# Patient Record
Sex: FEMALE | Race: WHITE | Employment: UNEMPLOYED | URBAN - METROPOLITAN AREA
[De-identification: names, ages, dates, MRNs, and addresses within clinical notes are randomized per-mention and may not be internally consistent; named-entity substitution may affect disease eponyms.]

---

## 2017-02-02 ENCOUNTER — OFFICE VISIT (OUTPATIENT)
Dept: FAMILY MEDICINE CLINIC | Facility: CLINIC | Age: 47
End: 2017-02-02

## 2017-02-02 VITALS
HEIGHT: 65 IN | BODY MASS INDEX: 36.49 KG/M2 | DIASTOLIC BLOOD PRESSURE: 98 MMHG | WEIGHT: 219 LBS | HEART RATE: 78 BPM | SYSTOLIC BLOOD PRESSURE: 142 MMHG

## 2017-02-02 DIAGNOSIS — F41.9 ANXIETY: ICD-10-CM

## 2017-02-02 DIAGNOSIS — G47.9 SLEEP DISTURBANCE: ICD-10-CM

## 2017-02-02 DIAGNOSIS — M15.9 PRIMARY OSTEOARTHRITIS INVOLVING MULTIPLE JOINTS: ICD-10-CM

## 2017-02-02 DIAGNOSIS — L30.9 DERMATITIS: ICD-10-CM

## 2017-02-02 DIAGNOSIS — F43.0 STRESS REACTION: Primary | ICD-10-CM

## 2017-02-02 DIAGNOSIS — J45.20 MILD INTERMITTENT ASTHMA WITHOUT COMPLICATION: ICD-10-CM

## 2017-02-02 DIAGNOSIS — F34.1 DYSTHYMIA: ICD-10-CM

## 2017-02-02 DIAGNOSIS — R53.83 OTHER FATIGUE: ICD-10-CM

## 2017-02-02 DIAGNOSIS — I10 ESSENTIAL HYPERTENSION: ICD-10-CM

## 2017-02-02 PROCEDURE — 99203 OFFICE O/P NEW LOW 30 MIN: CPT | Performed by: NURSE PRACTITIONER

## 2017-02-02 RX ORDER — TRIAMCINOLONE ACETONIDE 5 MG/G
OINTMENT TOPICAL 2 TIMES DAILY PRN
Qty: 1 TUBE | Refills: 1 | Status: SHIPPED | OUTPATIENT
Start: 2017-02-02 | End: 2017-09-26 | Stop reason: ALTCHOICE

## 2017-02-02 RX ORDER — ALBUTEROL SULFATE 90 UG/1
2 AEROSOL, METERED RESPIRATORY (INHALATION) EVERY 6 HOURS PRN
Qty: 1 INHALER | Refills: 1 | Status: SHIPPED | OUTPATIENT
Start: 2017-02-02 | End: 2017-09-13 | Stop reason: SDUPTHER

## 2017-02-02 RX ORDER — METOPROLOL TARTRATE 50 MG/1
50 TABLET, FILM COATED ORAL 2 TIMES DAILY
Qty: 60 TABLET | Refills: 1 | Status: SHIPPED | OUTPATIENT
Start: 2017-02-02 | End: 2017-04-24 | Stop reason: SDUPTHER

## 2017-02-02 RX ORDER — TRIAMCINOLONE ACETONIDE 5 MG/G
OINTMENT TOPICAL 2 TIMES DAILY PRN
COMMUNITY
End: 2017-02-02 | Stop reason: SDUPTHER

## 2017-02-02 RX ORDER — OXYCODONE HYDROCHLORIDE AND ACETAMINOPHEN 5; 325 MG/1; MG/1
1 TABLET ORAL EVERY 4 HOURS PRN
COMMUNITY
End: 2019-10-24

## 2017-02-02 RX ORDER — ALPRAZOLAM 1 MG/1
1 TABLET ORAL DAILY PRN
Qty: 30 TABLET | Refills: 0 | Status: SHIPPED | OUTPATIENT
Start: 2017-02-02 | End: 2017-03-02 | Stop reason: SDUPTHER

## 2017-02-02 RX ORDER — LORATADINE 10 MG/1
10 TABLET ORAL DAILY
COMMUNITY
End: 2017-04-24 | Stop reason: SDUPTHER

## 2017-02-02 RX ORDER — IBUPROFEN 800 MG/1
800 TABLET ORAL EVERY 8 HOURS PRN
Qty: 90 TABLET | Refills: 0 | Status: SHIPPED | OUTPATIENT
Start: 2017-02-02 | End: 2017-03-02 | Stop reason: SDUPTHER

## 2017-02-02 ASSESSMENT — ENCOUNTER SYMPTOMS
SHORTNESS OF BREATH: 0
COUGH: 0

## 2017-03-02 DIAGNOSIS — M15.9 PRIMARY OSTEOARTHRITIS INVOLVING MULTIPLE JOINTS: ICD-10-CM

## 2017-03-02 DIAGNOSIS — F43.0 STRESS REACTION: ICD-10-CM

## 2017-03-02 DIAGNOSIS — F41.9 ANXIETY: ICD-10-CM

## 2017-03-02 RX ORDER — IBUPROFEN 800 MG/1
800 TABLET ORAL EVERY 8 HOURS PRN
Qty: 90 TABLET | Refills: 3 | Status: SHIPPED | OUTPATIENT
Start: 2017-03-02 | End: 2017-07-13 | Stop reason: SDUPTHER

## 2017-03-02 RX ORDER — ALPRAZOLAM 1 MG/1
1 TABLET ORAL DAILY PRN
Qty: 30 TABLET | Refills: 0 | Status: SHIPPED | OUTPATIENT
Start: 2017-03-02 | End: 2017-04-04 | Stop reason: SDUPTHER

## 2017-03-30 ENCOUNTER — HOSPITAL ENCOUNTER (OUTPATIENT)
Age: 47
Setting detail: SPECIMEN
Discharge: HOME | End: 2017-03-30
Payer: MEDICARE

## 2017-03-30 ENCOUNTER — OFFICE VISIT (OUTPATIENT)
Dept: FAMILY MEDICINE CLINIC | Age: 47
End: 2017-03-30
Payer: MEDICARE

## 2017-03-30 VITALS
TEMPERATURE: 97.1 F | DIASTOLIC BLOOD PRESSURE: 88 MMHG | HEART RATE: 64 BPM | BODY MASS INDEX: 38.65 KG/M2 | WEIGHT: 232 LBS | SYSTOLIC BLOOD PRESSURE: 124 MMHG | HEIGHT: 65 IN

## 2017-03-30 DIAGNOSIS — Z11.4 SCREENING FOR HIV WITHOUT PRESENCE OF RISK FACTORS: ICD-10-CM

## 2017-03-30 DIAGNOSIS — J01.00 ACUTE NON-RECURRENT MAXILLARY SINUSITIS: Primary | ICD-10-CM

## 2017-03-30 DIAGNOSIS — H61.21 IMPACTED CERUMEN OF RIGHT EAR: ICD-10-CM

## 2017-03-30 LAB — HIV AG/AB: NONREACTIVE

## 2017-03-30 PROCEDURE — 69210 REMOVE IMPACTED EAR WAX UNI: CPT | Performed by: NURSE PRACTITIONER

## 2017-03-30 PROCEDURE — 99213 OFFICE O/P EST LOW 20 MIN: CPT | Performed by: NURSE PRACTITIONER

## 2017-03-30 PROCEDURE — 36415 COLL VENOUS BLD VENIPUNCTURE: CPT | Performed by: NURSE PRACTITIONER

## 2017-03-30 RX ORDER — SULFAMETHOXAZOLE AND TRIMETHOPRIM 800; 160 MG/1; MG/1
1 TABLET ORAL 2 TIMES DAILY
Qty: 20 TABLET | Refills: 0 | Status: SHIPPED | OUTPATIENT
Start: 2017-03-30 | End: 2017-04-09

## 2017-03-30 RX ORDER — GABAPENTIN 300 MG/1
300 CAPSULE ORAL 3 TIMES DAILY
COMMUNITY
End: 2017-09-26 | Stop reason: DRUGHIGH

## 2017-03-30 ASSESSMENT — ENCOUNTER SYMPTOMS
SINUS PRESSURE: 1
SORE THROAT: 0
SHORTNESS OF BREATH: 0
SWOLLEN GLANDS: 0

## 2017-04-03 DIAGNOSIS — F43.0 STRESS REACTION: ICD-10-CM

## 2017-04-03 DIAGNOSIS — F41.9 ANXIETY: ICD-10-CM

## 2017-04-03 RX ORDER — ALPRAZOLAM 1 MG/1
TABLET, ORALLY DISINTEGRATING ORAL
Qty: 30 TABLET | Refills: 0 | OUTPATIENT
Start: 2017-04-03

## 2017-04-04 RX ORDER — ALPRAZOLAM 1 MG/1
1 TABLET ORAL DAILY PRN
Qty: 30 TABLET | Refills: 0 | Status: SHIPPED | OUTPATIENT
Start: 2017-04-04 | End: 2017-05-02 | Stop reason: SDUPTHER

## 2017-04-24 ENCOUNTER — OFFICE VISIT (OUTPATIENT)
Dept: FAMILY MEDICINE CLINIC | Age: 47
End: 2017-04-24
Payer: MEDICARE

## 2017-04-24 VITALS
DIASTOLIC BLOOD PRESSURE: 98 MMHG | HEIGHT: 65 IN | SYSTOLIC BLOOD PRESSURE: 132 MMHG | HEART RATE: 60 BPM | TEMPERATURE: 97.4 F | BODY MASS INDEX: 38.49 KG/M2 | WEIGHT: 231 LBS

## 2017-04-24 DIAGNOSIS — L30.9 DERMATITIS: ICD-10-CM

## 2017-04-24 DIAGNOSIS — M25.551 CHRONIC RIGHT HIP PAIN: Primary | ICD-10-CM

## 2017-04-24 DIAGNOSIS — I10 ESSENTIAL HYPERTENSION: ICD-10-CM

## 2017-04-24 DIAGNOSIS — M15.9 PRIMARY OSTEOARTHRITIS INVOLVING MULTIPLE JOINTS: ICD-10-CM

## 2017-04-24 DIAGNOSIS — G89.29 CHRONIC RIGHT HIP PAIN: Primary | ICD-10-CM

## 2017-04-24 DIAGNOSIS — J30.2 SEASONAL ALLERGIC RHINITIS, UNSPECIFIED ALLERGIC RHINITIS TRIGGER: ICD-10-CM

## 2017-04-24 PROCEDURE — 99214 OFFICE O/P EST MOD 30 MIN: CPT | Performed by: NURSE PRACTITIONER

## 2017-04-24 RX ORDER — LORATADINE 10 MG/1
10 TABLET ORAL DAILY
Qty: 30 TABLET | Refills: 3 | Status: SHIPPED | OUTPATIENT
Start: 2017-04-24 | End: 2018-07-24 | Stop reason: SDUPTHER

## 2017-04-24 RX ORDER — METOPROLOL TARTRATE 50 MG/1
50 TABLET, FILM COATED ORAL 2 TIMES DAILY
Qty: 60 TABLET | Refills: 3 | Status: SHIPPED | OUTPATIENT
Start: 2017-04-24 | End: 2017-09-13 | Stop reason: SDUPTHER

## 2017-04-24 RX ORDER — DIPHENHYDRAMINE HCL 50 MG
50 CAPSULE ORAL EVERY 6 HOURS PRN
Qty: 120 CAPSULE | Refills: 0 | Status: SHIPPED | OUTPATIENT
Start: 2017-04-24 | End: 2017-05-24

## 2017-04-24 ASSESSMENT — ENCOUNTER SYMPTOMS
SHORTNESS OF BREATH: 0
COUGH: 0
RHINORRHEA: 1
EYE REDNESS: 1
EYE ITCHING: 1

## 2017-04-26 RX ORDER — FLUTICASONE PROPIONATE 50 MCG
1 SPRAY, SUSPENSION (ML) NASAL DAILY
Qty: 1 BOTTLE | Refills: 1 | Status: SHIPPED | OUTPATIENT
Start: 2017-04-26 | End: 2018-07-24 | Stop reason: SDUPTHER

## 2017-05-02 DIAGNOSIS — F41.9 ANXIETY: ICD-10-CM

## 2017-05-02 DIAGNOSIS — F43.0 STRESS REACTION: ICD-10-CM

## 2017-05-02 RX ORDER — ALPRAZOLAM 1 MG/1
1 TABLET ORAL DAILY PRN
Qty: 30 TABLET | Refills: 0 | Status: SHIPPED | OUTPATIENT
Start: 2017-05-02 | End: 2017-06-05 | Stop reason: SDUPTHER

## 2017-05-16 ENCOUNTER — OFFICE VISIT (OUTPATIENT)
Dept: FAMILY MEDICINE CLINIC | Age: 47
End: 2017-05-16
Payer: MEDICARE

## 2017-05-16 VITALS
DIASTOLIC BLOOD PRESSURE: 98 MMHG | WEIGHT: 226.5 LBS | HEIGHT: 65 IN | HEART RATE: 68 BPM | SYSTOLIC BLOOD PRESSURE: 148 MMHG | BODY MASS INDEX: 37.74 KG/M2

## 2017-05-16 DIAGNOSIS — M79.672 LEFT FOOT PAIN: Primary | ICD-10-CM
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
## 2017-05-16 DIAGNOSIS — Z12.31 ENCOUNTER FOR SCREENING MAMMOGRAM FOR BREAST CANCER: ICD-10-CM

## 2017-05-16 PROCEDURE — 99213 OFFICE O/P EST LOW 20 MIN: CPT | Performed by: NURSE PRACTITIONER

## 2017-05-16 RX ORDER — LIDOCAINE HYDROCHLORIDE 40 MG/ML
SOLUTION TOPICAL
COMMUNITY
Start: 2017-05-01 | End: 2017-09-26 | Stop reason: ALTCHOICE

## 2017-05-16 ASSESSMENT — ENCOUNTER SYMPTOMS: RESPIRATORY NEGATIVE: 1

## 2017-05-16 ASSESSMENT — PATIENT HEALTH QUESTIONNAIRE - PHQ9
2. FEELING DOWN, DEPRESSED OR HOPELESS: 0
SUM OF ALL RESPONSES TO PHQ QUESTIONS 1-9: 0
SUM OF ALL RESPONSES TO PHQ9 QUESTIONS 1 & 2: 0
1. LITTLE INTEREST OR PLEASURE IN DOING THINGS: 0

## 2017-06-05 DIAGNOSIS — F41.9 ANXIETY: ICD-10-CM

## 2017-06-05 DIAGNOSIS — F43.0 STRESS REACTION: ICD-10-CM

## 2017-06-05 RX ORDER — ALPRAZOLAM 1 MG/1
1 TABLET ORAL DAILY PRN
Qty: 30 TABLET | Refills: 0 | Status: SHIPPED | OUTPATIENT
Start: 2017-06-05 | End: 2017-07-03 | Stop reason: SDUPTHER

## 2017-07-03 DIAGNOSIS — F43.0 STRESS REACTION: ICD-10-CM

## 2017-07-03 DIAGNOSIS — F41.9 ANXIETY: ICD-10-CM

## 2017-07-03 RX ORDER — ALPRAZOLAM 1 MG/1
TABLET ORAL
Qty: 30 TABLET | Refills: 0 | Status: SHIPPED | OUTPATIENT
Start: 2017-07-03 | End: 2017-08-07 | Stop reason: SDUPTHER

## 2017-07-13 DIAGNOSIS — M15.9 PRIMARY OSTEOARTHRITIS INVOLVING MULTIPLE JOINTS: ICD-10-CM

## 2017-07-13 RX ORDER — IBUPROFEN 800 MG/1
800 TABLET ORAL EVERY 8 HOURS PRN
Qty: 90 TABLET | Refills: 1 | Status: SHIPPED | OUTPATIENT
Start: 2017-07-13 | End: 2017-09-13 | Stop reason: SDUPTHER

## 2017-08-07 DIAGNOSIS — F43.0 STRESS REACTION: ICD-10-CM

## 2017-08-07 DIAGNOSIS — F41.9 ANXIETY: ICD-10-CM

## 2017-08-08 RX ORDER — ALPRAZOLAM 1 MG/1
1 TABLET ORAL NIGHTLY PRN
Qty: 30 TABLET | Refills: 0 | Status: SHIPPED | OUTPATIENT
Start: 2017-08-08 | End: 2017-09-06 | Stop reason: SDUPTHER

## 2017-08-22 ENCOUNTER — OFFICE VISIT (OUTPATIENT)
Dept: FAMILY MEDICINE CLINIC | Age: 47
End: 2017-08-22
Payer: MEDICARE

## 2017-08-22 VITALS
HEIGHT: 65 IN | WEIGHT: 224 LBS | HEART RATE: 60 BPM | BODY MASS INDEX: 37.32 KG/M2 | SYSTOLIC BLOOD PRESSURE: 140 MMHG | DIASTOLIC BLOOD PRESSURE: 90 MMHG

## 2017-08-22 DIAGNOSIS — I10 ESSENTIAL HYPERTENSION: ICD-10-CM

## 2017-08-22 DIAGNOSIS — M25.562 ACUTE PAIN OF LEFT KNEE: Primary | ICD-10-CM

## 2017-08-22 DIAGNOSIS — M15.9 PRIMARY OSTEOARTHRITIS INVOLVING MULTIPLE JOINTS: ICD-10-CM

## 2017-08-22 DIAGNOSIS — M79.7 FIBROMYALGIA: ICD-10-CM

## 2017-08-22 PROCEDURE — 99214 OFFICE O/P EST MOD 30 MIN: CPT | Performed by: NURSE PRACTITIONER

## 2017-08-22 RX ORDER — DULOXETIN HYDROCHLORIDE 30 MG/1
30 CAPSULE, DELAYED RELEASE ORAL DAILY
COMMUNITY
End: 2019-08-19

## 2017-08-22 RX ORDER — AMLODIPINE BESYLATE 10 MG/1
10 TABLET ORAL DAILY
Qty: 30 TABLET | Refills: 3 | Status: SHIPPED | OUTPATIENT
Start: 2017-08-22 | End: 2018-01-23 | Stop reason: SDUPTHER

## 2017-08-22 ASSESSMENT — ENCOUNTER SYMPTOMS: BLURRED VISION: 0

## 2017-08-23 ENCOUNTER — TELEPHONE (OUTPATIENT)
Dept: FAMILY MEDICINE CLINIC | Age: 47
End: 2017-08-23

## 2017-08-23 DIAGNOSIS — M79.672 LEFT FOOT PAIN: ICD-10-CM

## 2017-08-23 DIAGNOSIS — M25.562 ACUTE PAIN OF LEFT KNEE: ICD-10-CM

## 2017-08-23 DIAGNOSIS — M25.562 CHRONIC PAIN OF LEFT KNEE: ICD-10-CM

## 2017-08-23 DIAGNOSIS — M79.7 FIBROMYALGIA: ICD-10-CM

## 2017-08-23 DIAGNOSIS — M17.12 PRIMARY OSTEOARTHRITIS OF LEFT KNEE: Primary | ICD-10-CM

## 2017-08-23 DIAGNOSIS — M77.32 HEEL SPUR, LEFT: ICD-10-CM
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
## 2017-08-23 DIAGNOSIS — M15.9 PRIMARY OSTEOARTHRITIS INVOLVING MULTIPLE JOINTS: ICD-10-CM

## 2017-08-23 DIAGNOSIS — G89.29 CHRONIC PAIN OF LEFT KNEE: ICD-10-CM

## 2017-09-06 DIAGNOSIS — F43.0 STRESS REACTION: ICD-10-CM

## 2017-09-06 DIAGNOSIS — F41.9 ANXIETY: ICD-10-CM

## 2017-09-06 RX ORDER — ALPRAZOLAM 1 MG/1
1 TABLET ORAL NIGHTLY PRN
Qty: 30 TABLET | Refills: 0 | Status: SHIPPED | OUTPATIENT
Start: 2017-09-06 | End: 2017-10-12 | Stop reason: SDUPTHER

## 2017-09-13 DIAGNOSIS — I10 ESSENTIAL HYPERTENSION: ICD-10-CM

## 2017-09-13 DIAGNOSIS — M15.9 PRIMARY OSTEOARTHRITIS INVOLVING MULTIPLE JOINTS: ICD-10-CM

## 2017-09-13 DIAGNOSIS — J45.20 MILD INTERMITTENT ASTHMA WITHOUT COMPLICATION: ICD-10-CM

## 2017-09-13 RX ORDER — METOPROLOL TARTRATE 50 MG/1
50 TABLET, FILM COATED ORAL 2 TIMES DAILY
Qty: 60 TABLET | Refills: 2 | Status: SHIPPED | OUTPATIENT
Start: 2017-09-13 | End: 2017-12-18 | Stop reason: SDUPTHER

## 2017-09-13 RX ORDER — ALBUTEROL SULFATE 90 UG/1
2 AEROSOL, METERED RESPIRATORY (INHALATION) EVERY 6 HOURS PRN
Qty: 1 INHALER | Refills: 3 | Status: SHIPPED | OUTPATIENT
Start: 2017-09-13 | End: 2018-03-19 | Stop reason: SDUPTHER

## 2017-09-13 RX ORDER — IBUPROFEN 800 MG/1
800 TABLET ORAL EVERY 8 HOURS PRN
Qty: 90 TABLET | Refills: 1 | Status: SHIPPED | OUTPATIENT
Start: 2017-09-13 | End: 2017-12-11 | Stop reason: SDUPTHER

## 2017-09-26 ENCOUNTER — OFFICE VISIT (OUTPATIENT)
Dept: FAMILY MEDICINE CLINIC | Age: 47
End: 2017-09-26
Payer: MEDICARE

## 2017-09-26 VITALS
HEART RATE: 60 BPM | BODY MASS INDEX: 36.28 KG/M2 | WEIGHT: 218 LBS | SYSTOLIC BLOOD PRESSURE: 138 MMHG | DIASTOLIC BLOOD PRESSURE: 88 MMHG

## 2017-09-26 DIAGNOSIS — M79.672 LEFT FOOT PAIN: ICD-10-CM

## 2017-09-26 DIAGNOSIS — M25.562 LEFT KNEE PAIN, UNSPECIFIED CHRONICITY: ICD-10-CM

## 2017-09-26 DIAGNOSIS — F43.0 STRESS REACTION: Primary | ICD-10-CM

## 2017-09-26 DIAGNOSIS — F41.9 ANXIETY: ICD-10-CM

## 2017-09-26 LAB
BASOPHILS ABSOLUTE: NORMAL /ΜL
BASOPHILS RELATIVE PERCENT: NORMAL %
EOSINOPHILS ABSOLUTE: NORMAL /ΜL
EOSINOPHILS RELATIVE PERCENT: NORMAL %
HCT VFR BLD CALC: NORMAL % (ref 36–46)
HEMOGLOBIN: NORMAL G/DL (ref 12–16)
LYMPHOCYTES ABSOLUTE: NORMAL /ΜL
LYMPHOCYTES RELATIVE PERCENT: NORMAL %
MCH RBC QN AUTO: NORMAL PG
MCHC RBC AUTO-ENTMCNC: NORMAL G/DL
MCV RBC AUTO: NORMAL FL
MONOCYTES ABSOLUTE: NORMAL /ΜL
MONOCYTES RELATIVE PERCENT: NORMAL %
NEUTROPHILS ABSOLUTE: NORMAL /ΜL
NEUTROPHILS RELATIVE PERCENT: NORMAL %
PDW BLD-RTO: NORMAL %
PLATELET # BLD: NORMAL K/ΜL
PMV BLD AUTO: NORMAL FL
RBC # BLD: NORMAL 10^6/ΜL
VITAMIN D 25-HYDROXY: NORMAL
VITAMIN D2, 25 HYDROXY: NORMAL
VITAMIN D3,25 HYDROXY: NORMAL
WBC # BLD: NORMAL 10^3/ML

## 2017-09-26 PROCEDURE — 99213 OFFICE O/P EST LOW 20 MIN: CPT | Performed by: NURSE PRACTITIONER

## 2017-09-26 PROCEDURE — 36415 COLL VENOUS BLD VENIPUNCTURE: CPT | Performed by: NURSE PRACTITIONER

## 2017-09-26 RX ORDER — GABAPENTIN 600 MG/1
TABLET ORAL 3 TIMES DAILY
Refills: 0 | COMMUNITY
Start: 2017-09-21 | End: 2018-07-24 | Stop reason: ALTCHOICE

## 2017-09-28 ENCOUNTER — TELEPHONE (OUTPATIENT)
Dept: FAMILY MEDICINE CLINIC | Age: 47
End: 2017-09-28

## 2017-09-28 DIAGNOSIS — R53.83 FATIGUE, UNSPECIFIED TYPE: ICD-10-CM

## 2017-09-28 DIAGNOSIS — F34.1 DYSTHYMIA: ICD-10-CM

## 2017-09-28 DIAGNOSIS — G47.9 SLEEP DISTURBANCE: ICD-10-CM

## 2017-10-12 DIAGNOSIS — F43.0 STRESS REACTION: ICD-10-CM

## 2017-10-12 DIAGNOSIS — F41.9 ANXIETY: ICD-10-CM

## 2017-10-12 RX ORDER — ALPRAZOLAM 1 MG/1
1 TABLET ORAL NIGHTLY PRN
Qty: 30 TABLET | Refills: 0 | Status: SHIPPED | OUTPATIENT
Start: 2017-10-12 | End: 2017-11-27 | Stop reason: SDUPTHER

## 2017-10-12 NOTE — TELEPHONE ENCOUNTER
Fax request, last appt 9/26/17    Health Maintenance   Topic Date Due    Cervical cancer screen  02/07/1989    Flu vaccine (1) 02/02/2018 (Originally 9/1/2017)    DTaP/Tdap/Td vaccine (1 - Tdap) 03/30/2018 (Originally 2/7/1987)    Pneumococcal med risk (1 of 1 - PPSV23) 03/30/2018 (Originally 2/7/1987)    Diabetes screen  08/24/2018    Lipid screen  04/18/2021    HIV screen  Completed             (applicable per patient's age: Cancer Screenings, Depression Screening, Fall Risk Screening, Immunizations)    No results found for: LABA1C, LABMICR, LDLCHOLESTEROL, LDLCALC, AST, ALT, BUN   (goal A1C is < 7)   (goal LDL is <100) need 30-50% reduction from baseline     BP Readings from Last 3 Encounters:   09/26/17 138/88   08/22/17 (!) 140/90   05/16/17 (!) 148/98    (goal /80)      All Future Testing planned in [facility]:  Lab Frequency Next Occurrence   XR HIP RIGHT STANDARD Once 07/26/2017   ASIF DIGITAL SCREEN BILATERAL Once 08/17/2017       Next Visit Date:  No future appointments.          Patient Active Problem List:     Stress reaction     Essential hypertension     Dysthymia     Anxiety     Mild intermittent asthma without complication     Dermatitis     Osteoarthritis of multiple joints     Fibromyalgia

## 2017-11-27 DIAGNOSIS — F41.9 ANXIETY: ICD-10-CM

## 2017-11-27 DIAGNOSIS — F43.0 STRESS REACTION: ICD-10-CM

## 2017-11-27 NOTE — TELEPHONE ENCOUNTER
Pharmacy request refill last in office 09/26/2017.   Health Maintenance   Topic Date Due    Cervical cancer screen  02/07/1989    Flu vaccine (1) 02/02/2018 (Originally 9/1/2017)    DTaP/Tdap/Td vaccine (1 - Tdap) 03/30/2018 (Originally 2/7/1987)    Pneumococcal med risk (1 of 1 - PPSV23) 03/30/2018 (Originally 2/7/1987)    Diabetes screen  08/24/2018    Lipid screen  04/18/2021    HIV screen  Completed             (applicable per patient's age: Cancer Screenings, Depression Screening, Fall Risk Screening, Immunizations)    No results found for: LABA1C, LABMICR, LDLCHOLESTEROL, LDLCALC, AST, ALT, BUN   (goal A1C is < 7)   (goal LDL is <100) need 30-50% reduction from baseline     BP Readings from Last 3 Encounters:   09/26/17 138/88   08/22/17 (!) 140/90   05/16/17 (!) 148/98    (goal /80)      All Future Testing planned in [facility]:  Lab Frequency Next Occurrence   XR HIP RIGHT STANDARD Once 07/26/2017   ASIF DIGITAL SCREEN BILATERAL Once 08/17/2017       Next Visit Date:  Future Appointments  Date Time Provider Phoebe Boogie   11/28/2017 10:30 AM Bruno Bernstein, PT [facility] None            Patient Active Problem List:     Stress reaction     Essential hypertension     Dysthymia     Anxiety     Mild intermittent asthma without complication     Dermatitis     Osteoarthritis of multiple joints     Fibromyalgia

## 2017-11-28 RX ORDER — ALPRAZOLAM 1 MG/1
1 TABLET ORAL NIGHTLY PRN
Qty: 30 TABLET | Refills: 0 | Status: SHIPPED | OUTPATIENT
Start: 2017-11-28 | End: 2017-12-26 | Stop reason: SDUPTHER

## 2017-11-30 ENCOUNTER — HOSPITAL ENCOUNTER (OUTPATIENT)
Dept: PHYSICAL THERAPY | Facility: CLINIC | Age: 47
Setting detail: THERAPIES SERIES
Discharge: HOME | End: 2017-11-30
Payer: MEDICARE

## 2017-11-30 PROCEDURE — 97110 THERAPEUTIC EXERCISES: CPT

## 2017-11-30 PROCEDURE — 97161 PT EVAL LOW COMPLEX 20 MIN: CPT

## 2017-11-30 PROCEDURE — 97113 AQUATIC THERAPY/EXERCISES: CPT

## 2017-11-30 NOTE — FLOWSHEET NOTE
[] [facility]       Physical Therapy [facility]       [address]        [address]       Phone: [phone]       Fax: [phone] [] [facility]       Promotion at [address] [facility]       Phone: [phone]       Fax: [phone] [x] Tyrese. [facility]  [address] [place] [address]   Phone: [phone]   Fax:  [phone]     Physical Therapy Daily  Aquatic Treatment Note    Date:  2017  Patient Name:  Lorraine Benton    :  1970  MRN: [id number]  Physician: Dr. Jv Villar: Roberto Gold (27 v)  Medical Diagnosis: Fibromyalgia                 Rehab Codes: M51.26  Onset Date:                    Next 's appt.: na      Visit# / total visits:  (2x wk)  Cancels/No Shows:     Subjective:    Pain:  [x] Yes  [] No Location: bilat shoulders, elbows, lower back, knees, feet   Pain Rating: (0-10 scale) 4-5/10  Pain altered Tx:  [x] No  [] Yes  Action:  Comments: (prefers Marlow Babinski or [address] [place] [address]) Pt reports is always in pn with any action or WB and averages around 4-5/10 pn daily.      Objective:     KEY  B = Belt G = Gloves N = Noodle   C = Cuffs K = Kickboard P = Paddles   CC = Cervical Collar L = Laps T = Theratube   DB = Dumbells M = Minutes W = Weights     Exercises/Activities  Warm-up/Amb     Dynamic Exercises       Forward 3L    March       Sideways 3L    Squat       Backwards 3L    Retro HS curls            Retro SLR       Stretches     Braiding       Gastroc/Soleus     Heel to Toe amb       Hamstring     Toe amb       Hip flexor     Heel amb       Piriformis            SKTC            Pec Stretch            Post Deltoid     Static Exercises UE            Shoulder flex/ext 12      Static Exercises LE     Shoulder abd/add 12      Heel/toe raises 12    Shoulder H.  abd/add 12      Marches 12    Shoulder IR/ER       Mini-squats 12    Rowing 12      4-way hip  12    Arm Circles       Hamstring curls 12    UT shrugs/rolls       Hip Circles/Fig 8     Scap squeezes       Ankle ROM 12    Diagonals 1/2       Lunges      Elbow flex/ext            Pron/Sup       Functional Exercise     Wrist AROM       Step            Wall Push-ups     Deep H20/       SLS     Bike 2'      Breast Stroke on Noodle     Hip abd/add       Noodle Twist     Hip flex/ext       Noodle Push down     Hip IR/ER       Kickboard push/pull     Knee flex/ext            Push/pull on [employer] 5'      Other:    Specific Instructions for next treatment:    Treatment Charges: Mins Units   []  Modalities     []  Ther Exercise     []  Manual Therapy     []  Ther Activities     [x]  Aquatics 35 2   []  Other       Assessment: [x] Progressing toward goals. Initiated full aquatic therapy this date with ed on benefits of aquatics and levels of gravity elimination. Demonstration with vc for each ex was provided for proper technique and mm activation. Pt had good marcos and understanding of ex with min L foot discomfort with dorsiflexion in mini squats but was mracos. Ed pt on DOMS with plans on progressing next tx if pt marcos. [] No change. [] Other:    STG: (to be met in 10 treatments)  1. ? Pain: Pt to decrease pain levels to 3/10 with ADLs  2. ? ROM: Increase flexibility throughout to ease ADL progression  3. ? Strength: Improve strength to 5/5 MMT throughout limitations  4. ? Function: Allow patient to perform stairs at home, community without pain   5. Independent with Home Exercise Programs  6. Demonstrate Knowledge of fall prevention     LTG: (to be met in 20 treatments)  1. Improve score of functional assessment tool LEFS from 50% impairment to less than 20% impairment   2. Decrease pain to 0-1/10 wth ADLs to ease functional motions    Pt.  Education:  [x] Yes  [] No  [] Reviewed Prior HEP/Ed  Method of Education: [x] Verbal  [] Demo  [] Written  Comprehension of Education:  [x] [employer]

## 2017-11-30 NOTE — CONSULTS
Phosphate 40-80 mAmin  [x] Aquatic Therapy   [] Other:    []  Medication allergies reviewed for use of    Dexamethasone Sodium Phosphate 4mg/ml     with iontophoresis treatments. Pt is not allergic. Frequency:  2 x/week for 20 visits      Todays Treatment:  Treatment Location  Left      Right                          Position   Bilat UE/LEs []          []  [] Supine    [] Prone   [] Side lying  [] Sitting                 Exercises:  Exercise Reps/ Time Weight/ Level Comments   Pool                                                                                                       Other:    Specific Instructions for next treatment: Advance in aquatic program     Treatment Charges: Mins Units   [x] Evaluation       [x]  Low       []  Moderate       []  High 30 1   []  Modalities     [x]  Ther Exercise 10 1   []  Manual Therapy     []  Ther Activities     []  Aquatics     []  Vasocompression     []  Other       TOTAL TREATMENT TIME: 40    Time in: 1154      Time out: 4202    Electronically signed by: Gigi Barrow PT    Physician Signature:________________________________Date:__________________  By signing above or cosigning this note, I have reviewed this plan of care and certify a need for medically necessary rehabilitation services.      *PLEASE SIGN ABOVE AND FAX BACK ALL PAGES*

## 2017-12-11 DIAGNOSIS — M15.9 PRIMARY OSTEOARTHRITIS INVOLVING MULTIPLE JOINTS: ICD-10-CM

## 2017-12-11 RX ORDER — IBUPROFEN 800 MG/1
800 TABLET ORAL EVERY 8 HOURS PRN
Qty: 90 TABLET | Refills: 1 | Status: SHIPPED | OUTPATIENT
Start: 2017-12-11 | End: 2018-07-24 | Stop reason: ALTCHOICE

## 2017-12-11 NOTE — TELEPHONE ENCOUNTER
Fax from pharmacy  Health Maintenance   Topic Date Due    Cervical cancer screen  02/07/1989    Flu vaccine (1) 02/02/2018 (Originally 9/1/2017)    DTaP/Tdap/Td vaccine (1 - Tdap) 03/30/2018 (Originally 2/7/1987)    Pneumococcal med risk (1 of 1 - PPSV23) 03/30/2018 (Originally 2/7/1987)    Diabetes screen  08/24/2018    Lipid screen  04/18/2021    HIV screen  Completed             (applicable per patient's age: Cancer Screenings, Depression Screening, Fall Risk Screening, Immunizations)    No results found for: LABA1C, LABMICR, LDLCHOLESTEROL, LDLCALC, AST, ALT, BUN   (goal A1C is < 7)   (goal LDL is <100) need 30-50% reduction from baseline     BP Readings from Last 3 Encounters:   09/26/17 138/88   08/22/17 (!) 140/90   05/16/17 (!) 148/98    (goal /80)      All Future Testing planned in [facility]:  Lab Frequency Next Occurrence   XR HIP RIGHT STANDARD Once 07/26/2017   ASIF DIGITAL SCREEN BILATERAL Once 08/17/2017       Next Visit Date:  No future appointments.          Patient Active Problem List:     Stress reaction     Essential hypertension     Dysthymia     Anxiety     Mild intermittent asthma without complication     Dermatitis     Osteoarthritis of multiple joints     Fibromyalgia

## 2017-12-13 ENCOUNTER — HOSPITAL ENCOUNTER (OUTPATIENT)
Dept: PHYSICAL THERAPY | Facility: CLINIC | Age: 47
Setting detail: THERAPIES SERIES
Discharge: HOME | End: 2017-12-13
Payer: MEDICARE

## 2017-12-13 NOTE — FLOWSHEET NOTE
[] Charla Medrano        Outpatient Physical                Therapy       [address]       Phone: [phone]       Fax: [phone] [] [facility] at [address] [place] [address]       Phone: [phone]       Fax: [phone] [x] [facility]     [address] [place] [address]      Phone: [phone]      Fax:  [phone]     Physical Therapy Cancel/No Show note    Date: 2017  Patient: Gavino David  : 1970  MRN: [id number]    Cancels/No Shows to date:     For today's appointment patient:  [x]  Cancelled  []  Rescheduled appointment  []  No-show     Reason given by patient:  []  Patient ill  []  Conflicting appointment  []  No transportation    []  Conflict with work  []  No reason given  [x]  Weather related  []  Other:      Comments:      [x]  Next appointment was confirmed    Electronically signed by: Tommy Galeano

## 2017-12-14 ENCOUNTER — OFFICE VISIT (OUTPATIENT)
Dept: FAMILY MEDICINE CLINIC | Age: 47
End: 2017-12-14
Payer: MEDICARE

## 2017-12-14 ENCOUNTER — HOSPITAL ENCOUNTER (OUTPATIENT)
Dept: PHYSICAL THERAPY | Facility: CLINIC | Age: 47
Setting detail: THERAPIES SERIES
Discharge: HOME | End: 2017-12-14
Payer: MEDICARE

## 2017-12-14 VITALS — DIASTOLIC BLOOD PRESSURE: 112 MMHG | HEART RATE: 76 BPM | SYSTOLIC BLOOD PRESSURE: 164 MMHG
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
## 2017-12-14 DIAGNOSIS — J34.89 NASAL TENDERNESS: ICD-10-CM

## 2017-12-14 DIAGNOSIS — S83.92XA SPRAIN OF LEFT KNEE, UNSPECIFIED LIGAMENT, INITIAL ENCOUNTER: ICD-10-CM

## 2017-12-14 DIAGNOSIS — R51.9 LEFT-SIDED FACE PAIN: ICD-10-CM

## 2017-12-14 DIAGNOSIS — S05.12XA TRAUMATIC HEMATOMA OF LEFT ORBIT, INITIAL ENCOUNTER: ICD-10-CM

## 2017-12-14 PROCEDURE — G8484 FLU IMMUNIZE NO ADMIN: HCPCS | Performed by: NURSE PRACTITIONER

## 2017-12-14 PROCEDURE — 99214 OFFICE O/P EST MOD 30 MIN: CPT | Performed by: NURSE PRACTITIONER

## 2017-12-14 PROCEDURE — G8417 CALC BMI ABV UP PARAM F/U: HCPCS | Performed by: NURSE PRACTITIONER

## 2017-12-14 PROCEDURE — G8427 DOCREV CUR MEDS BY ELIG CLIN: HCPCS | Performed by: NURSE PRACTITIONER

## 2017-12-14 PROCEDURE — 1036F TOBACCO NON-USER: CPT | Performed by: NURSE PRACTITIONER

## 2017-12-14 RX ORDER — PAROXETINE HYDROCHLORIDE 20 MG/1
TABLET, FILM COATED ORAL
Refills: 0 | COMMUNITY
Start: 2017-11-25 | End: 2018-05-02 | Stop reason: ALTCHOICE

## 2017-12-14 ASSESSMENT — ENCOUNTER SYMPTOMS
EYE PAIN: 1
BLURRED VISION: 0
PHOTOPHOBIA: 0

## 2017-12-14 NOTE — PROGRESS NOTES
knee) and myalgias (\"sore\"). Neurological: Negative for dizziness, tingling, sensory change, loss of consciousness, numbness and headaches. PAST MEDICAL HISTORY    Past Medical History:   Diagnosis Date    Anxiety     Arthritis     Asthma     Cataplexy and narcolepsy     Chronic pain     Depression     Fibromyalgia     Hypertension     Osteoarthritis        FAMILY HISTORY    Family History   Problem Relation Age of Onset    Heart Disease Mother     Diabetes Mother     Stroke Mother     Dementia Father        SOCIAL HISTORY    Social History     Social History    Marital status:      Spouse name: N/A    Number of children: N/A    Years of education: N/A     Social History Main Topics    Smoking status: Never Smoker    Smokeless tobacco: Never Used    Alcohol use No    Drug use: No    Sexual activity: Not on file     Other Topics Concern    Not on file     Social History Narrative    No narrative on file       SURGICAL HISTORY    Past Surgical History:   Procedure Laterality Date    CHOLECYSTECTOMY         CURRENT MEDICATIONS    Current Outpatient Prescriptions   Medication Sig Dispense Refill    PARoxetine (PAXIL) 20 MG tablet take 1 tablet by mouth once daily  0    ibuprofen (ADVIL;MOTRIN) 800 MG tablet Take 1 tablet by mouth every 8 hours as needed for Pain 90 tablet 1    ALPRAZolam (XANAX) 1 MG tablet Take 1 tablet by mouth nightly as needed for Anxiety .  30 tablet 0    gabapentin (NEURONTIN) 600 MG tablet 3 times daily  0    metoprolol tartrate (LOPRESSOR) 50 MG tablet Take 1 tablet by mouth 2 times daily 60 tablet 2    albuterol sulfate  (90 Base) MCG/ACT inhaler Inhale 2 puffs into the lungs every 6 hours as needed for Wheezing 1 Inhaler 3    DULoxetine (CYMBALTA) 30 MG extended release capsule Take 30 mg by mouth daily      amLODIPine (NORVASC) 10 MG tablet Take 1 tablet by mouth daily 30 tablet 3    fluticasone (FLONASE) 50 MCG/ACT nasal spray 1 spray by

## 2017-12-14 NOTE — FLOWSHEET NOTE
[] Marlin Del Castillo        Outpatient Physical                Therapy       [address]       Phone: [phone]       Fax: [phone] [] [facility] at [address] [facility]       Phone: [phone]       Fax: [phone] [x] Phijessica.  [address] [place] [address]      Phone: [phone]      Fax:  [phone]     Physical Therapy Cancel/No Show note    Date: 2017  Patient: Sean Ford  : 1970  MRN: [id number]    Cancels/No Shows to date:   For today's appointment patient:  [x]  Cancelled  []  Rescheduled appointment  []  No-show     Reason given by patient:  []  Patient ill  []  Conflicting appointment  []  No transportation    []  Conflict with work  []  No reason given  []  Weather related  [x]  Other:    Pt injured knee and requires xray   Comments:      []  Next appointment was confirmed    Electronically signed by: Raymon Carrero PTA

## 2017-12-18 DIAGNOSIS — I10 ESSENTIAL HYPERTENSION: ICD-10-CM

## 2017-12-18 RX ORDER — METOPROLOL TARTRATE 50 MG/1
50 TABLET, FILM COATED ORAL 2 TIMES DAILY
Qty: 60 TABLET | Refills: 5 | Status: SHIPPED | OUTPATIENT
Start: 2017-12-18 | End: 2018-09-11 | Stop reason: SDUPTHER

## 2017-12-18 NOTE — TELEPHONE ENCOUNTER
Fax from pharmacy   Health Maintenance   Topic Date Due    Cervical cancer screen  02/07/1989    Flu vaccine (1) 02/02/2018 (Originally 9/1/2017)    DTaP/Tdap/Td vaccine (1 - Tdap) 03/30/2018 (Originally 2/7/1987)    Pneumococcal med risk (1 of 1 - PPSV23) 03/30/2018 (Originally 2/7/1987)    Diabetes screen  08/24/2018    Lipid screen  04/18/2021    HIV screen  Completed             (applicable per patient's age: Cancer Screenings, Depression Screening, Fall Risk Screening, Immunizations)    No results found for: LABA1C, LABMICR, LDLCHOLESTEROL, LDLCALC, AST, ALT, BUN   (goal A1C is < 7)   (goal LDL is <100) need 30-50% reduction from baseline     BP Readings from Last 3 Encounters:   12/14/17 (!) 164/112   09/26/17 138/88   08/22/17 (!) 140/90    (goal /80)      All Future Testing planned in [facility]:  Lab Frequency Next Occurrence   XR HIP RIGHT STANDARD Once 07/26/2017   ASIF DIGITAL SCREEN BILATERAL Once 08/17/2017   CT FACIAL BONES WO CONTRAST Once 12/14/2017   XR KNEE LEFT (3 VIEWS) Once 12/14/2017       Next Visit Date:  No future appointments.          Patient Active Problem List:     Stress reaction     Essential hypertension     Dysthymia     Anxiety     Mild intermittent asthma without complication     Dermatitis     Osteoarthritis of multiple joints     Fibromyalgia

## 2017-12-26 DIAGNOSIS — F41.9 ANXIETY: ICD-10-CM

## 2017-12-26 DIAGNOSIS — F43.0 STRESS REACTION: ICD-10-CM

## 2017-12-26 RX ORDER — ALPRAZOLAM 1 MG/1
TABLET ORAL
Qty: 30 TABLET | Refills: 0 | Status: SHIPPED | OUTPATIENT
Start: 2017-12-26 | End: 2018-01-23 | Stop reason: SDUPTHER

## 2018-01-23 DIAGNOSIS — F43.0 STRESS REACTION: ICD-10-CM

## 2018-01-23 DIAGNOSIS — F41.9 ANXIETY: ICD-10-CM

## 2018-01-23 RX ORDER — ALPRAZOLAM 1 MG/1
TABLET ORAL
Qty: 30 TABLET | Refills: 0 | Status: SHIPPED | OUTPATIENT
Start: 2018-01-23 | End: 2018-02-19 | Stop reason: SDUPTHER

## 2018-01-23 RX ORDER — AMLODIPINE BESYLATE 10 MG/1
10 TABLET ORAL DAILY
Qty: 30 TABLET | Refills: 3 | Status: SHIPPED | OUTPATIENT
Start: 2018-01-23 | End: 2018-05-19 | Stop reason: SDUPTHER

## 2018-02-13 ENCOUNTER — OFFICE VISIT (OUTPATIENT)
Dept: INFECTIOUS DISEASES | Age: 48
End: 2018-02-13
Payer: MEDICARE

## 2018-02-13 VITALS
RESPIRATION RATE: 16 BRPM | HEIGHT: 65 IN | SYSTOLIC BLOOD PRESSURE: 193 MMHG | HEART RATE: 105 BPM | BODY MASS INDEX: 40.75 KG/M2 | OXYGEN SATURATION: 96 % | WEIGHT: 244.6 LBS | TEMPERATURE: 97.8 F | DIASTOLIC BLOOD PRESSURE: 92 MMHG
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
## 2018-02-13 DIAGNOSIS — L03.116 CELLULITIS OF LEFT LOWER EXTREMITY: ICD-10-CM

## 2018-02-13 PROCEDURE — G8427 DOCREV CUR MEDS BY ELIG CLIN: HCPCS | Performed by: INTERNAL MEDICINE

## 2018-02-13 PROCEDURE — 3017F COLORECTAL CA SCREEN DOC REV: CPT | Performed by: INTERNAL MEDICINE

## 2018-02-13 PROCEDURE — G8417 CALC BMI ABV UP PARAM F/U: HCPCS | Performed by: INTERNAL MEDICINE

## 2018-02-13 PROCEDURE — 99213 OFFICE O/P EST LOW 20 MIN: CPT | Performed by: INTERNAL MEDICINE

## 2018-02-13 PROCEDURE — 1036F TOBACCO NON-USER: CPT | Performed by: INTERNAL MEDICINE

## 2018-02-13 PROCEDURE — G8484 FLU IMMUNIZE NO ADMIN: HCPCS | Performed by: INTERNAL MEDICINE

## 2018-02-13 RX ORDER — TRAZODONE HYDROCHLORIDE 100 MG/1
1 TABLET ORAL DAILY
Refills: 0 | COMMUNITY
Start: 2017-12-26 | End: 2019-02-26 | Stop reason: ALTCHOICE

## 2018-02-13 ASSESSMENT — ENCOUNTER SYMPTOMS
SORE THROAT: 0
TROUBLE SWALLOWING: 0
DIARRHEA: 0
FACIAL SWELLING: 0
RHINORRHEA: 0
SHORTNESS OF BREATH: 0
COUGH: 0
ABDOMINAL PAIN: 0
VOICE CHANGE: 0
ABDOMINAL DISTENTION: 0
EYE DISCHARGE: 0
BACK PAIN: 0
NAUSEA: 0

## 2018-03-16 DIAGNOSIS — F41.9 ANXIETY: ICD-10-CM

## 2018-03-16 DIAGNOSIS — F43.0 STRESS REACTION: ICD-10-CM

## 2018-03-16 RX ORDER — TRIAMCINOLONE ACETONIDE 5 MG/G
OINTMENT TOPICAL
Qty: 15 G | Refills: 0 | Status: SHIPPED | OUTPATIENT
Start: 2018-03-16 | End: 2018-05-19 | Stop reason: SDUPTHER

## 2018-03-16 RX ORDER — ALPRAZOLAM 1 MG/1
TABLET ORAL
Qty: 30 TABLET | Refills: 0 | Status: SHIPPED | OUTPATIENT
Start: 2018-03-16 | End: 2018-04-17 | Stop reason: SDUPTHER

## 2018-03-19 DIAGNOSIS — J45.20 MILD INTERMITTENT ASTHMA WITHOUT COMPLICATION: ICD-10-CM

## 2018-03-19 RX ORDER — ALBUTEROL SULFATE 90 UG/1
2 AEROSOL, METERED RESPIRATORY (INHALATION) EVERY 6 HOURS PRN
Qty: 1 INHALER | Refills: 2 | Status: SHIPPED | OUTPATIENT
Start: 2018-03-19 | End: 2018-07-20 | Stop reason: SDUPTHER

## 2018-04-05 ENCOUNTER — OFFICE VISIT (OUTPATIENT)
Dept: FAMILY MEDICINE CLINIC | Age: 48
End: 2018-04-05
Payer: MEDICARE

## 2018-04-05 VITALS
DIASTOLIC BLOOD PRESSURE: 84 MMHG | WEIGHT: 254 LBS | HEART RATE: 96 BPM | BODY MASS INDEX: 42.27 KG/M2 | SYSTOLIC BLOOD PRESSURE: 138 MMHG

## 2018-04-05 DIAGNOSIS — M79.7 FIBROMYALGIA: ICD-10-CM

## 2018-04-05 DIAGNOSIS — M15.9 PRIMARY OSTEOARTHRITIS INVOLVING MULTIPLE JOINTS: Primary | ICD-10-CM

## 2018-04-05 DIAGNOSIS — M51.36 DDD (DEGENERATIVE DISC DISEASE), LUMBAR: ICD-10-CM

## 2018-04-05 PROCEDURE — G8427 DOCREV CUR MEDS BY ELIG CLIN: HCPCS | Performed by: NURSE PRACTITIONER

## 2018-04-05 PROCEDURE — G8417 CALC BMI ABV UP PARAM F/U: HCPCS | Performed by: NURSE PRACTITIONER

## 2018-04-05 PROCEDURE — 99213 OFFICE O/P EST LOW 20 MIN: CPT | Performed by: NURSE PRACTITIONER

## 2018-04-05 PROCEDURE — 3017F COLORECTAL CA SCREEN DOC REV: CPT | Performed by: NURSE PRACTITIONER

## 2018-04-05 PROCEDURE — 1036F TOBACCO NON-USER: CPT | Performed by: NURSE PRACTITIONER

## 2018-04-05 ASSESSMENT — ENCOUNTER SYMPTOMS
BACK PAIN: 1
BOWEL INCONTINENCE: 0

## 2018-04-10 ENCOUNTER — HOSPITAL ENCOUNTER (OUTPATIENT)
Dept: PHYSICAL THERAPY | Facility: CLINIC | Age: 48
Setting detail: THERAPIES SERIES
Discharge: HOME | End: 2018-04-10
Payer: MEDICARE

## 2018-04-17 DIAGNOSIS — F43.0 STRESS REACTION: ICD-10-CM

## 2018-04-17 DIAGNOSIS — F41.9 ANXIETY: ICD-10-CM

## 2018-04-18 RX ORDER — ALPRAZOLAM 1 MG/1
TABLET ORAL
Qty: 30 TABLET | Refills: 0 | Status: SHIPPED | OUTPATIENT
Start: 2018-04-18 | End: 2018-05-02 | Stop reason: HOSPADM

## 2018-04-24 DIAGNOSIS — M25.552 LEFT HIP PAIN: Primary | ICD-10-CM

## 2018-04-24 DIAGNOSIS — M25.562 LEFT KNEE PAIN, UNSPECIFIED CHRONICITY: ICD-10-CM

## 2018-04-25 ENCOUNTER — OFFICE VISIT (OUTPATIENT)
Dept: ORTHOPEDIC SURGERY | Age: 48
End: 2018-04-25
Payer: MEDICARE

## 2018-04-25 VITALS — HEIGHT: 65 IN | BODY MASS INDEX: 43.62 KG/M2 | WEIGHT: 261.8 LBS

## 2018-04-25 DIAGNOSIS — M17.12 PRIMARY OSTEOARTHRITIS OF LEFT KNEE: Primary | ICD-10-CM

## 2018-04-25 PROBLEM — L03.116 LEFT LEG CELLULITIS: Status: ACTIVE | Noted: 2018-01-10

## 2018-04-25 PROCEDURE — 3017F COLORECTAL CA SCREEN DOC REV: CPT | Performed by: STUDENT IN AN ORGANIZED HEALTH CARE EDUCATION/TRAINING PROGRAM

## 2018-04-25 PROCEDURE — 99203 OFFICE O/P NEW LOW 30 MIN: CPT | Performed by: STUDENT IN AN ORGANIZED HEALTH CARE EDUCATION/TRAINING PROGRAM

## 2018-04-25 PROCEDURE — 1036F TOBACCO NON-USER: CPT | Performed by: STUDENT IN AN ORGANIZED HEALTH CARE EDUCATION/TRAINING PROGRAM

## 2018-04-25 PROCEDURE — G8427 DOCREV CUR MEDS BY ELIG CLIN: HCPCS | Performed by: STUDENT IN AN ORGANIZED HEALTH CARE EDUCATION/TRAINING PROGRAM

## 2018-04-25 PROCEDURE — G8417 CALC BMI ABV UP PARAM F/U: HCPCS | Performed by: STUDENT IN AN ORGANIZED HEALTH CARE EDUCATION/TRAINING PROGRAM

## 2018-04-25 RX ORDER — MELOXICAM 15 MG/1
15 TABLET ORAL DAILY
Qty: 30 TABLET | Refills: 1 | Status: SHIPPED | OUTPATIENT
Start: 2018-04-25 | End: 2018-05-02 | Stop reason: ALTCHOICE

## 2018-04-25 ASSESSMENT — ENCOUNTER SYMPTOMS
NAUSEA: 0
WHEEZING: 0
ABDOMINAL DISTENTION: 0
ABDOMINAL PAIN: 0
SHORTNESS OF BREATH: 0
VOMITING: 0
COLOR CHANGE: 0
EYE PAIN: 0
COUGH: 0

## 2018-05-01 ENCOUNTER — HOSPITAL ENCOUNTER (OUTPATIENT)
Dept: PHYSICAL THERAPY | Age: 48
Setting detail: THERAPIES SERIES
Discharge: HOME | End: 2018-05-01
Payer: MEDICARE

## 2018-05-02 ENCOUNTER — OFFICE VISIT (OUTPATIENT)
Dept: FAMILY MEDICINE CLINIC | Age: 48
End: 2018-05-02
Payer: MEDICARE

## 2018-05-02 VITALS
DIASTOLIC BLOOD PRESSURE: 84 MMHG | HEART RATE: 100 BPM | SYSTOLIC BLOOD PRESSURE: 122 MMHG | WEIGHT: 255 LBS | BODY MASS INDEX: 42.43 KG/M2

## 2018-05-02 DIAGNOSIS — I10 ESSENTIAL HYPERTENSION: ICD-10-CM

## 2018-05-02 DIAGNOSIS — R63.5 WEIGHT GAIN: ICD-10-CM

## 2018-05-02 DIAGNOSIS — Z13.220 ENCOUNTER FOR SCREENING FOR LIPID DISORDER: ICD-10-CM

## 2018-05-02 PROCEDURE — 3017F COLORECTAL CA SCREEN DOC REV: CPT | Performed by: NURSE PRACTITIONER

## 2018-05-02 PROCEDURE — 99213 OFFICE O/P EST LOW 20 MIN: CPT | Performed by: NURSE PRACTITIONER

## 2018-05-02 PROCEDURE — 1036F TOBACCO NON-USER: CPT | Performed by: NURSE PRACTITIONER

## 2018-05-02 PROCEDURE — G8427 DOCREV CUR MEDS BY ELIG CLIN: HCPCS | Performed by: NURSE PRACTITIONER

## 2018-05-02 PROCEDURE — G8417 CALC BMI ABV UP PARAM F/U: HCPCS | Performed by: NURSE PRACTITIONER

## 2018-05-02 RX ORDER — BUDESONIDE AND FORMOTEROL FUMARATE DIHYDRATE 160; 4.5 UG/1; UG/1
2 AEROSOL RESPIRATORY (INHALATION) 2 TIMES DAILY
COMMUNITY
End: 2018-07-24 | Stop reason: SDUPTHER

## 2018-05-02 RX ORDER — VENLAFAXINE HYDROCHLORIDE 75 MG/1
CAPSULE, EXTENDED RELEASE ORAL
Refills: 0 | COMMUNITY
Start: 2018-04-30 | End: 2018-11-01 | Stop reason: SDUPTHER

## 2018-05-02 RX ORDER — HYDROXYZINE PAMOATE 25 MG/1
CAPSULE ORAL
Refills: 0 | COMMUNITY
Start: 2018-04-30 | End: 2018-07-24 | Stop reason: ALTCHOICE

## 2018-05-21 RX ORDER — TRIAMCINOLONE ACETONIDE 5 MG/G
OINTMENT TOPICAL
Qty: 15 G | Refills: 1 | Status: SHIPPED | OUTPATIENT
Start: 2018-05-21 | End: 2018-07-20 | Stop reason: SDUPTHER

## 2018-05-21 RX ORDER — AMLODIPINE BESYLATE 10 MG/1
10 TABLET ORAL DAILY
Qty: 30 TABLET | Refills: 5 | Status: SHIPPED | OUTPATIENT
Start: 2018-05-21 | End: 2018-11-16 | Stop reason: SDUPTHER

## 2018-07-20 RX ORDER — TRIAMCINOLONE ACETONIDE OINTMENT USP, 0.05% 0.5 MG/G
OINTMENT TOPICAL 2 TIMES DAILY
Qty: 15 G | Refills: 1 | Status: SHIPPED | OUTPATIENT
Start: 2018-07-20 | End: 2018-08-03

## 2018-07-20 RX ORDER — ALBUTEROL SULFATE 90 UG/1
2 AEROSOL, METERED RESPIRATORY (INHALATION) EVERY 6 HOURS PRN
Qty: 1 INHALER | Refills: 3 | Status: SHIPPED | OUTPATIENT
Start: 2018-07-20 | End: 2018-10-29 | Stop reason: SDUPTHER

## 2018-07-20 NOTE — TELEPHONE ENCOUNTER
Last in office 05/02/2018,  Health Maintenance   Topic Date Due    Pneumococcal med risk (1 of 1 - PPSV23) 02/07/1987    Cervical cancer screen  02/07/1989    Breast cancer screen  02/07/2018    Shingles Vaccine (1 of 2 - 2 Dose Series) 02/07/2018    Colon cancer screen colonoscopy  02/07/2018    Diabetes screen  08/24/2018    Flu vaccine (1) 09/01/2018    Potassium monitoring  01/12/2019    Creatinine monitoring  01/12/2019    Lipid screen  04/18/2021    DTaP/Tdap/Td vaccine (2 - Td) 01/05/2028    HIV screen  Completed             (applicable per patient's age: Cancer Screenings, Depression Screening, Fall Risk Screening, Immunizations)    No results found for: LABA1C, LABMICR, LDLCHOLESTEROL, LDLCALC, AST, ALT, BUN   (goal A1C is < 7)   (goal LDL is <100) need 30-50% reduction from baseline     BP Readings from Last 3 Encounters:   05/02/18 122/84   04/05/18 138/84   02/13/18 (!) 193/92    (goal /80)      All Future Testing planned in [facility]:  Lab Frequency Next Occurrence   XR KNEE LEFT (3 VIEWS) Once 04/28/2018   T4, Free Once 06/10/2018   TSH without Reflex Once 06/10/2018   Comprehensive Metabolic Panel Once 20/04/7795   Lipid Panel Once 06/10/2018       Next Visit Date:  No future appointments.          Patient Active Problem List:     Stress reaction     Essential hypertension     Dysthymia     Anxiety     Mild intermittent asthma without complication     Dermatitis     Osteoarthritis of multiple joints     Fibromyalgia     Left leg cellulitis     Primary osteoarthritis of left knee

## 2018-07-24 ENCOUNTER — OFFICE VISIT (OUTPATIENT)
Dept: FAMILY MEDICINE CLINIC | Age: 48
End: 2018-07-24
Payer: MEDICARE

## 2018-07-24 VITALS
DIASTOLIC BLOOD PRESSURE: 98 MMHG | HEART RATE: 96 BPM | WEIGHT: 247 LBS | SYSTOLIC BLOOD PRESSURE: 132 MMHG | BODY MASS INDEX: 41.1 KG/M2

## 2018-07-24 DIAGNOSIS — R42 VERTIGO: Primary | ICD-10-CM

## 2018-07-24 DIAGNOSIS — F41.9 ANXIETY: ICD-10-CM

## 2018-07-24 DIAGNOSIS — L30.9 DERMATITIS: ICD-10-CM

## 2018-07-24 DIAGNOSIS — I10 ESSENTIAL HYPERTENSION: ICD-10-CM

## 2018-07-24 DIAGNOSIS — J30.89 CHRONIC NON-SEASONAL ALLERGIC RHINITIS, UNSPECIFIED TRIGGER: ICD-10-CM

## 2018-07-24 DIAGNOSIS — J45.20 MILD INTERMITTENT ASTHMA WITHOUT COMPLICATION: ICD-10-CM

## 2018-07-24 DIAGNOSIS — F43.0 STRESS REACTION: ICD-10-CM

## 2018-07-24 PROCEDURE — 3017F COLORECTAL CA SCREEN DOC REV: CPT | Performed by: FAMILY MEDICINE

## 2018-07-24 PROCEDURE — G8427 DOCREV CUR MEDS BY ELIG CLIN: HCPCS | Performed by: FAMILY MEDICINE

## 2018-07-24 PROCEDURE — 1036F TOBACCO NON-USER: CPT | Performed by: FAMILY MEDICINE

## 2018-07-24 PROCEDURE — 99214 OFFICE O/P EST MOD 30 MIN: CPT | Performed by: FAMILY MEDICINE

## 2018-07-24 PROCEDURE — G8417 CALC BMI ABV UP PARAM F/U: HCPCS | Performed by: FAMILY MEDICINE

## 2018-07-24 RX ORDER — LORATADINE 10 MG/1
10 TABLET ORAL DAILY
Qty: 30 TABLET | Refills: 3 | Status: SHIPPED | OUTPATIENT
Start: 2018-07-24 | End: 2019-08-19 | Stop reason: SDUPTHER

## 2018-07-24 RX ORDER — BUDESONIDE AND FORMOTEROL FUMARATE DIHYDRATE 160; 4.5 UG/1; UG/1
2 AEROSOL RESPIRATORY (INHALATION) 2 TIMES DAILY
Qty: 1 INHALER | Refills: 0 | Status: SHIPPED | OUTPATIENT
Start: 2018-07-24 | End: 2018-09-13 | Stop reason: SDUPTHER

## 2018-07-24 RX ORDER — FLUTICASONE PROPIONATE 50 MCG
1 SPRAY, SUSPENSION (ML) NASAL DAILY
Qty: 1 BOTTLE | Refills: 3 | Status: SHIPPED | OUTPATIENT
Start: 2018-07-24 | End: 2018-10-23 | Stop reason: SDUPTHER

## 2018-07-24 RX ORDER — MECLIZINE HYDROCHLORIDE 25 MG/1
25 TABLET ORAL 3 TIMES DAILY PRN
Qty: 30 TABLET | Refills: 0 | Status: SHIPPED | OUTPATIENT
Start: 2018-07-24 | End: 2018-08-03

## 2018-07-24 RX ORDER — ALPRAZOLAM 1 MG/1
TABLET ORAL
Qty: 30 TABLET | Refills: 0 | Status: SHIPPED | OUTPATIENT
Start: 2018-07-24 | End: 2018-08-27 | Stop reason: SDUPTHER

## 2018-07-24 RX ORDER — TRIAMCINOLONE ACETONIDE 5 MG/G
OINTMENT TOPICAL
Qty: 30 G | Refills: 2 | Status: SHIPPED | OUTPATIENT
Start: 2018-07-24 | End: 2019-08-19 | Stop reason: SDUPTHER

## 2018-07-24 ASSESSMENT — ENCOUNTER SYMPTOMS
BACK PAIN: 1
ABDOMINAL PAIN: 0
COUGH: 0
EYES NEGATIVE: 1
SHORTNESS OF BREATH: 0

## 2018-07-24 ASSESSMENT — PATIENT HEALTH QUESTIONNAIRE - PHQ9
SUM OF ALL RESPONSES TO PHQ9 QUESTIONS 1 & 2: 0
2. FEELING DOWN, DEPRESSED OR HOPELESS: 0
1. LITTLE INTEREST OR PLEASURE IN DOING THINGS: 0
SUM OF ALL RESPONSES TO PHQ QUESTIONS 1-9: 0

## 2018-07-24 NOTE — PROGRESS NOTES
[facility] PHYSICIANS  DORIAN HICKEY [facility]  [address] [facility] [address] [address]  Dept: [phone]    7/24/2018    CHIEF COMPLAINT    Chief Complaint   Patient presents with    Dizziness       HPI    Michael Murphy is a 46 y.o. female who presents   Chief Complaint   Patient presents with    Dizziness   . Dizziness   This is a recurrent problem. The current episode started in the past 7 days. The problem occurs daily. The problem has been waxing and waning. Associated symptoms include congestion, headaches and vertigo. Pertinent negatives include no abdominal pain, chest pain, coughing or fever. The symptoms are aggravated by standing. Treatments tried: using a cane for stability. The treatment provided mild relief. REVIEW OF SYSTEMS    Review of Systems   Constitutional: Positive for malaise/fatigue. Negative for fever and weight loss. HENT: Positive for congestion. Eyes: Negative. Respiratory: Negative for cough and shortness of breath. Cardiovascular: Negative for chest pain, palpitations and leg swelling. Gastrointestinal: Negative for abdominal pain. Genitourinary: Negative for frequency and urgency. Musculoskeletal: Positive for back pain and joint pain. Skin: Negative. Neurological: Positive for dizziness, vertigo and headaches. Negative for sensory change. Endo/Heme/Allergies: Negative. Psychiatric/Behavioral: Negative for depression. The patient is nervous/anxious. The patient does not have insomnia. Anxiety not controlled with vistaril. Seeing psychiatrist next month.         PAST MEDICAL HISTORY    Past Medical History:   Diagnosis Date    Anxiety     Arthritis     Asthma     Cataplexy and narcolepsy     Chronic back pain     Chronic pain     Depression     Fibromyalgia     Hypertension     Obesity     Osteoarthritis        FAMILY HISTORY    Family History   Problem Relation Age of Onset    Heart Disease Mother     Diabetes Mother     Stroke Mother     Dementia Father        SOCIAL HISTORY    Social History     Social History    Marital status:      Spouse name: N/A    Number of children: N/A    Years of education: N/A     Social History Main Topics    Smoking status: Never Smoker    Smokeless tobacco: Never Used    Alcohol use No    Drug use: No    Sexual activity: Not Asked     Other Topics Concern    None     Social History Narrative    None       SURGICAL HISTORY    Past Surgical History:   Procedure Laterality Date    CHOLECYSTECTOMY         CURRENT MEDICATIONS    Current Outpatient Prescriptions   Medication Sig Dispense Refill    budesonide-formoterol (SYMBICORT) 160-4.5 MCG/ACT AERO Inhale 2 puffs into the lungs 2 times daily 1 Inhaler 0    fluticasone (FLONASE) 50 MCG/ACT nasal spray 1 spray by Nasal route daily 1 Bottle 3    ALPRAZolam (XANAX) 1 MG tablet take 1 tablet by mouth every evening if needed for anxiety.  30 tablet 0    loratadine (CLARITIN) 10 MG tablet Take 1 tablet by mouth daily 30 tablet 3    triamcinolone (ARISTOCORT) 0.5 % ointment apply to affected area twice a day 30 g 2    meclizine (ANTIVERT) 25 MG tablet Take 1 tablet by mouth 3 times daily as needed for Dizziness 30 tablet 0    albuterol sulfate HFA (VENTOLIN HFA) 108 (90 Base) MCG/ACT inhaler Inhale 2 puffs into the lungs every 6 hours as needed for Wheezing 1 Inhaler 3    triamcinolone (KENALOG) 0.05 % OINT ointment Apply topically 2 times daily for 14 days 15 g 1    amLODIPine (NORVASC) 10 MG tablet take 1 tablet by mouth daily 30 tablet 5    venlafaxine (EFFEXOR XR) 75 MG extended release capsule take 1 capsule by mouth every morning  0    Handicap Placard MISC by Does not apply route Expires 6 months after issue 1 each 0    metoprolol tartrate (LOPRESSOR) 50 MG tablet Take 1 tablet by mouth 2 times daily 60 tablet 5    DULoxetine (CYMBALTA) 30 MG extended release capsule Take 30 mg by mouth daily      oxyCODONE-acetaminophen (PERCOCET) 5-325 MG per tablet Take 1 tablet by mouth every 4 hours as needed for Pain .  traZODone (DESYREL) 100 MG tablet Take 1 tablet by mouth daily  0    sodium oxybate (XYREM) 500 MG/ML SOLN solution Take 4.5 g by mouth nightly        No current facility-administered medications for this visit. ALLERGIES    Allergies   Allergen Reactions    Amoxicillin     Augmentin [Amoxicillin-Pot Clavulanate]     Buspirone     Lisinopril     Paxil [Paroxetine Hcl]     Seasonal     Zoloft [Sertraline Hcl]        PHYSICAL EXAM   Physical Exam   Constitutional: She is oriented to person, place, and time. She appears well-developed and well-nourished. obese   HENT:   Head: Normocephalic. Ears:    Nose: Mucosal edema and rhinorrhea present. Right sinus exhibits no maxillary sinus tenderness and no frontal sinus tenderness. Left sinus exhibits no maxillary sinus tenderness and no frontal sinus tenderness. Mouth/Throat: Oropharynx is clear and moist.       Eyes: Pupils are equal, round, and reactive to light. Neck: Normal range of motion. Neck supple. No thyromegaly present. Cardiovascular: Normal rate, regular rhythm and normal heart sounds. No murmur heard. Pulmonary/Chest: Effort normal and breath sounds normal. She has no wheezes. She has no rales. Abdominal: Soft. There is no tenderness. Musculoskeletal: Normal range of motion. She exhibits tenderness (low back). She exhibits no edema or deformity. Lymphadenopathy:     She has no cervical adenopathy. Neurological: She is alert and oriented to person, place, and time. Skin: Skin is warm and dry. Psychiatric: She has a normal mood and affect. Her behavior is normal.   tearful   Vitals reviewed. Assessment/PLan  1. Vertigo  Stay hydrated. Pt ed provided. Call if not improving.   - meclizine (ANTIVERT) 25 MG tablet;  Take 1 tablet by mouth 3 times daily as needed for Dizziness  Dispense: 30 tablet; Refill: 0    2. Mild intermittent asthma without complication  Chronic, cont inhaler prn.   - budesonide-formoterol (SYMBICORT) 160-4.5 MCG/ACT AERO; Inhale 2 puffs into the lungs 2 times daily  Dispense: 1 Inhaler; Refill: 0    3. Essential hypertension  Chronic, stable, continue current medication and/or plan  Not controlled today, pt very anxious. 4. Chronic non-seasonal allergic rhinitis, unspecified trigger  Chronic, stable, continue current medication and/or plan. Get back on flonase and claritin. - fluticasone (FLONASE) 50 MCG/ACT nasal spray; 1 spray by Nasal route daily  Dispense: 1 Bottle; Refill: 3  - loratadine (CLARITIN) 10 MG tablet; Take 1 tablet by mouth daily  Dispense: 30 tablet; Refill: 3    5. Anxiety  Get back on xanax, keep seeing counselor.   - ALPRAZolam (XANAX) 1 MG tablet; take 1 tablet by mouth every evening if needed for anxiety. Dispense: 30 tablet; Refill: 0    6. Stress reaction  Cont xanax  - ALPRAZolam (XANAX) 1 MG tablet; take 1 tablet by mouth every evening if needed for anxiety. Dispense: 30 tablet; Refill: 0    7. Class 3 obesity due to excess calories without serious comorbidity with body mass index (BMI) of 40.0 to 44.9 in adult [facility])  Stay active. 8. Dermatitis  Cont topical.   - triamcinolone (ARISTOCORT) 0.5 % ointment; apply to affected area twice a day  Dispense: 30 g; Refill: 2      Laisha Stafford received counseling on the following healthy behaviors: nutrition, exercise and medication adherence  Reviewed prior labs and health maintenance. Continue current medications, diet and exercise. Discussed use, benefit, and side effects of prescribed medications. Barriers to medication compliance addressed. Patient given educational materials - see patient instructions. All patient questions answered. Patient voiced understanding. Return in about 2 months (around 9/24/2018) for htn.         Electronically signed by Adrianna Randle MD on 7/24/18 at

## 2018-08-23 ENCOUNTER — TELEPHONE (OUTPATIENT)
Dept: FAMILY MEDICINE CLINIC | Age: 48
End: 2018-08-23

## 2018-08-23 DIAGNOSIS — M79.7 FIBROMYALGIA: Primary | ICD-10-CM

## 2018-08-23 NOTE — TELEPHONE ENCOUNTER
Patient called stating she has a upcoming appt with KIAH Altamirano but is requesting to be reffered to a Rheumatologist , Please Cong-- patient was offered a appt to discuss request with provider but patient declined.

## 2018-08-24 NOTE — TELEPHONE ENCOUNTER
Please call patient that Rheumatology will wants office notes from us so I will need to see her before being able to refer. Thank you.

## 2018-08-29 NOTE — TELEPHONE ENCOUNTER
Tried calling patient and there was no answer and voice mail was full, but she does have a OA and fibromyalgia dx for her 4/5/18 office visit

## 2018-09-11 DIAGNOSIS — I10 ESSENTIAL HYPERTENSION: ICD-10-CM

## 2018-09-11 RX ORDER — METOPROLOL TARTRATE 50 MG/1
50 TABLET, FILM COATED ORAL 2 TIMES DAILY
Qty: 60 TABLET | Refills: 1 | Status: SHIPPED | OUTPATIENT
Start: 2018-09-11 | End: 2018-11-02 | Stop reason: SDUPTHER

## 2018-09-13 ENCOUNTER — OFFICE VISIT (OUTPATIENT)
Dept: FAMILY MEDICINE CLINIC | Age: 48
End: 2018-09-13
Payer: MEDICARE

## 2018-09-13 VITALS
WEIGHT: 246 LBS | HEART RATE: 100 BPM | SYSTOLIC BLOOD PRESSURE: 138 MMHG | DIASTOLIC BLOOD PRESSURE: 88 MMHG | BODY MASS INDEX: 40.94 KG/M2

## 2018-09-13 DIAGNOSIS — E66.01 CLASS 3 SEVERE OBESITY DUE TO EXCESS CALORIES WITHOUT SERIOUS COMORBIDITY WITH BODY MASS INDEX (BMI) OF 40.0 TO 44.9 IN ADULT (HCC): ICD-10-CM

## 2018-09-13 DIAGNOSIS — Z12.11 SCREENING FOR COLON CANCER: ICD-10-CM

## 2018-09-13 DIAGNOSIS — I10 ESSENTIAL HYPERTENSION: ICD-10-CM

## 2018-09-13 DIAGNOSIS — M15.9 PRIMARY OSTEOARTHRITIS INVOLVING MULTIPLE JOINTS: Primary | ICD-10-CM

## 2018-09-13 DIAGNOSIS — Z12.31 SCREENING MAMMOGRAM, ENCOUNTER FOR: ICD-10-CM

## 2018-09-13 DIAGNOSIS — R63.5 WEIGHT GAIN: ICD-10-CM

## 2018-09-13 DIAGNOSIS — J45.20 MILD INTERMITTENT ASTHMA WITHOUT COMPLICATION: ICD-10-CM

## 2018-09-13 PROCEDURE — 99214 OFFICE O/P EST MOD 30 MIN: CPT | Performed by: NURSE PRACTITIONER

## 2018-09-13 RX ORDER — BUDESONIDE AND FORMOTEROL FUMARATE DIHYDRATE 160; 4.5 UG/1; UG/1
2 AEROSOL RESPIRATORY (INHALATION) 2 TIMES DAILY
Qty: 1 INHALER | Refills: 3 | Status: SHIPPED | OUTPATIENT
Start: 2018-09-13 | End: 2019-08-19 | Stop reason: SDUPTHER

## 2018-09-13 RX ORDER — MELOXICAM 15 MG/1
15 TABLET ORAL DAILY
Qty: 30 TABLET | Refills: 3 | Status: SHIPPED | OUTPATIENT
Start: 2018-09-13 | End: 2019-03-02 | Stop reason: SDUPTHER

## 2018-09-13 ASSESSMENT — ENCOUNTER SYMPTOMS
EYES NEGATIVE: 1
NAUSEA: 0
DIARRHEA: 0
ABDOMINAL PAIN: 0
SHORTNESS OF BREATH: 0
COUGH: 0
BACK PAIN: 1
GASTROINTESTINAL NEGATIVE: 1
RESPIRATORY NEGATIVE: 1
BLURRED VISION: 0
VOMITING: 0

## 2018-09-13 NOTE — PROGRESS NOTES
pain and palpitations. Gastrointestinal: Negative. Negative for abdominal pain, diarrhea, nausea and vomiting. Genitourinary: Negative. Negative for dysuria. Musculoskeletal: Positive for back pain (Chronic, lumbar spine ) and joint pain (Mulitple ). Chronic pain see HPI    Skin: Negative. Neurological: Negative. Negative for dizziness and headaches. Endo/Heme/Allergies: Negative. Psychiatric/Behavioral: The patient is nervous/anxious (Chronic stable. ).       PAST MEDICAL HISTORY    Past Medical History:   Diagnosis Date    Anxiety     Arthritis     Asthma     Cataplexy and narcolepsy     Chronic back pain     Chronic pain     Depression     Fibromyalgia     Hypertension     Obesity     Osteoarthritis        FAMILY HISTORY    Family History   Problem Relation Age of Onset    Heart Disease Mother     Diabetes Mother     Stroke Mother     Dementia Father        SOCIAL HISTORY    Social History     Social History    Marital status:      Spouse name: N/A    Number of children: N/A    Years of education: N/A     Social History Main Topics    Smoking status: Never Smoker    Smokeless tobacco: Never Used    Alcohol use No    Drug use: No    Sexual activity: Not Asked     Other Topics Concern    None     Social History Narrative    None     SURGICAL HISTORY    Past Surgical History:   Procedure Laterality Date    CHOLECYSTECTOMY         CURRENT MEDICATIONS    Current Outpatient Prescriptions   Medication Sig Dispense Refill    meloxicam (MOBIC) 15 MG tablet Take 1 tablet by mouth daily 30 tablet 3    budesonide-formoterol (SYMBICORT) 160-4.5 MCG/ACT AERO Inhale 2 puffs into the lungs 2 times daily 1 Inhaler 3    Handicap Placard MISC by Does not apply route Expires in 6 months 1 each 0    metoprolol tartrate (LOPRESSOR) 50 MG tablet Take 1 tablet by mouth 2 times daily 60 tablet 1    ALPRAZolam (XANAX) 1 MG tablet take 1 tablet by mouth every evening if needed for anxiety 30 tablet 0    fluticasone (FLONASE) 50 MCG/ACT nasal spray 1 spray by Nasal route daily 1 Bottle 3    loratadine (CLARITIN) 10 MG tablet Take 1 tablet by mouth daily 30 tablet 3    triamcinolone (ARISTOCORT) 0.5 % ointment apply to affected area twice a day 30 g 2    albuterol sulfate HFA (VENTOLIN HFA) 108 (90 Base) MCG/ACT inhaler Inhale 2 puffs into the lungs every 6 hours as needed for Wheezing 1 Inhaler 3    amLODIPine (NORVASC) 10 MG tablet take 1 tablet by mouth daily 30 tablet 5    venlafaxine (EFFEXOR XR) 75 MG extended release capsule take 1 capsule by mouth every morning  0    Handicap Placard MISC by Does not apply route Expires 6 months after issue 1 each 0    DULoxetine (CYMBALTA) 30 MG extended release capsule Take 30 mg by mouth daily      oxyCODONE-acetaminophen (PERCOCET) 5-325 MG per tablet Take 1 tablet by mouth every 4 hours as needed for Pain .  sodium oxybate (XYREM) 500 MG/ML SOLN solution Take 4.5 g by mouth nightly       traZODone (DESYREL) 100 MG tablet Take 1 tablet by mouth daily  0     No current facility-administered medications for this visit. ALLERGIES    Allergies   Allergen Reactions    Amoxicillin     Augmentin [Amoxicillin-Pot Clavulanate]     Buspirone     Lisinopril     Paxil [Paroxetine Hcl]     Seasonal     Zoloft [Sertraline Hcl]        PHYSICAL EXAM   Physical Exam   Constitutional: She is oriented to person, place, and time. Vital signs are normal. She appears well-developed and well-nourished. No distress. Obese   HENT:   Head: Normocephalic. Neck: Neck supple. No thyromegaly present. Cardiovascular: Normal rate, regular rhythm, normal heart sounds and intact distal pulses. No murmur heard. Pulmonary/Chest: Effort normal and breath sounds normal. No respiratory distress. She has no wheezes. Abdominal: Soft. Bowel sounds are normal. She exhibits no distension. There is no tenderness.    Musculoskeletal:        Lumbar back: She exhibits tenderness. Lymphadenopathy:     She has no cervical adenopathy. Neurological: She is alert and oriented to person, place, and time. Skin: Skin is warm and dry. No rash noted. She is not diaphoretic. No erythema. Psychiatric: She has a normal mood and affect. Her behavior is normal. Her mood appears not anxious. She does not exhibit a depressed mood. Nursing note and vitals reviewed. Assessment   Diagnosis Orders   1. Primary osteoarthritis involving multiple joints  Handicap Placard MISC    Cane adjustable single point   2. Class 3 severe obesity due to excess calories without serious comorbidity with body mass index (BMI) of 40.0 to 44.9 in [facility])  Lipid Panel   3. Mild intermittent asthma without complication  budesonide-formoterol (SYMBICORT) 160-4.5 MCG/ACT AERO   4. Screening mammogram, encounter for  ASIF DIGITAL SCREEN W CAD BILATERAL   5. Essential hypertension  Lipid Panel    Comprehensive Metabolic Panel    TSH With Reflex Ft4    CBC With Auto Differential   6. Screening for colon cancer  POCT Fecal Immunochemical Test (FIT)     plan    Orders Placed This Encounter   Procedures    ASIF DIGITAL SCREEN W CAD BILATERAL     Standing Status:   Future     Standing Expiration Date:   11/13/2019     Order Specific Question:   Reason for exam:     Answer:   screening mammogram    Lipid Panel     Standing Status:   Future     Standing Expiration Date:   9/13/2019     Order Specific Question:   Is Patient Fasting?/# of Hours     Answer:   10-12 hours. Can have black coffee and water.     Comprehensive Metabolic Panel     Standing Status:   Future     Standing Expiration Date:   9/13/2019    TSH With Reflex Ft4     Standing Status:   Future     Standing Expiration Date:   9/13/2019    CBC With Auto Differential     Standing Status:   Future     Standing Expiration Date:   9/13/2019    POCT Fecal Immunochemical Test (FIT)     Standing Status:   Future     Standing Expiration Date:   9/13/2019       1. Primary osteoarthritis involving multiple joints    - Handicap Placard MISC; by Does not apply route Expires in 6 months  Dispense: 1 each; Refill: 0  - Cane adjustable single point  -Chronic. Stable. Try Mobic again to see if it provides better relief of pain. 2. Class 3 severe obesity due to excess calories without serious comorbidity with body mass index (BMI) of 40.0 to 44.9 in adult [facility])    - Lipid Panel; Future  -Continue increasing activity as able. -Continue watching portion sizes and decrease juice intake  -Use My Fitness Pal to count calories and become more aware of calorie intake. 3. Mild intermittent asthma without complication    -Chronic. Stable. Continue current medicaitons. - budesonide-formoterol (SYMBICORT) 160-4.5 MCG/ACT AERO; Inhale 2 puffs into the lungs 2 times daily  Dispense: 1 Inhaler; Refill: 3    4. Screening mammogram, encounter for    - ASIF DIGITAL SCREEN W CAD BILATERAL; Future  -Office to call with results. 5. Essential hypertension    - Lipid Panel; Future  - Comprehensive Metabolic Panel; Future  - TSH With Reflex Ft4; Future  - CBC With Auto Differential; Future  -Chronic. Initially high in office. Recheck better. Advised patient to take her medications daily    6. Screening for colon cancer    - POCT Fecal Immunochemical Test (FIT); Future    Rigoberto Elizabethey Patrickmirza received counseling on the following healthy behaviors: nutrition, exercise and medication adherence  Reviewed prior labs and health maintenance  Continue current medications, diet and exercise. Discussed use, benefit, and side effects of prescribed medications. Barriers to medication compliance addressed. Patient given educational materials - see patient instructions  Was a self-tracking handout given in paper form or via [employer]? No    Return in about 3 months (around 12/13/2018) for BP.       Electronically signed by RUBEN Valdes CNP on 9/13/18 at 9:14 AM

## 2018-09-17 ENCOUNTER — TELEPHONE (OUTPATIENT)
Dept: FAMILY MEDICINE CLINIC | Age: 48
End: 2018-09-17

## 2018-09-17 LAB
ALBUMIN SERPL-MCNC: 4.6 G/DL
ALP BLD-CCNC: 89 U/L
ALT SERPL-CCNC: 105 U/L
ANION GAP SERPL CALCULATED.3IONS-SCNC: NORMAL MMOL/L
AST SERPL-CCNC: 56 U/L
BASOPHILS ABSOLUTE: 0.1 /ΜL
BASOPHILS RELATIVE PERCENT: 1 %
BILIRUB SERPL-MCNC: 0.3 MG/DL (ref 0.1–1.4)
BUN BLDV-MCNC: 14 MG/DL
CALCIUM SERPL-MCNC: 9.4 MG/DL
CHLORIDE BLD-SCNC: 104 MMOL/L
CHOLESTEROL, TOTAL: 304 MG/DL
CHOLESTEROL/HDL RATIO: 5.6
CO2: 23 MMOL/L
CREAT SERPL-MCNC: 0.9 MG/DL
EOSINOPHILS ABSOLUTE: 0.38 /ΜL
EOSINOPHILS RELATIVE PERCENT: 4 %
GFR CALCULATED: 66.3
GLUCOSE BLD-MCNC: 165 MG/DL
HCT VFR BLD CALC: 43.4 % (ref 36–46)
HDLC SERPL-MCNC: 54 MG/DL (ref 35–70)
HEMOGLOBIN: 14.1 G/DL (ref 12–16)
LDL CHOLESTEROL CALCULATED: 199 MG/DL (ref 0–160)
LYMPHOCYTES ABSOLUTE: 2.95 /ΜL
LYMPHOCYTES RELATIVE PERCENT: 31 %
MCH RBC QN AUTO: 28.1 PG
MCHC RBC AUTO-ENTMCNC: 32.5 G/DL
MCV RBC AUTO: 86.4 FL
MONOCYTES ABSOLUTE: 0.38 /ΜL
MONOCYTES RELATIVE PERCENT: 4 %
NEUTROPHILS ABSOLUTE: 5.7 /ΜL
NEUTROPHILS RELATIVE PERCENT: NORMAL %
PDW BLD-RTO: NORMAL %
PLATELET # BLD: 401 K/ΜL
PMV BLD AUTO: NORMAL FL
POTASSIUM SERPL-SCNC: 4.5 MMOL/L
RBC # BLD: 5.02 10^6/ΜL
SODIUM BLD-SCNC: 138 MMOL/L
TOTAL PROTEIN: 7.1
TRIGL SERPL-MCNC: 254 MG/DL
TSH SERPL DL<=0.05 MIU/L-ACNC: 3.65 UIU/ML
VLDLC SERPL CALC-MCNC: 51 MG/DL
WBC # BLD: 9.5 10^3/ML

## 2018-09-17 NOTE — TELEPHONE ENCOUNTER
Please call patient and ask if she was fasting for her labs. Her blood sugar was elevated at 165 and her cholesterol is also high. If she was fasting I would like to see her for an apt to discuss further. Please also let her know her liver enzymes are high so she needs to ask pain management to remove the acetaminophen from her pain medication and just prescribe oxycodone.

## 2018-09-27 ENCOUNTER — TELEPHONE (OUTPATIENT)
Dept: FAMILY MEDICINE CLINIC | Age: 48
End: 2018-09-27

## 2018-09-27 ENCOUNTER — OFFICE VISIT (OUTPATIENT)
Dept: FAMILY MEDICINE CLINIC | Age: 48
End: 2018-09-27
Payer: MEDICARE

## 2018-09-27 DIAGNOSIS — E78.2 MIXED HYPERLIPIDEMIA: ICD-10-CM

## 2018-09-27 DIAGNOSIS — E11.8 TYPE 2 DIABETES MELLITUS WITH COMPLICATION, WITHOUT LONG-TERM CURRENT USE OF INSULIN (HCC): Primary | ICD-10-CM

## 2018-09-27 DIAGNOSIS — R74.8 ELEVATED LIVER ENZYMES: ICD-10-CM

## 2018-09-27 LAB — HBA1C MFR BLD: 6.9 %

## 2018-09-27 PROCEDURE — 83036 HEMOGLOBIN GLYCOSYLATED A1C: CPT | Performed by: NURSE PRACTITIONER

## 2018-09-27 PROCEDURE — 99213 OFFICE O/P EST LOW 20 MIN: CPT | Performed by: NURSE PRACTITIONER

## 2018-09-27 RX ORDER — BLOOD-GLUCOSE METER
1 KIT MISCELLANEOUS DAILY
Qty: 1 KIT | Refills: 0 | Status: SHIPPED | OUTPATIENT
Start: 2018-09-27 | End: 2021-04-19 | Stop reason: SDUPTHER

## 2018-09-27 RX ORDER — GLUCOSAMINE HCL/CHONDROITIN SU 500-400 MG
CAPSULE ORAL
Qty: 100 STRIP | Refills: 3 | Status: SHIPPED | OUTPATIENT
Start: 2018-09-27 | End: 2019-11-20 | Stop reason: SDUPTHER

## 2018-09-27 RX ORDER — LANCETS 30 GAUGE
EACH MISCELLANEOUS
Qty: 100 EACH | Refills: 3 | Status: SHIPPED | OUTPATIENT
Start: 2018-09-27 | End: 2019-10-16 | Stop reason: SDUPTHER

## 2018-09-27 NOTE — PROGRESS NOTES
[facility]YSIC[facility]ANS  [facility]  [address]   [place]  [id number]  Dept: [phone]    9/27/2018    CHIEF COMPLAINT    No chief complaint on file. HPI    Anthony Delaney is a 46 y.o. female who presents No chief complaint on file. Mitch Earing Here for HgbA1c and to discuss new diagnosis of diabetes. REVIEW OF SYSTEMS    Review of Systems   Constitutional: Negative. Negative for chills and fever. Eyes: Negative. Negative for blurred vision. Respiratory: Negative. Negative for cough and shortness of breath. Cardiovascular: Negative. Negative for chest pain and palpitations. Gastrointestinal: Negative for abdominal pain. Genitourinary: Negative. Negative for dysuria, frequency and urgency. Skin: Negative. Neurological: Negative for dizziness and headaches. Endo/Heme/Allergies: Negative. Negative for polydipsia.        PAST MEDICAL HISTORY    Past Medical History:   Diagnosis Date    Anxiety     Arthritis     Asthma     Cataplexy and narcolepsy     Chronic back pain     Chronic pain     Depression     Fibromyalgia     Hypertension     Obesity     Osteoarthritis        FAMILY HISTORY    Family History   Problem Relation Age of Onset    Heart Disease Mother     Diabetes Mother     Stroke Mother     Dementia Father        SOCIAL HISTORY    Social History     Social History    Marital status:      Spouse name: N/A    Number of children: N/A    Years of education: N/A     Social History Main Topics    Smoking status: Never Smoker    Smokeless tobacco: Never Used    Alcohol use No    Drug use: No    Sexual activity: Not on file     Other Topics Concern    Not on file     Social History Narrative    No narrative on file       SURGICAL HISTORY    Past Surgical History:   Procedure Laterality Date    CHOLECYSTECTOMY         CURRENT MEDICATIONS    Current Outpatient Prescriptions   Medication Sig Dispense

## 2018-09-28 ENCOUNTER — PATIENT MESSAGE (OUTPATIENT)
Dept: FAMILY MEDICINE CLINIC | Age: 48
End: 2018-09-28

## 2018-09-28 ENCOUNTER — TELEPHONE (OUTPATIENT)
Dept: FAMILY MEDICINE CLINIC | Age: 48
End: 2018-09-28

## 2018-09-28 NOTE — TELEPHONE ENCOUNTER
From: Mikayla Prajapati  To: RUBEN Merritt CNP  Sent: 9/27/2018 6:11 PM EDT  Subject: RE:Liver Panel    Ok thank you so much. Hope it's something simple  ----- Message -----  From: RUBEN Merritt CNP  Sent: 9/27/2018 2:34 PM EDT  To: Oletta Gaucher  Subject: Liver Panel  Tatyana Hopkins,    I spoke to Dr. Freya Lee regarding your liver panel recheck. She said it would be ok to wait 2 months still even though you're still taking the Tylenol. Please let me know if you need anything else.     Take Care,    Maranda Lou

## 2018-09-30 PROBLEM — E11.8 TYPE 2 DIABETES MELLITUS WITH COMPLICATION, WITHOUT LONG-TERM CURRENT USE OF INSULIN (HCC): Status: ACTIVE | Noted: 2018-09-30

## 2018-09-30 ASSESSMENT — ENCOUNTER SYMPTOMS
ABDOMINAL PAIN: 0
SHORTNESS OF BREATH: 0
RESPIRATORY NEGATIVE: 1
COUGH: 0
EYES NEGATIVE: 1
BLURRED VISION: 0

## 2018-10-11 DIAGNOSIS — F41.9 ANXIETY: ICD-10-CM

## 2018-10-11 DIAGNOSIS — F43.0 STRESS REACTION: ICD-10-CM

## 2018-10-11 RX ORDER — ALPRAZOLAM 1 MG/1
TABLET ORAL
Qty: 30 TABLET | Refills: 0 | Status: SHIPPED | OUTPATIENT
Start: 2018-10-11 | End: 2018-11-12 | Stop reason: SDUPTHER

## 2018-10-11 NOTE — TELEPHONE ENCOUNTER
osteoarthritis of left knee     Chronic back pain     Obesity     Chronic non-seasonal allergic rhinitis     Type 2 diabetes mellitus with complication, without long-term current use of insulin ([facility] [address]

## 2018-10-23 ENCOUNTER — TELEPHONE (OUTPATIENT)
Dept: FAMILY MEDICINE CLINIC | Age: 48
End: 2018-10-23

## 2018-10-23 ENCOUNTER — OFFICE VISIT (OUTPATIENT)
Dept: FAMILY MEDICINE CLINIC | Age: 48
End: 2018-10-23
Payer: MEDICARE

## 2018-10-23 VITALS
DIASTOLIC BLOOD PRESSURE: 104 MMHG | HEIGHT: 65 IN | HEART RATE: 72 BPM | SYSTOLIC BLOOD PRESSURE: 154 MMHG | BODY MASS INDEX: 41.32 KG/M2 | WEIGHT: 248 LBS

## 2018-10-23 DIAGNOSIS — F41.9 ANXIETY: ICD-10-CM

## 2018-10-23 DIAGNOSIS — I10 ESSENTIAL HYPERTENSION: ICD-10-CM

## 2018-10-23 DIAGNOSIS — E11.9 TYPE 2 DIABETES MELLITUS WITHOUT COMPLICATION, WITHOUT LONG-TERM CURRENT USE OF INSULIN (HCC): Primary | ICD-10-CM

## 2018-10-23 DIAGNOSIS — J30.89 SEASONAL ALLERGIC RHINITIS DUE TO OTHER ALLERGIC TRIGGER: ICD-10-CM

## 2018-10-23 DIAGNOSIS — E78.2 MIXED HYPERLIPIDEMIA: ICD-10-CM

## 2018-10-23 DIAGNOSIS — H65.01 RIGHT ACUTE SEROUS OTITIS MEDIA, RECURRENCE NOT SPECIFIED: ICD-10-CM

## 2018-10-23 DIAGNOSIS — R74.8 ELEVATED LIVER ENZYMES: ICD-10-CM

## 2018-10-23 PROCEDURE — 99214 OFFICE O/P EST MOD 30 MIN: CPT | Performed by: NURSE PRACTITIONER

## 2018-10-23 RX ORDER — FLUTICASONE PROPIONATE 50 MCG
1 SPRAY, SUSPENSION (ML) NASAL DAILY
Qty: 1 BOTTLE | Refills: 3 | Status: SHIPPED | OUTPATIENT
Start: 2018-10-23 | End: 2019-08-19 | Stop reason: SDUPTHER

## 2018-10-23 ASSESSMENT — ENCOUNTER SYMPTOMS
GASTROINTESTINAL NEGATIVE: 1
DIARRHEA: 0
EYES NEGATIVE: 1
RHINORRHEA: 0
COUGH: 0
BACK PAIN: 0
ALLERGIC/IMMUNOLOGIC NEGATIVE: 1
SORE THROAT: 0
RESPIRATORY NEGATIVE: 1
BLURRED VISION: 0
ABDOMINAL PAIN: 0
CONSTIPATION: 0
SHORTNESS OF BREATH: 0
WHEEZING: 0
NAUSEA: 0
VOMITING: 0

## 2018-10-23 NOTE — PROGRESS NOTES
materials - see patient instructions  Was a self-tracking handout given in paper form or via [employer]? No    Return in about 2 months (around 12/23/2018) for diabetes, HgbA1C.     Electronically signed by RUBEN Ch CNP on 10/23/18 at 7:48 AM

## 2018-11-01 RX ORDER — VENLAFAXINE HYDROCHLORIDE 75 MG/1
CAPSULE, EXTENDED RELEASE ORAL
Qty: 30 CAPSULE | Refills: 3 | Status: SHIPPED | OUTPATIENT
Start: 2018-11-01 | End: 2019-03-06 | Stop reason: SDUPTHER

## 2018-11-12 DIAGNOSIS — F41.9 ANXIETY: ICD-10-CM

## 2018-11-12 DIAGNOSIS — F43.0 STRESS REACTION: ICD-10-CM

## 2018-11-12 RX ORDER — ALPRAZOLAM 1 MG/1
TABLET ORAL
Qty: 30 TABLET | Refills: 0 | Status: SHIPPED | OUTPATIENT
Start: 2018-11-12 | End: 2018-12-14 | Stop reason: SDUPTHER

## 2018-11-16 DIAGNOSIS — I10 ESSENTIAL HYPERTENSION: Primary | ICD-10-CM

## 2018-11-16 RX ORDER — AMLODIPINE BESYLATE 10 MG/1
10 TABLET ORAL DAILY
Qty: 30 TABLET | Refills: 2 | Status: SHIPPED | OUTPATIENT
Start: 2018-11-16 | End: 2019-02-18 | Stop reason: SDUPTHER

## 2018-12-07 DIAGNOSIS — S83.92XA SPRAIN OF LEFT KNEE, UNSPECIFIED LIGAMENT, INITIAL ENCOUNTER: ICD-10-CM
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
## 2018-12-14 DIAGNOSIS — F43.0 STRESS REACTION: ICD-10-CM

## 2018-12-14 DIAGNOSIS — F41.9 ANXIETY: ICD-10-CM

## 2018-12-14 RX ORDER — ALPRAZOLAM 1 MG/1
TABLET ORAL
Qty: 30 TABLET | Refills: 0 | Status: SHIPPED | OUTPATIENT
Start: 2018-12-14 | End: 2019-01-14 | Stop reason: SDUPTHER

## 2018-12-28 ENCOUNTER — OFFICE VISIT (OUTPATIENT)
Dept: FAMILY MEDICINE CLINIC | Age: 48
End: 2018-12-28
Payer: MEDICARE

## 2018-12-28 VITALS
WEIGHT: 249 LBS | SYSTOLIC BLOOD PRESSURE: 152 MMHG | HEART RATE: 84 BPM | DIASTOLIC BLOOD PRESSURE: 98 MMHG | BODY MASS INDEX: 41.44 KG/M2

## 2018-12-28 DIAGNOSIS — R74.8 ELEVATED LIVER ENZYMES: ICD-10-CM

## 2018-12-28 DIAGNOSIS — R80.9 MICROALBUMINURIA: ICD-10-CM

## 2018-12-28 DIAGNOSIS — E11.9 TYPE 2 DIABETES MELLITUS WITHOUT COMPLICATION, WITHOUT LONG-TERM CURRENT USE OF INSULIN (HCC): Primary | ICD-10-CM

## 2018-12-28 DIAGNOSIS — I10 ESSENTIAL HYPERTENSION: ICD-10-CM

## 2018-12-28 LAB
ALBUMIN SERPL-MCNC: 4.8 G/DL
ALP BLD-CCNC: 96 U/L
ALT SERPL-CCNC: 72 U/L
ANION GAP SERPL CALCULATED.3IONS-SCNC: 10 MMOL/L
AST SERPL-CCNC: 52 U/L
BILIRUB SERPL-MCNC: 0.5 MG/DL (ref 0.1–1.4)
BUN BLDV-MCNC: 10 MG/DL
CALCIUM SERPL-MCNC: 9.7 MG/DL
CHLORIDE BLD-SCNC: 100 MMOL/L
CO2: 25 MMOL/L
CREAT SERPL-MCNC: 0.71 MG/DL
CREATININE URINE POCT: NORMAL
GFR CALCULATED: NORMAL
GLUCOSE BLD-MCNC: 209 MG/DL
HBA1C MFR BLD: 8.5 %
MICROALBUMIN/CREAT 24H UR: NORMAL MG/G{CREAT}
MICROALBUMIN/CREAT UR-RTO: NORMAL
POTASSIUM SERPL-SCNC: 5.1 MMOL/L
SODIUM BLD-SCNC: 135 MMOL/L
TOTAL PROTEIN: 7.4

## 2018-12-28 PROCEDURE — 1036F TOBACCO NON-USER: CPT | Performed by: NURSE PRACTITIONER

## 2018-12-28 PROCEDURE — G8417 CALC BMI ABV UP PARAM F/U: HCPCS | Performed by: NURSE PRACTITIONER

## 2018-12-28 PROCEDURE — G8484 FLU IMMUNIZE NO ADMIN: HCPCS | Performed by: NURSE PRACTITIONER

## 2018-12-28 PROCEDURE — 36415 COLL VENOUS BLD VENIPUNCTURE: CPT | Performed by: NURSE PRACTITIONER

## 2018-12-28 PROCEDURE — 99213 OFFICE O/P EST LOW 20 MIN: CPT | Performed by: NURSE PRACTITIONER

## 2018-12-28 PROCEDURE — 2022F DILAT RTA XM EVC RTNOPTHY: CPT | Performed by: NURSE PRACTITIONER

## 2018-12-28 PROCEDURE — 3017F COLORECTAL CA SCREEN DOC REV: CPT | Performed by: NURSE PRACTITIONER

## 2018-12-28 PROCEDURE — 82044 UR ALBUMIN SEMIQUANTITATIVE: CPT | Performed by: NURSE PRACTITIONER

## 2018-12-28 PROCEDURE — 3045F PR MOST RECENT HEMOGLOBIN A1C LEVEL 7.0-9.0%: CPT | Performed by: NURSE PRACTITIONER

## 2018-12-28 PROCEDURE — 83036 HEMOGLOBIN GLYCOSYLATED A1C: CPT | Performed by: NURSE PRACTITIONER

## 2018-12-28 PROCEDURE — G8427 DOCREV CUR MEDS BY ELIG CLIN: HCPCS | Performed by: NURSE PRACTITIONER

## 2018-12-28 RX ORDER — HYDROCHLOROTHIAZIDE 12.5 MG/1
12.5 TABLET ORAL DAILY
Qty: 30 TABLET | Refills: 1 | Status: SHIPPED | OUTPATIENT
Start: 2018-12-28 | End: 2019-02-18 | Stop reason: SDUPTHER

## 2018-12-28 RX ORDER — METFORMIN HYDROCHLORIDE 500 MG/1
500 TABLET, EXTENDED RELEASE ORAL
Qty: 30 TABLET | Refills: 5 | Status: CANCELLED | OUTPATIENT
Start: 2018-12-28

## 2018-12-28 RX ORDER — METFORMIN HYDROCHLORIDE 500 MG/1
1000 TABLET, EXTENDED RELEASE ORAL
Qty: 90 TABLET | Refills: 1 | Status: SHIPPED | OUTPATIENT
Start: 2018-12-28 | End: 2019-03-20 | Stop reason: SDUPTHER

## 2018-12-28 ASSESSMENT — ENCOUNTER SYMPTOMS
VOMITING: 0
SHORTNESS OF BREATH: 0
BACK PAIN: 0
BLURRED VISION: 0
ABDOMINAL PAIN: 0
GASTROINTESTINAL NEGATIVE: 1
COUGH: 0
CONSTIPATION: 0
NAUSEA: 0
DIARRHEA: 0
RESPIRATORY NEGATIVE: 1

## 2019-01-09 DIAGNOSIS — R74.8 ELEVATED LIVER ENZYMES: ICD-10-CM

## 2019-01-09 DIAGNOSIS — R80.9 MICROALBUMINURIA: ICD-10-CM

## 2019-01-14 DIAGNOSIS — F43.0 STRESS REACTION: ICD-10-CM

## 2019-01-14 DIAGNOSIS — F41.9 ANXIETY: ICD-10-CM

## 2019-01-14 RX ORDER — ALPRAZOLAM 1 MG/1
TABLET ORAL
Qty: 30 TABLET | Refills: 0 | Status: SHIPPED | OUTPATIENT
Start: 2019-01-14 | End: 2019-02-12 | Stop reason: SDUPTHER

## 2019-02-12 DIAGNOSIS — F41.9 ANXIETY: ICD-10-CM

## 2019-02-12 DIAGNOSIS — F43.0 STRESS REACTION: ICD-10-CM

## 2019-02-12 RX ORDER — ALPRAZOLAM 1 MG/1
TABLET ORAL
Qty: 30 TABLET | Refills: 0 | Status: SHIPPED | OUTPATIENT
Start: 2019-02-12 | End: 2019-03-12 | Stop reason: SDUPTHER

## 2019-02-18 DIAGNOSIS — I10 ESSENTIAL HYPERTENSION: ICD-10-CM

## 2019-02-18 RX ORDER — HYDROCHLOROTHIAZIDE 12.5 MG/1
TABLET ORAL
Qty: 30 TABLET | Refills: 0 | Status: SHIPPED | OUTPATIENT
Start: 2019-02-18 | End: 2019-04-09 | Stop reason: SDUPTHER

## 2019-02-18 RX ORDER — AMLODIPINE BESYLATE 10 MG/1
TABLET ORAL
Qty: 30 TABLET | Refills: 0 | Status: SHIPPED | OUTPATIENT
Start: 2019-02-18 | End: 2019-05-22 | Stop reason: SDUPTHER

## 2019-02-26 ENCOUNTER — OFFICE VISIT (OUTPATIENT)
Dept: FAMILY MEDICINE CLINIC | Age: 49
End: 2019-02-26
Payer: MEDICARE

## 2019-02-26 VITALS
BODY MASS INDEX: 41.82 KG/M2 | HEART RATE: 80 BPM | WEIGHT: 251 LBS | SYSTOLIC BLOOD PRESSURE: 128 MMHG | HEIGHT: 65 IN | DIASTOLIC BLOOD PRESSURE: 72 MMHG

## 2019-02-26 DIAGNOSIS — Z12.4 PAP SMEAR FOR CERVICAL CANCER SCREENING: ICD-10-CM

## 2019-02-26 DIAGNOSIS — I10 ESSENTIAL HYPERTENSION: Primary | ICD-10-CM

## 2019-02-26 DIAGNOSIS — Z12.11 COLON CANCER SCREENING: ICD-10-CM

## 2019-02-26 DIAGNOSIS — Z12.31 BREAST CANCER SCREENING BY MAMMOGRAM: ICD-10-CM

## 2019-02-26 DIAGNOSIS — E11.9 TYPE 2 DIABETES MELLITUS WITHOUT COMPLICATION, WITHOUT LONG-TERM CURRENT USE OF INSULIN (HCC): ICD-10-CM

## 2019-02-26 LAB
BUN BLDV-MCNC: 12 MG/DL
CALCIUM SERPL-MCNC: 9.9 MG/DL
CHLORIDE BLD-SCNC: 94 MMOL/L
CO2: 29 MMOL/L
CREAT SERPL-MCNC: 0.73 MG/DL
GFR CALCULATED: NORMAL
GLUCOSE BLD-MCNC: 302 MG/DL
POTASSIUM SERPL-SCNC: 4.9 MMOL/L
SODIUM BLD-SCNC: 135 MMOL/L

## 2019-02-26 PROCEDURE — G8427 DOCREV CUR MEDS BY ELIG CLIN: HCPCS | Performed by: NURSE PRACTITIONER

## 2019-02-26 PROCEDURE — 3017F COLORECTAL CA SCREEN DOC REV: CPT | Performed by: NURSE PRACTITIONER

## 2019-02-26 PROCEDURE — 99214 OFFICE O/P EST MOD 30 MIN: CPT | Performed by: NURSE PRACTITIONER

## 2019-02-26 PROCEDURE — 2022F DILAT RTA XM EVC RTNOPTHY: CPT | Performed by: NURSE PRACTITIONER

## 2019-02-26 PROCEDURE — 3046F HEMOGLOBIN A1C LEVEL >9.0%: CPT | Performed by: NURSE PRACTITIONER

## 2019-02-26 PROCEDURE — G8417 CALC BMI ABV UP PARAM F/U: HCPCS | Performed by: NURSE PRACTITIONER

## 2019-02-26 PROCEDURE — 1036F TOBACCO NON-USER: CPT | Performed by: NURSE PRACTITIONER

## 2019-02-26 PROCEDURE — 36415 COLL VENOUS BLD VENIPUNCTURE: CPT | Performed by: NURSE PRACTITIONER

## 2019-02-26 PROCEDURE — G8484 FLU IMMUNIZE NO ADMIN: HCPCS | Performed by: NURSE PRACTITIONER

## 2019-02-26 RX ORDER — GABAPENTIN 400 MG/1
400 CAPSULE ORAL 3 TIMES DAILY
COMMUNITY
End: 2019-10-24

## 2019-02-26 ASSESSMENT — ENCOUNTER SYMPTOMS
EYES NEGATIVE: 1
VOMITING: 0
CONSTIPATION: 0
NAUSEA: 0
COUGH: 0
GASTROINTESTINAL NEGATIVE: 1
RESPIRATORY NEGATIVE: 1
ABDOMINAL PAIN: 0
DIARRHEA: 0
BACK PAIN: 0
SHORTNESS OF BREATH: 0

## 2019-03-01 DIAGNOSIS — I10 ESSENTIAL HYPERTENSION: ICD-10-CM

## 2019-03-02 RX ORDER — MELOXICAM 15 MG/1
TABLET ORAL
Qty: 30 TABLET | Refills: 3 | Status: SHIPPED | OUTPATIENT
Start: 2019-03-02 | End: 2019-08-19

## 2019-03-12 DIAGNOSIS — F43.0 STRESS REACTION: ICD-10-CM

## 2019-03-12 DIAGNOSIS — F41.9 ANXIETY: ICD-10-CM

## 2019-03-12 RX ORDER — ALPRAZOLAM 1 MG/1
TABLET ORAL
Qty: 30 TABLET | Refills: 0 | Status: SHIPPED | OUTPATIENT
Start: 2019-03-12 | End: 2019-04-09 | Stop reason: SDUPTHER

## 2019-03-20 ENCOUNTER — OFFICE VISIT (OUTPATIENT)
Dept: FAMILY MEDICINE CLINIC | Age: 49
End: 2019-03-20
Payer: MEDICARE

## 2019-03-20 VITALS
SYSTOLIC BLOOD PRESSURE: 146 MMHG | WEIGHT: 251 LBS | BODY MASS INDEX: 41.77 KG/M2 | DIASTOLIC BLOOD PRESSURE: 104 MMHG | HEART RATE: 104 BPM

## 2019-03-20 DIAGNOSIS — M51.36 DDD (DEGENERATIVE DISC DISEASE), LUMBAR: ICD-10-CM

## 2019-03-20 DIAGNOSIS — M79.7 FIBROMYALGIA: ICD-10-CM

## 2019-03-20 DIAGNOSIS — M15.9 PRIMARY OSTEOARTHRITIS INVOLVING MULTIPLE JOINTS: Primary | ICD-10-CM

## 2019-03-20 DIAGNOSIS — J45.20 MILD INTERMITTENT ASTHMA WITHOUT COMPLICATION: ICD-10-CM

## 2019-03-20 PROCEDURE — G8417 CALC BMI ABV UP PARAM F/U: HCPCS | Performed by: NURSE PRACTITIONER

## 2019-03-20 PROCEDURE — G8427 DOCREV CUR MEDS BY ELIG CLIN: HCPCS | Performed by: NURSE PRACTITIONER

## 2019-03-20 PROCEDURE — 1036F TOBACCO NON-USER: CPT | Performed by: NURSE PRACTITIONER

## 2019-03-20 PROCEDURE — 3017F COLORECTAL CA SCREEN DOC REV: CPT | Performed by: NURSE PRACTITIONER

## 2019-03-20 PROCEDURE — G8484 FLU IMMUNIZE NO ADMIN: HCPCS | Performed by: NURSE PRACTITIONER

## 2019-03-20 PROCEDURE — 99215 OFFICE O/P EST HI 40 MIN: CPT | Performed by: NURSE PRACTITIONER

## 2019-03-20 ASSESSMENT — ENCOUNTER SYMPTOMS
GASTROINTESTINAL NEGATIVE: 1
CONSTIPATION: 0
DIARRHEA: 0
ABDOMINAL PAIN: 0
RESPIRATORY NEGATIVE: 1
SHORTNESS OF BREATH: 0
VOMITING: 0
EYES NEGATIVE: 1
COUGH: 0
NAUSEA: 0

## 2019-05-09 DIAGNOSIS — F43.0 STRESS REACTION: ICD-10-CM

## 2019-05-09 DIAGNOSIS — F41.9 ANXIETY: ICD-10-CM

## 2019-05-09 RX ORDER — ALPRAZOLAM 1 MG/1
TABLET ORAL
Qty: 30 TABLET | Refills: 0 | Status: SHIPPED | OUTPATIENT
Start: 2019-05-09 | End: 2019-06-10 | Stop reason: SDUPTHER

## 2019-05-09 NOTE — TELEPHONE ENCOUNTER
Pharm requested refill     Health Maintenance   Topic Date Due    Pneumococcal 0-64 years Vaccine (1 of 1 - PPSV23) 02/07/1974    Diabetic retinal exam  02/07/1978    Hepatitis B Vaccine (1 of 3 - Risk 3-dose series) 02/07/1987    Cervical cancer screen  02/07/1989    Breast cancer screen  02/07/2018    Shingles Vaccine (1 of 2) 02/07/2018    Colon cancer screen colonoscopy  02/07/2018    Flu vaccine (Season Ended) 09/01/2019    Lipid screen  09/17/2019    A1C test (Diabetic or Prediabetic)  12/28/2019    Diabetic microalbuminuria test  12/28/2019    Diabetic foot exam  02/26/2020    Potassium monitoring  02/26/2020    Creatinine monitoring  02/26/2020    DTaP/Tdap/Td vaccine (2 - Td) 01/05/2028    HIV screen  Completed             (applicable per patient's age: Cancer Screenings, Depression Screening, Fall Risk Screening, Immunizations)    Hemoglobin A1C (%)   Date Value   12/28/2018 8.5   09/27/2018 6.9     LDL Calculated (mg/dL)   Date Value   09/17/2018 199 (A)     AST (U/L)   Date Value   12/28/2018 52     ALT (U/L)   Date Value   12/28/2018 72     BUN (mg/dL)   Date Value   02/26/2019 12      (goal A1C is < 7)   (goal LDL is <100) need 30-50% reduction from baseline     BP Readings from Last 3 Encounters:   03/20/19 (!) 146/104   02/26/19 128/72   12/28/18 (!) 152/98    (goal /80)      All Future Testing planned in [facility]:  Lab Frequency Next Occurrence   Hepatic Function Panel Once 01/21/2019   ASIF DIGITAL SCREEN W CAD BILATERAL Once 04/27/2019   POCT Fecal Immunochemical Test (FIT) Once 05/29/2019       Next Visit Date:  No future appointments.          Patient Active Problem List:     Stress reaction     Essential hypertension     Dysthymia     Anxiety     Mild intermittent asthma without complication     Dermatitis     Osteoarthritis of multiple joints     Fibromyalgia     Left leg cellulitis     Primary osteoarthritis of left knee     Chronic back pain     Obesity     Chronic

## 2019-06-10 DIAGNOSIS — F41.9 ANXIETY: ICD-10-CM

## 2019-06-10 DIAGNOSIS — F43.0 STRESS REACTION: ICD-10-CM

## 2019-06-10 RX ORDER — ALPRAZOLAM 1 MG/1
TABLET ORAL
Qty: 30 TABLET | Refills: 0 | Status: SHIPPED | OUTPATIENT
Start: 2019-06-10 | End: 2019-08-06 | Stop reason: SDUPTHER

## 2019-06-10 NOTE — TELEPHONE ENCOUNTER
Pharmacy request    Health Maintenance   Topic Date Due    Pneumococcal 0-64 years Vaccine (1 of 1 - PPSV23) 02/07/1974    Diabetic retinal exam  02/07/1978    Hepatitis B Vaccine (1 of 3 - Risk 3-dose series) 02/07/1987    Cervical cancer screen  02/07/1989    Breast cancer screen  02/07/2018    Shingles Vaccine (1 of 2) 02/07/2018    Colon cancer screen colonoscopy  02/07/2018    Flu vaccine (Season Ended) 09/01/2019    Lipid screen  09/17/2019    A1C test (Diabetic or Prediabetic)  12/28/2019    Diabetic microalbuminuria test  12/28/2019    Diabetic foot exam  02/26/2020    Potassium monitoring  02/26/2020    Creatinine monitoring  02/26/2020    DTaP/Tdap/Td vaccine (2 - Td) 01/05/2028    HIV screen  Completed             (applicable per patient's age: Cancer Screenings, Depression Screening, Fall Risk Screening, Immunizations)    Hemoglobin A1C (%)   Date Value   12/28/2018 8.5   09/27/2018 6.9     LDL Calculated (mg/dL)   Date Value   09/17/2018 199 (A)     AST (U/L)   Date Value   12/28/2018 52     ALT (U/L)   Date Value   12/28/2018 72     BUN (mg/dL)   Date Value   02/26/2019 12      (goal A1C is < 7)   (goal LDL is <100) need 30-50% reduction from baseline     BP Readings from Last 3 Encounters:   03/20/19 (!) 146/104   02/26/19 128/72   12/28/18 (!) 152/98    (goal /80)      All Future Testing planned in [facility]:  Lab Frequency Next Occurrence   Hepatic Function Panel Once 01/21/2019   ASIF DIGITAL SCREEN W CAD BILATERAL Once 06/13/2019   POCT Fecal Immunochemical Test (FIT) Once 06/30/2019       Next Visit Date:  No future appointments.          Patient Active Problem List:     Stress reaction     Essential hypertension     Dysthymia     Anxiety     Mild intermittent asthma without complication     Dermatitis     Osteoarthritis of multiple joints     Fibromyalgia     Left leg cellulitis     Primary osteoarthritis of left knee     Chronic back pain     Obesity     Chronic non-seasonal allergic rhinitis     Type 2 diabetes mellitus without complication, without long-term current use of insulin ([facility] [address]

## 2019-06-27 DIAGNOSIS — I10 ESSENTIAL HYPERTENSION: ICD-10-CM

## 2019-06-27 RX ORDER — VENLAFAXINE HYDROCHLORIDE 75 MG/1
CAPSULE, EXTENDED RELEASE ORAL
Qty: 30 CAPSULE | Refills: 3 | Status: SHIPPED | OUTPATIENT
Start: 2019-06-27 | End: 2019-11-06 | Stop reason: SDUPTHER

## 2019-06-27 RX ORDER — METOPROLOL TARTRATE 50 MG/1
TABLET, FILM COATED ORAL
Qty: 60 TABLET | Refills: 3 | Status: SHIPPED | OUTPATIENT
Start: 2019-06-27 | End: 2019-11-13 | Stop reason: SDUPTHER

## 2019-08-06 DIAGNOSIS — F41.9 ANXIETY: ICD-10-CM

## 2019-08-06 DIAGNOSIS — F43.0 STRESS REACTION: ICD-10-CM

## 2019-08-06 RX ORDER — ALPRAZOLAM 1 MG/1
TABLET ORAL
Qty: 30 TABLET | Refills: 0 | Status: SHIPPED | OUTPATIENT
Start: 2019-08-06 | End: 2019-10-14 | Stop reason: SDUPTHER

## 2019-08-06 NOTE — TELEPHONE ENCOUNTER
allergic rhinitis     Type 2 diabetes mellitus without complication, without long-term current use of insulin ([facility] [address]

## 2019-08-16 ENCOUNTER — NURSE TRIAGE (OUTPATIENT)
Dept: OTHER | Facility: CLINIC | Age: 49
End: 2019-08-16

## 2019-08-16 NOTE — TELEPHONE ENCOUNTER
Reason for Disposition   Blood glucose > 400 mg/dl (22 mmol/l)    Protocols used: DIABETES - HIGH BLOOD SUGAR-ADULT-OH    Received call from Lili in [facility]. Patient calling with the following:  New diagnosis of diabetes in December of 2018. Patient reports that her blood sugars have been running high even though she has cut soft drinks from her diet, she is drinking sugar free gatorade, and eating healthier. Patient reports despite the change in diet and drinking that her blood sugar \"the other day\" was 511. Prior to this phone call the patient had a blood sugar of 490 and then while on the phone with this writer the patient's blood sugar was 411. The patient denies vomiting at this time; nor has she been instructed on how to check urine ketones. This writer called [address] to be connected with PCP office however no one at the PCP office was able to be reached. This writer then told the patient to proceed to the ED for evaluation and treatment. Patient will take blood glucose monitor with her so the ED can historically see what her blood sugars have been running. The patient reports she will head to the ED and states that she is not having blurred vision or head ache at this time.

## 2019-08-17 ENCOUNTER — TELEPHONE (OUTPATIENT)
Dept: FAMILY MEDICINE CLINIC | Age: 49
End: 2019-08-17

## 2019-08-17 DIAGNOSIS — E11.9 TYPE 2 DIABETES MELLITUS WITHOUT COMPLICATION, WITHOUT LONG-TERM CURRENT USE OF INSULIN (HCC): Primary | ICD-10-CM

## 2019-08-17 RX ORDER — GLIMEPIRIDE 2 MG/1
2 TABLET ORAL
Qty: 90 TABLET | Refills: 1 | Status: SHIPPED | OUTPATIENT
Start: 2019-08-17 | End: 2020-02-10

## 2019-08-17 RX ORDER — METFORMIN HYDROCHLORIDE 500 MG/1
1000 TABLET, EXTENDED RELEASE ORAL
Qty: 180 TABLET | Refills: 2
Start: 2019-08-17 | End: 2019-10-22 | Stop reason: SDUPTHER

## 2019-08-17 NOTE — TELEPHONE ENCOUNTER
Pt called with elevated bs. Told to take 2000mg metformin, amaryl 2mg called in to add. Call for appt.

## 2019-08-19 ENCOUNTER — OFFICE VISIT (OUTPATIENT)
Dept: FAMILY MEDICINE CLINIC | Age: 49
End: 2019-08-19
Payer: MEDICARE

## 2019-08-19 ENCOUNTER — TELEPHONE (OUTPATIENT)
Dept: FAMILY MEDICINE CLINIC | Age: 49
End: 2019-08-19

## 2019-08-19 VITALS
WEIGHT: 232 LBS | BODY MASS INDEX: 38.65 KG/M2 | HEIGHT: 65 IN | HEART RATE: 84 BPM | SYSTOLIC BLOOD PRESSURE: 128 MMHG | DIASTOLIC BLOOD PRESSURE: 86 MMHG

## 2019-08-19 DIAGNOSIS — L30.9 DERMATITIS: ICD-10-CM

## 2019-08-19 DIAGNOSIS — R74.8 LIVER ENZYME ELEVATION: ICD-10-CM

## 2019-08-19 DIAGNOSIS — E11.9 TYPE 2 DIABETES MELLITUS WITHOUT COMPLICATION, WITHOUT LONG-TERM CURRENT USE OF INSULIN (HCC): Primary | ICD-10-CM

## 2019-08-19 DIAGNOSIS — J30.89 SEASONAL ALLERGIC RHINITIS DUE TO OTHER ALLERGIC TRIGGER: ICD-10-CM

## 2019-08-19 DIAGNOSIS — J45.20 MILD INTERMITTENT ASTHMA WITHOUT COMPLICATION: ICD-10-CM

## 2019-08-19 DIAGNOSIS — E66.09 CLASS 2 OBESITY DUE TO EXCESS CALORIES WITHOUT SERIOUS COMORBIDITY WITH BODY MASS INDEX (BMI) OF 38.0 TO 38.9 IN ADULT: ICD-10-CM

## 2019-08-19 DIAGNOSIS — M15.9 PRIMARY OSTEOARTHRITIS INVOLVING MULTIPLE JOINTS: ICD-10-CM

## 2019-08-19 DIAGNOSIS — J30.9 ALLERGIC RHINITIS, UNSPECIFIED SEASONALITY, UNSPECIFIED TRIGGER: ICD-10-CM

## 2019-08-19 LAB — HBA1C MFR BLD: 14 %

## 2019-08-19 PROCEDURE — 2022F DILAT RTA XM EVC RTNOPTHY: CPT | Performed by: FAMILY MEDICINE

## 2019-08-19 PROCEDURE — 83036 HEMOGLOBIN GLYCOSYLATED A1C: CPT | Performed by: FAMILY MEDICINE

## 2019-08-19 PROCEDURE — G8417 CALC BMI ABV UP PARAM F/U: HCPCS | Performed by: FAMILY MEDICINE

## 2019-08-19 PROCEDURE — 99214 OFFICE O/P EST MOD 30 MIN: CPT | Performed by: FAMILY MEDICINE

## 2019-08-19 PROCEDURE — 3046F HEMOGLOBIN A1C LEVEL >9.0%: CPT | Performed by: FAMILY MEDICINE

## 2019-08-19 PROCEDURE — 3017F COLORECTAL CA SCREEN DOC REV: CPT | Performed by: FAMILY MEDICINE

## 2019-08-19 PROCEDURE — 1036F TOBACCO NON-USER: CPT | Performed by: FAMILY MEDICINE

## 2019-08-19 PROCEDURE — G8428 CUR MEDS NOT DOCUMENT: HCPCS | Performed by: FAMILY MEDICINE

## 2019-08-19 RX ORDER — ALBUTEROL SULFATE 90 UG/1
AEROSOL, METERED RESPIRATORY (INHALATION)
Qty: 18 G | Refills: 2 | Status: SHIPPED | OUTPATIENT
Start: 2019-08-19 | End: 2019-11-01 | Stop reason: SDUPTHER

## 2019-08-19 RX ORDER — LORATADINE 10 MG/1
10 TABLET ORAL DAILY
Qty: 30 TABLET | Refills: 3 | Status: SHIPPED | OUTPATIENT
Start: 2019-08-19 | End: 2020-06-03 | Stop reason: SDUPTHER

## 2019-08-19 RX ORDER — TRIAMCINOLONE ACETONIDE 5 MG/G
OINTMENT TOPICAL
Qty: 30 G | Refills: 3 | Status: SHIPPED | OUTPATIENT
Start: 2019-08-19 | End: 2020-08-04 | Stop reason: SDUPTHER

## 2019-08-19 RX ORDER — FLUTICASONE PROPIONATE 50 MCG
1 SPRAY, SUSPENSION (ML) NASAL DAILY
Qty: 1 BOTTLE | Refills: 3 | Status: SHIPPED | OUTPATIENT
Start: 2019-08-19 | End: 2020-01-09

## 2019-08-19 RX ORDER — IBUPROFEN 800 MG/1
800 TABLET ORAL EVERY 8 HOURS PRN
Qty: 90 TABLET | Refills: 1 | Status: SHIPPED | OUTPATIENT
Start: 2019-08-19 | End: 2019-10-16 | Stop reason: SDUPTHER

## 2019-08-19 RX ORDER — BUDESONIDE AND FORMOTEROL FUMARATE DIHYDRATE 160; 4.5 UG/1; UG/1
2 AEROSOL RESPIRATORY (INHALATION) 2 TIMES DAILY
Qty: 1 INHALER | Refills: 3 | Status: SHIPPED | OUTPATIENT
Start: 2019-08-19 | End: 2020-01-09

## 2019-08-19 ASSESSMENT — ENCOUNTER SYMPTOMS
COUGH: 0
SHORTNESS OF BREATH: 0
BLOOD IN STOOL: 0
ABDOMINAL PAIN: 0
EYES NEGATIVE: 1
BACK PAIN: 1

## 2019-08-19 NOTE — TELEPHONE ENCOUNTER
Silvio Allen, could you check on марина to make sure she gets to diabetic counselor. She is uncontrolled and resistant to using injections which she most likely needs. I did not mention you when she was here for appt.

## 2019-08-19 NOTE — PROGRESS NOTES
 Sexual activity: Not Currently   Lifestyle    Physical activity:     Days per week: None     Minutes per session: None    Stress: None   Relationships    Social connections:     Talks on phone: None     Gets together: None     Attends [employer] service: None     Active member of club or organization: None     Attends meetings of clubs or organizations: None     Relationship status: None    Intimate partner violence:     Fear of current or ex partner: None     Emotionally abused: None     Physically abused: None     Forced sexual activity: None   Other Topics Concern    None   Social History Narrative    None       SURGICAL HISTORY    Past Surgical History:   Procedure Laterality Date    CHOLECYSTECTOMY         CURRENT MEDICATIONS    Current Outpatient Medications   Medication Sig Dispense Refill    budesonide-formoterol (SYMBICORT) 160-4.5 MCG/ACT AERO Inhale 2 puffs into the lungs 2 times daily 1 Inhaler 3    fluticasone (FLONASE) 50 MCG/ACT nasal spray 1 spray by Nasal route daily 1 Bottle 3    albuterol sulfate HFA (VENTOLIN HFA) 108 (90 Base) MCG/ACT inhaler inhale 2 puffs by mouth every 6 hours if needed for wheezing 18 g 2    triamcinolone (ARISTOCORT) 0.5 % ointment apply to affected area twice a day 30 g 3    loratadine (CLARITIN) 10 MG tablet Take 1 tablet by mouth daily 30 tablet 3    ibuprofen (ADVIL;MOTRIN) 800 MG tablet Take 1 tablet by mouth every 8 hours as needed for Pain Take with food 90 tablet 1    Handicap Placard MISC by Does not apply route  5 years from [facility] written date 1 each 0    glimepiride (AMARYL) 2 MG tablet Take 1 tablet by mouth daily (before lunch) 90 tablet 1    metFORMIN (GLUCOPHAGE-XR) 500 MG extended release tablet Take 2 tablets by mouth 2 times daily (before meals) 180 tablet 2    venlafaxine (EFFEXOR XR) 75 MG extended release capsule take 1 capsule by mouth every morning 30 capsule 3    metoprolol tartrate (LOPRESSOR) 50 MG tablet take 1 tablet by mouth twice a day 60 tablet 3    amLODIPine (NORVASC) 10 MG tablet take 1 tablet by mouth once daily 30 tablet 5    hydrochlorothiazide (HYDRODIURIL) 12.5 MG tablet take 1 tablet by mouth once daily 30 tablet 5    gabapentin (NEURONTIN) 400 MG capsule Take 400 mg by mouth 3 times daily. .      blood glucose monitor strips Check blood sugar 1 x daily 100 strip 3    Lancets MISC Check Blood sugar 1 x daily 100 each 3    glucose monitoring kit (FREESTYLE) monitoring kit 1 kit by Does not apply route daily 1 kit 0    Handicap Placard MISC by Does not apply route Expires 6 months after issue 1 each 0    oxyCODONE-acetaminophen (PERCOCET) 5-325 MG per tablet Take 1 tablet by mouth every 4 hours as needed for Pain .  sodium oxybate (XYREM) 500 MG/ML SOLN solution Take 4.5 g by mouth nightly       ALPRAZolam (XANAX) 1 MG tablet take 1 tablet by mouth every evening if needed for anxiety (Patient not taking: Reported on 8/19/2019) 30 tablet 0     No current facility-administered medications for this visit. ALLERGIES    Allergies   Allergen Reactions    Lisinopril Anaphylaxis     Difficulty breathing     Zoloft [Sertraline Hcl] Anaphylaxis     Difficulty breathing     Seasonal     Amoxicillin Rash    Augmentin [Amoxicillin-Pot Clavulanate] Rash    Buspirone Rash    Paxil [Paroxetine Hcl] Other (See Comments)     Weight gain        PHYSICAL EXAM   Physical Exam   Constitutional: She is oriented to person, place, and time. She appears well-developed and well-nourished. obese   HENT:   Head: Normocephalic. Eyes: Pupils are equal, round, and reactive to light. Neck: Normal range of motion. Neck supple. No thyromegaly present. Cardiovascular: Normal rate, regular rhythm and normal heart sounds. No murmur heard. Pulmonary/Chest: Effort normal and breath sounds normal. She has no wheezes. She has no rales. Abdominal: Soft. There is no tenderness. There is no rebound and no guarding.

## 2019-08-21 ENCOUNTER — TELEPHONE (OUTPATIENT)
Dept: FAMILY MEDICINE CLINIC | Age: 49
End: 2019-08-21

## 2019-08-21 RX ORDER — FLUCONAZOLE 150 MG/1
150 TABLET ORAL
Qty: 2 TABLET | Refills: 1 | Status: SHIPPED | OUTPATIENT
Start: 2019-08-21 | End: 2019-10-24 | Stop reason: ALTCHOICE

## 2019-10-14 DIAGNOSIS — F43.0 STRESS REACTION: ICD-10-CM

## 2019-10-14 DIAGNOSIS — F41.9 ANXIETY: ICD-10-CM

## 2019-10-14 RX ORDER — ALPRAZOLAM 1 MG/1
TABLET ORAL
Qty: 30 TABLET | Refills: 0 | Status: SHIPPED | OUTPATIENT
Start: 2019-10-14 | End: 2019-12-10 | Stop reason: SDUPTHER

## 2019-10-16 DIAGNOSIS — M15.9 PRIMARY OSTEOARTHRITIS INVOLVING MULTIPLE JOINTS: ICD-10-CM

## 2019-10-16 DIAGNOSIS — E11.8 TYPE 2 DIABETES MELLITUS WITH COMPLICATION, WITHOUT LONG-TERM CURRENT USE OF INSULIN (HCC): ICD-10-CM

## 2019-10-16 RX ORDER — LANCETS 33 GAUGE
EACH MISCELLANEOUS
Qty: 100 EACH | Refills: 3 | Status: SHIPPED | OUTPATIENT
Start: 2019-10-16 | End: 2020-08-12 | Stop reason: SDUPTHER

## 2019-10-16 RX ORDER — IBUPROFEN 800 MG/1
TABLET ORAL
Qty: 90 TABLET | Refills: 1 | Status: SHIPPED | OUTPATIENT
Start: 2019-10-16 | End: 2020-02-12 | Stop reason: SDUPTHER

## 2019-10-24 ENCOUNTER — OFFICE VISIT (OUTPATIENT)
Dept: FAMILY MEDICINE CLINIC | Age: 49
End: 2019-10-24
Payer: MEDICARE

## 2019-10-24 ENCOUNTER — TELEPHONE (OUTPATIENT)
Dept: FAMILY MEDICINE CLINIC | Age: 49
End: 2019-10-24

## 2019-10-24 VITALS
WEIGHT: 234 LBS | DIASTOLIC BLOOD PRESSURE: 86 MMHG | BODY MASS INDEX: 38.99 KG/M2 | HEIGHT: 65 IN | SYSTOLIC BLOOD PRESSURE: 128 MMHG | HEART RATE: 100 BPM

## 2019-10-24 DIAGNOSIS — M25.519 NECK AND SHOULDER PAIN: Primary | ICD-10-CM

## 2019-10-24 DIAGNOSIS — E11.9 TYPE 2 DIABETES MELLITUS WITHOUT COMPLICATION, WITHOUT LONG-TERM CURRENT USE OF INSULIN (HCC): Primary | ICD-10-CM

## 2019-10-24 DIAGNOSIS — E11.8 TYPE 2 DIABETES MELLITUS WITH COMPLICATION, WITHOUT LONG-TERM CURRENT USE OF INSULIN (HCC): ICD-10-CM

## 2019-10-24 DIAGNOSIS — J45.20 MILD INTERMITTENT ASTHMA WITHOUT COMPLICATION: ICD-10-CM

## 2019-10-24 DIAGNOSIS — M54.2 NECK AND SHOULDER PAIN: Primary | ICD-10-CM

## 2019-10-24 DIAGNOSIS — I10 ESSENTIAL HYPERTENSION: ICD-10-CM

## 2019-10-24 DIAGNOSIS — R68.2 DRY MOUTH: ICD-10-CM

## 2019-10-24 PROCEDURE — 2022F DILAT RTA XM EVC RTNOPTHY: CPT | Performed by: NURSE PRACTITIONER

## 2019-10-24 PROCEDURE — 3017F COLORECTAL CA SCREEN DOC REV: CPT | Performed by: NURSE PRACTITIONER

## 2019-10-24 PROCEDURE — G8484 FLU IMMUNIZE NO ADMIN: HCPCS | Performed by: NURSE PRACTITIONER

## 2019-10-24 PROCEDURE — G8427 DOCREV CUR MEDS BY ELIG CLIN: HCPCS | Performed by: NURSE PRACTITIONER

## 2019-10-24 PROCEDURE — 1036F TOBACCO NON-USER: CPT | Performed by: NURSE PRACTITIONER

## 2019-10-24 PROCEDURE — 3046F HEMOGLOBIN A1C LEVEL >9.0%: CPT | Performed by: NURSE PRACTITIONER

## 2019-10-24 PROCEDURE — 99214 OFFICE O/P EST MOD 30 MIN: CPT | Performed by: NURSE PRACTITIONER

## 2019-10-24 PROCEDURE — G8417 CALC BMI ABV UP PARAM F/U: HCPCS | Performed by: NURSE PRACTITIONER

## 2019-10-24 RX ORDER — CYCLOBENZAPRINE HCL 5 MG
5 TABLET ORAL 2 TIMES DAILY PRN
Qty: 30 TABLET | Refills: 0 | Status: SHIPPED | OUTPATIENT
Start: 2019-10-24 | End: 2020-02-12 | Stop reason: SDUPTHER

## 2019-10-24 RX ORDER — OXYCODONE AND ACETAMINOPHEN 7.5; 325 MG/1; MG/1
TABLET ORAL
Status: ON HOLD | COMMUNITY
Start: 2019-10-09 | End: 2021-10-28 | Stop reason: HOSPADM

## 2019-10-24 RX ORDER — GABAPENTIN 600 MG/1
1200 TABLET ORAL 2 TIMES DAILY
COMMUNITY
Start: 2019-10-09 | End: 2022-05-09 | Stop reason: ALTCHOICE

## 2019-10-24 RX ORDER — XYLITOL/YERBA SANTA
AEROSOL, SPRAY WITH PUMP (ML) MUCOUS MEMBRANE
Qty: 240 ML | Refills: 11 | Status: SHIPPED | OUTPATIENT
Start: 2019-10-24 | End: 2021-04-19

## 2019-10-24 ASSESSMENT — ENCOUNTER SYMPTOMS
GASTROINTESTINAL NEGATIVE: 1
NAUSEA: 0
DIARRHEA: 0
SHORTNESS OF BREATH: 0
RESPIRATORY NEGATIVE: 1
VOMITING: 0
ABDOMINAL PAIN: 0
COUGH: 0
BACK PAIN: 0

## 2019-11-20 ENCOUNTER — OFFICE VISIT (OUTPATIENT)
Dept: FAMILY MEDICINE CLINIC | Age: 49
End: 2019-11-20
Payer: MEDICARE

## 2019-11-20 VITALS
DIASTOLIC BLOOD PRESSURE: 90 MMHG | WEIGHT: 229 LBS | HEART RATE: 100 BPM | BODY MASS INDEX: 38.11 KG/M2 | SYSTOLIC BLOOD PRESSURE: 150 MMHG | OXYGEN SATURATION: 99 %

## 2019-11-20 DIAGNOSIS — G47.09 OTHER INSOMNIA: ICD-10-CM

## 2019-11-20 DIAGNOSIS — J45.20 MILD INTERMITTENT ASTHMA WITHOUT COMPLICATION: ICD-10-CM

## 2019-11-20 DIAGNOSIS — M25.519 NECK AND SHOULDER PAIN: ICD-10-CM
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
## 2019-11-20 DIAGNOSIS — H04.123 DRY EYE SYNDROME OF BOTH EYES: ICD-10-CM

## 2019-11-20 DIAGNOSIS — E11.8 TYPE 2 DIABETES MELLITUS WITH COMPLICATION, WITHOUT LONG-TERM CURRENT USE OF INSULIN (HCC): ICD-10-CM

## 2019-11-20 DIAGNOSIS — J34.89 NASAL SORE: ICD-10-CM

## 2019-11-20 DIAGNOSIS — S06.0X0D CONCUSSION WITHOUT LOSS OF CONSCIOUSNESS, SUBSEQUENT ENCOUNTER: ICD-10-CM

## 2019-11-20 DIAGNOSIS — M54.2 NECK AND SHOULDER PAIN: ICD-10-CM

## 2019-11-20 DIAGNOSIS — E11.9 TYPE 2 DIABETES MELLITUS WITHOUT COMPLICATION, WITHOUT LONG-TERM CURRENT USE OF INSULIN (HCC): Primary | ICD-10-CM

## 2019-11-20 DIAGNOSIS — I10 ESSENTIAL HYPERTENSION: ICD-10-CM

## 2019-11-20 LAB
CHP ED QC CHECK: NORMAL
GLUCOSE BLD-MCNC: 364 MG/DL
HBA1C MFR BLD: 8.6 %

## 2019-11-20 PROCEDURE — 82962 GLUCOSE BLOOD TEST: CPT | Performed by: NURSE PRACTITIONER

## 2019-11-20 PROCEDURE — 1036F TOBACCO NON-USER: CPT | Performed by: NURSE PRACTITIONER

## 2019-11-20 PROCEDURE — 83036 HEMOGLOBIN GLYCOSYLATED A1C: CPT | Performed by: NURSE PRACTITIONER

## 2019-11-20 PROCEDURE — 3017F COLORECTAL CA SCREEN DOC REV: CPT | Performed by: NURSE PRACTITIONER

## 2019-11-20 PROCEDURE — 99214 OFFICE O/P EST MOD 30 MIN: CPT | Performed by: NURSE PRACTITIONER

## 2019-11-20 PROCEDURE — 2022F DILAT RTA XM EVC RTNOPTHY: CPT | Performed by: NURSE PRACTITIONER

## 2019-11-20 PROCEDURE — G8417 CALC BMI ABV UP PARAM F/U: HCPCS | Performed by: NURSE PRACTITIONER

## 2019-11-20 PROCEDURE — G8427 DOCREV CUR MEDS BY ELIG CLIN: HCPCS | Performed by: NURSE PRACTITIONER

## 2019-11-20 PROCEDURE — G8484 FLU IMMUNIZE NO ADMIN: HCPCS | Performed by: NURSE PRACTITIONER

## 2019-11-20 RX ORDER — TRAZODONE HYDROCHLORIDE 100 MG/1
100 TABLET ORAL NIGHTLY
Qty: 30 TABLET | Refills: 1 | Status: SHIPPED | OUTPATIENT
Start: 2019-11-20 | End: 2020-01-13

## 2019-11-20 RX ORDER — MINERAL OIL 100 MG/100ML
1 OIL ORAL; TOPICAL 2 TIMES DAILY
Qty: 1 DEVICE | Refills: 0 | Status: SHIPPED | OUTPATIENT
Start: 2019-11-20

## 2019-11-20 RX ORDER — SODIUM CHLORIDE/ALOE VERA
GEL (GRAM) NASAL PRN
Qty: 1 TUBE | Refills: 3 | Status: SHIPPED | OUTPATIENT
Start: 2019-11-20 | End: 2020-08-03

## 2019-11-20 ASSESSMENT — ENCOUNTER SYMPTOMS
DIARRHEA: 0
SHORTNESS OF BREATH: 0
VOMITING: 0
BACK PAIN: 0
GASTROINTESTINAL NEGATIVE: 1
NAUSEA: 0
ALLERGIC/IMMUNOLOGIC NEGATIVE: 1
EYES NEGATIVE: 1
ABDOMINAL PAIN: 0

## 2019-11-27 ENCOUNTER — CARE COORDINATION (OUTPATIENT)
Dept: CARE COORDINATION | Age: 49
End: 2019-11-27

## 2019-11-29 DIAGNOSIS — G47.09 OTHER INSOMNIA: Primary | ICD-10-CM

## 2019-11-29 DIAGNOSIS — E11.9 TYPE 2 DIABETES MELLITUS WITHOUT COMPLICATION, WITHOUT LONG-TERM CURRENT USE OF INSULIN (HCC): ICD-10-CM

## 2019-11-29 RX ORDER — METFORMIN HYDROCHLORIDE 500 MG/1
TABLET, EXTENDED RELEASE ORAL
Qty: 180 TABLET | Refills: 1 | Status: SHIPPED | OUTPATIENT
Start: 2019-11-29 | End: 2020-02-12

## 2019-12-01 RX ORDER — ZOLPIDEM TARTRATE 5 MG/1
5 TABLET ORAL NIGHTLY PRN
Qty: 30 TABLET | Refills: 0 | Status: SHIPPED | OUTPATIENT
Start: 2019-12-01 | End: 2019-12-30

## 2019-12-03 DIAGNOSIS — Z12.31 BREAST CANCER SCREENING BY MAMMOGRAM: ICD-10-CM

## 2019-12-04 ENCOUNTER — CARE COORDINATION (OUTPATIENT)
Dept: CARE COORDINATION | Age: 49
End: 2019-12-04

## 2019-12-04 SDOH — SOCIAL STABILITY: SOCIAL NETWORK: HOW OFTEN DO YOU GET TOGETHER WITH FRIENDS OR RELATIVES?: MORE THAN THREE TIMES A WEEK

## 2019-12-04 SDOH — HEALTH STABILITY: PHYSICAL HEALTH: ON AVERAGE, HOW MANY DAYS PER WEEK DO YOU ENGAGE IN MODERATE TO STRENUOUS EXERCISE (LIKE A BRISK WALK)?: 4 DAYS

## 2019-12-04 SDOH — ECONOMIC STABILITY: TRANSPORTATION INSECURITY
IN THE PAST 12 MONTHS, HAS LACK OF TRANSPORTATION KEPT YOU FROM MEETINGS, WORK, OR FROM GETTING THINGS NEEDED FOR DAILY LIVING?: NO

## 2019-12-04 SDOH — HEALTH STABILITY: MENTAL HEALTH: HOW OFTEN DO YOU HAVE A DRINK CONTAINING ALCOHOL?: NEVER

## 2019-12-04 SDOH — SOCIAL STABILITY: SOCIAL NETWORK
DO YOU BELONG TO ANY CLUBS OR ORGANIZATIONS SUCH AS CHURCH GROUPS UNIONS, FRATERNAL OR ATHLETIC GROUPS, OR SCHOOL GROUPS?: NO

## 2019-12-04 SDOH — ECONOMIC STABILITY: FOOD INSECURITY: WITHIN THE PAST 12 MONTHS, THE FOOD YOU BOUGHT JUST DIDN'T LAST AND YOU DIDN'T HAVE MONEY TO GET MORE.: NEVER TRUE

## 2019-12-04 SDOH — SOCIAL STABILITY: SOCIAL NETWORK: HOW OFTEN DO YOU ATTEND CHURCH OR RELIGIOUS SERVICES?: 1 TO 4 TIMES PER YEAR

## 2019-12-04 SDOH — HEALTH STABILITY: PHYSICAL HEALTH: ON AVERAGE, HOW MANY MINUTES DO YOU ENGAGE IN EXERCISE AT THIS LEVEL?: 40 MIN

## 2019-12-04 SDOH — HEALTH STABILITY: MENTAL HEALTH
STRESS IS WHEN SOMEONE FEELS TENSE, NERVOUS, ANXIOUS, OR CAN'T SLEEP AT NIGHT BECAUSE THEIR MIND IS TROUBLED. HOW STRESSED ARE YOU?: NOT AT ALL

## 2019-12-04 SDOH — ECONOMIC STABILITY: FOOD INSECURITY: WITHIN THE PAST 12 MONTHS, YOU WORRIED THAT YOUR FOOD WOULD RUN OUT BEFORE YOU GOT MONEY TO BUY MORE.: NEVER TRUE

## 2019-12-04 SDOH — SOCIAL STABILITY: SOCIAL NETWORK: ARE YOU MARRIED, WIDOWED, DIVORCED, SEPARATED, NEVER MARRIED, OR LIVING WITH A PARTNER?: DIVORCED

## 2019-12-04 SDOH — ECONOMIC STABILITY: INCOME INSECURITY: HOW HARD IS IT FOR YOU TO PAY FOR THE VERY BASICS LIKE FOOD, HOUSING, MEDICAL CARE, AND HEATING?: NOT HARD AT ALL

## 2019-12-04 SDOH — SOCIAL STABILITY: SOCIAL NETWORK
IN A TYPICAL WEEK, HOW MANY TIMES DO YOU TALK ON THE PHONE WITH FAMILY, FRIENDS, OR NEIGHBORS?: MORE THAN THREE TIMES A WEEK

## 2019-12-04 SDOH — SOCIAL STABILITY: SOCIAL NETWORK: HOW OFTEN DO YOU ATTENT MEETINGS OF THE CLUB OR ORGANIZATION YOU BELONG TO?: NEVER

## 2019-12-04 SDOH — ECONOMIC STABILITY: TRANSPORTATION INSECURITY
IN THE PAST 12 MONTHS, HAS THE LACK OF TRANSPORTATION KEPT YOU FROM MEDICAL APPOINTMENTS OR FROM GETTING MEDICATIONS?: NO

## 2019-12-09 ENCOUNTER — TELEPHONE (OUTPATIENT)
Dept: FAMILY MEDICINE CLINIC | Age: 49
End: 2019-12-09

## 2019-12-09 DIAGNOSIS — R05.9 COUGH: Primary | ICD-10-CM

## 2019-12-09 RX ORDER — BENZONATATE 100 MG/1
100-200 CAPSULE ORAL 3 TIMES DAILY PRN
Qty: 60 CAPSULE | Refills: 0 | Status: CANCELLED | OUTPATIENT
Start: 2019-12-09 | End: 2019-12-16

## 2019-12-09 RX ORDER — BENZONATATE 200 MG/1
200 CAPSULE ORAL 3 TIMES DAILY PRN
Qty: 30 CAPSULE | Refills: 1 | Status: SHIPPED | OUTPATIENT
Start: 2019-12-09 | End: 2019-12-16

## 2019-12-10 DIAGNOSIS — F41.9 ANXIETY: ICD-10-CM

## 2019-12-10 DIAGNOSIS — F43.0 STRESS REACTION: ICD-10-CM

## 2019-12-10 RX ORDER — ALPRAZOLAM 1 MG/1
TABLET ORAL
Qty: 30 TABLET | Refills: 0 | Status: SHIPPED | OUTPATIENT
Start: 2019-12-10 | End: 2020-01-09

## 2019-12-12 ENCOUNTER — TELEPHONE (OUTPATIENT)
Dept: FAMILY MEDICINE CLINIC | Age: 49
End: 2019-12-12

## 2019-12-12 DIAGNOSIS — T07.XXXA FRACTURES, MULTIPLE: ICD-10-CM

## 2019-12-12 DIAGNOSIS — Z78.0 POST-MENOPAUSAL: Primary | ICD-10-CM

## 2019-12-13 ENCOUNTER — CARE COORDINATION (OUTPATIENT)
Dept: CARE COORDINATION | Age: 49
End: 2019-12-13

## 2019-12-13 RX ORDER — CAL/D3/MAG11/ZINC/COP/MANG/BOR 600 MG-800
1 TABLET ORAL DAILY
Qty: 30 TABLET | Refills: 5 | Status: SHIPPED | OUTPATIENT
Start: 2019-12-13 | End: 2020-02-12 | Stop reason: SDUPTHER

## 2019-12-18 ENCOUNTER — CARE COORDINATION (OUTPATIENT)
Dept: CARE COORDINATION | Age: 49
End: 2019-12-18

## 2019-12-20 ENCOUNTER — CARE COORDINATION (OUTPATIENT)
Dept: CARE COORDINATION | Age: 49
End: 2019-12-20

## 2019-12-27 ENCOUNTER — CARE COORDINATION (OUTPATIENT)
Dept: CARE COORDINATION | Age: 49
End: 2019-12-27

## 2019-12-30 DIAGNOSIS — G47.09 OTHER INSOMNIA: ICD-10-CM

## 2019-12-30 RX ORDER — ZOLPIDEM TARTRATE 5 MG/1
5 TABLET ORAL NIGHTLY PRN
Qty: 30 TABLET | Refills: 2 | Status: SHIPPED | OUTPATIENT
Start: 2019-12-30 | End: 2020-04-07 | Stop reason: SDUPTHER

## 2020-01-09 RX ORDER — ALPRAZOLAM 1 MG/1
TABLET ORAL
Qty: 30 TABLET | Refills: 0 | Status: SHIPPED | OUTPATIENT
Start: 2020-01-09 | End: 2020-06-04

## 2020-01-09 NOTE — TELEPHONE ENCOUNTER
Health Maintenance   Topic Date Due    Pneumococcal 0-64 years Vaccine (1 of 1 - PPSV23) 02/07/1974    Cervical cancer screen  02/07/1989    Shingles Vaccine (1 of 2) 02/07/2018    Colon cancer screen colonoscopy  02/07/2018    Flu vaccine (1) 09/01/2019    Lipid screen  09/17/2019    Diabetic microalbuminuria test  12/28/2019    Hepatitis B vaccine (1 of 3 - Risk 3-dose series) 09/18/2035 (Originally 2/7/1987)    Diabetic foot exam  02/26/2020    Potassium monitoring  02/26/2020    Creatinine monitoring  02/26/2020    Diabetic retinal exam  11/16/2020    A1C test (Diabetic or Prediabetic)  11/20/2020    Breast cancer screen  11/15/2021    DTaP/Tdap/Td vaccine (2 - Td) 01/05/2028    HIV screen  Completed             (applicable per patient's age: Cancer Screenings, Depression Screening, Fall Risk Screening, Immunizations)    Hemoglobin A1C (%)   Date Value   11/20/2019 8.6   08/19/2019 14.0   12/28/2018 8.5     LDL Calculated (mg/dL)   Date Value   09/17/2018 199 (A)     AST (U/L)   Date Value   12/28/2018 52     ALT (U/L)   Date Value   12/28/2018 72     BUN (mg/dL)   Date Value   02/26/2019 12      (goal A1C is < 7)   (goal LDL is <100) need 30-50% reduction from baseline     BP Readings from Last 3 Encounters:   11/20/19 (!) 150/90   10/24/19 128/86   08/19/19 128/86    (goal /80)      All Future Testing planned in [facility]:  Lab Frequency Next Occurrence   POCT Fecal Immunochemical Test (FIT) Once 06/30/2019   US LIVER Once 10/03/2019   DEXA Bone Density Axial Skeleton Once 12/27/2019       Next Visit Date:  Future Appointments   Date Time Provider Phoebe Boogie   1/16/2020  9:15 AM Trout[place] Zenaida, APRN - CNP [address] [id number]   2/21/2020 10:15 AM Tr[place] Zenaida, APRN - CNP [address] [id number]            Patient Active Problem List:     Stress reaction     Essential hypertension     Dysthymia     Anxiety     Mild intermittent asthma without complication     Dermatitis

## 2020-01-10 ENCOUNTER — CARE COORDINATION (OUTPATIENT)
Dept: CARE COORDINATION | Age: 50
End: 2020-01-10

## 2020-01-17 ENCOUNTER — TELEPHONE (OUTPATIENT)
Dept: FAMILY MEDICINE CLINIC | Age: 50
End: 2020-01-17

## 2020-01-17 ENCOUNTER — CARE COORDINATION (OUTPATIENT)
Dept: CARE COORDINATION | Age: 50
End: 2020-01-17

## 2020-01-17 NOTE — CARE COORDINATION
Ambulatory Care Coordination Note  1/17/2020  CM Risk Score: 6  Charlson 10 Year Mortality Risk Score: 23%     ACC: Latasha Magana, RN    Summary Note: spoke with pt who said her foot is doing better. She is wearing a boot for 6 weeks. She is getting around ok. She did have her Dexa scan done at [facility] will get these results into her chart. She said her sleep study is scheduled for Sunday to Monday. She did have an apt to see her pcp yesterday but missed this apt because she had to go see her brother in [place] who is being dx with Cancer. She said her fingers are bothering her in the morning they feel stiff she wants to see her pcp about this did reschedule her for end of Jan per her request. She said the other day her bs was high because she ate larisa with pasta. Her bs are coming down. She is working on eating better focusing on eating meats and veggies with some fruits. 1) fu on bs- fluctuating  2) fu on sleep   3) fu on sleep study - January   4) fu dm ed  5) dexa scan done         Care Coordination Interventions    Program Enrollment:  Complex Care  Referral from Primary Care Provider:  No  Suggested Interventions and Community Resources  Zone Management Tools:  Not Started         Goals Addressed                 This Visit's Progress     Conditions and Symptoms   On track     I will schedule office visits, as directed by my provider. I will keep my appointment or reschedule if I have to cancel. I will follow my Zone Management tool to seek urgent or emergent care. I will notify my provider of any symptoms that indicate a worsening of my condition. dm     Barriers: overwhelmed by complexity of regimen  Plan for overcoming my barriers: care coordination   Confidence: 7/10  Anticipated Goal Completion Date: 2/27/19                Prior to Admission medications    Medication Sig Start Date End Date Taking?  Authorizing Provider   hydrochlorothiazide (HYDRODIURIL) 12.5 MG tablet take 1 tablet by mouth once RUBEN Pitt CNP   Handicap Placard MISC by Does not apply route Expires 6 months after issue 4/5/18   RUBEN Hess CNP       Future Appointments   Date Time Provider Phoebe Chuyita   1/27/2020 10:45 AM RUBEN Pitt [email or web address]   2/21/2020 10:15 AM RUBEN Pitt CNP [email or web address]      and   Diabetes Assessment    Medic Alert ID:  No  Meal Planning:  Avoidance of concentrated sweets   How often do you test your blood sugar?:  Daily   Do you have barriers with adherence to non-pharmacologic self-management interventions?  (Nutrition/Exercise/Self-Monitoring):  No   Have you ever had to go to the ED for symptoms of low blood sugar?:  No

## 2020-01-31 ENCOUNTER — CARE COORDINATION (OUTPATIENT)
Dept: CARE COORDINATION | Age: 50
End: 2020-01-31

## 2020-02-07 ENCOUNTER — CARE COORDINATION (OUTPATIENT)
Dept: CARE COORDINATION | Age: 50
End: 2020-02-07

## 2020-02-07 NOTE — CARE COORDINATION
Ambulatory Care Coordination Note  2/7/2020  CM Risk Score: 6  Charlson 10 Year Mortality Risk Score: 23%     ACC: Alexa Dunlap, RN    Summary Note: spoke with pt who said she is doing well. She did have her sleep study done yet because she took a medication she was not supposed to take. She has to call them to reschedule. She is out of her boot and wearing a regular shoe. She said her bs are around 190. Advised her on diabetic diet and exercise. She said she does not want to go to DM ed because it is too hard for her to get to those apt. She said she does get information in the mail from [facility] about dm diet with recipes. 1) fu on bs- fluctuating  2) fu on sleep better   3) fu on sleep study - needs rescheduled   4) fu dm ed does not want to go           Care Coordination Interventions    Program Enrollment:  Complex Care  Referral from Primary Care Provider:  No  Suggested Interventions and Community Resources  Zone Management Tools:  Completed         Goals Addressed                 This Visit's Progress     Conditions and Symptoms   Improving     I will schedule office visits, as directed by my provider. I will keep my appointment or reschedule if I have to cancel. I will follow my Zone Management tool to seek urgent or emergent care. I will notify my provider of any symptoms that indicate a worsening of my condition. dm     Barriers: overwhelmed by complexity of regimen  Plan for overcoming my barriers: care coordination   Confidence: 7/10  Anticipated Goal Completion Date: 2/27/19                Prior to Admission medications    Medication Sig Start Date End Date Taking?  Authorizing Provider   hydrochlorothiazide (HYDRODIURIL) 12.5 MG tablet take 1 tablet by mouth once daily 1/13/20   RUBEN Botello CNP   metoprolol tartrate (LOPRESSOR) 50 MG tablet take 1 tablet by mouth twice a day 1/13/20   RUBEN Botello - CNP   traZODone (DESYREL) 100 MG tablet take 1 tablet by mouth nightly 1/13/20   RUBEN Park CNP   ALPRAZolam Robert Reuben) 1 MG tablet take 1 tablet by mouth every evening if needed for anxiety 1/9/20 2/8/20  RUBEN Park CNP   fluticasone Richad Vigo) 50 MCG/ACT nasal spray instill 1 spray into each nostril once daily 1/9/20   RUBEN Park CNP   SYMBICORT 160-4.5 MCG/ACT AERO inhale 2 puffs by mouth twice a day 1/9/20   RUBEN Park CNP   mupirocin (BACTROBAN) 2 % ointment apply topically to affected area three times a day 1/2/20   RUBEN Park CNP   zolpidem (AMBIEN) 5 MG tablet Take 1 tablet by mouth nightly as needed for Sleep for up to 90 days. 12/30/19 3/29/20  RUBEN Park CNP   CALTRATE 600+D PLUS MINERALS (CALTRATE) 600-800 MG-UNIT TABS tablet Take 1 tablet by mouth daily 12/13/19   RUBEN Park CNP   amLODIPine (NORVASC) 10 MG tablet take 1 tablet by mouth once daily 12/12/19   RUBEN Park CNP   metFORMIN (GLUCOPHAGE-XR) 500 MG extended release tablet take 2 tablets by mouth twice a day BEFORE MEALS 11/29/19   RUBEN Park CNP   blood glucose test strips (ONE TOUCH ULTRA TEST) strip TEST once daily 11/21/19   Kalpana Ross MD   polyethylene glycol-propylene glycol (SYSTANE) 0.4-0.3 % GEL ophthalmic gel Place 1 drop into both eyes nightly 11/20/19   RUBEN Park CNP   Propylene Glycol 0.6 % SOLN 1 drop 4-5 times per day 11/20/19   RUBEN Park CNP   polyethyl glycol-propyl glycol 0.4-0.3 % (SYSTANE) 0.4-0.3 % ophthalmic solution 1-2 drops 4-5 times per day 11/20/19   RUBEN Park CNP   saline nasal gel (AYR) GEL by Nasal route as needed for Congestion 11/20/19   RUBEN Park CNP   Misc. Devices MISC 1 each by Does not apply route once for 1 dose Please provide patient with one humidifier 11/20/19 11/20/19  RUBEN Park CNP   Misc.  Devices MISC 1 each by Does not apply route once for 1 dose Please provide the patient with one heating pad

## 2020-02-12 ENCOUNTER — OFFICE VISIT (OUTPATIENT)
Dept: FAMILY MEDICINE CLINIC | Age: 50
End: 2020-02-12
Payer: MEDICARE

## 2020-02-12 VITALS
HEIGHT: 65 IN | BODY MASS INDEX: 39.32 KG/M2 | SYSTOLIC BLOOD PRESSURE: 122 MMHG | DIASTOLIC BLOOD PRESSURE: 78 MMHG | WEIGHT: 236 LBS | HEART RATE: 74 BPM | OXYGEN SATURATION: 98 %

## 2020-02-12 LAB
CREATININE URINE POCT: 100
HBA1C MFR BLD: 7.5 %
MICROALBUMIN/CREAT 24H UR: 30 MG/G{CREAT}
MICROALBUMIN/CREAT UR-RTO: <30

## 2020-02-12 PROCEDURE — G8417 CALC BMI ABV UP PARAM F/U: HCPCS | Performed by: NURSE PRACTITIONER

## 2020-02-12 PROCEDURE — 3051F HG A1C>EQUAL 7.0%<8.0%: CPT | Performed by: NURSE PRACTITIONER

## 2020-02-12 PROCEDURE — G8427 DOCREV CUR MEDS BY ELIG CLIN: HCPCS | Performed by: NURSE PRACTITIONER

## 2020-02-12 PROCEDURE — 2022F DILAT RTA XM EVC RTNOPTHY: CPT | Performed by: NURSE PRACTITIONER

## 2020-02-12 PROCEDURE — 83036 HEMOGLOBIN GLYCOSYLATED A1C: CPT | Performed by: NURSE PRACTITIONER

## 2020-02-12 PROCEDURE — G8484 FLU IMMUNIZE NO ADMIN: HCPCS | Performed by: NURSE PRACTITIONER

## 2020-02-12 PROCEDURE — 99214 OFFICE O/P EST MOD 30 MIN: CPT | Performed by: NURSE PRACTITIONER

## 2020-02-12 PROCEDURE — 3017F COLORECTAL CA SCREEN DOC REV: CPT | Performed by: NURSE PRACTITIONER

## 2020-02-12 PROCEDURE — 1036F TOBACCO NON-USER: CPT | Performed by: NURSE PRACTITIONER

## 2020-02-12 PROCEDURE — 82044 UR ALBUMIN SEMIQUANTITATIVE: CPT | Performed by: NURSE PRACTITIONER

## 2020-02-12 RX ORDER — CAL/D3/MAG11/ZINC/COP/MANG/BOR 600 MG-800
1 TABLET ORAL DAILY
Qty: 30 TABLET | Refills: 5 | Status: SHIPPED | OUTPATIENT
Start: 2020-02-12 | End: 2021-04-19

## 2020-02-12 RX ORDER — CYCLOBENZAPRINE HCL 5 MG
5 TABLET ORAL 2 TIMES DAILY PRN
Qty: 30 TABLET | Refills: 1 | Status: SHIPPED | OUTPATIENT
Start: 2020-02-12 | End: 2020-02-28

## 2020-02-12 RX ORDER — IBUPROFEN 800 MG/1
TABLET ORAL
Qty: 90 TABLET | Refills: 1 | Status: SHIPPED | OUTPATIENT
Start: 2020-02-12 | End: 2020-05-26

## 2020-02-12 RX ORDER — METFORMIN HYDROCHLORIDE 500 MG/1
TABLET, EXTENDED RELEASE ORAL
Qty: 180 TABLET | Refills: 1 | Status: SHIPPED | OUTPATIENT
Start: 2020-02-12 | End: 2020-03-05

## 2020-02-12 ASSESSMENT — ENCOUNTER SYMPTOMS
TROUBLE SWALLOWING: 0
SWOLLEN GLANDS: 1
RESPIRATORY NEGATIVE: 1
SINUS PAIN: 0
SHORTNESS OF BREATH: 0
COUGH: 0
BACK PAIN: 1
BOWEL INCONTINENCE: 0
SORE THROAT: 0
CHEST TIGHTNESS: 0
CONSTIPATION: 0
EYES NEGATIVE: 1
NAUSEA: 0
GASTROINTESTINAL NEGATIVE: 1
SINUS PRESSURE: 0

## 2020-02-12 NOTE — PROGRESS NOTES
[facility]  [facility]  [address]  [facility] [address]  Dept: [phone]    2/12/2020    CHIEF COMPLAINT    Chief Complaint   Patient presents with    Diabetes     A1C, Micro     Back Pain     HPI    Ravi Beaver is a 44 y.o. female who presents   Chief Complaint   Patient presents with    Diabetes     A1C, Micro     Back Pain     Patient is tearful, brother passed away on Friday right and has been the support for the rest of her family. Also discussed that her father was diagnosed with Alzheimer's about 2 years ago and is declining. Reports that she is not sleeping related to the stress. We discussed that she discontinued her narcolepsy medication, Zyrem, and effexor as she is trying to complete a sleep study that she has previously cancelled. Discussed concerns with swelling on her throat. She saw the dentist yesterday and was told that she most likely does not have a tooth infection but was prescribed an antibiotic, clindamycin, that she has not yet filled. Also discussed back pain that started after straining getting her dog into the bathtub. She continues to use her prescribed percocet for pain. Discussed trying heat or ice to the affected area. Right ankle pain s/p fracture- patient reports she took her fracture shoe off early due to reporting her pain was resolved. Back Pain   This is a new problem. The current episode started in the past 7 days. The problem occurs intermittently. The pain is present in the lumbar spine. The quality of the pain is described as aching. The pain does not radiate. The pain is moderate. The symptoms are aggravated by bending. Associated symptoms include headaches. Pertinent negatives include no bladder incontinence, bowel incontinence, chest pain, fever, numbness, paresis or paresthesias. Risk factors include obesity, sedentary lifestyle, history of osteoporosis and menopause.  She has tried analgesics for the symptoms. The treatment provided mild relief. Other   This is a new (Swelling on anterior neck, suspect parotid gland involvement) problem. The current episode started yesterday. The problem has been gradually worsening. Associated symptoms include fatigue, headaches, joint swelling, myalgias, neck pain and swollen glands. Pertinent negatives include no anorexia, chest pain, chills, congestion, coughing, diaphoresis, fever, nausea, numbness, rash or sore throat. The symptoms are aggravated by drinking and swallowing. She has tried nothing (Patient seen at dental office yesterday and was precribed clindamycin ) for the symptoms. The treatment provided no relief. Vitals:    02/12/20 0952   BP: 122/78   Pulse: 74   SpO2: 98%   Weight: 236 lb (107 kg)   Height: 5' 5\" (1.651 m)     REVIEW OF SYSTEMS    Review of Systems   Constitutional: Positive for fatigue. Negative for chills, diaphoresis and fever. HENT: Negative for congestion, ear pain, postnasal drip, sinus pressure, sinus pain, sore throat and trouble swallowing. Globus sensation related to left sided swelling in mouth    Eyes: Negative. Respiratory: Negative. Negative for cough, chest tightness and shortness of breath. Cardiovascular: Negative. Negative for chest pain and palpitations. Gastrointestinal: Negative. Negative for anorexia, bowel incontinence, constipation and nausea. Genitourinary: Negative. Negative for bladder incontinence. Musculoskeletal: Positive for back pain, joint swelling, myalgias, neck pain and neck stiffness. Hx of fibromyalgia and osteoarthritis    Skin: Negative for rash and wound. Neurological: Positive for headaches. Negative for dizziness, light-headedness, numbness and paresthesias. Hx of migraines    Psychiatric/Behavioral: Positive for dysphoric mood. The patient is nervous/anxious.         PAST MEDICAL HISTORY    Past Medical History:   Diagnosis Date    Anxiety     Arthritis     Asthma     Cataplexy and narcolepsy     Chronic back pain     Chronic pain     Depression     Fibromyalgia     Hypertension     Liver enzyme elevation 8/19/2019    Obesity     Osteoarthritis        FAMILY HISTORY    Family History   Problem Relation Age of Onset    Heart Disease Mother     Diabetes Mother     Stroke Mother     Dementia Father     Alzheimer's Disease Father        SOCIAL HISTORY    Social History     Socioeconomic History    Marital status:      Spouse name: None    Number of children: None    Years of education: None    Highest education level: None   Occupational History    None   Social Needs    Financial resource strain: Not hard at all   [place] insecurity:     Worry: Never true     Inability: Never true    Transportation needs:     Medical: No     Non-medical: No   Tobacco Use    Smoking status: Never Smoker    Smokeless tobacco: Never Used   Substance and Sexual Activity    Alcohol use: No     Frequency: Never    Drug use: No    Sexual activity: Yes     Partners: Male   Lifestyle    Physical activity:     Days per week: 4 days     Minutes per session: 40 min    Stress: Not at all   Relationships    Social connections:     Talks on phone: More than three times a week     Gets together: More than three times a week     Attends [employer] service: 1 to 4 times per year     Active member of club or organization: No     Attends meetings of clubs or organizations: Never     Relationship status:     Intimate partner violence:     Fear of current or ex partner: None     Emotionally abused: None     Physically abused: None     Forced sexual activity: None   Other Topics Concern    None   Social History Narrative    None       SURGICAL HISTORY    Past Surgical History:   Procedure Laterality Date    CHOLECYSTECTOMY         CURRENT MEDICATIONS    Current Outpatient Medications   Medication Sig Dispense Refill    ibuprofen Normocephalic. Right Ear: Hearing and external ear normal.      Left Ear: Hearing and external ear normal.      Nose: Nose normal. No mucosal edema. Mouth/Throat:      Lips: Pink. Mouth: Mucous membranes are moist.      Dentition: No dental tenderness or dental abscesses. Tongue: Lesions present. Pharynx: Oropharynx is clear. Uvula midline. Tonsils: No tonsillar exudate. Eyes:      General: Lids are normal.         Right eye: No discharge. Left eye: No discharge. Conjunctiva/sclera:      Right eye: Right conjunctiva is injected. Left eye: Left conjunctiva is injected. Pupils: Pupils are equal, round, and reactive to light. Comments: Redness noted   Neck:      Musculoskeletal: Normal range of motion and neck supple. Muscular tenderness present. Thyroid: No thyromegaly or thyroid tenderness. Cardiovascular:      Rate and Rhythm: Normal rate and regular rhythm. Pulses: Normal pulses. Heart sounds: Normal heart sounds, S1 normal and S2 normal. No murmur. Pulmonary:      Effort: Pulmonary effort is normal. No respiratory distress. Breath sounds: Normal breath sounds. No wheezing. Abdominal:      General: There is no distension. Palpations: Abdomen is soft. Tenderness: There is no abdominal tenderness. Musculoskeletal:      Lumbar back: She exhibits tenderness and pain. She exhibits no swelling, no edema and no deformity. Lymphadenopathy:      Cervical: No cervical adenopathy. Skin:     General: Skin is warm and dry. Findings: No erythema or rash. Neurological:      Mental Status: She is alert and oriented to person, place, and time. Psychiatric:         Attention and Perception: Attention normal.         Mood and Affect: Affect is tearful. Behavior: Behavior normal. Behavior is cooperative. Thought Content:  Thought content normal.         Cognition and Memory: Cognition and memory normal. Judgment: Judgment normal.         Assessment/PLan  1. Type 2 diabetes mellitus without complication, without long-term current use of insulin ([facility])    - POCT glycosylated hemoglobin (Hb A1C)  - POCT microalbumin  Results for orders placed or performed in visit on 02/12/20   POCT glycosylated hemoglobin (Hb A1C)   Result Value Ref Range    Hemoglobin A1C 7.5 %   POCT microalbumin   Result Value Ref Range    Microalb, Ur 30     Creatinine Ur POCT 100     Microalbumin Creatinine Ratio <30      -Discussed healthy meals and importance of maintaining blood glucose control.   -Chronic, stable, continue current medications. 2. Primary osteoarthritis involving multiple joints  -Discussed alternating heat and ice to affected areas. - ibuprofen (ADVIL;MOTRIN) 800 MG tablet; take 1 tablet by mouth every 8 hours with food if needed for pain  Dispense: 90 tablet; Refill: 1    3. Post-menopausal  - Discussed normal DEXASCAN results and history of fractures, patient wishes to continue taking supplements. - Caltrate 600+D Plus Minerals (CALTRATE) 600-800 MG-UNIT TABS tablet; Take 1 tablet by mouth daily  Dispense: 30 tablet; Refill: 5    4. Fractures, multiple  - Discussed normal DEXASCAN results and history of fractures, patient wishes to continue taking supplements. - Caltrate 600+D Plus Minerals (CALTRATE) 600-800 MG-UNIT TABS tablet; Take 1 tablet by mouth daily  Dispense: 30 tablet; Refill: 5    5. Neck and shoulder pain  - Discussed alternating heat and ice to affected areas.  -Use ibuprofen as needed for pain. - cyclobenzaprine (FLEXERIL) 5 MG tablet; Take 1 tablet by mouth 2 times daily as needed for Muscle spasms  Dispense: 30 tablet; Refill: 1      Brandi Turcios received counseling on the following healthy behaviors: nutrition, exercise and medication adherence  Reviewed prior labs and health maintenance  Continue current medications, diet and exercise.   Discussed use, benefit, and side effects of prescribed medications. Barriers to medication compliance addressed. Patient given educational materials - see patient instructions  Was a self-tracking handout given in paper form or via [employer]? Yes    Requested Prescriptions     Signed Prescriptions Disp Refills    ibuprofen (ADVIL;MOTRIN) 800 MG tablet 90 tablet 1     Sig: take 1 tablet by mouth every 8 hours with food if needed for pain    Caltrate 600+D Plus Minerals (CALTRATE) 600-800 MG-UNIT TABS tablet 30 tablet 5     Sig: Take 1 tablet by mouth daily    cyclobenzaprine (FLEXERIL) 5 MG tablet 30 tablet 1     Sig: Take 1 tablet by mouth 2 times daily as needed for Muscle spasms       All patient questions answered. Patient voiced understanding. Quality Measures    Body mass index is 39.27 kg/m². Elevated. Weight control planned discussed. Healthy diet and regular exercise. BP: 122/78 Blood pressure is high. Treatment plan consists of Weight Reduction, DASH Eating Plan, Increased Physical Activity. Lab Results   Component Value Date    LDLCALC 199 (A) 09/17/2018    (goal LDL reduction with dx if diabetes is 50% LDL reduction)      PHQ Scores 7/24/2018 5/16/2017   PHQ2 Score 0 0   PHQ9 Score 0 0     Interpretation of Total Score Depression Severity: 1-4 = Minimal depression, 5-9 = Mild depression, 10-14 = Moderate depression, 15-19 = Moderately severe depression, 20-27 = Severe depression     Return in about 6 weeks (around 3/25/2020) for depression, Anxiety. Related to brother's death and discontinuation of Effexor.      Electronically signed by RUBEN Mcmullen CNP on 2/12/20 at 12:34 PM

## 2020-02-24 ENCOUNTER — CARE COORDINATION (OUTPATIENT)
Dept: CARE COORDINATION | Age: 50
End: 2020-02-24

## 2020-02-24 NOTE — CARE COORDINATION
Ambulatory Care Coordination Note  2/24/2020  CM Risk Score: 6  Charlson 10 Year Mortality Risk Score: 23%     ACC: Jethro Burton, RN    Summary Note: spoke with pt who said she is doing good. Her bs have been great her last A1C was 7.5. she did get her sleep study rescheduled for March 5-6. She said her foot is healed and she has been taking her dog for walks. seh does need health maintenance items completed. 1) fu on bs- better A1C 7.5  2) fu on sleep better   3) fu on sleep study - march 5-6   4) fu dm ed does not want to go   5) colonoscopy, lipids, vaccines, pap            Care Coordination Interventions    Program Enrollment:  Complex Care  Referral from Primary Care Provider:  No  Suggested Interventions and Community Resources  Zone Management Tools:  Completed         Goals Addressed                 This Visit's Progress     Conditions and Symptoms   On track     I will schedule office visits, as directed by my provider. I will keep my appointment or reschedule if I have to cancel. I will follow my Zone Management tool to seek urgent or emergent care. I will notify my provider of any symptoms that indicate a worsening of my condition. dm     Barriers: overwhelmed by complexity of regimen  Plan for overcoming my barriers: care coordination   Confidence: 7/10  Anticipated Goal Completion Date: 2/27/19                Prior to Admission medications    Medication Sig Start Date End Date Taking?  Authorizing Provider   ibuprofen (ADVIL;MOTRIN) 800 MG tablet take 1 tablet by mouth every 8 hours with food if needed for pain 2/12/20   RUBEN Putnam CNP   Caltrate 600+D Plus Minerals (CALTRATE) 600-800 MG-UNIT TABS tablet Take 1 tablet by mouth daily 2/12/20   RUBEN Putnam CNP   metFORMIN (GLUCOPHAGE-XR) 500 MG extended release tablet take 2 tablets by mouth twice a day before meals 2/12/20   RUBEN Putnam CNP   glimepiride (AMARYL) 2 MG tablet take 1 tablet by mouth daily (BEFORE (Nutrition/Exercise/Self-Monitoring):  No   Have you ever had to go to the ED for symptoms of low blood sugar?:  No       No patient-reported symptoms

## 2020-03-09 ENCOUNTER — CARE COORDINATION (OUTPATIENT)
Dept: CARE COORDINATION | Age: 50
End: 2020-03-09

## 2020-03-10 ENCOUNTER — OFFICE VISIT (OUTPATIENT)
Dept: FAMILY MEDICINE CLINIC | Age: 50
End: 2020-03-10
Payer: MEDICARE

## 2020-03-10 VITALS
WEIGHT: 234 LBS | SYSTOLIC BLOOD PRESSURE: 122 MMHG | BODY MASS INDEX: 38.94 KG/M2 | DIASTOLIC BLOOD PRESSURE: 78 MMHG | HEART RATE: 72 BPM

## 2020-03-10 PROCEDURE — 1036F TOBACCO NON-USER: CPT | Performed by: NURSE PRACTITIONER

## 2020-03-10 PROCEDURE — 3017F COLORECTAL CA SCREEN DOC REV: CPT | Performed by: NURSE PRACTITIONER

## 2020-03-10 PROCEDURE — G8417 CALC BMI ABV UP PARAM F/U: HCPCS | Performed by: NURSE PRACTITIONER

## 2020-03-10 PROCEDURE — G8484 FLU IMMUNIZE NO ADMIN: HCPCS | Performed by: NURSE PRACTITIONER

## 2020-03-10 PROCEDURE — 99214 OFFICE O/P EST MOD 30 MIN: CPT | Performed by: NURSE PRACTITIONER

## 2020-03-10 PROCEDURE — G8427 DOCREV CUR MEDS BY ELIG CLIN: HCPCS | Performed by: NURSE PRACTITIONER

## 2020-03-10 RX ORDER — METHYLPREDNISOLONE 4 MG/1
TABLET ORAL
Qty: 1 KIT | Refills: 0 | Status: SHIPPED | OUTPATIENT
Start: 2020-03-10 | End: 2020-06-02 | Stop reason: ALTCHOICE

## 2020-03-10 ASSESSMENT — ENCOUNTER SYMPTOMS
SHORTNESS OF BREATH: 0
COUGH: 0
RESPIRATORY NEGATIVE: 1
EYES NEGATIVE: 1
ABDOMINAL PAIN: 0
BACK PAIN: 1

## 2020-03-10 NOTE — PATIENT INSTRUCTIONS
[facility]   Phone [phone]   *available 24/7 365 days a year    [facility] [address] Friday and Saturday 9-5 pm   Address [address] [place] [address]     Patient Education        Carpal Tunnel Syndrome: Care Instructions  Your Care Instructions    Carpal tunnel syndrome is a nerve problem. It can cause tingling, numbness, weakness, or pain in the fingers, thumb, and hand. The median nerve and several tough tissues called tendons run through a space in the wrist called the carpal tunnel. The repeated hand motions used in work and some hobbies and sports can put pressure on the nerve. Pregnancy and several conditions, including diabetes, arthritis, and an underactive thyroid, also can cause carpal tunnel syndrome. You may be able to limit an activity or do it differently to reduce your symptoms. You also can take other steps to feel better. If your symptoms are mild, 1 to 2 weeks of home treatment are likely to ease your pain. Surgery is needed only if other treatments do not work. Follow-up care is a key part of your treatment and safety. Be sure to make and go to all appointments, and call your doctor if you are having problems. It's also a good idea to know your test results and keep a list of the medicines you take. How can you care for yourself at home? · If possible, stop or reduce the activity that causes your symptoms. If you cannot stop the activity, take frequent breaks to rest and stretch or change hand positions to do a task. Try switching hands, such as when using a computer mouse. · Try to avoid bending or twisting your wrists. · Ask your doctor if you can take an over-the-counter pain medicine, such as acetaminophen (Tylenol), ibuprofen (Advil, Motrin), or naproxen (Aleve). Be safe with medicines. Read and follow all instructions on the label.   · If your doctor prescribes corticosteroid medicine to help reduce pain and swelling, take it your hands and fingers strong. · Avoid holding objects (such as a book) in one position for a long time. When possible, use your whole hand to grasp an object. Using just the thumb and index finger can put stress on the wrist.  · Do not smoke. It can make this condition worse by reducing blood flow to the median nerve. If you need help quitting, talk to your doctor about stop-smoking programs and medicines. These can increase your chances of quitting for good. When should you call for help? Watch closely for changes in your health, and be sure to contact your doctor if:    · Your pain or other problems do not get better with home care.     · You want more information about physical or occupational therapy.     · You have side effects of your corticosteroid medicine, such as:  ? Weight gain. ? Mood changes. ? Trouble sleeping. ? Bruising easily.     · You have any other problems with your medicine. Where can you learn more? Go to https://[employer]. org and sign in to your [employer] account. Enter R432 in the TinyCo box to learn more about \"Carpal Tunnel Syndrome: Care Instructions. \"     If you do not have an account, please click on the \"Sign Up Now\" link. Current as of: June 26, 2019  Content Version: 12.3  © 2074-5194 Healthwise, Incorporated. Care instructions adapted under license by [facility] ([facility]). If you have questions about a medical condition or this instruction, always ask your healthcare professional. [place] [address] any warranty or liability for your use of this information. Patient Education        Carpal Tunnel Syndrome: Exercises  Introduction  Here are some examples of exercises for you to try. The exercises may be suggested for a condition or for rehabilitation. Start each exercise slowly. Ease off the exercises if you start to have pain. You will be told when to start these exercises and which ones will work best for you.   Warm-up stretches  When you no longer have pain or numbness, you can do exercises to help prevent carpal tunnel syndrome from coming back. Do not do any stretch or movement that is uncomfortable or painful. 1. Rotate your wrist up, down, and from side to side. Repeat 4 times. 2. Stretch your fingers far apart. Relax them, and then stretch them again. Repeat 4 times. 3. Stretch your thumb by pulling it back gently, holding it, and then releasing it. Repeat 4 times. How to do the exercises  Prayer stretch   1. Start with your palms together in front of your chest just below your chin. 2. Slowly lower your hands toward your waistline, keeping your hands close to your stomach and your palms together until you feel a mild to moderate stretch under your forearms. 3. Hold for at least 15 to 30 seconds. Repeat 2 to 4 times. Wrist flexor stretch   1. Extend your arm in front of you with your palm up. 2. Bend your wrist, pointing your hand toward the floor. 3. With your other hand, gently bend your wrist farther until you feel a mild to moderate stretch in your forearm. 4. Hold for at least 15 to 30 seconds. Repeat 2 to 4 times. Wrist extensor stretch   1. Repeat steps 1 through 4 of the stretch above, but begin with your extended hand palm down. Follow-up care is a key part of your treatment and safety. Be sure to make and go to all appointments, and call your doctor if you are having problems. It's also a good idea to know your test results and keep a list of the medicines you take. Where can you learn more? Go to https://[employer]. org and sign in to your [employer] account. Enter U398 in the TMAT box to learn more about \"Carpal Tunnel Syndrome: Exercises. \"     If you do not have an account, please click on the \"Sign Up Now\" link. Current as of: June 26, 2019  Content Version: 12.3  © 0532-0916 Healthwise, Incorporated. Care instructions adapted under license by [facility] ([facility]). If you have questions about a medical condition or this instruction, always ask your healthcare professional. [place] [address] any warranty or liability for your use of this information.

## 2020-03-10 NOTE — PROGRESS NOTES
[facility]  [facility]  [address]  [address] [address]  Dept: [phone]    3/10/2020    CHIEF COMPLAINT    Chief Complaint   Patient presents with    Hand Pain       HPI    Natalee Dalton is a 44 y.o. female who presents   Chief Complaint   Patient presents with    Hand Pain   . Right wrist pain- She moved furniture in her room 2 weeks ago. She initially felt it was a muscle strain/pulled muscle, but her sx have gotten worse. She has numbness in her 4th & 5th digits. The pain has woken her from sleeping. Pain is improved with putting her arm down    Anxiety/Depression/Stress- Resumed Effexor this week. She is noticing a slight difference so far. She does not want to go back on Xanax. Restarted Effexor due to life stress with her father having alzheimers, recent loss of her brother, taking a break from her boyfriend and overall increased stress. Her other brother has been very upset since their brother passing and has threatened to commit suicide. Chronic back pain- Recent Epidural injection in her back from pain provider Dr. Pearl Angry on 3/2. She states that since she had left sided hip pain and worse sciatic pain in her left leg. She is scheduled for another injection on 3/16. She states that he will only give her Percocet until she gets the other injection. Completed her sleep study, but with great difficulty falling asleep. She also reports that they kept waking her up. She has not received the results yet, but is still not sleeping well and hopes to resume prior medications soon. Wrist Pain    The pain is present in the right wrist, right hand and right fingers. The current episode started 1 to 4 weeks ago. There has been a history of trauma. The problem occurs constantly. The quality of the pain is described as burning (burning in finger and hand, sharp in arm ).  Associated symptoms include a limited range of motion, resource strain: Not hard at all   [employer] insecurity     Worry: Never true     Inability: Never true    Transportation needs     Medical: No     Non-medical: No   Tobacco Use    Smoking status: Never Smoker    Smokeless tobacco: Never Used   Substance and Sexual Activity    Alcohol use: No     Frequency: Never    Drug use: No    Sexual activity: Yes     Partners: Male   Lifestyle    Physical activity     Days per week: 4 days     Minutes per session: 40 min    Stress: Not at all   Relationships    Social connections     Talks on phone: More than three times a week     Gets together: More than three times a week     Attends Jewish service: 1 to 4 times per year     Active member of club or organization: No     Attends meetings of clubs or organizations: Never     Relationship status:     Intimate partner violence     Fear of current or ex partner: None     Emotionally abused: None     Physically abused: None     Forced sexual activity: None   Other Topics Concern    None   Social History Narrative    None       SURGICAL HISTORY    Past Surgical History:   Procedure Laterality Date    CHOLECYSTECTOMY         CURRENT MEDICATIONS    Current Outpatient Medications   Medication Sig Dispense Refill    methylPREDNISolone (MEDROL DOSEPACK) 4 MG tablet Take by mouth.  1 kit 0    Elastic Bandages & Supports (WRIST SPLINT) MISC 1 Device by Does not apply route daily 1 each 0    Elastic Bandages & Supports (WRIST SPLINT/COCK-UP/RIGHT M) MISC 1 Device by Does not apply route nightly 1 each 0    cyclobenzaprine (FLEXERIL) 5 MG tablet take 1 tablet by mouth twice a day if needed for muscle spasm 60 tablet 1    metFORMIN (GLUCOPHAGE-XR) 500 MG extended release tablet take 2 tablets by mouth twice a day before meals 180 tablet 1    ibuprofen (ADVIL;MOTRIN) 800 MG tablet take 1 tablet by mouth every 8 hours with food if needed for pain 90 tablet 1    Caltrate 600+D Plus Minerals (CALTRATE) 600-800 MG-UNIT TABS tablet Take 1 tablet by mouth daily 30 tablet 5    glimepiride (AMARYL) 2 MG tablet take 1 tablet by mouth daily (BEFORE LUNCH) 90 tablet 1    hydrochlorothiazide (HYDRODIURIL) 12.5 MG tablet take 1 tablet by mouth once daily 30 tablet 3    metoprolol tartrate (LOPRESSOR) 50 MG tablet take 1 tablet by mouth twice a day 60 tablet 3    traZODone (DESYREL) 100 MG tablet take 1 tablet by mouth nightly 30 tablet 3    fluticasone (FLONASE) 50 MCG/ACT nasal spray instill 1 spray into each nostril once daily 16 g 3    SYMBICORT 160-4.5 MCG/ACT AERO inhale 2 puffs by mouth twice a day 10.2 g 5    mupirocin (BACTROBAN) 2 % ointment apply topically to affected area three times a day 22 g 1    zolpidem (AMBIEN) 5 MG tablet Take 1 tablet by mouth nightly as needed for Sleep for up to 90 days. 30 tablet 2    amLODIPine (NORVASC) 10 MG tablet take 1 tablet by mouth once daily 30 tablet 3    blood glucose test strips (ONE TOUCH ULTRA TEST) strip TEST once daily 100 strip 3    polyethylene glycol-propylene glycol (SYSTANE) 0.4-0.3 % GEL ophthalmic gel Place 1 drop into both eyes nightly 10 mL 1    Propylene Glycol 0.6 % SOLN 1 drop 4-5 times per day 15 mL 5    polyethyl glycol-propyl glycol 0.4-0.3 % (SYSTANE) 0.4-0.3 % ophthalmic solution 1-2 drops 4-5 times per day 30 mL 1    saline nasal gel (AYR) GEL by Nasal route as needed for Congestion 1 Tube 3    Blood Pressure Monitor WASHINGTON 1 Device by Does not apply route 2 times daily 1 Device 0    Brimonidine Tartrate 0.025 % SOLN 1 drop in each eye every 8 hours as needed for redness. 7.5 mL 5    venlafaxine (EFFEXOR XR) 75 MG extended release capsule take 1 capsule by mouth every morning 30 capsule 5    albuterol sulfate  (90 Base) MCG/ACT inhaler inhale 2 puffs every 6 hours if needed for wheezing 18 g 5    oxyCODONE-acetaminophen (PERCOCET) 7.5-325 MG per tablet       gabapentin (NEURONTIN) 600 MG tablet Take 1,200 mg by mouth 2 times daily.       the left side. Heart sounds: Normal heart sounds, S1 normal and S2 normal. No murmur. Pulmonary:      Effort: Pulmonary effort is normal. No respiratory distress. Breath sounds: Normal breath sounds. No wheezing. Musculoskeletal:      Right wrist: She exhibits decreased range of motion and tenderness. She exhibits no swelling, no effusion, no crepitus, no deformity and no laceration. Right hand: She exhibits decreased range of motion and tenderness. She exhibits no bony tenderness, no deformity, no laceration and no swelling. Decreased sensation noted. Decreased sensation is present in the ulnar distribution. Decreased strength noted. Hands:       Comments: Tenderness and pain elicited with Phalen test   Skin:     General: Skin is warm and dry. Neurological:      Mental Status: She is alert and oriented to person, place, and time. Psychiatric:         Behavior: Behavior normal. Behavior is cooperative. Assessment/PLan  1. Right wrist pain    - methylPREDNISolone (MEDROL DOSEPACK) 4 MG tablet; Take by mouth. Dispense: 1 kit; Refill: 0  - Elastic Bandages & Supports (WRIST SPLINT) MISC; 1 Device by Does not apply route daily  Dispense: 1 each; Refill: 0  - Elastic Bandages & Supports (WRIST SPLINT/COCK-UP/RIGHT M) MISC; 1 Device by Does not apply route nightly  Dispense: 1 each; Refill: 0  -Advised that wrist pain seems to be related a pinched nerve that could be related to carpal tunnel given her location of numbness in 4th and 5th digits.   -Encouraged to wear cock-up splint nightly, perform exercises provided in AVS and wear alternative wrist brace (order provided) if cock-up splint cannot be tolerated. -Advised of side effects of medrol dose pack. -If no improvement in sx will refer to neurology for evaluation of possible ulnar nerve entrapment. 2. Pain in lower extremity due to sciatica    - methylPREDNISolone (MEDROL DOSEPACK) 4 MG tablet; Take by mouth.   Dispense:

## 2020-03-12 RX ORDER — POLYETHYLENE GLYCOL 400 AND PROPYLENE GLYCOL 4; 3 MG/ML; MG/ML
GEL OPHTHALMIC
Qty: 10 ML | Refills: 1 | Status: SHIPPED | OUTPATIENT
Start: 2020-03-12 | End: 2020-09-22 | Stop reason: SDUPTHER

## 2020-03-16 ENCOUNTER — CARE COORDINATION (OUTPATIENT)
Dept: CARE COORDINATION | Age: 50
End: 2020-03-16

## 2020-03-16 NOTE — CARE COORDINATION
19   RUBEN Bowman CNP   saline nasal gel (AYR) GEL by Nasal route as needed for Congestion 19   RUBEN Bowman CNP   Misc. Devices MISC 1 each by Does not apply route once for 1 dose Please provide patient with one humidifier 19  RUBEN Bowman CNP   Misc. Devices MISC 1 each by Does not apply route once for 1 dose Please provide the patient with one heating pad 19  RUBEN Bowman CNP   Blood Pressure Monitor WASHINGTON 1 Device by Does not apply route 2 times daily 19   RUBEN Bowman CNP   Brimonidine Tartrate 0.025 % SOLN 1 drop in each eye every 8 hours as needed for redness. 19   RUBEN Bowman CNP   venlafaxine (EFFEXOR XR) 75 MG extended release capsule take 1 capsule by mouth every morning 19   Keely Rosales MD   albuterol sulfate  (90 Base) MCG/ACT inhaler inhale 2 puffs every 6 hours if needed for wheezing 19   RUBEN Bowman CNP   oxyCODONE-acetaminophen (PERCOCET) 7.5-325 MG per tablet  10/9/19   Historical Provider, MD   gabapentin (NEURONTIN) 600 MG tablet Take 1,200 mg by mouth 2 times daily.  10/9/19   Historical Provider, MD   Artificial Saliva (MOUTH KOTE) SOLN solution Swish and spit with 15 ml up to 5 times per day 10/24/19   RUBEN Bowman CNP   ONETOUCH DELICA LANCETS 43C MISC CHECK BLOOD SUGARS ONCE A DAY 10/16/19   RUBEN Bowman CNP   triamcinolone (ARISTOCORT) 0.5 % ointment apply to affected area twice a day 19   Keely Rosales MD   loratadine (CLARITIN) 10 MG tablet Take 1 tablet by mouth daily 19   Keely Rosales MD   Handicap Placard MISC by Does not apply route  5 years from origninal written date 19   Keely Rosales MD   glucose monitoring kit (FREESTYLE) monitoring kit 1 kit by Does not apply route daily 18   RUBEN Bowman - CNP   Handicap Placard MISC by Does not apply route Expires 6 months after issue 18

## 2020-03-24 ENCOUNTER — TELEPHONE (OUTPATIENT)
Dept: FAMILY MEDICINE CLINIC | Age: 50
End: 2020-03-24

## 2020-03-30 ENCOUNTER — CARE COORDINATION (OUTPATIENT)
Dept: CARE COORDINATION | Age: 50
End: 2020-03-30

## 2020-03-30 NOTE — CARE COORDINATION
Ambulatory Care Coordination Note  3/30/2020  CM Risk Score: 6  Charlson 10 Year Mortality Risk Score: 23%     ACC: Elvi Ramírez RN    Summary Note: spoke with pt who said she is doing well at home. Her bs have been a little high around 200 she thinks this is because of the steroid injections she has been getting. She is watiching her diet and trying to get more activity in by walking her dogs every night. She does have mychart and encouragement given to use her mychart when not able to get into the doctor office. covid risk education provided to pt.   1) fu on bs- better A1C 7.5  2) fu on sleep better   3) fu on sleep study - done    4) fu dm ed does not want to go   5) colonoscopy, lipids, vaccines, pap        Care Coordination Interventions    Program Enrollment:  Complex Care  Referral from Primary Care Provider:  No  Suggested Interventions and Community Resources  Zone Management Tools:  Completed         Goals Addressed                 This Visit's Progress     Conditions and Symptoms   Improving     I will schedule office visits, as directed by my provider. I will keep my appointment or reschedule if I have to cancel. I will follow my Zone Management tool to seek urgent or emergent care. I will notify my provider of any symptoms that indicate a worsening of my condition. dm     Barriers: overwhelmed by complexity of regimen  Plan for overcoming my barriers: care coordination   Confidence: 7/10  Anticipated Goal Completion Date: 2/27/19                Prior to Admission medications    Medication Sig Start Date End Date Taking? Authorizing Provider   amLODIPine (NORVASC) 10 MG tablet take 1 tablet by mouth once daily 3/18/20   Padma Reed APRN - CNP   SYSTANE 0.4-0.3 % GEL ophthalmic gel place 1 drop into both eyes nightly 3/12/20   Padma Fischerl APRN - CNP   methylPREDNISolone (MEDROL DOSEPACK) 4 MG tablet Take by mouth.  3/10/20   Richmondlo Duel, APRN - CNP   Elastic Bandages & Supports (WRIST SPLINT) MISC 1 Device by Does not apply route daily 3/10/20   [facility]   Elastic Bandages & Supports (WRIST SPLINT/COCK-UP/RIGHT M) MISC 1 Device by Does not apply route nightly 3/10/20   RUBEN Cameron CNP   cyclobenzaprine (FLEXERIL) 5 MG tablet take 1 tablet by mouth twice a day if needed for muscle spasm 3/9/20   [facility], APR[facility] - CNP   metFORMIN (GLUCOPHAGE-XR) 500 MG extended release tablet take 2 tablets by mouth twice a day before meals 3/5/20   [facility]   ibuprofen (ADVIL;MOTRIN) 800 MG tablet take 1 tablet by mouth every 8 hours with food if needed for pain 2/12/20   [facility]   Caltrate 600+D Plus Minerals (CALTRATE) 600-800 MG-UNIT TABS tablet Take 1 tablet by mouth daily 2/12/20   [facility]   glimepiride (AMARYL) 2 MG tablet take 1 tablet by mouth daily (BEFORE LUNCH) 2/10/20   RUBEN Cameron CNP   hydrochlorothiazide (HYDRODIURIL) 12.5 MG tablet take 1 tablet by mouth once daily 1/13/20   [facility] RUBEN Valdes CNP   metoprolol tartrate (LOPRESSOR) 50 MG tablet take 1 tablet by mouth twice a day 1/13/20   [facility] RUBEN Valdes CNP   traZODone (DESYREL) 100 MG tablet take 1 tablet by mouth nightly 1/13/20   [facility]   fluticasone [facility]) 50 MCG/ACT nasal spray instill 1 spray into each nostril once daily 1/9/20   Garrick Lambert, APRN - CNP   SYMBICORT 160-4.5 MCG/ACT AERO inhale 2 puffs by mouth twice a day 1/9/20   RUBEN Cameron CNP   mupirocin (BACTROBAN) 2 % ointment apply topically to affected area three times a day 1/2/20   RUBEN Cameron CNP   blood glucose test strips (ONE TOUCH ULTRA TEST) strip TEST once daily 11/21/19   Lucero Jones MD   Propylene Glycol 0.6 % SOLN 1 drop 4-5 times per day 11/20/19   Garrick Lambert APRN - CNP   saline nasal gel (AYR) GEL by Nasal route as needed for Congestion 11/20/19   Estsarah Lambert, APRN - CNP   Misc.  Devices MISC 1 each by Does not apply route once for 1 dose Please provide patient with one humidifier 19  RUBEN Velasquez CNP   Misc. Devices MISC 1 each by Does not apply route once for 1 dose Please provide the patient with one heating pad 19  RUBEN Velasquez CNP   Blood Pressure Monitor WASHINGTON 1 Device by Does not apply route 2 times daily 19   [facility] Busby CNP   Brimonidine Tartrate 0.025 % SOLN 1 drop in each eye every 8 hours as needed for redness. 19   SonaRUBEN Argueta CNP   venlafaxine (EFFEXOR XR) 75 MG extended release capsule take 1 capsule by mouth every morning 19   Kelly Baer MD   albuterol sulfate  (90 Base) MCG/ACT inhaler inhale 2 puffs every 6 hours if needed for wheezing 19   SoanRUBEN Argueta CNP   oxyCODONE-acetaminophen (PERCOCET) 7.5-325 MG per tablet  10/9/19   Historical Provider, MD   gabapentin (NEURONTIN) 600 MG tablet Take 1,200 mg by mouth 2 times daily. 10/9/19   Historical Provider, MD   Artificial Saliva (MOUTH KOTE) SOLN solution Swish and spit with 15 ml up to 5 times per day 10/24/19   RUBEN Velasquez CNP   ONETOUCH DELICA LANCETS 40M MISC CHECK BLOOD SUGARS ONCE A DAY 10/16/19   SonaRUBEN Argueta CNP   triamcinolone (ARISTOCORT) 0.5 % ointment apply to affected area twice a day 19   Kelly Baer MD   loratadine (CLARITIN) 10 MG tablet Take 1 tablet by mouth daily 19   Kelly Baer MD   Handicap [place]en MISC by Does not apply route  5 years from origninal written date 19   Kelly Baer MD   glucose monitoring kit (FREESTYLE) monitoring kit 1 kit by Does not apply route daily 18   SonaRUBEN Argueta CNP   Handicap Placard MISC by Does not apply route Expires 6 months after issue 18   Joanne Joseph RUBEN - CNP       No future appointments.

## 2020-04-07 RX ORDER — ZOLPIDEM TARTRATE 5 MG/1
5 TABLET ORAL NIGHTLY PRN
Qty: 30 TABLET | Refills: 2 | OUTPATIENT
Start: 2020-04-07 | End: 2020-07-06

## 2020-04-07 RX ORDER — TRAZODONE HYDROCHLORIDE 100 MG/1
100 TABLET ORAL NIGHTLY
Qty: 30 TABLET | Refills: 3 | Status: SHIPPED | OUTPATIENT
Start: 2020-04-07 | End: 2020-04-16 | Stop reason: SDUPTHER

## 2020-04-07 RX ORDER — ZOLPIDEM TARTRATE 5 MG/1
5 TABLET ORAL NIGHTLY PRN
Qty: 30 TABLET | Refills: 0 | Status: SHIPPED | OUTPATIENT
Start: 2020-04-07 | End: 2020-07-06

## 2020-04-07 NOTE — TELEPHONE ENCOUNTER
Fax from pharmacy    Health Maintenance   Topic Date Due    Pneumococcal 0-64 years Vaccine (1 of 1 - PPSV23) 02/07/1974    Cervical cancer screen  02/07/1989    Shingles Vaccine (1 of 2) 02/07/2018    Colon cancer screen colonoscopy  02/07/2018    Lipid screen  09/17/2019    Diabetic foot exam  02/26/2020    Potassium monitoring  02/26/2020    Creatinine monitoring  02/26/2020    Hepatitis B vaccine (1 of 3 - Risk 3-dose series) 09/18/2035 (Originally 2/7/1987)    Flu vaccine (Season Ended) 09/01/2020    Diabetic retinal exam  11/16/2020    A1C test (Diabetic or Prediabetic)  02/12/2021    Diabetic microalbuminuria test  02/12/2021    Breast cancer screen  11/15/2021    DTaP/Tdap/Td vaccine (2 - Td) 01/05/2028    HIV screen  Completed    Hepatitis A vaccine  Aged Out    Hib vaccine  Aged Out    Meningococcal (ACWY) vaccine  Aged Out             (applicable per patient's age: Cancer Screenings, Depression Screening, Fall Risk Screening, Immunizations)    Hemoglobin A1C (%)   Date Value   02/12/2020 7.5   11/20/2019 8.6   08/19/2019 14.0     LDL Calculated (mg/dL)   Date Value   09/17/2018 199 (A)     AST (U/L)   Date Value   12/28/2018 52     ALT (U/L)   Date Value   12/28/2018 72     BUN (mg/dL)   Date Value   02/26/2019 12      (goal A1C is < 7)   (goal LDL is <100) need 30-50% reduction from baseline     BP Readings from Last 3 Encounters:   03/10/20 122/78   02/12/20 122/78   11/20/19 (!) 150/90    (goal /80)      All Future Testing planned in [facility]:  Lab Frequency Next Occurrence   US LIVER Once 10/03/2019       Next Visit Date:  No future appointments.          Patient Active Problem List:     Stress reaction     Essential hypertension     Dysthymia     Anxiety     Mild intermittent asthma without complication     Dermatitis     Osteoarthritis of multiple joints     Fibromyalgia     Left leg cellulitis     Primary osteoarthritis of left knee     Chronic back pain     Class 2 obesity due to excess calories without serious comorbidity with body mass index (BMI) of 38.0 to 38.9 in adult     Chronic non-seasonal allergic rhinitis     Type 2 diabetes mellitus without complication, without long-term current use of insulin (HCC)     Liver enzyme elevation

## 2020-04-13 ENCOUNTER — CARE COORDINATION (OUTPATIENT)
Dept: CARE COORDINATION | Age: 50
End: 2020-04-13

## 2020-04-16 NOTE — TELEPHONE ENCOUNTER
Class 2 obesity due to excess calories without serious comorbidity with body mass index (BMI) of 38.0 to 38.9 in adult     Chronic non-seasonal allergic rhinitis     Type 2 diabetes mellitus without complication, without long-term current use of insulin (HCC)     Liver enzyme elevation

## 2020-04-17 RX ORDER — TRAZODONE HYDROCHLORIDE 100 MG/1
100 TABLET ORAL NIGHTLY
Qty: 30 TABLET | Refills: 2 | Status: SHIPPED | OUTPATIENT
Start: 2020-04-17 | End: 2020-12-02 | Stop reason: SDUPTHER

## 2020-04-17 RX ORDER — METOPROLOL TARTRATE 50 MG/1
TABLET, FILM COATED ORAL
Qty: 60 TABLET | Refills: 2 | Status: SHIPPED | OUTPATIENT
Start: 2020-04-17 | End: 2021-01-28

## 2020-04-17 RX ORDER — VENLAFAXINE HYDROCHLORIDE 75 MG/1
CAPSULE, EXTENDED RELEASE ORAL
Qty: 30 CAPSULE | Refills: 2 | Status: SHIPPED | OUTPATIENT
Start: 2020-04-17 | End: 2020-08-03 | Stop reason: SDUPTHER

## 2020-04-17 RX ORDER — HYDROCHLOROTHIAZIDE 12.5 MG/1
TABLET ORAL
Qty: 30 TABLET | Refills: 2 | Status: SHIPPED | OUTPATIENT
Start: 2020-04-17 | End: 2020-08-12 | Stop reason: SDUPTHER

## 2020-04-20 ENCOUNTER — CARE COORDINATION (OUTPATIENT)
Dept: CARE COORDINATION | Age: 50
End: 2020-04-20

## 2020-04-27 ENCOUNTER — CARE COORDINATION (OUTPATIENT)
Dept: CARE COORDINATION | Age: 50
End: 2020-04-27

## 2020-05-04 ENCOUNTER — CARE COORDINATION (OUTPATIENT)
Dept: CARE COORDINATION | Age: 50
End: 2020-05-04

## 2020-05-26 RX ORDER — IBUPROFEN 800 MG/1
TABLET ORAL
Qty: 90 TABLET | Refills: 0 | Status: SHIPPED | OUTPATIENT
Start: 2020-05-26 | End: 2020-07-13

## 2020-05-26 NOTE — TELEPHONE ENCOUNTER
Pharm requested refill    Health Maintenance   Topic Date Due    Pneumococcal 0-64 years Vaccine (1 of 1 - PPSV23) 02/[birth year]/1974    Cervical cancer screen  02/07/1989    Shingles Vaccine (1 of 2) 02/07/2018    Colon cancer screen colonoscopy  02/07/2018    Lipid screen  09/17/2019    Diabetic foot exam  02/26/2020    Potassium monitoring  02/26/2020    Creatinine monitoring  02/26/2020    Hepatitis B vaccine (1 of 3 - Risk 3-dose series) 09/18/2035 (Originally 2/7/1987)    Flu vaccine (Season Ended) 09/01/2020    Diabetic retinal exam  11/16/2020    A1C test (Diabetic or Prediabetic)  02/12/2021    Diabetic microalbuminuria test  02/12/2021    Breast cancer screen  11/15/2021    DTaP/Tdap/Td vaccine (2 - Td) 01/05/2028    HIV screen  Completed    Hepatitis A vaccine  Aged Out    Hib vaccine  Aged Out    Meningococcal (ACWY) vaccine  Aged Out             (applicable per patient's age: Cancer Screenings, Depression Screening, Fall Risk Screening, Immunizations)    Hemoglobin A1C (%)   Date Value   02/12/2020 7.5   11/20/2019 8.6   08/19/2019 14.0     LDL Calculated (mg/dL)   Date Value   09/17/2018 199 (A)     AST (U/L)   Date Value   12/28/2018 52     ALT (U/L)   Date Value   12/28/2018 72     BUN (mg/dL)   Date Value   02/26/2019 12      (goal A1C is < 7)   (goal LDL is <100) need 30-50% reduction from baseline     BP Readings from Last 3 Encounters:   03/10/20 122/78   02/12/20 122/78   11/20/19 (!) 150/90    (goal /80)      All Future Testing planned in [facility]:  Lab Frequency Next Occurrence   US LIVER Once 10/03/2019       Next Visit Date:  No future appointments.          Patient Active Problem List:     Stress reaction     Essential hypertension     Dysthymia     Anxiety     Mild intermittent asthma without complication     Dermatitis     Osteoarthritis of multiple joints     Fibromyalgia     Left leg cellulitis     Primary osteoarthritis of left knee     Chronic back pain     Class 2

## 2020-06-02 ENCOUNTER — TELEMEDICINE (OUTPATIENT)
Dept: FAMILY MEDICINE CLINIC | Age: 50
End: 2020-06-02
Payer: MEDICARE

## 2020-06-02 VITALS — BODY MASS INDEX: 36.65 KG/M2 | TEMPERATURE: 98.6 F | HEIGHT: 65 IN | WEIGHT: 220 LBS

## 2020-06-02 PROCEDURE — 99214 OFFICE O/P EST MOD 30 MIN: CPT | Performed by: NURSE PRACTITIONER

## 2020-06-02 PROCEDURE — G8427 DOCREV CUR MEDS BY ELIG CLIN: HCPCS | Performed by: NURSE PRACTITIONER

## 2020-06-02 PROCEDURE — 3017F COLORECTAL CA SCREEN DOC REV: CPT | Performed by: NURSE PRACTITIONER

## 2020-06-02 ASSESSMENT — ENCOUNTER SYMPTOMS: TROUBLE SWALLOWING: 0

## 2020-06-02 NOTE — PROGRESS NOTES
1 tablet by mouth daily (BEFORE LUNCH) 90 tablet 1    fluticasone (FLONASE) 50 MCG/ACT nasal spray instill 1 spray into each nostril once daily 16 g 3    SYMBICORT 160-4.5 MCG/ACT AERO inhale 2 puffs by mouth twice a day 10.2 g 5    mupirocin (BACTROBAN) 2 % ointment apply topically to affected area three times a day 22 g 1    blood glucose test strips (ONE TOUCH ULTRA TEST) strip TEST once daily 100 strip 3    Propylene Glycol 0.6 % SOLN 1 drop 4-5 times per day 15 mL 5    Blood Pressure Monitor WASHINGTON 1 Device by Does not apply route 2 times daily 1 Device 0    Brimonidine Tartrate 0.025 % SOLN 1 drop in each eye every 8 hours as needed for redness. 7.5 mL 5    albuterol sulfate  (90 Base) MCG/ACT inhaler inhale 2 puffs every 6 hours if needed for wheezing 18 g 5    oxyCODONE-acetaminophen (PERCOCET) 7.5-325 MG per tablet       gabapentin (NEURONTIN) 600 MG tablet Take 1,200 mg by mouth 2 times daily.  Artificial Saliva (MOUTH KOTE) SOLN solution Swish and spit with 15 ml up to 5 times per day 240 mL 11    ONETOUCH DELICA LANCETS 97M MISC CHECK BLOOD SUGARS ONCE A  each 3    triamcinolone (ARISTOCORT) 0.5 % ointment apply to affected area twice a day 30 g 3    Handicap Placard MISC by Does not apply route  5 years from origninal written date 1 each 0    glucose monitoring kit (FREESTYLE) monitoring kit 1 kit by Does not apply route daily 1 kit 0    Handicap Placard MISC by Does not apply route Expires 6 months after issue 1 each 0    ALPRAZolam (XANAX) 1 MG tablet take 1 tablet by mouth every evening if needed for anxiety 30 tablet 0    loratadine (CLARITIN) 10 MG tablet Take 1 tablet by mouth daily 30 tablet 3    zolpidem (AMBIEN) 5 MG tablet Take 1 tablet by mouth nightly as needed for Sleep for up to 90 days.  (Patient not taking: Reported on 2020) 30 tablet 0    saline nasal gel (AYR) GEL by Nasal route as needed for Congestion (Patient not taking: Reported on 6/2/2020) 1 Tube 3    Misc. Devices MISC 1 each by Does not apply route once for 1 dose Please provide patient with one humidifier 1 Device 0    Misc. Devices MISC 1 each by Does not apply route once for 1 dose Please provide the patient with one heating pad 1 Device 0     No current facility-administered medications for this visit. ALLERGIES:   Allergies   Allergen Reactions    Lisinopril Anaphylaxis     Difficulty breathing     Zoloft [Sertraline Hcl] Anaphylaxis     Difficulty breathing     Seasonal     Amoxicillin Rash    Augmentin [Amoxicillin-Pot Clavulanate] Rash    Buspirone Rash    Paxil [Paroxetine Hcl] Other (See Comments)     Weight gain        PHYSICAL EXAM:   Physical Exam  Vitals signs reviewed. Constitutional:       General: She is not in acute distress. Appearance: Normal appearance. She is well-developed. She is obese. She is not ill-appearing or toxic-appearing. HENT:      Head: Normocephalic. Right Ear: Hearing normal.      Left Ear: Hearing normal.      Mouth/Throat:      Lips: Pink. Eyes:      General: Lids are normal.      Conjunctiva/sclera: Conjunctivae normal.   Neck:      Musculoskeletal: Normal range of motion. Pulmonary:      Effort: Pulmonary effort is normal.   Musculoskeletal: Normal range of motion. Skin:     Findings: No rash. Neurological:      Mental Status: She is alert and oriented to person, place, and time. Mental status is at baseline. Coordination: Coordination is intact. Psychiatric:         Mood and Affect: Mood normal.         Behavior: Behavior normal. Behavior is cooperative. Thought Content: Thought content normal.         Judgment: Judgment normal.          ASSESSMENT/PLAN:  1. Anxiety  2. Dysthymia    -Chronic, stable, continue current medications. 3. Neck pain  4. Radiculopathy of arm    - XR CERVICAL SPINE (4-5 VIEWS); Future  - MRI CERVICAL SPINE WO CONTRAST; Future  - tiZANidine (ZANAFLEX) 2 MG tablet;  Take

## 2020-06-03 ENCOUNTER — TELEPHONE (OUTPATIENT)
Dept: FAMILY MEDICINE CLINIC | Age: 50
End: 2020-06-03

## 2020-06-03 RX ORDER — TIZANIDINE 2 MG/1
2-4 TABLET ORAL 3 TIMES DAILY PRN
Qty: 30 TABLET | Refills: 0 | Status: SHIPPED | OUTPATIENT
Start: 2020-06-03 | End: 2020-07-13

## 2020-06-03 RX ORDER — LORATADINE 10 MG/1
10 TABLET ORAL DAILY
Qty: 30 TABLET | Refills: 3 | Status: SHIPPED | OUTPATIENT
Start: 2020-06-03 | End: 2020-06-05 | Stop reason: SDUPTHER

## 2020-06-03 NOTE — TELEPHONE ENCOUNTER
Pt says new muscle relaxer med that was discussed during yesterdays appt was not sent to the pharmacy.

## 2020-06-04 NOTE — TELEPHONE ENCOUNTER
intermittent asthma without complication     Dermatitis     Osteoarthritis of multiple joints     Fibromyalgia     Left leg cellulitis     Primary osteoarthritis of left knee     Chronic back pain     Class 2 obesity due to excess calories without serious comorbidity with body mass index (BMI) of 38.0 to 38.9 in adult     Chronic non-seasonal allergic rhinitis     Type 2 diabetes mellitus without complication, without long-term current use of insulin (HCC)     Liver enzyme elevation

## 2020-06-05 RX ORDER — ALPRAZOLAM 1 MG/1
TABLET ORAL
Qty: 30 TABLET | Refills: 0 | Status: SHIPPED | OUTPATIENT
Start: 2020-06-05 | End: 2020-07-13

## 2020-06-05 RX ORDER — LORATADINE 10 MG/1
10 TABLET ORAL DAILY
Qty: 30 TABLET | Refills: 3 | Status: SHIPPED | OUTPATIENT
Start: 2020-06-05 | End: 2020-08-04 | Stop reason: SDUPTHER

## 2020-06-13 ASSESSMENT — ENCOUNTER SYMPTOMS
BACK PAIN: 1
GASTROINTESTINAL NEGATIVE: 1

## 2020-06-23 ENCOUNTER — TELEPHONE (OUTPATIENT)
Dept: FAMILY MEDICINE CLINIC | Age: 50
End: 2020-06-23

## 2020-06-23 RX ORDER — LIRAGLUTIDE 6 MG/ML
INJECTION SUBCUTANEOUS
Qty: 2 PEN | Refills: 3 | Status: SHIPPED | OUTPATIENT
Start: 2020-06-23 | End: 2020-08-03 | Stop reason: SDUPTHER

## 2020-07-31 ENCOUNTER — TELEPHONE (OUTPATIENT)
Dept: FAMILY MEDICINE CLINIC | Age: 50
End: 2020-07-31

## 2020-07-31 NOTE — TELEPHONE ENCOUNTER
Pt called in and stated that she was exposed to covid 19, it was about 12 days ago at a family party. Pt states she stayed mostly to herself, and she has shown no symptoms.  She has an upcoming appt and just wants to know what she do

## 2020-07-31 NOTE — TELEPHONE ENCOUNTER
If it has been more than 14 days since her exposure by her apt on Monday then she is fine to be seen with a mask if her temp is normal and she hasnt developed any sx.

## 2020-08-03 ENCOUNTER — OFFICE VISIT (OUTPATIENT)
Dept: FAMILY MEDICINE CLINIC | Age: 50
End: 2020-08-03
Payer: MEDICARE

## 2020-08-03 VITALS
HEART RATE: 76 BPM | BODY MASS INDEX: 38.32 KG/M2 | HEIGHT: 65 IN | OXYGEN SATURATION: 98 % | SYSTOLIC BLOOD PRESSURE: 124 MMHG | WEIGHT: 230 LBS | DIASTOLIC BLOOD PRESSURE: 80 MMHG | TEMPERATURE: 97.6 F

## 2020-08-03 PROBLEM — E11.8 TYPE 2 DIABETES MELLITUS WITH COMPLICATION, WITHOUT LONG-TERM CURRENT USE OF INSULIN (HCC): Status: ACTIVE | Noted: 2018-10-23

## 2020-08-03 LAB — HBA1C MFR BLD: 8.3 %

## 2020-08-03 PROCEDURE — 3017F COLORECTAL CA SCREEN DOC REV: CPT | Performed by: NURSE PRACTITIONER

## 2020-08-03 PROCEDURE — 2022F DILAT RTA XM EVC RTNOPTHY: CPT | Performed by: NURSE PRACTITIONER

## 2020-08-03 PROCEDURE — G8427 DOCREV CUR MEDS BY ELIG CLIN: HCPCS | Performed by: NURSE PRACTITIONER

## 2020-08-03 PROCEDURE — 83036 HEMOGLOBIN GLYCOSYLATED A1C: CPT | Performed by: NURSE PRACTITIONER

## 2020-08-03 PROCEDURE — 3052F HG A1C>EQUAL 8.0%<EQUAL 9.0%: CPT | Performed by: NURSE PRACTITIONER

## 2020-08-03 PROCEDURE — 99215 OFFICE O/P EST HI 40 MIN: CPT | Performed by: NURSE PRACTITIONER

## 2020-08-03 PROCEDURE — 1036F TOBACCO NON-USER: CPT | Performed by: NURSE PRACTITIONER

## 2020-08-03 PROCEDURE — G8417 CALC BMI ABV UP PARAM F/U: HCPCS | Performed by: NURSE PRACTITIONER

## 2020-08-03 RX ORDER — LIRAGLUTIDE 6 MG/ML
1.8 INJECTION SUBCUTANEOUS DAILY
Qty: 3 PEN | Refills: 3 | Status: SHIPPED | OUTPATIENT
Start: 2020-08-03 | End: 2021-02-02

## 2020-08-03 ASSESSMENT — PATIENT HEALTH QUESTIONNAIRE - PHQ9
1. LITTLE INTEREST OR PLEASURE IN DOING THINGS: 0
SUM OF ALL RESPONSES TO PHQ9 QUESTIONS 1 & 2: 1
SUM OF ALL RESPONSES TO PHQ QUESTIONS 1-9: 1
2. FEELING DOWN, DEPRESSED OR HOPELESS: 1
SUM OF ALL RESPONSES TO PHQ QUESTIONS 1-9: 1

## 2020-08-03 ASSESSMENT — ENCOUNTER SYMPTOMS
NAUSEA: 0
SHORTNESS OF BREATH: 0
CONSTIPATION: 0
RESPIRATORY NEGATIVE: 1
GASTROINTESTINAL NEGATIVE: 1
COUGH: 0
DIARRHEA: 0
ABDOMINAL PAIN: 0
EYES NEGATIVE: 1
VOMITING: 0

## 2020-08-03 NOTE — PROGRESS NOTES
Pt is present for appt,. Patient presents with issue of swelling, and possible cyst on the back of her left leg behind her knee. Pt states her PTSD is not getting any better. Pt states her depression has not been getting better. Has suffered a lot of loss this year. Pt would like [facility]

## 2020-08-03 NOTE — PROGRESS NOTES
and 2 tablets HS. MRI shows bone spurs and one disc, C5-6 with some left paracentral protrusion. Bilateral wrist pain- R > L. Indicates that this is not much better. Using splints at night on the left, the right wrist she cannot remove without pain. Left leg pain- reports she's noted this for \"almost 1year \" She stood up and noted that she had a huge bump on the back of her knee. She was told she has a bakers cyst.  She notes pain every once and a while. She has no hx of this. Has never had it drained. Requesting to see specialist    Diabetes   She presents for her follow-up diabetic visit. She has type 2 diabetes mellitus. Her disease course has been improving. Hypoglycemia symptoms include nervousness/anxiousness (Chronic stable. ). Pertinent negatives for hypoglycemia include no dizziness or headaches. Associated symptoms include fatigue. Pertinent negatives for diabetes include no chest pain, no foot paresthesias and no weight loss. There are no hypoglycemic complications. There are no diabetic complications. Risk factors for coronary artery disease include diabetes mellitus and obesity. Current diabetic treatment includes oral agent (dual therapy) (Victoza). Her weight is decreasing steadily. She is following a diabetic diet. Meal planning includes ADA exchanges. She has had a previous visit with a dietitian. She participates in exercise intermittently. An ACE inhibitor/angiotensin II receptor blocker is contraindicated. She does not see a podiatrist.Eye exam is current. Hypertension   This is a chronic problem. The problem is unchanged. The problem is controlled. Associated symptoms include malaise/fatigue (chronic. Stable ) and neck pain. Pertinent negatives include no chest pain, headaches, palpitations, peripheral edema or shortness of breath. Agents associated with hypertension include NSAIDs.  Risk factors for coronary artery disease include diabetes mellitus, obesity, post-menopausal state, stress and sedentary lifestyle. Past treatments include diuretics, beta blockers, calcium channel blockers and ACE inhibitors. The current treatment provides moderate improvement. Vitals:    08/03/20 0957   BP: 124/80   Site: Left Upper Arm   Position: Sitting   Cuff Size: Large Adult   Pulse: 76   Temp: 97.6 °F (36.4 °C)   TempSrc: Temporal   SpO2: 98%   Weight: 230 lb (104.3 kg)   Height: 5' 5\" (1.651 m)     Wt Readings from Last 3 Encounters:   08/03/20 230 lb (104.3 kg)   06/02/20 220 lb (99.8 kg)   03/10/20 234 lb (106.1 kg)     BP Readings from Last 3 Encounters:   08/03/20 124/80   03/10/20 122/78   02/12/20 122/78     REVIEW OF SYSTEMS    Review of Systems   Constitutional: Positive for fatigue and malaise/fatigue (chronic. Stable ). Negative for chills and weight loss. Eyes: Negative. Negative for visual disturbance. Respiratory: Negative. Negative for cough and shortness of breath. Cardiovascular: Negative. Negative for chest pain and palpitations. Gastrointestinal: Negative. Negative for abdominal pain, constipation, diarrhea, nausea and vomiting. Genitourinary: Negative. Negative for difficulty urinating. Musculoskeletal: Positive for arthralgias, back pain and neck pain. Neurological: Negative. Negative for dizziness and headaches. Psychiatric/Behavioral: Positive for dysphoric mood. The patient is nervous/anxious (Chronic stable. ).         PAST MEDICAL HISTORY    Past Medical History:   Diagnosis Date    Anxiety     Arthritis     Asthma     Cataplexy and narcolepsy     Chronic back pain     Chronic pain     Depression     Fibromyalgia     Hypertension     Liver enzyme elevation 8/19/2019    Obesity     Osteoarthritis        FAMILY HISTORY    Family History   Problem Relation Age of Onset    Heart Disease Mother     Diabetes Mother     Stroke Mother     Dementia Father     Alzheimer's Disease Father        SOCIAL HISTORY    Social History     Socioeconomic History    Marital status:      Spouse name: None    Number of children: None    Years of education: None    Highest education level: None   Occupational History    None   Social Needs    Financial resource strain: Not hard at all   [place] insecurity     Worry: Never true     Inability: Never true   [employer] needs     Medical: No     Non-medical: No   Tobacco Use    Smoking status: Never Smoker    Smokeless tobacco: Never Used   Substance and Sexual Activity    Alcohol use: No     Frequency: Never    Drug use: No    Sexual activity: Yes     Partners: Male   Lifestyle    Physical activity     Days per week: 4 days     Minutes per session: 40 min    Stress: Not at all   Relationships    Social connections     Talks on phone: More than three times a week     Gets together: More than three times a week     Attends [employer] service: 1 to 4 times per year     Active member of club or organization: No     Attends meetings of clubs or organizations: Never     Relationship status:     Intimate partner violence     Fear of current or ex partner: None     Emotionally abused: None     Physically abused: None     Forced sexual activity: None   Other Topics Concern    None   Social History Narrative    None       SURGICAL HISTORY    Past Surgical History:   Procedure Laterality Date    CHOLECYSTECTOMY         CURRENT MEDICATIONS    Current Outpatient Medications   Medication Sig Dispense Refill    ALPRAZolam (XANAX) 1 MG tablet take 1 tablet by mouth every evening if needed for anxiety 30 tablet 0    venlafaxine (EFFEXOR XR) 150 MG extended release capsule Take 1 capsule every morning 30 capsule 5    loratadine (CLARITIN) 10 MG tablet Take 1 tablet by mouth daily 30 tablet 3    fluticasone (FLONASE) 50 MCG/ACT nasal spray instill 1 spray into each nostril once daily 16 g 3    mupirocin (BACTROBAN) 2 % ointment apply topically to affected area three times a day 22 g 1    triamcinolone (ARISTOCORT) 0.5 % ointment apply to affected area twice a day 30 g 3    Insulin Pen Needle 32G X 4 MM MISC 1 each by Does not apply route daily 100 each 3    Liraglutide (VICTOZA) 18 MG/3ML SOPN SC injection Inject 1.8 mg into the skin daily 3 pen 3    tiZANidine (ZANAFLEX) 2 MG tablet take 1-2 tablets by mouth three times a day if needed for muscle spasm 60 tablet 5    ibuprofen (ADVIL;MOTRIN) 800 MG tablet take 1 tablet by mouth every 8 hours with food if needed for pain 90 tablet 0    metoprolol tartrate (LOPRESSOR) 50 MG tablet take 1 tablet by mouth twice a day 60 tablet 2    traZODone (DESYREL) 100 MG tablet Take 1 tablet by mouth nightly 30 tablet 2    amLODIPine (NORVASC) 10 MG tablet take 1 tablet by mouth once daily 30 tablet 5    SYSTANE 0.4-0.3 % GEL ophthalmic gel place 1 drop into both eyes nightly 10 mL 1    Elastic Bandages & Supports (WRIST SPLINT) MISC 1 Device by Does not apply route daily 1 each 0    Elastic Bandages & Supports (WRIST SPLINT/COCK-UP/RIGHT M) MISC 1 Device by Does not apply route nightly 1 each 0    metFORMIN (GLUCOPHAGE-XR) 500 MG extended release tablet take 2 tablets by mouth twice a day before meals 180 tablet 1    SYMBICORT 160-4.5 MCG/ACT AERO inhale 2 puffs by mouth twice a day 10.2 g 5    Propylene Glycol 0.6 % SOLN 1 drop 4-5 times per day 15 mL 5    Blood Pressure Monitor WASHINGTON 1 Device by Does not apply route 2 times daily 1 Device 0    Brimonidine Tartrate 0.025 % SOLN 1 drop in each eye every 8 hours as needed for redness. 7.5 mL 5    albuterol sulfate  (90 Base) MCG/ACT inhaler inhale 2 puffs every 6 hours if needed for wheezing 18 g 5    oxyCODONE-acetaminophen (PERCOCET) 7.5-325 MG per tablet       gabapentin (NEURONTIN) 600 MG tablet Take 1,200 mg by mouth 2 times daily.       Artificial Saliva (MOUTH KOTE) SOLN solution Swish and spit with 15 ml up to 5 times per day 240 mL 11    Handicap Placard MISC by Does not apply route  5 years from origninal written date 1 each 0    glucose monitoring kit (FREESTYLE) monitoring kit 1 kit by Does not apply route daily 1 kit 0    Handicap Placard MISC by Does not apply route Expires 6 months after issue 1 each 0    hydrochlorothiazide (HYDRODIURIL) 12.5 MG tablet take 1 tablet by mouth once daily 30 tablet 2    blood glucose test strips (ONE TOUCH ULTRA TEST) strip TEST once daily 100 strip 3    OneTouch Delica Lancets 97K MISC CHECK BLOOD SUGARS ONCE A  each 3    Caltrate 600+D Plus Minerals (CALTRATE) 600-800 MG-UNIT TABS tablet Take 1 tablet by mouth daily (Patient not taking: Reported on 8/3/2020) 30 tablet 5     No current facility-administered medications for this visit. ALLERGIES    Allergies   Allergen Reactions    Lisinopril Anaphylaxis     Difficulty breathing     Zoloft [Sertraline Hcl] Anaphylaxis     Difficulty breathing     Seasonal     Amoxicillin Rash    Augmentin [Amoxicillin-Pot Clavulanate] Rash    Buspirone Rash    Paxil [Paroxetine Hcl] Other (See Comments)     Weight gain        PHYSICAL EXAM   Physical Exam  Vitals signs and nursing note reviewed. Constitutional:       General: She is not in acute distress. Appearance: She is well-developed. She is obese. She is not diaphoretic. HENT:      Head: Normocephalic. Right Ear: Hearing normal.      Left Ear: Hearing normal.   Eyes:      General:         Right eye: No discharge. Left eye: No discharge. Pupils: Pupils are equal.   Neck:      Musculoskeletal: Normal range of motion. Cardiovascular:      Rate and Rhythm: Normal rate and regular rhythm. Pulses: Normal pulses. Radial pulses are 2+ on the right side and 2+ on the left side. Heart sounds: Normal heart sounds, S1 normal and S2 normal. No murmur. Pulmonary:      Effort: Pulmonary effort is normal. No respiratory distress. Breath sounds: Normal breath sounds. No wheezing.    Skin: General: Skin is warm and dry. Neurological:      Mental Status: She is alert and oriented to person, place, and time. Psychiatric:         Mood and Affect: Mood is anxious. Speech: Speech is rapid and pressured. Behavior: Behavior normal. Behavior is cooperative. ASSESSMENT/PLAN  1. Dysthymia  2. Stress reaction  3. Anxiety    - Radha Christensen, PhD, Psychology, [place]  - venlafaxine (EFFEXOR XR) 150 MG extended release capsule; Take 1 capsule every morning  Dispense: 30 capsule; Refill: 5  -Improved with increased dose of Effexor from 75 to 150 mg.  -Referral provided for psychological resources in [place].  -Patient advised that it is unsafe to take trazodone 100 mg with current dose of Effexor and Xyrem. Indicates she has been only taking trazodone as needed, but will discontinue at this time    4. Type 2 diabetes mellitus with complication, without long-term current use of insulin ([facility])    - POCT glycosylated hemoglobin (Hb A1C)  - Liraglutide (VICTOZA) 18 MG/3ML SOPN SC injection; Inject 1.8 mg into the skin daily  Dispense: 3 pen; Refill: 3  - Insulin Pen Needle 32G X 4 MM MISC; 1 each by Does not apply route daily  Dispense: 100 each; Refill: 3  -Increase patient's Victoza from 1.2 mg daily to 1.8  -Continue monitoring blood pressures at home, advised to be more mindful of dietary changes to further improve blood glucose as her A1c has gone up since February. Results for orders placed or performed in visit on 08/03/20   POCT glycosylated hemoglobin (Hb A1C)   Result Value Ref Range    Hemoglobin A1C 8.3 %     5. Class 2 severe obesity due to excess calories with serious comorbidity and body mass index (BMI) of 38.0 to 38.9 in adult [facility])    -Advised patient that Victoza can be used for weight loss, but in her case it is for diabetes.   If she is able to lose weight on the medication that would be helpful for her overall health  - Liraglutide (VICTOZA) 18 MG/3ML SOPN SC injection; Inject 1.8 mg into the skin daily  Dispense: 3 pen; Refill: 3    6. Mild intermittent asthma without complication    -Chronic, stable, continue current medications. 7. Radiculopathy of arm  8. Neck pain    -We will refer to Dr. Sydney Pimentel for evaluation and treatment given her complaints of both neck and knee pain    9. Narcolepsy and cataplexy  10. Other insomnia    -Chronic. Stable. Continue current medications and following with Dr. Charmaine Kolb. 11. Essential hypertension    -Chronic, stable, continue current medications. 12. Allergic rhinitis, unspecified seasonality, unspecified trigger    - loratadine (CLARITIN) 10 MG tablet; Take 1 tablet by mouth daily  Dispense: 30 tablet; Refill: 3  - fluticasone (FLONASE) 50 MCG/ACT nasal spray; instill 1 spray into each nostril once daily  Dispense: 16 g; Refill: 3  -Chronic, stable, continue current medications. 13. Nasal sore    Advised use of Bactroban ocularly. If sores do not improve return to office for further evaluation or referral for ENT  - mupirocin (BACTROBAN) 2 % ointment; apply topically to affected area three times a day  Dispense: 22 g; Refill: 1    14. Dermatitis    -Chronic, stable, continue current medications. - triamcinolone (ARISTOCORT) 0.5 % ointment; apply to affected area twice a day  Dispense: 30 g; Refill: 3    15. Right wrist pain    -Referral for evaluation for possible carpal tunnel release  - External Referral To Hand Surgery    16. Baker's cyst of knee, left    - Rani Cowart MD, Orthopedic Surgery, [place]  2  -Mild swelling noted today, but patient wishes to discuss with specialist.     Ana Don received counseling on the following healthy behaviors: nutrition, exercise and medication adherence  Reviewed prior labs and health maintenance  Continue current medications, diet and exercise. Discussed use, benefit, and side effects of prescribed medications. Barriers to medication compliance addressed. Patient given educational materials - see patient instructions  Was a self-tracking handout given in paper form or via [employer]? Yes    Requested Prescriptions     Signed Prescriptions Disp Refills    Liraglutide (VICTOZA) 18 MG/3ML SOPN SC injection 3 pen 3     Sig: Inject 1.8 mg into the skin daily    ALPRAZolam (XANAX) 1 MG tablet 30 tablet 0     Sig: take 1 tablet by mouth every evening if needed for anxiety    venlafaxine (EFFEXOR XR) 150 MG extended release capsule 30 capsule 5     Sig: Take 1 capsule every morning    loratadine (CLARITIN) 10 MG tablet 30 tablet 3     Sig: Take 1 tablet by mouth daily    fluticasone (FLONASE) 50 MCG/ACT nasal spray 16 g 3     Sig: instill 1 spray into each nostril once daily    mupirocin (BACTROBAN) 2 % ointment 22 g 1     Sig: apply topically to affected area three times a day    triamcinolone (ARISTOCORT) 0.5 % ointment 30 g 3     Sig: apply to affected area twice a day    Insulin Pen Needle 32G X 4 MM MISC 100 each 3     Si each by Does not apply route daily       All patient questions answered. Patient voiced understanding. Quality Measures    Body mass index is 38.27 kg/m². Elevated. Weight control planned discussed Healthy diet and regular exercise. BP: 124/80 Blood pressure is normal. Treatment plan consists of No treatment change needed.     Lab Results   Component Value Date    LDLCALC 199 (A) 2018    (goal LDL reduction with dx if diabetes is 50% LDL reduction)      PHQ Scores 8/3/2020 2018 2017   PHQ2 Score 1 0 0   PHQ9 Score 1 0 0     Interpretation of Total Score Depression Severity: 1-4 = Minimal depression, 5-9 = Mild depression, 10-14 = Moderate depression, 15-19 = Moderately severe depression, 20-27 = Severe depression     Return in about 3 months (around 11/3/2020) for HgbA1C, diabetes, BP, foot exam.    (Please note that portions of this note were completed with a voice recognition program. Efforts were made to edit the dictations but occasionally words are mis-transcribed.)      Electronically signed by RUBEN Selby CNP on 8/3/20 at 10:03 AM EDT

## 2020-08-04 RX ORDER — VENLAFAXINE HYDROCHLORIDE 150 MG/1
CAPSULE, EXTENDED RELEASE ORAL
Qty: 30 CAPSULE | Refills: 5 | Status: SHIPPED | OUTPATIENT
Start: 2020-08-04 | End: 2021-01-28

## 2020-08-04 RX ORDER — LORATADINE 10 MG/1
10 TABLET ORAL DAILY
Qty: 30 TABLET | Refills: 3 | Status: SHIPPED | OUTPATIENT
Start: 2020-08-04

## 2020-08-04 RX ORDER — TRIAMCINOLONE ACETONIDE 5 MG/G
OINTMENT TOPICAL
Qty: 30 G | Refills: 3 | Status: SHIPPED | OUTPATIENT
Start: 2020-08-04 | End: 2021-05-28 | Stop reason: SDUPTHER

## 2020-08-04 RX ORDER — FLUTICASONE PROPIONATE 50 MCG
SPRAY, SUSPENSION (ML) NASAL
Qty: 16 G | Refills: 3 | Status: SHIPPED | OUTPATIENT
Start: 2020-08-04 | End: 2020-12-16 | Stop reason: ALTCHOICE

## 2020-08-04 RX ORDER — ALPRAZOLAM 1 MG/1
TABLET ORAL
Qty: 30 TABLET | Refills: 0 | Status: SHIPPED | OUTPATIENT
Start: 2020-08-04 | End: 2020-09-01 | Stop reason: SDUPTHER

## 2020-08-12 RX ORDER — LANCETS 33 GAUGE
EACH MISCELLANEOUS
Qty: 100 EACH | Refills: 3 | Status: SHIPPED | OUTPATIENT
Start: 2020-08-12 | End: 2021-08-18 | Stop reason: SDUPTHER

## 2020-08-12 RX ORDER — HYDROCHLOROTHIAZIDE 12.5 MG/1
TABLET ORAL
Qty: 30 TABLET | Refills: 2 | Status: SHIPPED | OUTPATIENT
Start: 2020-08-12 | End: 2020-11-02

## 2020-08-16 ASSESSMENT — ENCOUNTER SYMPTOMS: BACK PAIN: 1

## 2020-08-24 ENCOUNTER — OFFICE VISIT (OUTPATIENT)
Dept: ORTHOPEDIC SURGERY | Age: 50
End: 2020-08-24
Payer: MEDICARE

## 2020-08-24 VITALS — TEMPERATURE: 98.3 F

## 2020-08-24 PROCEDURE — 99203 OFFICE O/P NEW LOW 30 MIN: CPT | Performed by: PHYSICIAN ASSISTANT

## 2020-08-24 PROCEDURE — G8427 DOCREV CUR MEDS BY ELIG CLIN: HCPCS | Performed by: PHYSICIAN ASSISTANT

## 2020-08-24 PROCEDURE — G8417 CALC BMI ABV UP PARAM F/U: HCPCS | Performed by: PHYSICIAN ASSISTANT

## 2020-08-24 PROCEDURE — 20610 DRAIN/INJ JOINT/BURSA W/O US: CPT | Performed by: PHYSICIAN ASSISTANT

## 2020-08-24 PROCEDURE — 3017F COLORECTAL CA SCREEN DOC REV: CPT | Performed by: PHYSICIAN ASSISTANT

## 2020-08-24 PROCEDURE — 1036F TOBACCO NON-USER: CPT | Performed by: PHYSICIAN ASSISTANT

## 2020-08-24 RX ORDER — LIDOCAINE HYDROCHLORIDE 10 MG/ML
4 INJECTION, SOLUTION EPIDURAL; INFILTRATION; INTRACAUDAL; PERINEURAL ONCE
Status: COMPLETED | OUTPATIENT
Start: 2020-08-24 | End: 2020-08-26

## 2020-08-24 RX ORDER — BETAMETHASONE SODIUM PHOSPHATE AND BETAMETHASONE ACETATE 3; 3 MG/ML; MG/ML
12 INJECTION, SUSPENSION INTRA-ARTICULAR; INTRALESIONAL; INTRAMUSCULAR; SOFT TISSUE ONCE
Status: COMPLETED | OUTPATIENT
Start: 2020-08-24 | End: 2020-08-26

## 2020-08-24 RX ORDER — MELOXICAM 15 MG/1
15 TABLET ORAL DAILY
Qty: 30 TABLET | Refills: 3 | Status: SHIPPED | OUTPATIENT
Start: 2020-08-24 | End: 2020-10-16 | Stop reason: SDUPTHER

## 2020-08-24 ASSESSMENT — ENCOUNTER SYMPTOMS
RHINORRHEA: 0
NAUSEA: 0
EYES NEGATIVE: 1
VOMITING: 0
COUGH: 0
COLOR CHANGE: 0

## 2020-08-24 NOTE — PROGRESS NOTES
Patient ID: Azar Robertson is a 44 y.o. female. Chief Complaint   Patient presents with   [facility]    Neck Pain     [facility] park    Knee Pain     left knee pain         HPI  Ms. Sabrina Aguirre is a 47-year-old female presents for evaluation of chronic right-sided neck pain and left knee pain and swelling. Neck Pain:  Patient states her pain is been present for almost a year pain is most severe in the right side of the neck and radiates down the right arm. Associated with numbness and tingling to the entire right arm most severe in the hand specifically over the middle and ring fingers of this right hand. Patient states she has been wearing a cock-up wrist splint over the last 3 months which helps significantly with the numbness and tingling in this right hand. Pain continues to be severe in the right side of the neck as well as the right trapezius area. She has tried physical therapy in the past however none within the last 6 months. Patient is currently on Percocet and tizanidine per outside prescriber which provide moderate relief of her pain. Left knee swelling and Pain:  Patient states she has had this pain for approximately 3 to 4 months now initially started as swelling to the posterior knee and some discomfort however has developed into pain that is aggravated by most activity including extended periods of standing and walking. Pain is relieved by rest and ice. Patient also notes mild relief with over-the-counter ibuprofen. Pain is most severe to the posterior aspect of the knee associated with swelling. Patient denies any joint warmth, redness, fever or chills.     Past Medical History:   Diagnosis Date    Anxiety     Arthritis     Asthma     Cataplexy and narcolepsy     Chronic back pain     Chronic pain     Depression     Fibromyalgia     Hypertension     Liver enzyme elevation 8/19/2019    Obesity     Osteoarthritis      Past Surgical History: Procedure Laterality Date    CHOLECYSTECTOMY       Family History   Problem Relation Age of Onset    Heart Disease Mother     Diabetes Mother     Stroke Mother     Dementia Father     Alzheimer's Disease Father      Social History     Occupational History    Not on file   Tobacco Use    Smoking status: Never Smoker    Smokeless tobacco: Never Used   Substance and Sexual Activity    Alcohol use: No     Frequency: Never    Drug use: No    Sexual activity: Yes     Partners: Male        Review of Systems   Constitutional: Negative for chills and fever. HENT: Negative for congestion and rhinorrhea. Eyes: Negative. Respiratory: Negative for cough. Cardiovascular: Negative for chest pain and leg swelling. Gastrointestinal: Negative for nausea and vomiting. Musculoskeletal: Positive for arthralgias and neck pain. Skin: Negative for color change and rash. Neurological: Positive for numbness. Negative for dizziness. Physical Exam  Constitutional:       Appearance: She is well-developed. HENT:      Head: Normocephalic and atraumatic. Neck:      Musculoskeletal: Neck supple. Decreased range of motion (With rotation to the right. Normal flexion, extension, left rotation). Pain with movement (Pain on the right with movement to the left) and muscular tenderness (Moderate tenderness of the right trapezius) present. No torticollis or spinous process tenderness. Cardiovascular:      Rate and Rhythm: Normal rate. Pulmonary:      Effort: Pulmonary effort is normal.   Abdominal:      Palpations: Abdomen is soft.    Musculoskeletal:      Comments: right Hand/Wrist   Tenderness:  None     Range of Motion:     Pronation: 90    Supination: 90    Flexion: 40    Extension: 50    Hand Joints: normal     Muscle Strength     : 4/5    Wrist Extension: 5/5    Wrist Flexion: 4/5     Sensation: normal  Phalen's Sign: Positive  Tinel's Sign (Medial Nerve): Positive  Finkelstein's Test: Negative    left Knee   Swelling: Mild  Effusion: 1+  Tenderness: palpable area of swelling that is tender to palpation over the posterior medial knee. No tenderness to either joint line     Range of Motion:     Extension: -5    Flexion: 110     McMurrays: Negative  Anterior Lachmann: Negative  Posterior Lachmann: Negative  Anterior Drawer: Negative  Posterior Drawer: Negative  Varus Stress Test: Negative  Valgus Stress Test: Negative  Pivot Shift: Negative  Patellar Apprehension: No       Skin:     General: Skin is warm and dry. Findings: No rash. Neurological:      Mental Status: She is alert and oriented to person, place, and time. Psychiatric:         Behavior: Behavior normal.         Assessment:     Encounter Diagnoses   Name Primary?  Baker's cyst of knee, left Yes    Carpal tunnel syndrome of right wrist     Neck pain      X-rays taken in clinic today and reviewed by me  AP bilateral knees standing and lateral view of the left knee demonstrate mild to moderate tricompartmental degenerative changes of the left knee most severe in the patellofemoral compartment. Patient with superior pole enthesophyte present to the patella. No evidence of acute fracture or other osseous malalignment seen. Plan:     Right possible cervical radiculopathy vs Carpal tunnel syndrome  - Start Mobic 15 mg daily  - Referral for EMG right upper extremity  - Continue on tizanidine per PCP    Left Baker's cyst  - Recommend aspiration and injection  - Start Mobic 15 mg daily    RTC after completion of EMG    Procedure: Left knee aspiration and celestone injection  An informed verbal consent for the procedure was obtained and risks including, but not limited to: allergy to medications, injection, bleeding, stiffness of joint, recurrence of symptoms, loss of function, swelling, drainage, irrigation, need for surgery and pseudo-septic inflammation, were explained to the patient.  Also, discussed was the potential for further injections, irrigation and debridement and surgery. Alternate means of treatment have also been discussed with the patient. Under sterile conditions the superolateral portal left knee is prepped with Betadine and alcohol. Using a 25-gauge needle 4 cc of lidocaine is injected superficially. Subsequently using 18-gauge needle 5 cc of clear, straw-colored synovial fluid is aspirated from the knee joint and 2 cc of Celestone is injected into the left knee. A bandage is applied to the injection site. Patient tolerated procedure well. No orders of the defined types were placed in this encounter. Chaka Watkins, [address]      Please note that this chart was generated using voicerecognition Dragon dictation software. Although every effort was made to ensurethe accuracy of this automated transcription, some errors in transcription may haveoccurred.

## 2020-08-26 RX ADMIN — LIDOCAINE HYDROCHLORIDE 4 ML: 10 INJECTION, SOLUTION EPIDURAL; INFILTRATION; INTRACAUDAL; PERINEURAL at 16:50

## 2020-08-26 RX ADMIN — BETAMETHASONE SODIUM PHOSPHATE AND BETAMETHASONE ACETATE 12 MG: 3; 3 INJECTION, SUSPENSION INTRA-ARTICULAR; INTRALESIONAL; INTRAMUSCULAR; SOFT TISSUE at 16:50

## 2020-08-31 NOTE — TELEPHONE ENCOUNTER
Last filled 8 4.   Soonest fill tomorrow September 1
Chronic back pain     Class 3 severe obesity due to excess calories without serious comorbidity with body mass index (BMI) of 40.0 to 44.9 in adult [facility])     Chronic non-seasonal allergic rhinitis     Type 2 diabetes mellitus with complication, without long-term current use of insulin (HCC)     Liver enzyme elevation

## 2020-09-01 RX ORDER — ALPRAZOLAM 1 MG/1
TABLET ORAL
Qty: 30 TABLET | Refills: 0 | Status: SHIPPED | OUTPATIENT
Start: 2020-09-01 | End: 2020-09-29

## 2020-09-04 RX ORDER — METFORMIN HYDROCHLORIDE 500 MG/1
TABLET, EXTENDED RELEASE ORAL
Qty: 180 TABLET | Refills: 0 | Status: SHIPPED | OUTPATIENT
Start: 2020-09-04 | End: 2020-10-12

## 2020-09-04 NOTE — TELEPHONE ENCOUNTER
Last visit: 8/3/2020  Last Med refill: 3/5/2020  Does patient have enough medication for 72 hours: No:     Next Visit Date:  No future appointments.     Health Maintenance   Topic Date Due    Pneumococcal 0-64 years Vaccine (1 of 1 - PPSV23) 02/07/1974    Cervical cancer screen  02/07/1989    Shingles Vaccine (1 of 2) 02/07/2018    Colon cancer screen colonoscopy  02/07/2018    Lipid screen  09/17/2019    Diabetic foot exam  02/26/2020    Potassium monitoring  02/26/2020    Creatinine monitoring  02/26/2020    Flu vaccine (1) 09/01/2020    Hepatitis B vaccine (1 of 3 - Risk 3-dose series) 09/18/2035 (Originally 2/7/1987)    Diabetic retinal exam  11/16/2020    Diabetic microalbuminuria test  02/12/2021    A1C test (Diabetic or Prediabetic)  08/03/2021    Breast cancer screen  11/15/2021    DTaP/Tdap/Td vaccine (2 - Td) 01/05/2028    HIV screen  Completed    Hepatitis A vaccine  Aged Out    Hib vaccine  Aged Out    Meningococcal (ACWY) vaccine  Aged Out       Hemoglobin A1C (%)   Date Value   08/03/2020 8.3   02/12/2020 7.5   11/20/2019 8.6             ( goal A1C is < 7)   No results found for: LABMICR  LDL Calculated (mg/dL)   Date Value   09/17/2018 199 (A)       (goal LDL is <100)   AST (U/L)   Date Value   12/28/2018 52     ALT (U/L)   Date Value   12/28/2018 72     BUN (mg/dL)   Date Value   02/26/2019 12     BP Readings from Last 3 Encounters:   08/03/20 124/80   03/10/20 122/78   02/12/20 122/78          (goal 120/80)    All Future Testing planned in [facility]  Lab Frequency Next Occurrence   XR CERVICAL SPINE (4-5 VIEWS) Once 06/26/2020   EMG Once 08/24/2020               Patient Active Problem List:     Stress reaction     Essential hypertension     Dysthymia     Anxiety     Mild intermittent asthma without complication     Dermatitis     Osteoarthritis of multiple joints     Fibromyalgia     Left leg cellulitis     Primary osteoarthritis of left knee     Chronic back pain     Class 3 severe obesity due to excess calories without serious comorbidity with body mass index (BMI) of 40.0 to 44.9 in adult [facility])     Chronic non-seasonal allergic rhinitis     Type 2 diabetes mellitus with complication, without long-term current use of insulin (HCC)     Liver enzyme elevation

## 2020-09-14 ENCOUNTER — TELEPHONE (OUTPATIENT)
Dept: ORTHOPEDIC SURGERY | Age: 50
End: 2020-09-14

## 2020-09-14 NOTE — TELEPHONE ENCOUNTER
Carol From Dr Rah Duncan 's office desires that the EMG order be sent via fax to this number:  Fax [phone]

## 2020-09-18 NOTE — TELEPHONE ENCOUNTER
Last visit: 8/3/2020  Last Med refill: 4/1/2020  Does patient have enough medication for 72 hours: Yes    Next Visit Date:  No future appointments.     Health Maintenance   Topic Date Due    Pneumococcal 0-64 years Vaccine (1 of 1 - PPSV23) 02/07/1974    Cervical cancer screen  02/07/1989    Shingles Vaccine (1 of 2) 02/07/2018    Colon cancer screen colonoscopy  02/07/2018    Lipid screen  09/17/2019    Diabetic foot exam  02/26/2020    Potassium monitoring  02/26/2020    Creatinine monitoring  02/26/2020    Flu vaccine (1) 09/01/2020    Hepatitis B vaccine (1 of 3 - Risk 3-dose series) 09/18/2035 (Originally 2/7/1987)    Diabetic retinal exam  11/16/2020    Diabetic microalbuminuria test  02/12/2021    A1C test (Diabetic or Prediabetic)  08/03/2021    Breast cancer screen  11/15/2021    DTaP/Tdap/Td vaccine (2 - Td) 01/05/2028    HIV screen  Completed    Hepatitis A vaccine  Aged Out    Hib vaccine  Aged Out    Meningococcal (ACWY) vaccine  Aged Out       Hemoglobin A1C (%)   Date Value   08/03/2020 8.3   02/12/2020 7.5   11/20/2019 8.6             ( goal A1C is < 7)   No results found for: LABMICR  LDL Calculated (mg/dL)   Date Value   09/17/2018 199 (A)       (goal LDL is <100)   AST (U/L)   Date Value   12/28/2018 52     ALT (U/L)   Date Value   12/28/2018 72     BUN (mg/dL)   Date Value   02/26/2019 12     BP Readings from Last 3 Encounters:   08/03/20 124/80   03/10/20 122/78   02/12/20 122/78          (goal 120/80)    All Future Testing planned in [facility]  Lab Frequency Next Occurrence   XR CERVICAL SPINE (4-5 VIEWS) Once 06/26/2020   EMG Once 08/24/2020               Patient Active Problem List:     Stress reaction     Essential hypertension     Dysthymia     Anxiety     Mild intermittent asthma without complication     Dermatitis     Osteoarthritis of multiple joints     Fibromyalgia     Left leg cellulitis     Primary osteoarthritis of left knee     Chronic back pain     Class 3 severe obesity due to excess calories without serious comorbidity with body mass index (BMI) of 40.0 to 44.9 in adult [facility])     Chronic non-seasonal allergic rhinitis     Type 2 diabetes mellitus with complication, without long-term current use of insulin (HCC)     Liver enzyme elevation

## 2020-09-19 RX ORDER — ALBUTEROL SULFATE 90 UG/1
AEROSOL, METERED RESPIRATORY (INHALATION)
Qty: 18 G | Refills: 5 | Status: SHIPPED | OUTPATIENT
Start: 2020-09-19 | End: 2021-04-19 | Stop reason: SDUPTHER

## 2020-09-22 RX ORDER — POLYETHYLENE GLYCOL 400 AND PROPYLENE GLYCOL 4; 3 MG/ML; MG/ML
GEL OPHTHALMIC
Qty: 10 ML | Refills: 0 | Status: SHIPPED | OUTPATIENT
Start: 2020-09-22 | End: 2021-01-06

## 2020-09-22 NOTE — TELEPHONE ENCOUNTER
Pharm requested refill    Health Maintenance   Topic Date Due    Pneumococcal 0-64 years Vaccine (1 of 1 - PPSV23) 02/07/1974    Cervical cancer screen  02/07/1989    Shingles Vaccine (1 of 2) 02/07/2018    Colon cancer screen colonoscopy  02/07/2018    Lipid screen  09/17/2019    Diabetic foot exam  02/26/2020    Potassium monitoring  02/26/2020    Creatinine monitoring  02/26/2020    Flu vaccine (1) 09/01/2020    Hepatitis B vaccine (1 of 3 - Risk 3-dose series) 09/18/2035 (Originally 2/7/1987)    Diabetic retinal exam  11/16/2020    Diabetic microalbuminuria test  02/12/2021    A1C test (Diabetic or Prediabetic)  08/03/2021    Breast cancer screen  11/15/2021    DTaP/Tdap/Td vaccine (2 - Td) 01/05/2028    HIV screen  Completed    Hepatitis A vaccine  Aged Out    Hib vaccine  Aged Out    Meningococcal (ACWY) vaccine  Aged Out             (applicable per patient's age: Cancer Screenings, Depression Screening, Fall Risk Screening, Immunizations)    Hemoglobin A1C (%)   Date Value   08/03/2020 8.3   02/12/2020 7.5   11/20/2019 8.6     LDL Calculated (mg/dL)   Date Value   09/17/2018 199 (A)     AST (U/L)   Date Value   12/28/2018 52     ALT (U/L)   Date Value   12/28/2018 72     BUN (mg/dL)   Date Value   02/26/2019 12      (goal A1C is < 7)   (goal LDL is <100) need 30-50% reduction from baseline     BP Readings from Last 3 Encounters:   08/03/20 124/80   03/10/20 122/78   02/12/20 122/78    (goal /80)      All Future Testing planned in [facility]:  Lab Frequency Next Occurrence   XR CERVICAL SPINE (4-5 VIEWS) Once 06/26/2020   EMG Once 08/24/2020       Next Visit Date:  No future appointments.          Patient Active Problem List:     Stress reaction     Essential hypertension     Dysthymia     Anxiety     Mild intermittent asthma without complication     Dermatitis     Osteoarthritis of multiple joints     Fibromyalgia     Left leg cellulitis     Primary osteoarthritis of left knee Chronic back pain     Class 3 severe obesity due to excess calories without serious comorbidity with body mass index (BMI) of 40.0 to 44.9 in adult [facility])     Chronic non-seasonal allergic rhinitis     Type 2 diabetes mellitus with complication, without long-term current use of insulin (HCC)     Liver enzyme elevation

## 2020-09-23 RX ORDER — IBUPROFEN 800 MG/1
TABLET ORAL
Qty: 90 TABLET | Refills: 3 | Status: SHIPPED | OUTPATIENT
Start: 2020-09-23 | End: 2021-07-27 | Stop reason: SDUPTHER

## 2020-09-23 NOTE — TELEPHONE ENCOUNTER
Chronic back pain     Class 3 severe obesity due to excess calories without serious comorbidity with body mass index (BMI) of 40.0 to 44.9 in adult [facility])     Chronic non-seasonal allergic rhinitis     Type 2 diabetes mellitus with complication, without long-term current use of insulin (HCC)     Liver enzyme elevation

## 2020-09-29 NOTE — TELEPHONE ENCOUNTER
Pharm requested refill    Health Maintenance   Topic Date Due    Statin Therapy  1970    Pneumococcal 0-64 years Vaccine (1 of 1 - PPSV23) 02/07/1974    Cervical cancer screen  02/07/1989    Shingles Vaccine (1 of 2) 02/07/2018    Colon cancer screen colonoscopy  02/07/2018    Lipid screen  09/17/2019    Diabetic foot exam  02/26/2020    Potassium monitoring  02/26/2020    Creatinine monitoring  02/26/2020    Flu vaccine (1) 09/01/2020    Hepatitis B vaccine (1 of 3 - Risk 3-dose series) 09/18/2035 (Originally 2/7/1987)    Diabetic retinal exam  11/16/2020    Diabetic microalbuminuria test  02/12/2021    A1C test (Diabetic or Prediabetic)  08/03/2021    Breast cancer screen  11/15/2021    DTaP/Tdap/Td vaccine (2 - Td) 01/05/2028    HIV screen  Completed    Hepatitis A vaccine  Aged Out    Hib vaccine  Aged Out    Meningococcal (ACWY) vaccine  Aged Out             (applicable per patient's age: Cancer Screenings, Depression Screening, Fall Risk Screening, Immunizations)    Hemoglobin A1C (%)   Date Value   08/03/2020 8.3   02/12/2020 7.5   11/20/2019 8.6     LDL Calculated (mg/dL)   Date Value   09/17/2018 199 (A)     AST (U/L)   Date Value   12/28/2018 52     ALT (U/L)   Date Value   12/28/2018 72     BUN (mg/dL)   Date Value   02/26/2019 12      (goal A1C is < 7)   (goal LDL is <100) need 30-50% reduction from baseline     BP Readings from Last 3 Encounters:   08/03/20 124/80   03/10/20 122/78   02/12/20 122/78    (goal /80)      All Future Testing planned in [facility]:  Lab Frequency Next Occurrence   XR CERVICAL SPINE (4-5 VIEWS) Once 06/26/2020   EMG Once 08/24/2020       Next Visit Date:  No future appointments.          Patient Active Problem List:     Stress reaction     Essential hypertension     Dysthymia     Anxiety     Mild intermittent asthma without complication     Dermatitis     Osteoarthritis of multiple joints     Fibromyalgia     Left leg cellulitis     Primary osteoarthritis of left knee     Chronic back pain     Class 3 severe obesity due to excess calories without serious comorbidity with body mass index (BMI) of 40.0 to 44.9 in adult [facility])     Chronic non-seasonal allergic rhinitis     Type 2 diabetes mellitus with complication, without long-term current use of insulin (HCC)     Liver enzyme elevation

## 2020-09-30 RX ORDER — ALPRAZOLAM 1 MG/1
TABLET ORAL
Qty: 30 TABLET | Refills: 0 | Status: SHIPPED | OUTPATIENT
Start: 2020-09-30 | End: 2020-11-02 | Stop reason: SDUPTHER

## 2020-10-07 ENCOUNTER — NURSE TRIAGE (OUTPATIENT)
Dept: OTHER | Facility: CLINIC | Age: 50
End: 2020-10-07

## 2020-10-07 NOTE — TELEPHONE ENCOUNTER
Call patient and see if she would like to do a video visit tomorrow or if she would like to be seen at the flu clinic.

## 2020-10-07 NOTE — TELEPHONE ENCOUNTER
Received call from Lili in [facility]. She has fever, headache fatigue. She denies known covid 19 exposure. She is able to touch chin to chest and actually headache gone at this point after taking Acetaminophen. Call soft transferred to [facility] in [address] to schedule appointment. Attention Provider: Thank you for allowing me to participate in the care of your patient. The  patient was connected to triage in response to information provided to the [facility]. Please do not respond through this encounter as the response is not directed to a shared pool. Reason for Disposition   HIGH RISK patient (e.g., age > 59 years, diabetes, heart or lung disease, weak immune system)    Answer Assessment - Initial Assessment Questions  1. COVID-19 DIAGNOSIS: \"Who made your Coronavirus (COVID-19) diagnosis? \" \"Was it confirmed by a positive lab test?\" If not diagnosed by a HCP, ask \"Are there lots of cases (community spread) where you live? \" (See public health department website, if unsure)      Not diagnosed    2. ONSET: \"When did the COVID-19 symptoms start? \"       10/5/20    3. WORST SYMPTOM: \"What is your worst symptom? \" (e.g., cough, fever, shortness of breath, muscle aches)      Fever, headache, sweats, weakness, fatigue    4. COUGH: \"Do you have a cough? \" If so, ask: \"How bad is the cough? \"        Denies    5. FEVER: \"Do you have a fever? \" If so, ask: \"What is your temperature, how was it measured, and when did it start? \"      It was 102, Acetaminophen brought it down, currently it is 98. This .8    6. RESPIRATORY STATUS: \"Describe your breathing? \" (e.g., shortness of breath, wheezing, unable to speak)       Denies    7. BETTER-SAME-WORSE: Bob Gut you getting better, staying the same or getting worse compared to yesterday? \"  If getting worse, ask, \"In what way? \"      Getting a better    8. HIGH RISK DISEASE: \"Do you have any chronic medical problems? \" (e.g., asthma, heart or lung disease, weak immune system, etc.)    Diabetes, asthma    9. PREGNANCY: \"Is there any chance you are pregnant? \" \"When was your last menstrual period? \"      Denies    10. OTHER SYMPTOMS: \"Do you have any other symptoms? \"  (e.g., chills, fatigue, headache, loss of smell or taste, muscle pain, sore throat)        Poor appetite    Protocols used: CORONAVIRUS (COVID-19) DIAGNOSED OR SUSPECTED-ADULT-

## 2020-10-16 ENCOUNTER — OFFICE VISIT (OUTPATIENT)
Dept: ORTHOPEDIC SURGERY | Age: 50
End: 2020-10-16
Payer: MEDICARE

## 2020-10-16 ENCOUNTER — TELEPHONE (OUTPATIENT)
Dept: ORTHOPEDIC SURGERY | Age: 50
End: 2020-10-16

## 2020-10-16 VITALS — TEMPERATURE: 97.2 F

## 2020-10-16 PROCEDURE — G8484 FLU IMMUNIZE NO ADMIN: HCPCS | Performed by: PHYSICIAN ASSISTANT

## 2020-10-16 PROCEDURE — 99213 OFFICE O/P EST LOW 20 MIN: CPT | Performed by: PHYSICIAN ASSISTANT

## 2020-10-16 PROCEDURE — 3017F COLORECTAL CA SCREEN DOC REV: CPT | Performed by: PHYSICIAN ASSISTANT

## 2020-10-16 PROCEDURE — 1036F TOBACCO NON-USER: CPT | Performed by: PHYSICIAN ASSISTANT

## 2020-10-16 PROCEDURE — G8428 CUR MEDS NOT DOCUMENT: HCPCS | Performed by: PHYSICIAN ASSISTANT

## 2020-10-16 PROCEDURE — G8417 CALC BMI ABV UP PARAM F/U: HCPCS | Performed by: PHYSICIAN ASSISTANT

## 2020-10-16 RX ORDER — MELOXICAM 15 MG/1
15 TABLET ORAL DAILY
Qty: 30 TABLET | Refills: 3 | Status: SHIPPED | OUTPATIENT
Start: 2020-10-16 | End: 2021-04-19

## 2020-10-16 NOTE — TELEPHONE ENCOUNTER
Called patient and let her know the PT order went over electronically to [facility] and I printed the order and put into the mail for her records.

## 2020-10-16 NOTE — TELEPHONE ENCOUNTER
Patient was in office today and accidentally left the physical therapy orders on the counter at check out.       [phone]    She would also like a paper copy mailed to her home address to keep for her records, if possible    Thank you

## 2020-10-16 NOTE — PROGRESS NOTES
[facility], [facility], [address] [facility], [address] [facility]           Dept Phone: [phone]           Dept Fax:  [address] [address] [facility]           Nikki Ortiz          Dept Phone: [phone]           Dept Fax:  [phone]      Chief Compliant:  No chief complaint on file. History of Present Illness: This is a 44 y.o. female who presents to the clinic today for follow up of left knee pain. Patient does have a known Baker's cyst as well as some mild osteoarthritis of this left knee. Patient underwent an aspiration and Celestone injection and started on meloxicam at last visit on 8/24/2020. Patient states overall she does feel like her pain has improved mildly however she does continue to have issues with certain movements. Pain is most severe however to the anterior medial aspect of this left knee. She states the size of the Baker's cyst does appear to be getting larger however she has no pain of the posterior knee today. Patient denies any calf pain, knee joint warmth, redness, fever, chills, numbness or tingling. Of note patient did have an EMG which did show cervical radiculopathy as well as carpal tunnel syndrome and was instructed to follow-up with Dr. Kandy Barclay however this appointment had to be rescheduled due to family member health condition she has not rescheduled this at this point. Patient states since starting nocturnal splinting she has feels quite a bit improved with this issue.     Past History:    Current Outpatient Medications:     meloxicam (MOBIC) 15 MG tablet, Take 1 tablet by mouth daily, Disp: 30 tablet, Rfl: 3    diclofenac sodium (VOLTAREN) 1 % GEL, Apply 4 g topically 4 times daily, Disp: 5 Tube, Rfl: 0    metFORMIN (GLUCOPHAGE-XR) 500 MG extended release tablet, take 2 tablets by mouth twice a day before 2    amLODIPine (NORVASC) 10 MG tablet, take 1 tablet by mouth once daily, Disp: 30 tablet, Rfl: 5    Elastic Bandages & Supports (WRIST SPLINT) MISC, 1 Device by Does not apply route daily, Disp: 1 each, Rfl: 0    Elastic Bandages & Supports (WRIST SPLINT/COCK-UP/RIGHT M) MISC, 1 Device by Does not apply route nightly, Disp: 1 each, Rfl: 0    Caltrate 600+D Plus Minerals (CALTRATE) 600-800 MG-UNIT TABS tablet, Take 1 tablet by mouth daily, Disp: 30 tablet, Rfl: 5    SYMBICORT 160-4.5 MCG/ACT AERO, inhale 2 puffs by mouth twice a day, Disp: 10.2 g, Rfl: 5    Propylene Glycol 0.6 % SOLN, 1 drop 4-5 times per day, Disp: 15 mL, Rfl: 5    Blood Pressure Monitor [place], 1 Device by Does not apply route 2 times daily, Disp: 1 Device, Rfl: 0    Brimonidine Tartrate 0.025 % SOLN, 1 drop in each eye every 8 hours as needed for redness. , Disp: 7.5 mL, Rfl: 5    oxyCODONE-acetaminophen (PERCOCET) 7.5-325 MG per tablet, , Disp: , Rfl:     gabapentin (NEURONTIN) 600 MG tablet, Take 1,200 mg by mouth 2 times daily. , Disp: , Rfl:     Artificial Saliva (MOUTH KOTE) SOLN solution, Swish and spit with 15 ml up to 5 times per day, Disp: 240 mL, Rfl: 11    Handicap Placard MISC, by Does not apply route  5 years from origninal written date, Disp: 1 each, Rfl: 0    glucose monitoring kit (FREESTYLE) monitoring kit, 1 kit by Does not apply route daily, Disp: 1 kit, Rfl: 0    Handicap Placard MISC, by Does not apply route Expires 6 months after issue, Disp: 1 each, Rfl: 0  Allergies   Allergen Reactions    Lisinopril Anaphylaxis     Difficulty breathing     Zoloft [Sertraline Hcl] Anaphylaxis     Difficulty breathing     Seasonal     Amoxicillin Rash    Augmentin [Amoxicillin-Pot Clavulanate] Rash    Buspirone Rash    Paxil [Paroxetine Hcl] Other (See Comments)     Weight gain      Social History     Socioeconomic History    Marital status:      Spouse name: Not on file    Number of children: Not on file  Years of education: Not on file    Highest education level: Not on file   Occupational History    Not on file   Social Needs    Financial resource strain: Not hard at all    Food insecurity     Worry: Never true     Inability: Never true   [employer] needs     Medical: No     Non-medical: No   Tobacco Use    Smoking status: Never Smoker    Smokeless tobacco: Never Used   Substance and Sexual Activity    Alcohol use: No     Frequency: Never    Drug use: No    Sexual activity: Yes     Partners: Male   Lifestyle    Physical activity     Days per week: 4 days     Minutes per session: 40 min    Stress: Not at all   Relationships    Social connections     Talks on phone: More than three times a week     Gets together: More than three times a week     Attends [employer] service: 1 to 4 times per year     Active member of club or organization: No     Attends meetings of clubs or organizations: Never     Relationship status:     Intimate partner violence     Fear of current or ex partner: Not on file     Emotionally abused: Not on file     Physically abused: Not on file     Forced sexual activity: Not on file   Other Topics Concern    Not on file   Social History Narrative    Not on file     Past Medical History:   Diagnosis Date    Anxiety     Arthritis     Asthma     Cataplexy and narcolepsy     Chronic back pain     Chronic pain     Depression     Fibromyalgia     Hypertension     Liver enzyme elevation 8/19/2019    Obesity     Osteoarthritis      Past Surgical History:   Procedure Laterality Date    CHOLECYSTECTOMY       Family History   Problem Relation Age of Onset    Heart Disease Mother     Diabetes Mother     Stroke Mother     Dementia Father     Alzheimer's Disease Father         Review of Systems   Constitutional: Negative for fever, chills, sweats. Eyes: Negative for changes in vision, or pain.    HENT: Negative for ear ache, epistaxis, or sore

## 2020-10-26 ENCOUNTER — HOSPITAL ENCOUNTER (OUTPATIENT)
Dept: PHYSICAL THERAPY | Facility: CLINIC | Age: 50
Setting detail: THERAPIES SERIES
Discharge: HOME | End: 2020-10-26
Payer: MEDICARE

## 2020-10-26 NOTE — FLOWSHEET NOTE
[] [facility]) - [facility]  [address]    [phone]  F: [phone]   [] [address]  [facility]   [address]  P: [phone]  F: [phone]  [] [place]  [address] [facility]  P: [phone]  F: [phone] [] [address]  P: [phone]  F: [phone]  [] [address]  [facility]   [address]   [place]: [phone]  F: [phone]   [] [facility]  [address] [facility] [address]  [place]: [phone]   F: [phone]     Physical Therapy Cancel/No Show note    Date: 10/26/2020  Patient: Dalton Murguia  : 1970  MRN: [id number]    Cancels/No Shows to date:     For today's appointment patient:    [x]  Cancelled    [] Rescheduled appointment    [] No-show     Reason given by patient:    []  Patient ill    []  Conflicting appointment    [] No transportation      [] Conflict with work    [] No reason given    [] Weather related    [] COVID-19    [x] Other:      Comments:  Pt opted not to participate in PT at this time due to a lack of pain and functional limitations. Advised her that I will keep her chart available until she sees [facility] on  if she reconsiders.       [] Next appointment was confirmed    Electronically signed by: Malissa Holguin, PT

## 2020-11-09 RX ORDER — TIZANIDINE 2 MG/1
TABLET ORAL
Qty: 60 TABLET | Refills: 0 | Status: SHIPPED | OUTPATIENT
Start: 2020-11-09 | End: 2021-04-19

## 2020-11-09 NOTE — TELEPHONE ENCOUNTER
Last visit: 8/3/2020  Last Med refill: 7/29/2020  Does patient have enough medication for 72 hours: No:     Next Visit Date:  Future Appointments   Date Time Provider Phoebe Boogie   11/10/2020  1:30 PM RUBEN Reagan CNP fp [facility]   11/30/2020  9:30 AM ROCIO Laird [facility] [address] Maintenance   Topic Date Due    Pneumococcal 0-64 years Vaccine (1 of 1 - PPSV23) 02/07/1974    Cervical cancer screen  02/07/1989    Shingles Vaccine (1 of 2) 02/07/2018    Colon cancer screen colonoscopy  02/07/2018    Lipid screen  09/17/2019    Diabetic foot exam  02/26/2020    Potassium monitoring  02/26/2020    Creatinine monitoring  02/26/2020    Flu vaccine (1) 09/01/2020    Diabetic retinal exam  11/16/2020    Hepatitis B vaccine (1 of 3 - Risk 3-dose series) 09/18/2035 (Originally 2/7/1987)    Diabetic microalbuminuria test  02/12/2021    A1C test (Diabetic or Prediabetic)  08/03/2021    Breast cancer screen  11/15/2021    DTaP/Tdap/Td vaccine (2 - Td) 01/05/2028    HIV screen  Completed    Hepatitis A vaccine  Aged Out    Hib vaccine  Aged Out    Meningococcal (ACWY) vaccine  Aged Out       Hemoglobin A1C (%)   Date Value   08/03/2020 8.3   02/12/2020 7.5   11/20/2019 8.6             ( goal A1C is < 7)   No results found for: LABMICR  LDL Calculated (mg/dL)   Date Value   09/17/2018 199 (A)       (goal LDL is <100)   AST (U/L)   Date Value   12/28/2018 52     ALT (U/L)   Date Value   12/28/2018 72     BUN (mg/dL)   Date Value   02/26/2019 12     BP Readings from Last 3 Encounters:   08/03/20 124/80   03/10/20 122/78   02/12/20 122/78          (goal 120/80)    All Future Testing planned in [facility]  Lab Frequency Next Occurrence   XR CERVICAL SPINE (4-5 VIEWS) Once 06/26/2020   EMG Once 08/24/2020               Patient Active Problem List:     Stress reaction     Essential hypertension     Dysthymia     Anxiety     Mild intermittent asthma without complication

## 2020-11-25 RX ORDER — AMLODIPINE BESYLATE 10 MG/1
TABLET ORAL
Qty: 30 TABLET | Refills: 5 | Status: SHIPPED | OUTPATIENT
Start: 2020-11-25 | End: 2021-05-10 | Stop reason: SDUPTHER

## 2020-11-30 ENCOUNTER — PATIENT MESSAGE (OUTPATIENT)
Dept: FAMILY MEDICINE CLINIC | Age: 50
End: 2020-11-30

## 2020-11-30 RX ORDER — TRAZODONE HYDROCHLORIDE 100 MG/1
100 TABLET ORAL NIGHTLY
Qty: 30 TABLET | Refills: 2 | OUTPATIENT
Start: 2020-11-30

## 2020-11-30 NOTE — TELEPHONE ENCOUNTER
Pharm requested refill    Health Maintenance   Topic Date Due    Cervical cancer screen  02/07/1989    Shingles Vaccine (1 of 2) 02/07/2018    Colon cancer screen colonoscopy  02/07/2018    Lipid screen  09/17/2019    Diabetic foot exam  02/26/2020    Potassium monitoring  02/26/2020    Creatinine monitoring  02/26/2020    Flu vaccine (1) 09/01/2020    Diabetic retinal exam  11/16/2020    Hepatitis B vaccine (1 of 3 - Risk 3-dose series) 09/18/2035 (Originally 2/7/1987)    Diabetic microalbuminuria test  02/12/2021    A1C test (Diabetic or Prediabetic)  08/03/2021    Breast cancer screen  11/15/2021    DTaP/Tdap/Td vaccine (2 - Td) 01/05/2028    HIV screen  Completed    Hepatitis A vaccine  Aged Out    Hib vaccine  Aged Out    Meningococcal (ACWY) vaccine  Aged Out    Pneumococcal 0-64 years Vaccine  Aged Out             (applicable per patient's age: Cancer Screenings, Depression Screening, Fall Risk Screening, Immunizations)    Hemoglobin A1C (%)   Date Value   08/03/2020 8.3   02/12/2020 7.5   11/20/2019 8.6     LDL Calculated (mg/dL)   Date Value   09/17/2018 199 (A)     AST (U/L)   Date Value   12/28/2018 52     ALT (U/L)   Date Value   12/28/2018 72     BUN (mg/dL)   Date Value   02/26/2019 12      (goal A1C is < 7)   (goal LDL is <100) need 30-50% reduction from baseline     BP Readings from Last 3 Encounters:   08/03/20 124/80   03/10/20 122/78   02/12/20 122/78    (goal /80)      All Future Testing planned in [facility]:  Lab Frequency Next Occurrence   XR CERVICAL SPINE (4-5 VIEWS) Once 06/26/2020   EMG Once 08/24/2020       Next Visit Date:  No future appointments.          Patient Active Problem List:     Stress reaction     Essential hypertension     Dysthymia     Anxiety     Mild intermittent asthma without complication     Dermatitis     Osteoarthritis of multiple joints     Fibromyalgia     Left leg cellulitis     Primary osteoarthritis of left knee     Chronic back pain Class 3 severe obesity due to excess calories without serious comorbidity with body mass index (BMI) of 40.0 to 44.9 in adult [facility])     Chronic non-seasonal allergic rhinitis     Type 2 diabetes mellitus with complication, without long-term current use of insulin (HCC)     Liver enzyme elevation

## 2020-12-01 NOTE — TELEPHONE ENCOUNTER
Her Flukle message to me last night made it sound like she was continuing to take the Xyrem. She cannot take that with the trazodone and the Effexor. Is she taking the xyrem? She has filled it in the last month. If she is I cannot fill the Trazodone    Please make the patient a f/u appointment.   She is overdue

## 2020-12-01 NOTE — TELEPHONE ENCOUNTER
Patient only takes the Trazodone when she stays at her dad's. She doesn't take the Xyrem when stays at his house. She will check her schedule and call to make an appointment with Shanda Gallegos. She said if you don't feel comfortable she'll understand.

## 2020-12-02 RX ORDER — TRAZODONE HYDROCHLORIDE 100 MG/1
50-100 TABLET ORAL NIGHTLY PRN
Qty: 30 TABLET | Refills: 0 | Status: SHIPPED | OUTPATIENT
Start: 2020-12-02 | End: 2020-12-28

## 2020-12-02 RX ORDER — ROPINIROLE 1 MG/1
TABLET, FILM COATED ORAL
COMMUNITY
Start: 2020-11-08

## 2020-12-02 RX ORDER — TRAZODONE HYDROCHLORIDE 100 MG/1
100 TABLET ORAL NIGHTLY
Qty: 30 TABLET | Refills: 0 | OUTPATIENT
Start: 2020-12-02

## 2020-12-02 RX ORDER — SODIUM OXYBATE 0.5 G/ML
SOLUTION ORAL
COMMUNITY
Start: 2020-11-13

## 2020-12-16 ENCOUNTER — TELEMEDICINE (OUTPATIENT)
Dept: FAMILY MEDICINE CLINIC | Age: 50
End: 2020-12-16
Payer: MEDICARE

## 2020-12-16 PROCEDURE — 1036F TOBACCO NON-USER: CPT | Performed by: NURSE PRACTITIONER

## 2020-12-16 PROCEDURE — G8427 DOCREV CUR MEDS BY ELIG CLIN: HCPCS | Performed by: NURSE PRACTITIONER

## 2020-12-16 PROCEDURE — G8417 CALC BMI ABV UP PARAM F/U: HCPCS | Performed by: NURSE PRACTITIONER

## 2020-12-16 PROCEDURE — 99213 OFFICE O/P EST LOW 20 MIN: CPT | Performed by: NURSE PRACTITIONER

## 2020-12-16 PROCEDURE — G8484 FLU IMMUNIZE NO ADMIN: HCPCS | Performed by: NURSE PRACTITIONER

## 2020-12-16 PROCEDURE — 3017F COLORECTAL CA SCREEN DOC REV: CPT | Performed by: NURSE PRACTITIONER

## 2020-12-16 RX ORDER — FLUTICASONE PROPIONATE 50 MCG
1 SPRAY, SUSPENSION (ML) NASAL DAILY
Qty: 2 BOTTLE | Refills: 1 | Status: SHIPPED | OUTPATIENT
Start: 2020-12-16 | End: 2021-10-18

## 2020-12-16 RX ORDER — GUAIFENESIN DEXTROMETHORPHAN HYDROBROMIDE ORAL SOLUTION 10; 100 MG/5ML; MG/5ML
10 SOLUTION ORAL EVERY 4 HOURS PRN
Status: CANCELLED | OUTPATIENT
Start: 2020-12-16

## 2020-12-16 RX ORDER — GUAIFENESIN AND DEXTROMETHORPHAN HYDROBROMIDE 600; 30 MG/1; MG/1
1 TABLET, EXTENDED RELEASE ORAL 2 TIMES DAILY
Qty: 28 TABLET | Refills: 1 | Status: SHIPPED | OUTPATIENT
Start: 2020-12-16 | End: 2021-10-08

## 2020-12-16 RX ORDER — DIAPER,BRIEF,INFANT-TODD,DISP
1 EACH MISCELLANEOUS
Qty: 75 LOZENGE | Refills: 5 | Status: SHIPPED | OUTPATIENT
Start: 2020-12-16 | End: 2021-10-27

## 2020-12-16 RX ORDER — GUAIFENESIN AND DEXTROMETHORPHAN HYDROBROMIDE 100; 10 MG/5ML; MG/5ML
5 SOLUTION ORAL EVERY 4 HOURS PRN
Qty: 120 ML | Refills: 0 | Status: SHIPPED | OUTPATIENT
Start: 2020-12-16 | End: 2021-04-19 | Stop reason: ALTCHOICE

## 2020-12-16 ASSESSMENT — ENCOUNTER SYMPTOMS
SINUS PRESSURE: 1
HOARSE VOICE: 1
NAUSEA: 0
COUGH: 1
CONSTIPATION: 0
DIARRHEA: 0
ABDOMINAL PAIN: 0
SORE THROAT: 1
SINUS COMPLAINT: 1
SHORTNESS OF BREATH: 0

## 2020-12-16 NOTE — PROGRESS NOTES
[facility]  [facility]  [address]  [facility] [address]  Dept: [phone]    2020    TELEHEALTH EVALUATION -- Audio/Visual (During GSXVY-26 public health emergency)  Kristina Tinsley (:  1970) has requested an audio/video evaluation for the following concern(s):    HPI:  Appointment to discuss swelling on the left side of her face by jaw and into the ear. Left sided facial swelling and ear pressure started yesterday. She woke up today and notes that she's been having some sinus pressure bilaterally and scratchy, sore throat  Scratchy and achy throat began this AM upon waking. Cough started last night. She had a COVID test on Monday due to a pain management epidural injection. The injection went well. Testing was negative    Diabetes-noting higher blood sugars since her pain management injection. Monday night BG was 500. She notes that her BG has gone back down to 249. Prior to the injection she was running in the 140s. Sinus Problem  This is a new problem. The current episode started yesterday. The problem has been rapidly worsening since onset. There has been no fever. Associated symptoms include congestion, coughing, a hoarse voice, sinus pressure and a sore throat. Pertinent negatives include no chills, ear pain, headaches or shortness of breath. Patient-Reported Vitals 2020   Patient-Reported Weight 200lb   Patient-Reported Height 5' 5\"   Patient-Reported Systolic 945   Patient-Reported Diastolic 74   Patient-Reported Temperature 98.6     There were no vitals filed for this visit. Wt Readings from Last 3 Encounters:   20 230 lb (104.3 kg)   20 220 lb (99.8 kg)   03/10/20 234 lb (106.1 kg)     BP Readings from Last 3 Encounters:   20 124/80   03/10/20 122/78   20 122/78       REVIEW OF SYSTEMS:   Review of Systems   Constitutional: Negative for chills.    HENT: Positive for congestion, hoarse voice, sinus pressure and sore throat. Negative for ear pain and postnasal drip. Respiratory: Positive for cough. Negative for shortness of breath. Gastrointestinal: Negative for abdominal pain, constipation, diarrhea and nausea. Neurological: Negative for headaches.        PAST MEDICAL HISTORY:    Past Medical History:   Diagnosis Date    Anxiety     Arthritis     Asthma     Cataplexy and narcolepsy     Chronic back pain     Chronic pain     Depression     Fibromyalgia     Hypertension     Liver enzyme elevation 8/19/2019    Obesity     Osteoarthritis        FAMILY HISTORY:    Family History   Problem Relation Age of Onset    Heart Disease Mother     Diabetes Mother     Stroke Mother     Dementia Father     Alzheimer's Disease Father        SOCIAL HISTORY:    Social History     Socioeconomic History    Marital status:      Spouse name: None    Number of children: None    Years of education: None    Highest education level: None   Occupational History    None   Social Needs    Financial resource strain: Not hard at all   [place] insecurity     Worry: Never true     Inability: Never true    Transportation needs     Medical: No     Non-medical: No   Tobacco Use    Smoking status: Never Smoker    Smokeless tobacco: Never Used   Substance and Sexual Activity    Alcohol use: No     Frequency: Never    Drug use: No    Sexual activity: Yes     Partners: Male   Lifestyle    Physical activity     Days per week: 4 days     Minutes per session: 40 min    Stress: Not at all   Relationships    Social connections     Talks on phone: More than three times a week     Gets together: More than three times a week     Attends Hindu service: 1 to 4 times per year     Active member of club or organization: No     Attends meetings of clubs or organizations: Never     Relationship status:     Intimate partner violence     Fear of current or ex partner: None Emotionally abused: None     Physically abused: None     Forced sexual activity: None   Other Topics Concern    None   Social History Narrative    None       SURGICAL HISTORY:    Past Surgical History:   Procedure Laterality Date    CHOLECYSTECTOMY         CURRENT MEDICATIONS:    Current Outpatient Medications   Medication Sig Dispense Refill    fluticasone (FLONASE) 50 MCG/ACT nasal spray 1 spray by Each Nostril route daily 2 Bottle 1    Dextromethorphan-guaiFENesin (MUCINEX DM)  MG TB12 Take 1 tablet by mouth 2 times daily 28 tablet 1    Menthol ([employer] COUGH DROPS SUGAR FREE) 7.6 MG LOZG Take 1 lozenge by mouth every 2 hours as needed (cough) 75 lozenge 5    Dextromethorphan-guaiFENesin (RA TUSSIN COUGH DM SUGAR FREE)  MG/5ML SYRP Take 5 mLs by mouth every 4 hours as needed for Cough Take cough syrup OR  Muccinex  mL 0    rOPINIRole (REQUIP) 1 MG tablet take 1 tablet by mouth 1-3 HOURS BEFORE BEDTIME DAILY      XYREM 500 MG/ML SOLN solution       amLODIPine (NORVASC) 10 MG tablet take 1 tablet by mouth once daily 30 tablet 5    tiZANidine (ZANAFLEX) 2 MG tablet take 1-2 tablets by mouth three times a day if needed for muscle spasm 60 tablet 0    meloxicam (MOBIC) 15 MG tablet Take 1 tablet by mouth daily 30 tablet 3    diclofenac sodium (VOLTAREN) 1 % GEL Apply 4 g topically 4 times daily 5 Tube 0    metFORMIN (GLUCOPHAGE-XR) 500 MG extended release tablet take 2 tablets by mouth twice a day before meals 180 tablet 1    ibuprofen (ADVIL;MOTRIN) 800 MG tablet take 1 tablet by mouth every 8 hours with food if needed for pain 90 tablet 3    polyethylene glycol-propylene glycol (SYSTANE) 0.4-0.3 % GEL ophthalmic gel place 1 drop into both eyes nightly 10 mL 0    albuterol sulfate  (90 Base) MCG/ACT inhaler inhale 2 puffs every 6 hours if needed for wheezing 18 g 5    blood glucose test strips (ONE TOUCH ULTRA TEST) strip TEST once daily 100 strip 3    OneTouch Delica Lancets 33G MISC CHECK BLOOD SUGARS ONCE A  each 3    venlafaxine (EFFEXOR XR) 150 MG extended release capsule Take 1 capsule every morning 30 capsule 5    loratadine (CLARITIN) 10 MG tablet Take 1 tablet by mouth daily 30 tablet 3    mupirocin (BACTROBAN) 2 % ointment apply topically to affected area three times a day 22 g 1    triamcinolone (ARISTOCORT) 0.5 % ointment apply to affected area twice a day 30 g 3    Insulin Pen Needle 32G X 4 MM MISC 1 each by Does not apply route daily 100 each 3    Liraglutide (VICTOZA) 18 MG/3ML SOPN SC injection Inject 1.8 mg into the skin daily 3 pen 3    metoprolol tartrate (LOPRESSOR) 50 MG tablet take 1 tablet by mouth twice a day 60 tablet 2    Elastic Bandages & Supports (WRIST SPLINT) MIS 1 Device by Does not apply route daily 1 each 0    Elastic Bandages & Supports (WRIST SPLINT/COCK-UP/RIGHT M) MISC 1 Device by Does not apply route nightly 1 each 0    Caltrate 600+D Plus Minerals (CALTRATE) 600-800 MG-UNIT TABS tablet Take 1 tablet by mouth daily 30 tablet 5    SYMBICORT 160-4.5 MCG/ACT AERO inhale 2 puffs by mouth twice a day 10.2 g 5    Blood Pressure Monitor [place] 1 Device by Does not apply route 2 times daily 1 Device 0    Brimonidine Tartrate 0.025 % SOLN 1 drop in each eye every 8 hours as needed for redness. 7.5 mL 5    oxyCODONE-acetaminophen (PERCOCET) 7.5-325 MG per tablet       gabapentin (NEURONTIN) 600 MG tablet Take 1,200 mg by mouth 2 times daily.       Artificial Saliva (MOUTH KOTE) SOLN solution Swish and spit with 15 ml up to 5 times per day 240 mL 11    glucose monitoring kit (FREESTYLE) monitoring kit 1 kit by Does not apply route daily 1 kit 0    Handicap Placard MISC by Does not apply route Expires 6 months after issue 1 each 0    hydroCHLOROthiazide (HYDRODIURIL) 12.5 MG tablet 1 daily 30 tablet 2    traZODone (DESYREL) 100 MG tablet take 1/2-1 tablet by mouth nightly if needed for sleep DO NOT TAKE WITH IN 24 HRS OF XYREM 30 tablet 2    azithromycin (ZITHROMAX) 250 MG tablet Take 2 tabs (500 mg) on Day 1, and take 1 tab (250 mg) on days 2 through 5. 1 packet 0    Propylene Glycol 0.6 % SOLN 1 drop 4-5 times per day 15 mL 5     No current facility-administered medications for this visit. ALLERGIES:   Allergies   Allergen Reactions    Lisinopril Anaphylaxis     Difficulty breathing     Zoloft [Sertraline Hcl] Anaphylaxis     Difficulty breathing     Seasonal     Amoxicillin Rash    Augmentin [Amoxicillin-Pot Clavulanate] Rash    Buspirone Rash    Paxil [Paroxetine Hcl] Other (See Comments)     Weight gain        PHYSICAL EXAM:   Physical Exam  Vitals signs reviewed. Constitutional:       General: She is not in acute distress. Appearance: Normal appearance. She is well-developed. She is not ill-appearing or toxic-appearing. HENT:      Head: Normocephalic. Right Ear: Hearing normal.      Left Ear: Hearing normal.      Mouth/Throat:      Lips: Pink. Eyes:      General: Lids are normal.      Conjunctiva/sclera: Conjunctivae normal.   Neck:      Musculoskeletal: Normal range of motion. Pulmonary:      Effort: Pulmonary effort is normal. No respiratory distress. Musculoskeletal: Normal range of motion. Skin:     Findings: No rash. Neurological:      Mental Status: She is alert and oriented to person, place, and time. Mental status is at baseline. Coordination: Coordination is intact. Psychiatric:         Mood and Affect: Mood normal.         Behavior: Behavior normal. Behavior is cooperative. Thought Content: Thought content normal.         Judgment: Judgment normal.          ASSESSMENT/PLAN:  1. Acute non-recurrent sinusitis, unspecified location  2. Cough    - fluticasone (FLONASE) 50 MCG/ACT nasal spray; 1 spray by Each Nostril route daily  Dispense: 2 Bottle;  Refill: 1  - Dextromethorphan-guaiFENesin (MUCINEX DM)  MG TB12; Take 1 tablet by mouth 2 times daily  Dispense: 28 tablet; Refill: 1  - Menthol ([employer]S COUGH DROPS SUGAR FREE) 7.6 MG LOZG; Take 1 lozenge by mouth every 2 hours as needed (cough)  Dispense: 75 lozenge; Refill: 5  - Dextromethorphan-guaiFENesin (RA TUSSIN COUGH DM SUGAR FREE)  MG/5ML SYRP; Take 5 mLs by mouth every 4 hours as needed for Cough Take cough syrup OR  Muccinex DM  Dispense: 120 mL; Refill: 0  -Advised patient to take cough med OR Mucinex as able to have guaifenesin and dextromethorphan.   -Advised patient given relatively short course of symptoms that she should not need an antibiotic at this point and we need to treat her symptoms as she could be dealing with a common cold/virus. Advised patient to take above medications, increase fluids, rest and notify office if symptoms fail to improve after the above treatments and an antibiotic will be called in. Patient advised to make an appointment for diabetes recheck in person as she has not been seen    Return in about 1 month (around 1/16/2021) for HgbA1C, diabetes. Sera Mehta is a 44 y.o. female being evaluated by a Virtual Visit (video visit) encounter to address concerns as mentioned above. A caregiver was present when appropriate. Due to this being a TeleHealth encounter (During [facility]- public health emergency), evaluation of the following organ systems was limited: Vitals/Constitutional/EENT/Resp/CV/GI//MS/Neuro/Skin/Heme-Lymph-Imm. Pursuant to the emergency declaration under the [address] [place] [address], [address] [place] [address] and the [email or web address] and [employer] Act, this Virtual Visit was conducted with patient's (and/or legal guardian's) consent, to reduce the patient's risk of exposure to COVID-19 and provide necessary medical care. The patient (and/or legal guardian) has also been advised to contact this office for worsening conditions or problems, and seek emergency medical treatment and/or call 911 if deemed necessary. Services were provided through a video synchronous discussion virtually to substitute for in-person clinic visit. Patient and provider were located at their individual homes. --Yasmin Liz, APRN - CNP on 12/16/2020 at 11:52 PM    (Please note that portions of this note were completed with a voice recognition program. Efforts were made to edit the dictations but occasionally words are mis-transcribed. )      An electronic signature was used to authenticate this note.

## 2020-12-21 ENCOUNTER — TELEPHONE (OUTPATIENT)
Dept: FAMILY MEDICINE CLINIC | Age: 50
End: 2020-12-21

## 2020-12-21 RX ORDER — AZITHROMYCIN 250 MG/1
TABLET, FILM COATED ORAL
Qty: 1 PACKET | Refills: 0 | Status: SHIPPED | OUTPATIENT
Start: 2020-12-21 | End: 2021-04-19 | Stop reason: ALTCHOICE

## 2020-12-21 NOTE — TELEPHONE ENCOUNTER
Pt calling for f/u (Had appt on 12.16.20) regarding the following not getting better:    Pressure in both ears with pain  Sore throat  No Fever     Asking for medications:    BRANDON Goodson November RD - [address], [place] - [facility] [phone] - F [phone]

## 2020-12-28 RX ORDER — TRAZODONE HYDROCHLORIDE 100 MG/1
TABLET ORAL
Qty: 30 TABLET | Refills: 2 | Status: SHIPPED | OUTPATIENT
Start: 2020-12-28 | End: 2021-03-12 | Stop reason: SDUPTHER

## 2020-12-28 NOTE — TELEPHONE ENCOUNTER
Pharm requested refill    Health Maintenance   Topic Date Due    Hepatitis C screen  1970    Pneumococcal 0-64 years Vaccine (1 of 1 - PPSV23) 02/07/1974    Cervical cancer screen  02/07/1989    Shingles Vaccine (1 of 2) 02/07/2018    Colon cancer screen colonoscopy  02/07/2018    Lipid screen  09/17/2019    Diabetic foot exam  02/26/2020    Potassium monitoring  02/26/2020    Creatinine monitoring  02/26/2020    Flu vaccine (1) 09/01/2020    Hepatitis B vaccine (1 of 3 - Risk 3-dose series) 09/18/2035 (Originally 2/7/1987)    Diabetic microalbuminuria test  02/12/2021    A1C test (Diabetic or Prediabetic)  08/03/2021    Breast cancer screen  11/15/2021    Diabetic retinal exam  12/03/2021    DTaP/Tdap/Td vaccine (2 - Td) 01/05/2028    HIV screen  Completed    Hepatitis A vaccine  Aged Out    Hib vaccine  Aged Out    Meningococcal (ACWY) vaccine  Aged Out             (applicable per patient's age: Cancer Screenings, Depression Screening, Fall Risk Screening, Immunizations)    Hemoglobin A1C (%)   Date Value   08/03/2020 8.3   02/12/2020 7.5   11/20/2019 8.6     LDL Calculated (mg/dL)   Date Value   09/17/2018 199 (A)     AST (U/L)   Date Value   12/28/2018 52     ALT (U/L)   Date Value   12/28/2018 72     BUN (mg/dL)   Date Value   02/26/2019 12      (goal A1C is < 7)   (goal LDL is <100) need 30-50% reduction from baseline     BP Readings from Last 3 Encounters:   08/03/20 124/80   03/10/20 122/78   02/12/20 122/78    (goal /80)      All Future Testing planned in [facility]:  Lab Frequency Next Occurrence   XR CERVICAL SPINE (4-5 VIEWS) Once 06/26/2020   EMG Once 08/24/2020       Next Visit Date:  No future appointments.          Patient Active Problem List:     Stress reaction     Essential hypertension     Dysthymia     Anxiety     Mild intermittent asthma without complication     Dermatitis     Osteoarthritis of multiple joints     Fibromyalgia     Left leg cellulitis     Primary osteoarthritis of left knee     Chronic back pain     Class 3 severe obesity due to excess calories without serious comorbidity with body mass index (BMI) of 40.0 to 44.9 in adult [facility])     Chronic non-seasonal allergic rhinitis     Type 2 diabetes mellitus with complication, without long-term current use of insulin (HCC)     Liver enzyme elevation

## 2020-12-28 NOTE — TELEPHONE ENCOUNTER
Pharm requested refill    Health Maintenance   Topic Date Due    Hepatitis C screen  1970    Pneumococcal 0-64 years Vaccine (1 of 1 - PPSV23) 02/07/1974    Cervical cancer screen  02/07/1989    Shingles Vaccine (1 of 2) 02/07/2018    Colon cancer screen colonoscopy  02/07/2018    Lipid screen  09/17/2019    Diabetic foot exam  02/26/2020    Potassium monitoring  02/26/2020    Creatinine monitoring  02/26/2020    Flu vaccine (1) 09/01/2020    Hepatitis B vaccine (1 of 3 - Risk 3-dose series) 09/18/2035 (Originally 2/7/1987)    Diabetic microalbuminuria test  02/12/2021    A1C test (Diabetic or Prediabetic)  08/03/2021    Breast cancer screen  11/15/2021    Diabetic retinal exam  12/03/2021    DTaP/Tdap/Td vaccine (2 - Td) 01/05/2028    HIV screen  Completed    Hepatitis A vaccine  Aged Out    Hib vaccine  Aged Out    Meningococcal (ACWY) vaccine  Aged Out             (applicable per patient's age: Cancer Screenings, Depression Screening, Fall Risk Screening, Immunizations)    Hemoglobin A1C (%)   Date Value   08/03/2020 8.3   02/12/2020 7.5   11/20/2019 8.6     LDL Calculated (mg/dL)   Date Value   09/17/2018 199 (A)     AST (U/L)   Date Value   12/28/2018 52     ALT (U/L)   Date Value   12/28/2018 72     BUN (mg/dL)   Date Value   02/26/2019 12      (goal A1C is < 7)   (goal LDL is <100) need 30-50% reduction from baseline     BP Readings from Last 3 Encounters:   08/03/20 124/80   03/10/20 122/78   02/12/20 122/78    (goal /80)      All Future Testing planned in [facility]:  Lab Frequency Next Occurrence   XR CERVICAL SPINE (4-5 VIEWS) Once 06/26/2020   EMG Once 08/24/2020       Next Visit Date:  No future appointments.          Patient Active Problem List:     Stress reaction     Essential hypertension     Dysthymia     Anxiety     Mild intermittent asthma without complication     Dermatitis     Osteoarthritis of multiple joints     Fibromyalgia     Left leg cellulitis     Primary osteoarthritis of left knee     Chronic back pain     Class 3 severe obesity due to excess calories without serious comorbidity with body mass index (BMI) of 40.0 to 44.9 in adult [facility])     Chronic non-seasonal allergic rhinitis     Type 2 diabetes mellitus with complication, without long-term current use of insulin (HCC)     Liver enzyme elevation

## 2020-12-29 RX ORDER — HYDROCHLOROTHIAZIDE 12.5 MG/1
TABLET ORAL
Qty: 30 TABLET | Refills: 2 | Status: SHIPPED | OUTPATIENT
Start: 2020-12-29 | End: 2021-04-05 | Stop reason: SDUPTHER

## 2021-01-04 DIAGNOSIS — E11.9 TYPE 2 DIABETES MELLITUS WITHOUT COMPLICATION, WITHOUT LONG-TERM CURRENT USE OF INSULIN (HCC): ICD-10-CM

## 2021-01-05 DIAGNOSIS — M71.22 BAKER'S CYST OF KNEE, LEFT: ICD-10-CM

## 2021-01-05 DIAGNOSIS — F43.0 STRESS REACTION: ICD-10-CM

## 2021-01-05 DIAGNOSIS — H04.123 DRY EYE SYNDROME OF BOTH EYES: ICD-10-CM

## 2021-01-05 DIAGNOSIS — F41.9 ANXIETY: ICD-10-CM

## 2021-01-05 DIAGNOSIS — J34.89 NASAL SORE: ICD-10-CM

## 2021-01-05 DIAGNOSIS — M70.50 PES ANSERINE BURSITIS: ICD-10-CM

## 2021-01-05 RX ORDER — METFORMIN HYDROCHLORIDE 500 MG/1
TABLET, EXTENDED RELEASE ORAL
Qty: 180 TABLET | Refills: 0 | Status: SHIPPED | OUTPATIENT
Start: 2021-01-05 | End: 2021-08-03 | Stop reason: SDUPTHER

## 2021-01-05 NOTE — TELEPHONE ENCOUNTER
Pharm requested refill    Health Maintenance   Topic Date Due    Hepatitis C screen  1970    Pneumococcal 0-64 years Vaccine (1 of 1 - PPSV23) 02/07/1974    Cervical cancer screen  02/07/1989    Shingles Vaccine (1 of 2) 02/07/2018    Colon cancer screen colonoscopy  02/07/2018    Lipid screen  09/17/2019    Diabetic foot exam  02/26/2020    Potassium monitoring  02/26/2020    Creatinine monitoring  02/26/2020    Flu vaccine (1) 09/01/2020    Hepatitis B vaccine (1 of 3 - Risk 3-dose series) 09/18/2035 (Originally 2/7/1987)    Diabetic microalbuminuria test  02/12/2021    A1C test (Diabetic or Prediabetic)  08/03/2021    Breast cancer screen  11/15/2021    Diabetic retinal exam  12/03/2021    DTaP/Tdap/Td vaccine (2 - Td) 01/05/2028    HIV screen  Completed    Hepatitis A vaccine  Aged Out    Hib vaccine  Aged Out    Meningococcal (ACWY) vaccine  Aged Out             (applicable per patient's age: Cancer Screenings, Depression Screening, Fall Risk Screening, Immunizations)    Hemoglobin A1C (%)   Date Value   08/03/2020 8.3   02/12/2020 7.5   11/20/2019 8.6     LDL Calculated (mg/dL)   Date Value   09/17/2018 199 (A)     AST (U/L)   Date Value   12/28/2018 52     ALT (U/L)   Date Value   12/28/2018 72     BUN (mg/dL)   Date Value   02/26/2019 12      (goal A1C is < 7)   (goal LDL is <100) need 30-50% reduction from baseline     BP Readings from Last 3 Encounters:   08/03/20 124/80   03/10/20 122/78   02/12/20 122/78    (goal /80)      All Future Testing planned in [facility]:  Lab Frequency Next Occurrence   XR CERVICAL SPINE (4-5 VIEWS) Once 06/26/2020   EMG Once 08/24/2020       Next Visit Date:  No future appointments.          Patient Active Problem List:     Stress reaction     Essential hypertension     Dysthymia     Anxiety     Mild intermittent asthma without complication     Dermatitis     Osteoarthritis of multiple joints     Fibromyalgia     Left leg cellulitis Primary osteoarthritis of left knee     Chronic back pain     Class 3 severe obesity due to excess calories without serious comorbidity with body mass index (BMI) of 40.0 to 44.9 in adult [facility])     Chronic non-seasonal allergic rhinitis     Type 2 diabetes mellitus with complication, without long-term current use of insulin (HCC)     Liver enzyme elevation

## 2021-01-06 RX ORDER — POLYETHYLENE GLYCOL 400 AND PROPYLENE GLYCOL 4; 3 MG/ML; MG/ML
GEL OPHTHALMIC
Qty: 10 ML | Refills: 0 | Status: SHIPPED | OUTPATIENT
Start: 2021-01-06 | End: 2021-04-19 | Stop reason: SDUPTHER

## 2021-01-06 RX ORDER — ALPRAZOLAM 1 MG/1
TABLET ORAL
Qty: 30 TABLET | Refills: 0 | Status: SHIPPED | OUTPATIENT
Start: 2021-01-06 | End: 2021-02-05 | Stop reason: SDUPTHER

## 2021-01-09 ENCOUNTER — TELEPHONE (OUTPATIENT)
Dept: FAMILY MEDICINE CLINIC | Age: 51
End: 2021-01-09

## 2021-01-09 NOTE — TELEPHONE ENCOUNTER
Please call patient for an in person diabetes follow-up. She was advised to do so at her appointment in December, but I see she still has not scheduled.

## 2021-01-19 ENCOUNTER — OFFICE VISIT (OUTPATIENT)
Dept: ORTHOPEDIC SURGERY | Age: 51
End: 2021-01-19
Payer: MEDICARE

## 2021-01-19 DIAGNOSIS — M54.12 CERVICAL RADICULOPATHY: ICD-10-CM

## 2021-01-19 DIAGNOSIS — M54.2 NECK PAIN: Primary | ICD-10-CM

## 2021-01-19 DIAGNOSIS — M75.01 ADHESIVE CAPSULITIS OF RIGHT SHOULDER: ICD-10-CM

## 2021-01-19 PROCEDURE — G8427 DOCREV CUR MEDS BY ELIG CLIN: HCPCS | Performed by: ORTHOPAEDIC SURGERY

## 2021-01-19 PROCEDURE — 99213 OFFICE O/P EST LOW 20 MIN: CPT | Performed by: ORTHOPAEDIC SURGERY

## 2021-01-19 PROCEDURE — 3017F COLORECTAL CA SCREEN DOC REV: CPT | Performed by: ORTHOPAEDIC SURGERY

## 2021-01-19 PROCEDURE — 1036F TOBACCO NON-USER: CPT | Performed by: ORTHOPAEDIC SURGERY

## 2021-01-19 PROCEDURE — G8484 FLU IMMUNIZE NO ADMIN: HCPCS | Performed by: ORTHOPAEDIC SURGERY

## 2021-01-19 PROCEDURE — G8417 CALC BMI ABV UP PARAM F/U: HCPCS | Performed by: ORTHOPAEDIC SURGERY

## 2021-01-19 NOTE — PROGRESS NOTES
Patient ID: Americo Hernandez is a 44 y.o. female. Chief Complaint   Patient presents with    Neck Pain     chronic neck pain with right radiculopathy since 2015 (also has fibromyalgia)    Carpal Tunnel     bilateral carpal tunnel right > left        HPI     Patient presents with a multitude of issues #1 neck pain #2 right radicular arm pain #3 right hand numbness and tingling #4 diabetes its fairly brittle #5 patient is currently receiving lumbar epidural steroid injections from Dr. Benjamin Kaufman      Patient has had EMGs from Dr. Jorden Garner which show a chronic C5-6 radiculopathy as well as moderate bilateral carpal tunnel syndrome    Patient reports substantial improvement in her hand dysesthesia with cock-up wrist splint     patient with history of frozen shoulder    Past Medical History:   Diagnosis Date    Anxiety     Arthritis     Asthma     Cataplexy and narcolepsy     Chronic back pain     Chronic pain     Depression     Fibromyalgia     Hypertension     Liver enzyme elevation 8/19/2019    Obesity     Osteoarthritis      Past Surgical History:   Procedure Laterality Date    CHOLECYSTECTOMY       Family History   Problem Relation Age of Onset    Heart Disease Mother     Diabetes Mother     Stroke Mother     Dementia Father     Alzheimer's Disease Father      Social History     Occupational History    Not on file   Tobacco Use    Smoking status: Never Smoker    Smokeless tobacco: Never Used   Substance and Sexual Activity    Alcohol use: No     Frequency: Never    Drug use: No    Sexual activity: Yes     Partners: Male        Review of Systems   All other systems reviewed and are negative. Physical Exam  Vitals signs and nursing note reviewed. Constitutional:       Appearance: She is well-developed. HENT:      Head: Normocephalic and atraumatic.       Nose: Nose normal.   Eyes:      Conjunctiva/sclera: Conjunctivae normal.   Neck: Musculoskeletal: Normal range of motion and neck supple. Pulmonary:      Effort: Pulmonary effort is normal. No respiratory distress. Musculoskeletal:      Comments: Normal gait     Skin:     General: Skin is warm and dry. Neurological:      Mental Status: She is alert and oriented to person, place, and time. Sensory: No sensory deficit. Psychiatric:         Behavior: Behavior normal.         Thought Content: Thought content normal.       Examination cervical spine slow motion on rotation patient gets aggravation of pain with rotation and cervical flexion relief of pain with neck extension i.e. opposite of typical Spurling    Examination hand reveals negative Phalen's    Shoulder patient with elevation only about 120 degrees although external rotation is normal internal rotation is about 2 vertebrae below the contralateral hand but she is able to get to about L4      EMGs are reviewed as stated above    MRI cervical spine is reviewed largely normal    Assessment:          1. Neck pain    2. Cervical radiculopathy    3. Adhesive capsulitis of right shoulder      Radiculopathy by EMGs    MRI do not show any significant stenosis    Physical exam consistent with a frozen shoulder  Plan:          Orders Placed This Encounter   Procedures   [address]     Referral Priority:   Routine     Referral Type:   Eval and Treat     Referral Reason:   Specialty Services Required     Requested Specialty:   Physical Therapy     Number of Visits Requested:   1        Richa Deras MD    Please note that this chart was generated using voicerecognition Dragon dictation software. Although every effort was made to ensurethe accuracy of this automated transcription, some errors in transcription may haveoccurred.

## 2021-01-28 DIAGNOSIS — I10 ESSENTIAL HYPERTENSION: ICD-10-CM

## 2021-01-28 DIAGNOSIS — F43.0 STRESS REACTION: ICD-10-CM

## 2021-01-28 DIAGNOSIS — F34.1 DYSTHYMIA: ICD-10-CM

## 2021-01-28 DIAGNOSIS — F41.9 ANXIETY: ICD-10-CM

## 2021-01-28 RX ORDER — METOPROLOL TARTRATE 50 MG/1
TABLET, FILM COATED ORAL
Qty: 60 TABLET | Refills: 2 | Status: SHIPPED | OUTPATIENT
Start: 2021-01-28 | End: 2021-05-10 | Stop reason: SDUPTHER

## 2021-01-28 RX ORDER — VENLAFAXINE HYDROCHLORIDE 150 MG/1
CAPSULE, EXTENDED RELEASE ORAL
Qty: 30 CAPSULE | Refills: 5 | Status: SHIPPED | OUTPATIENT
Start: 2021-01-28 | End: 2021-08-04 | Stop reason: SDUPTHER

## 2021-02-02 DIAGNOSIS — E66.01 CLASS 2 SEVERE OBESITY DUE TO EXCESS CALORIES WITH SERIOUS COMORBIDITY AND BODY MASS INDEX (BMI) OF 38.0 TO 38.9 IN ADULT (HCC): ICD-10-CM

## 2021-02-02 DIAGNOSIS — E11.8 TYPE 2 DIABETES MELLITUS WITH COMPLICATION, WITHOUT LONG-TERM CURRENT USE OF INSULIN (HCC): ICD-10-CM

## 2021-02-02 RX ORDER — LIRAGLUTIDE 6 MG/ML
INJECTION SUBCUTANEOUS
Qty: 9 ML | Refills: 1 | Status: SHIPPED | OUTPATIENT
Start: 2021-02-02 | End: 2021-04-01 | Stop reason: SDUPTHER

## 2021-02-02 NOTE — TELEPHONE ENCOUNTER
Pharm requested refill    Health Maintenance   Topic Date Due    Hepatitis C screen  1970    Pneumococcal 0-64 years Vaccine (1 of 1 - PPSV23) 02/07/1974    Cervical cancer screen  02/07/1989    Shingles Vaccine (1 of 2) 02/07/2018    Colon cancer screen colonoscopy  02/07/2018    Lipid screen  09/17/2019    Diabetic foot exam  02/26/2020    Potassium monitoring  02/26/2020    Creatinine monitoring  02/26/2020    Flu vaccine (1) 09/01/2020    Diabetic microalbuminuria test  02/12/2021    Hepatitis B vaccine (1 of 3 - Risk 3-dose series) 09/18/2035 (Originally 2/7/1987)    A1C test (Diabetic or Prediabetic)  08/03/2021    Breast cancer screen  11/15/2021    Diabetic retinal exam  12/03/2021    DTaP/Tdap/Td vaccine (2 - Td) 01/05/2028    HIV screen  Completed    Hepatitis A vaccine  Aged Out    Hib vaccine  Aged Out    Meningococcal (ACWY) vaccine  Aged Out             (applicable per patient's age: Cancer Screenings, Depression Screening, Fall Risk Screening, Immunizations)    Hemoglobin A1C (%)   Date Value   08/03/2020 8.3   02/12/2020 7.5   11/20/2019 8.6     LDL Calculated (mg/dL)   Date Value   09/17/2018 199 (A)     AST (U/L)   Date Value   12/28/2018 52     ALT (U/L)   Date Value   12/28/2018 72     BUN (mg/dL)   Date Value   02/26/2019 12      (goal A1C is < 7)   (goal LDL is <100) need 30-50% reduction from baseline     BP Readings from Last 3 Encounters:   08/03/20 124/80   03/10/20 122/78   02/12/20 122/78    (goal /80)      All Future Testing planned in [facility]:  Lab Frequency Next Occurrence   XR CERVICAL SPINE (4-5 VIEWS) Once 06/26/2020   EMG Once 08/24/2020       Next Visit Date:  Future Appointments   Date Time Provider Phoebe Boogie   2/8/2021 12:45 PM Bill Estrada, PT [facility]   2/24/2021  3:30 PM Jese Ferrer APRN - CNP [address] [email or web address]   3/2/2021 10:00 AM Ciarra Campos MD [email or web address]            Patient Active Problem List: Stress reaction     Essential hypertension     Dysthymia     Anxiety     Mild intermittent asthma without complication     Dermatitis     Osteoarthritis of multiple joints     Fibromyalgia     Left leg cellulitis     Primary osteoarthritis of left knee     Chronic back pain     Class 3 severe obesity due to excess calories without serious comorbidity with body mass index (BMI) of 40.0 to 44.9 in [facility])     Chronic non-seasonal allergic rhinitis     Type 2 diabetes mellitus with complication, without long-term current use of insulin (HCC)     Liver enzyme elevation

## 2021-02-05 DIAGNOSIS — F41.9 ANXIETY: ICD-10-CM

## 2021-02-05 DIAGNOSIS — F43.0 STRESS REACTION: ICD-10-CM

## 2021-02-05 PROBLEM — G47.33 OBSTRUCTIVE SLEEP APNEA SYNDROME: Status: ACTIVE | Noted: 2021-02-05

## 2021-02-05 PROBLEM — F33.0 MILD RECURRENT MAJOR DEPRESSION (HCC): Status: ACTIVE | Noted: 2021-02-05

## 2021-02-05 PROBLEM — G25.81 RESTLESS LEGS: Status: ACTIVE | Noted: 2021-02-05

## 2021-02-05 PROBLEM — R44.2 HYPNAGOGIC HALLUCINATIONS: Status: ACTIVE | Noted: 2021-02-05

## 2021-02-05 PROBLEM — M79.672 PAIN IN LEFT FOOT: Status: ACTIVE | Noted: 2021-02-05

## 2021-02-05 PROBLEM — M76.70 PERONEAL TENDINITIS: Status: ACTIVE | Noted: 2021-02-05

## 2021-02-05 PROBLEM — J45.40 MODERATE PERSISTENT ASTHMA WITHOUT COMPLICATION: Status: ACTIVE | Noted: 2021-02-05

## 2021-02-05 PROBLEM — M93.20 OSTEOCHONDRITIS DISSECANS: Status: ACTIVE | Noted: 2021-02-05

## 2021-02-05 PROBLEM — R73.9 HYPERGLYCEMIA: Status: ACTIVE | Noted: 2021-02-05

## 2021-02-05 PROBLEM — M19.079 PRIMARY LOCALIZED OSTEOARTHROSIS OF ANKLE AND FOOT: Status: ACTIVE | Noted: 2021-02-05

## 2021-02-05 PROBLEM — G47.411 CATAPLEXY AND NARCOLEPSY: Status: ACTIVE | Noted: 2021-02-05

## 2021-02-05 RX ORDER — ALPRAZOLAM 1 MG/1
TABLET ORAL
Qty: 30 TABLET | Refills: 0 | Status: SHIPPED | OUTPATIENT
Start: 2021-02-05 | End: 2021-03-16 | Stop reason: SDUPTHER

## 2021-02-18 ENCOUNTER — HOSPITAL ENCOUNTER (OUTPATIENT)
Dept: PHYSICAL THERAPY | Age: 51
Setting detail: THERAPIES SERIES
Discharge: HOME | End: 2021-02-18
Payer: MEDICARE

## 2021-02-18 PROCEDURE — 97110 THERAPEUTIC EXERCISES: CPT

## 2021-02-18 PROCEDURE — 97161 PT EVAL LOW COMPLEX 20 MIN: CPT

## 2021-02-18 NOTE — PROGRESS NOTES
Physical Therapy     [address] [facility]  [address]         Phone: [phone]       Fax: [phone]    Physical Therapy General Evaluation    Date:  2021  Patient: Mady Bolanos  : 1970  MRN: [id number]  Physician: DAYANARA Cervantes     Insurance:[place] [employer]age  Medical Diagnosis: Neck pain, cerv radiculopathy, R adhesive capsulitis    Rehab Codes: M54.2, M62.81  Onset Date: 2021                                 Next 's appt: 2021    Subjective:  Symptoms started in 2015, but I ignored it due to hx of fibromyalgia. Pain has progressed over the years and I have a hard time turning my head to the R. I don't want injections because it spikes my sugar. Pain cream and Percocet help with the pain. CC: R neck pain that radiates into the head and gives me a headache. Relieved with Aleve with acetominophen. Headaches worse lately. Numbness down R arm to elbow.    HPI: (onset date)    PMHx: [] Unremarkable [] Diabetes [] HTN  [] Pacemaker   [] MI/Heart Problems [] Cancer [] Arthritis [] Asthma                         [] refer to full medical chart  In EPIC  [x] Other:   fibromyalgia     Comorbidities:   [] Obesity [] Dialysis  [x] Other: R carpal tunnel with brace   [] Asthma/COPD [] Dementia [] Other:   [] Stroke [] Sleep apnea [] Other:   [] Vascular disease [] Rheumatic disease [] Other:     Tests: [x] X-Ray: [] MRI:  [] Other:    Medications: [x] Refer to full medical record [] None [] Other:  Allergies:      [] Refer to full medical record [] None [] Other:    Function:  Hand Dominance  [x] Right  [] Left  Working:  [] Normal Duty  [] Light Duty  [] Off D/T Condition  [] Retired     [x] Not Employed  []  Disability  [] Other:  Applying for disability              ADL/IADL Previous level of function Current level of function Who currently assists the patient with task   Bathing  [x] Independent  [] Assist [x] Independent  [] Assist [] []    Palpation [] [x] [] Tender R neck/UT   Sensation [] [x] [] +t/n in R ring and pinky finger relieved with cerv traction   Edema [] [] []    Neurological [] [] []                                                                  Comments: Repeated retraction increased pain in R neck/shoulder. Neck disability index score 22= 44% disability    Assessment:  Patient would benefit from skilled physical therapy services in order to decrease neck and RUE pain, improve function, and decrease tightness in R neck/UT musculature. Problems:    [x] ? Pain:  [x] ? ROM:  [] ? Strength:  [x] ? Function:  [] Other:    STG: (to be met in 6 treatments)  1. ? Pain to 5 or less with ADLs  2. Initiate Home Exercise Program  3. Demonstrate Knowledge of fall prevention  LTG: (to be met in 12 treatments)  1. NDI 17 (raw score) or less  2. Improve cerv rotation to R to full for safety with driving  3. Decrease pain to < 3 with daily activities    Patient goals: Reduce pain and avoid surgery. Functional Assessment Used: NDI  Current Status- Score 44  Goal Status- Score 34 or better    Evaluation Complexity:  History (Personal factors, comorbidities) [] 0 [x] 1-2 [] 3+   Exam (limitations, restrictions) [x] 1-2 [] 3 [] 4+   Clinical presentation (progression) [x] Stable [] Evolving  [] Unstable   Decision Making [x] Low [] Moderate [] High    [x] Low Complexity [] Moderate Complexity [] High Complexity     Rehab Potential:  [] Good  [x] Fair  [] Poor   Suggested Professional Referral:  [x] No  [] Yes:  Barriers to Goal Achievement[de-identified]  [x] No  [] Yes:  Domestic Concerns:  [x] No  [] Yes:    Pt. Education:  [x] Plans/Goals, Risks/Benefits discussed  [x] Home exercise program  Method of Education: [x] Verbal  [x] Demo  [x] Written  Comprehension of Education:  [x] Verbalizes understanding. [] Demonstrates understanding. [] Needs Review. [] Demonstrates/verbalizes understanding of HEP/Ed previously given.     Treatment Plan:  [x] Therapeutic Exercise   85414  [] Iontophoresis: 4 mg/mL Dexamethasone Sodium Phosphate  mAmin  62625   [] Therapeutic Activity  33990 [] Vasopneumatic cold with compression  39080    [] Gait Training   90319 [] Ultrasound   67621   [] Neuromuscular Re-education  23739 [] Electrical Stimulation Unattended  73088   [x] Manual Therapy  77551 [] Electrical Stimulation Attended  16291   [x] Instruction in HEP  [x] Lumbar/Cervical Traction  21423   [] Aquatic Therapy   33721 [] Cold/hotpack    [] Massage   65234      [] Dry Needling, 1 or 2 muscles  12804   [] Biofeedback, first 15 minutes   86328  [] Biofeedback, additional 15 minutes   20477 [] Dry Needling, 3 or more muscles  95490     Frequency:  2 x/week for 12 visits    Todays Treatment:  Modalities:   Exercises:Access Code: [email or web address]   URL: [email or web address]   Date: 02/18/2021   Prepared by: Tammi Díaz     Exercises - HEP     Gentle Levator Scapulae Stretch - 3 reps - 1 sets - 15 hold - 1-3x daily - 7x weekly   Seated Cervical Sidebending Stretch - 3 reps - 1 sets - 15 hold - 1-3x daily - 7x weekly   Seated Passive Cervical Retraction - 10 reps - 3 sets - 1x daily - 7x weekly     Exercise Reps/ Time Weight/ Level Comments   Ed in fall prevention   Add next visit.  Copy in chart                           Other:    Specific Instructions for next treatment: Add mechanical cervical traction 30 on/10 off~30# x 20 min, review HEP, add hypervolt R neck, UT, rhomboids    Treatment Charges: Mins Units   [x] Evaluation       [x]  Low       []  Moderate       []  High 30 1   []  Modalities     [x]  Ther Exercise 15 1   []  Manual Therapy     []  Ther Activities     []  Aquatics     []  Neuromuscular     []  Gait Training     []  Dry Needling           1-2 muscles     []  Dry Needling           3 or more muscles     [] Vasocompression     []  Other 60 2     TOTAL TREATMENT TIME: 45      Time in: 0920     Time out: 1020    Electronically signed by: Susanna Toth Dawna Ward

## 2021-02-23 ENCOUNTER — HOSPITAL ENCOUNTER (OUTPATIENT)
Dept: PHYSICAL THERAPY | Age: 51
Setting detail: THERAPIES SERIES
Discharge: HOME | End: 2021-02-23
Payer: MEDICARE

## 2021-02-23 NOTE — PROGRESS NOTES
[address]   Outpatient Physical Therapy  Cancel/No Show Note    Date: 21    Patient Name: Alex Radford        MRN: [id number]  Account: [de-identified] : 1970    Physician: DAYANARA Cervantes                            Insurance:[place] [employer]  Medical Diagnosis: Neck pain, cerv radiculopathy, R adhesive capsulitis                Rehab Codes: M54.2, M62.81  Onset Date: 2021                                 Next 's appt: 2021  Total visits:   Cx/ns: 1/0    Patient canceled due to not feeling well.      Valentino Flora, PTA

## 2021-03-01 ENCOUNTER — HOSPITAL ENCOUNTER (OUTPATIENT)
Dept: PHYSICAL THERAPY | Age: 51
Setting detail: THERAPIES SERIES
Discharge: HOME | End: 2021-03-01
Payer: MEDICARE

## 2021-03-01 NOTE — PROGRESS NOTES
[address] [facility] [address] [facility]   Outpatient Physical Therapy  Cancel/No Show Note    Date: 21    Patient Name: Andris Snellen        MRN: [id number]  Account: [de-identified] : 1970    Physician: DAYANARA Cervantes                            Insurance:[place] [employer]age  Medical Diagnosis: Neck pain, cerv radiculopathy, R adhesive capsulitis                Rehab Codes: M54.2, M62.81  Onset Date: 2021                                 Next 's appt: 2021  Total visits:   Cx/ns: 3/0    Patient canceled due to not waking up in time for appt.      Adrian Alonso, PTA

## 2021-03-09 ENCOUNTER — HOSPITAL ENCOUNTER (OUTPATIENT)
Dept: PHYSICAL THERAPY | Age: 51
Setting detail: THERAPIES SERIES
Discharge: HOME | End: 2021-03-09
Payer: MEDICARE

## 2021-03-09 NOTE — PROGRESS NOTES
[address] [facility]   Outpatient Physical Therapy  Cancel/No Show Note    Date: 3/9/21    Patient Name: Jaleesa Mendenhall        MRN: [id number]  Account: [de-identified] : 1970    Physician: DAYANARA Cervantes                            Insurance:[place] [employer]vantage  Medical Diagnosis: Neck pain, cerv radiculopathy, R adhesive capsulitis                Rehab Codes: M54.2, M62.81  Onset Date: 2021                                 Next 's appt: 2021  Total visits:   Cx/ns: 4/0    Patient canceled with no reason given by . Per attendance policy patient to be DC's and there is currently no further appointments made.      Gloria Bradford, PTA

## 2021-03-12 DIAGNOSIS — G47.09 OTHER INSOMNIA: ICD-10-CM

## 2021-03-12 RX ORDER — TRAZODONE HYDROCHLORIDE 100 MG/1
TABLET ORAL
Qty: 30 TABLET | Refills: 2 | Status: SHIPPED | OUTPATIENT
Start: 2021-03-12 | End: 2021-05-28 | Stop reason: SDUPTHER

## 2021-03-15 DIAGNOSIS — F43.0 STRESS REACTION: ICD-10-CM

## 2021-03-15 DIAGNOSIS — F41.9 ANXIETY: ICD-10-CM

## 2021-03-16 RX ORDER — ALPRAZOLAM 1 MG/1
TABLET ORAL
Qty: 30 TABLET | Refills: 0 | Status: SHIPPED | OUTPATIENT
Start: 2021-03-16 | End: 2021-04-19 | Stop reason: SDUPTHER

## 2021-03-19 NOTE — PROGRESS NOTES
Physical Therapy        [] [facility]) @ [facility]  [address]  [place], [address] [address] [facility]  Phone [phone]  Fax [phone]    Physical Therapy Discharge Note    Date: 3/19/2021      Patient: Sina Santamaria  : 1970  MRN: [id number]    Physician: DAYANARA Cervantes    Diagnosis: Cervical radiculopathy, R shoulder adhesive capsulitis Onset Date: 2021    Rehab Diagnosis:  ICD-10 Codes: Total visits attended:  Cancels/No shows:  Date of initial visit: 2021                  [] Patient recovered from conditions. Treatment goals were met. [] Patient received maximum benefit. No further therapy indicated at this time. [] Patient demonstrated improvement from condition with  ** Of  ** Short term goals met. []Patient demonstrated improvement from condition with **   Of **  Long term goals met. [] Patient to continue exercise/home instructions independently. [] Therapy interrupted due to:    [x] Patient has 2 or more no shows/cancels, is discontinued per our policy. [] Patient has completed prescribed number of treatment sessions. [] Other:      Pain level at evaluation was       /10 and at discharge was       /10    It Is My Understanding That The:  [] Patient returned to work. [] Patient demonstrated improved level of function. [] Patient returned to previous functional level. [x] Patient's current functional status is unknown due to no-shows  [] Other:         Treatment Included: Evaluation only.    [] Therapeutic Exercise   36085  [] Iontophoresis: 4 mg/mL Dexamethasone Sodium Phosphate  mAmin  17525   [] Therapeutic Activity  08721 [] Vasopneumatic cold with compression  00415    [] Gait Training   96081 [] Ultrasound   31437   [] Neuromuscular Re-education  17998 [] Electrical Stimulation Unattended  32381   [] Manual Therapy  48202 [] Electrical Stimulation Attended  46988   [] Instruction in HEP  [] Lumbar/Cervical Traction  56513   [] Aquatic Therapy   22489 [] Cold/hotpack    [] Massage   [id number]      [] Dry Needling, 1 or 2 muscles  84931   [] Biofeedback, first 15 minutes   98339  [] Biofeedback, additional 15 minutes   57714 [] Dry Needling, 3 or more muscles  74321                If you have any questions or concerns regarding this patient's care, please contact us.    Thank you for your referral.      Electronically signed by: Leonardo Fitzpatrick PT

## 2021-03-31 DIAGNOSIS — M71.22 BAKER'S CYST OF KNEE, LEFT: ICD-10-CM

## 2021-03-31 DIAGNOSIS — M70.50 PES ANSERINE BURSITIS: ICD-10-CM

## 2021-04-01 DIAGNOSIS — E11.8 TYPE 2 DIABETES MELLITUS WITH COMPLICATION, WITHOUT LONG-TERM CURRENT USE OF INSULIN (HCC): ICD-10-CM

## 2021-04-01 DIAGNOSIS — E66.01 CLASS 2 SEVERE OBESITY DUE TO EXCESS CALORIES WITH SERIOUS COMORBIDITY AND BODY MASS INDEX (BMI) OF 38.0 TO 38.9 IN ADULT (HCC): ICD-10-CM

## 2021-04-02 RX ORDER — LIRAGLUTIDE 6 MG/ML
INJECTION SUBCUTANEOUS
Qty: 9 ML | Refills: 1 | Status: SHIPPED | OUTPATIENT
Start: 2021-04-02 | End: 2021-06-17 | Stop reason: SDUPTHER

## 2021-04-05 DIAGNOSIS — I10 ESSENTIAL HYPERTENSION: ICD-10-CM

## 2021-04-06 RX ORDER — HYDROCHLOROTHIAZIDE 12.5 MG/1
TABLET ORAL
Qty: 90 TABLET | Refills: 1 | Status: SHIPPED | OUTPATIENT
Start: 2021-04-06 | End: 2021-09-27

## 2021-04-19 ENCOUNTER — OFFICE VISIT (OUTPATIENT)
Dept: FAMILY MEDICINE CLINIC | Age: 51
End: 2021-04-19
Payer: MEDICARE

## 2021-04-19 VITALS
SYSTOLIC BLOOD PRESSURE: 124 MMHG | WEIGHT: 231 LBS | HEART RATE: 76 BPM | HEIGHT: 65 IN | OXYGEN SATURATION: 97 % | TEMPERATURE: 97.1 F | RESPIRATION RATE: 16 BRPM | DIASTOLIC BLOOD PRESSURE: 80 MMHG | BODY MASS INDEX: 38.49 KG/M2

## 2021-04-19 DIAGNOSIS — E11.8 TYPE 2 DIABETES MELLITUS WITH COMPLICATION, WITHOUT LONG-TERM CURRENT USE OF INSULIN (HCC): Primary | ICD-10-CM

## 2021-04-19 DIAGNOSIS — Z13.228 SCREENING FOR METABOLIC DISORDER: ICD-10-CM

## 2021-04-19 DIAGNOSIS — Z12.31 VISIT FOR SCREENING MAMMOGRAM: ICD-10-CM

## 2021-04-19 DIAGNOSIS — G47.09 OTHER INSOMNIA: ICD-10-CM

## 2021-04-19 DIAGNOSIS — J30.9 ALLERGIC RHINITIS, UNSPECIFIED SEASONALITY, UNSPECIFIED TRIGGER: ICD-10-CM

## 2021-04-19 DIAGNOSIS — Z11.59 NEED FOR HEPATITIS C SCREENING TEST: ICD-10-CM

## 2021-04-19 DIAGNOSIS — G89.29 CHRONIC MIDLINE LOW BACK PAIN WITH BILATERAL SCIATICA: ICD-10-CM

## 2021-04-19 DIAGNOSIS — F34.1 DYSTHYMIA: ICD-10-CM

## 2021-04-19 DIAGNOSIS — I10 ESSENTIAL HYPERTENSION: ICD-10-CM

## 2021-04-19 DIAGNOSIS — Z13.21 ENCOUNTER FOR VITAMIN DEFICIENCY SCREENING: ICD-10-CM

## 2021-04-19 DIAGNOSIS — M54.41 CHRONIC MIDLINE LOW BACK PAIN WITH BILATERAL SCIATICA: ICD-10-CM

## 2021-04-19 DIAGNOSIS — G47.411 NARCOLEPSY AND CATAPLEXY: ICD-10-CM

## 2021-04-19 DIAGNOSIS — M54.42 CHRONIC MIDLINE LOW BACK PAIN WITH BILATERAL SCIATICA: ICD-10-CM

## 2021-04-19 DIAGNOSIS — H04.123 DRY EYE SYNDROME OF BOTH EYES: ICD-10-CM

## 2021-04-19 DIAGNOSIS — F43.0 STRESS REACTION: ICD-10-CM

## 2021-04-19 DIAGNOSIS — Z11.4 ENCOUNTER FOR SCREENING FOR HIV: ICD-10-CM

## 2021-04-19 DIAGNOSIS — Z13.220 SCREENING, LIPID: ICD-10-CM

## 2021-04-19 DIAGNOSIS — F41.9 ANXIETY: ICD-10-CM

## 2021-04-19 DIAGNOSIS — J45.20 MILD INTERMITTENT ASTHMA WITHOUT COMPLICATION: ICD-10-CM

## 2021-04-19 DIAGNOSIS — M54.2 NECK PAIN: ICD-10-CM

## 2021-04-19 DIAGNOSIS — E66.01 CLASS 2 SEVERE OBESITY DUE TO EXCESS CALORIES WITH SERIOUS COMORBIDITY AND BODY MASS INDEX (BMI) OF 38.0 TO 38.9 IN ADULT (HCC): ICD-10-CM

## 2021-04-19 LAB
CREATININE URINE POCT: NORMAL
HBA1C MFR BLD: 8.8 %
MICROALBUMIN/CREAT 24H UR: NORMAL MG/G{CREAT}
MICROALBUMIN/CREAT UR-RTO: NORMAL

## 2021-04-19 PROCEDURE — 3017F COLORECTAL CA SCREEN DOC REV: CPT | Performed by: NURSE PRACTITIONER

## 2021-04-19 PROCEDURE — G8427 DOCREV CUR MEDS BY ELIG CLIN: HCPCS | Performed by: NURSE PRACTITIONER

## 2021-04-19 PROCEDURE — 2022F DILAT RTA XM EVC RTNOPTHY: CPT | Performed by: NURSE PRACTITIONER

## 2021-04-19 PROCEDURE — 82044 UR ALBUMIN SEMIQUANTITATIVE: CPT | Performed by: NURSE PRACTITIONER

## 2021-04-19 PROCEDURE — 1036F TOBACCO NON-USER: CPT | Performed by: NURSE PRACTITIONER

## 2021-04-19 PROCEDURE — 99214 OFFICE O/P EST MOD 30 MIN: CPT | Performed by: NURSE PRACTITIONER

## 2021-04-19 PROCEDURE — 3052F HG A1C>EQUAL 8.0%<EQUAL 9.0%: CPT | Performed by: NURSE PRACTITIONER

## 2021-04-19 PROCEDURE — G8417 CALC BMI ABV UP PARAM F/U: HCPCS | Performed by: NURSE PRACTITIONER

## 2021-04-19 PROCEDURE — 83036 HEMOGLOBIN GLYCOSYLATED A1C: CPT | Performed by: NURSE PRACTITIONER

## 2021-04-19 RX ORDER — POLYETHYLENE GLYCOL 400 AND PROPYLENE GLYCOL 4; 3 MG/ML; MG/ML
GEL OPHTHALMIC
Qty: 10 ML | Refills: 0 | Status: SHIPPED | OUTPATIENT
Start: 2021-04-19 | End: 2022-07-24 | Stop reason: SDUPTHER

## 2021-04-19 RX ORDER — INSULIN GLARGINE 100 [IU]/ML
10 INJECTION, SOLUTION SUBCUTANEOUS NIGHTLY
Qty: 5 PEN | Refills: 2 | Status: SHIPPED | OUTPATIENT
Start: 2021-04-19 | End: 2022-05-02 | Stop reason: SDUPTHER

## 2021-04-19 RX ORDER — ALPRAZOLAM 1 MG/1
TABLET ORAL
Qty: 30 TABLET | Refills: 0 | Status: SHIPPED | OUTPATIENT
Start: 2021-04-19 | End: 2021-05-28 | Stop reason: SDUPTHER

## 2021-04-19 RX ORDER — BUDESONIDE AND FORMOTEROL FUMARATE DIHYDRATE 160; 4.5 UG/1; UG/1
AEROSOL RESPIRATORY (INHALATION)
Qty: 10.2 G | Refills: 5 | Status: SHIPPED | OUTPATIENT
Start: 2021-04-19 | End: 2021-11-03

## 2021-04-19 RX ORDER — BLOOD-GLUCOSE METER
1 KIT MISCELLANEOUS DAILY
Qty: 1 KIT | Refills: 0 | Status: SHIPPED | OUTPATIENT
Start: 2021-04-19 | End: 2022-05-09

## 2021-04-19 RX ORDER — ALBUTEROL SULFATE 90 UG/1
AEROSOL, METERED RESPIRATORY (INHALATION)
Qty: 18 G | Refills: 5 | Status: SHIPPED | OUTPATIENT
Start: 2021-04-19 | End: 2021-09-09 | Stop reason: SDUPTHER

## 2021-04-19 ASSESSMENT — ENCOUNTER SYMPTOMS
DIARRHEA: 0
BACK PAIN: 1
ABDOMINAL PAIN: 0
VOMITING: 0
RESPIRATORY NEGATIVE: 1
SHORTNESS OF BREATH: 0
EYES NEGATIVE: 1
NAUSEA: 0
CONSTIPATION: 0
GASTROINTESTINAL NEGATIVE: 1

## 2021-04-19 ASSESSMENT — PATIENT HEALTH QUESTIONNAIRE - PHQ9
2. FEELING DOWN, DEPRESSED OR HOPELESS: 1
SUM OF ALL RESPONSES TO PHQ QUESTIONS 1-9: 2

## 2021-04-19 NOTE — PROGRESS NOTES
[facility]  Dept: [phone]  Chief Complaint   Patient presents with    Diabetes    Forms     disability forms need to be completed

## 2021-04-19 NOTE — ASSESSMENT & PLAN NOTE
Uncontrolled, changes made today: Add Lantus 10 units nightly. Monitor sugars and notify office of any persistent elevations. Discussed use, benefit, and side effects of prescribed medications. Barriers to medication compliance addressed. Advised patient to hold Lantus when sick.

## 2021-04-19 NOTE — ASSESSMENT & PLAN NOTE
Well-controlled, continue current treatment plan. Continue taking allergy medications as prescribed in an effort to maintain control of asthma.

## 2021-04-19 NOTE — PROGRESS NOTES
[facility]  [facility]  [address]  [facility] [address]  Dept: [phone]    4/19/2021    CHIEF COMPLAINT    Chief Complaint   Patient presents with    Diabetes    Forms     disability forms need to be completed       HPI    Daivd Hubbard is a 46 y.o. female who presents   Chief Complaint   Patient presents with    Diabetes    Forms     disability forms need to be completed     Appointment to follow-up on multiple medical conditions. DM-using Victoza & metformin XR. Previously took Amaryl. This was discontinued due to GI upset. Tolerating Victoza well. She indicates her FBG have been 130's, but her BG this AM was \"200 something\". Denies hypo-/hyper glycemia. She feels that her family cooking for her is the reason why her sugar's have been higher. She is drinking a lot of Milo's tea that has sucralose throughout the day. She drinks this and water all day. HgbA1C today is 8.8. Urine micro is WNL today. Lab Results       Component                Value               Date                       LABA1C                   8.3                 08/03/2020                 LABA1C                   7.5                 02/12/2020                 LABA1C                   8.6                 11/20/2019            No results found for: EAG      Hypertension -taking hydrochlorothiazide 12.5 mg, amlodipine 10 mg and metoprolol tartrate 50 mg twice daily. Anxiety/depression-continues taking Effexor 150 mg & Trazodone 100 mg PRN HS when not taking her Xyrem. Patient was previously referred to counselor in [place] area as she is having in [place]. She continues taking Xanax PRN. Noting she is needing to take it more lately due to her stress with her father being in the end stages of dementia. She has not gone to counseling. Narcolepsy- Seeing Dr. Victor Hugo Peralta. Currently taking Xyrem as it works better than Ambien.  She does not like the SE so dietitian. She participates in exercise intermittently. An ACE inhibitor/angiotensin II receptor blocker is contraindicated. She does not see a podiatrist.Eye exam is current. Hypertension  This is a chronic problem. The problem is unchanged. The problem is controlled. Associated symptoms include malaise/fatigue (chronic. Stable ) and neck pain. Pertinent negatives include no chest pain, headaches, palpitations, peripheral edema or shortness of breath. Agents associated with hypertension include NSAIDs. Risk factors for coronary artery disease include diabetes mellitus, obesity, post-menopausal state, stress and sedentary lifestyle. Past treatments include diuretics, beta blockers, calcium channel blockers and ACE inhibitors. The current treatment provides moderate improvement. Vitals:    04/19/21 1052   BP: 124/80   Site: Left Upper Arm   Position: Sitting   Cuff Size: Large Adult   Pulse: 76   Resp: 16   Temp: 97.1 °F (36.2 °C)   TempSrc: Temporal   SpO2: 97%   Weight: 231 lb (104.8 kg)   Height: 5' 5\" (1.651 m)       Wt Readings from Last 3 Encounters:   04/19/21 231 lb (104.8 kg)   08/03/20 230 lb (104.3 kg)   06/02/20 220 lb (99.8 kg)     BP Readings from Last 3 Encounters:   04/19/21 124/80   08/03/20 124/80   03/10/20 122/78       REVIEW OF SYSTEMS    Review of Systems   Constitutional: Positive for fatigue and malaise/fatigue (chronic. Stable ). Negative for chills, fever and weight loss. Eyes: Negative. Negative for visual disturbance (wearing glasses ). Respiratory: Negative. Negative for shortness of breath. Cardiovascular: Negative. Negative for chest pain, palpitations and leg swelling. Gastrointestinal: Negative. Negative for abdominal pain, constipation, diarrhea, nausea and vomiting. Genitourinary: Negative. Musculoskeletal: Positive for back pain and neck pain. Skin: Negative. Neurological: Negative. Negative for dizziness and headaches.    Psychiatric/Behavioral: Positive for dysphoric mood (See HPI ) and sleep disturbance (See HPI ). The patient is nervous/anxious (Chronic stable. ).         PAST MEDICAL HISTORY    Past Medical History:   Diagnosis Date    Anxiety     Arthritis     Asthma     Cataplexy and narcolepsy     Chronic back pain     Chronic pain     Depression     Fibromyalgia     Hypertension     Liver enzyme elevation 8/19/2019    Obesity     Osteoarthritis        FAMILY HISTORY    Family History   Problem Relation Age of Onset    Heart Disease Mother     Diabetes Mother     Stroke Mother     Dementia Father     Alzheimer's Disease Father        SOCIAL HISTORY    Social History     Socioeconomic History    Marital status:      Spouse name: None    Number of children: None    Years of education: None    Highest education level: None   Occupational History    None   Social Needs    Financial resource strain: Not hard at all   [id number] insecurity     Worry: Never true     Inability: Never true    Transportation needs     Medical: No     Non-medical: No   Tobacco Use    Smoking status: Never Smoker    Smokeless tobacco: Never Used   Substance and Sexual Activity    Alcohol use: No     Frequency: Never    Drug use: No    Sexual activity: Yes     Partners: Male   Lifestyle    Physical activity     Days per week: 4 days     Minutes per session: 40 min    Stress: Not at all   Relationships    Social connections     Talks on phone: More than three times a week     Gets together: More than three times a week     Attends Mandaen service: 1 to 4 times per year     Active member of club or organization: No     Attends meetings of clubs or organizations: Never     Relationship status:     Intimate partner violence     Fear of current or ex partner: None     Emotionally abused: None     Physically abused: None     Forced sexual activity: None   Other Topics Concern    None   Social History Narrative    None       SURGICAL HISTORY    Past Surgical History:   Procedure Laterality Date    CHOLECYSTECTOMY         CURRENT MEDICATIONS    Current Outpatient Medications   Medication Sig Dispense Refill    albuterol sulfate  (90 Base) MCG/ACT inhaler inhale 2 puffs every 6 hours if needed for wheezing 18 g 5    budesonide-formoterol (SYMBICORT) 160-4.5 MCG/ACT AERO inhale 2 puffs by mouth twice a day 10.2 g 5    polyethylene glycol-propylene glycol (SYSTANE) 0.4-0.3 % GEL ophthalmic gel place 1 drop into both eyes nightly 10 mL 0    Brimonidine Tartrate 0.025 % SOLN 1 drop in each eye every 8 hours as needed for redness.  7.5 mL 5    ALPRAZolam (XANAX) 1 MG tablet take 1 tablet by mouth every evening if needed for anxiety 30 tablet 0    insulin glargine (LANTUS SOLOSTAR) 100 UNIT/ML injection pen Inject 10 Units into the skin nightly 5 pen 2    glucose monitoring kit (FREESTYLE) monitoring kit 1 kit by Does not apply route daily 1 kit 0    Insulin Pen Needle 32G X 4 MM MISC 1 each by Does not apply route daily 100 each 5    hydroCHLOROthiazide (HYDRODIURIL) 12.5 MG tablet 1 daily 90 tablet 1    Liraglutide (VICTOZA) 18 MG/3ML SOPN SC injection inject 1.8 milligram subcutaneously daily 9 mL 1    diclofenac sodium (VOLTAREN) 1 % GEL apply 4 grams topically four times a day 500 g 2    traZODone (DESYREL) 100 MG tablet take 1/2-1 tablet by mouth nightly if needed for sleep DO NOT TAKE WITH IN 24 HRS OF XYREM 30 tablet 2    metoprolol tartrate (LOPRESSOR) 50 MG tablet take 1 tablet by mouth twice a day 60 tablet 2    venlafaxine (EFFEXOR XR) 150 MG extended release capsule take 1 capsule by mouth every morning 30 capsule 5    mupirocin (BACTROBAN) 2 % ointment apply to affected area three times a day 22 g 0    metFORMIN (GLUCOPHAGE-XR) 500 MG extended release tablet Take 2 tablets by mouth twice daily 180 tablet 0    fluticasone (FLONASE) 50 MCG/ACT nasal spray 1 spray by Each Nostril route daily 2 Bottle 1    Dextromethorphan-guaiFENesin (MUCINEX DM)  MG TB12 Take 1 tablet by mouth 2 times daily 28 tablet 1    Menthol ([employer] COUGH DROPS SUGAR FREE) 7.6 MG LOZG Take 1 lozenge by mouth every 2 hours as needed (cough) 75 lozenge 5    rOPINIRole (REQUIP) 1 MG tablet take 1 tablet by mouth 1-3 HOURS BEFORE BEDTIME DAILY      amLODIPine (NORVASC) 10 MG tablet take 1 tablet by mouth once daily 30 tablet 5    ibuprofen (ADVIL;MOTRIN) 800 MG tablet take 1 tablet by mouth every 8 hours with food if needed for pain 90 tablet 3    blood glucose test strips (ONE TOUCH ULTRA TEST) strip TEST once daily 100 strip 3    OneTouch Delica Lancets 00O MISC CHECK BLOOD SUGARS ONCE A  each 3    loratadine (CLARITIN) 10 MG tablet Take 1 tablet by mouth daily 30 tablet 3    triamcinolone (ARISTOCORT) 0.5 % ointment apply to affected area twice a day 30 g 3    Elastic Bandages & Supports (WRIST SPLINT) [facility] 1 Device by Does not apply route daily 1 each 0    Elastic Bandages & Supports (WRIST SPLINT/COCK-UP/RIGHT M) MISC 1 Device by Does not apply route nightly 1 each 0    Blood Pressure Monitor [place] 1 Device by Does not apply route 2 times daily 1 Device 0    oxyCODONE-acetaminophen (PERCOCET) 7.5-325 MG per tablet       gabapentin (NEURONTIN) 600 MG tablet Take 1,200 mg by mouth 2 times daily.  Handicap Placard MISC by Does not apply route Expires 6 months after issue 1 each 0    XYREM 500 MG/ML SOLN solution       Propylene Glycol 0.6 % SOLN 1 drop 4-5 times per day (Patient not taking: Reported on 4/19/2021) 15 mL 5     No current facility-administered medications for this visit.         ALLERGIES    Allergies   Allergen Reactions    Lisinopril Anaphylaxis     Difficulty breathing     Zoloft [Sertraline Hcl] Anaphylaxis     Difficulty breathing     Seasonal     Amoxicillin Rash    Augmentin [Amoxicillin-Pot Clavulanate] Rash    Buspirone Rash    Paxil [Paroxetine Hcl] Other (See Comments)     Weight gain PHYSICAL EXAM   Physical Exam  Vitals signs and nursing note reviewed. Constitutional:       General: She is not in acute distress. Appearance: She is well-developed. She is obese. She is not diaphoretic. Interventions: Face mask in place. HENT:      Head: Normocephalic. Right Ear: Hearing normal.      Left Ear: Hearing normal.   Eyes:      General:         Right eye: No discharge. Left eye: No discharge. Pupils: Pupils are equal.   Neck:      Musculoskeletal: Normal range of motion. Cardiovascular:      Rate and Rhythm: Normal rate and regular rhythm. Pulses: Normal pulses. Radial pulses are 2+ on the right side and 2+ on the left side. Dorsalis pedis pulses are 2+ on the right side and 2+ on the left side. Posterior tibial pulses are 2+ on the right side and 2+ on the left side. Heart sounds: Normal heart sounds, S1 normal and S2 normal. No murmur. Pulmonary:      Effort: Pulmonary effort is normal. No respiratory distress. Breath sounds: Normal breath sounds. No wheezing. Musculoskeletal:      Right foot: No deformity or bunion. Left foot: No deformity or bunion. Feet:      Right foot:      Protective Sensation: 10 sites tested. 10 sites sensed. Skin integrity: Skin integrity normal. No ulcer, blister, skin breakdown or callus. Toenail Condition: Right toenails are normal.      Left foot:      Protective Sensation: 10 sites tested. 10 sites sensed. Skin integrity: Skin integrity normal. No ulcer, blister, skin breakdown or callus. Toenail Condition: Left toenails are normal.   Skin:     General: Skin is warm and dry. Neurological:      Mental Status: She is alert and oriented to person, place, and time. Psychiatric:         Behavior: Behavior normal. Behavior is cooperative. ASSESSMENT/PLAN  1.  Type 2 diabetes mellitus with complication, without long-term current use of insulin [facility])  Assessment & Plan:   Uncontrolled, changes made today: Add Lantus 10 units nightly. Monitor sugars and notify office of any persistent elevations. Discussed use, benefit, and side effects of prescribed medications. Barriers to medication compliance addressed. Advised patient to hold Lantus when sick. Orders:  -     POCT glycosylated hemoglobin (Hb A1C)  -     POCT microalbumin  -     CBC Auto Differential; Future  -     Comprehensive Metabolic Panel; Future  -     insulin glargine (LANTUS SOLOSTAR) 100 UNIT/ML injection pen; Inject 10 Units into the skin nightly, Disp-5 pen, R-2Normal  -     glucose monitoring kit (FREESTYLE) monitoring kit; DAILY Starting Mon 4/19/2021, Disp-1 kit, R-0, Normal  -     Insulin Pen Needle 32G X 4 MM MISC; DAILY Starting Mon 4/19/2021, Disp-100 each, R-5, Normal  -     HM DIABETES FOOT EXAM  2. Mild intermittent asthma without complication  Assessment & Plan:   Well-controlled, continue current treatment plan. Continue taking allergy medications as prescribed in an effort to maintain control of asthma. Orders:  -     albuterol sulfate  (90 Base) MCG/ACT inhaler; inhale 2 puffs every 6 hours if needed for wheezing, Disp-18 g, R-5Normal  -     budesonide-formoterol (SYMBICORT) 160-4.5 MCG/ACT AERO; inhale 2 puffs by mouth twice a day, Disp-10.2 g, R-5Normal  3. Dry eye syndrome of both eyes  Assessment & Plan:   Well-controlled, continue current treatment plan  Orders:  -     polyethylene glycol-propylene glycol (SYSTANE) 0.4-0.3 % GEL ophthalmic gel; place 1 drop into both eyes nightly, Disp-10 mL, R-0Normal  -     Brimonidine Tartrate 0.025 % SOLN; 1 drop in each eye every 8 hours as needed for redness. , Disp-7.5 mL, R-5Normal  4. Stress reaction  -     ALPRAZolam (XANAX) 1 MG tablet; take 1 tablet by mouth every evening if needed for anxiety, Disp-30 tablet, R-0Normal  5. Anxiety  Assessment & Plan:   Well-controlled, continue current treatment plan. Orders:  -     ALPRAZolam (XANAX) 1 MG tablet; take 1 tablet by mouth every evening if needed for anxiety, Disp-30 tablet, R-0Normal  6. Dysthymia  Assessment & Plan:   Well-controlled, continue current treatment plan  7. Essential hypertension  Assessment & Plan:  Well-controlled, continue current treatment plan   8. Class 2 severe obesity due to excess calories with serious comorbidity and body mass index (BMI) of 38.0 to 38.9 in adult [facility])  Assessment & Plan:  Discussed dietary and activity modifications to assist in weight loss. 9. Narcolepsy and cataplexy  Assessment & Plan:  Monitored by specialist- no acute findings meriting change in the plan. Patient to continue taking Xyrem as ordered by pulmonary. Will have sleep study and will be starting to use CPAP after sleep titration study is completed   10. Allergic rhinitis, unspecified seasonality, unspecified trigger  Assessment & Plan:   Well-controlled, continue current treatment plan  11. Chronic midline low back pain with bilateral sciatica  Assessment & Plan:   Monitored by specialist- no acute findings meriting change in the plan. Continue following with pain management, taking oral pain medications and gabapentin as ordered by their office. 12. Neck pain  Assessment & Plan:   Uncontrolled, Continue following with Dr. Babs Jj and go to physical therapy as ordered  13. Other insomnia  Assessment & Plan:   Monitored by specialist- no acute findings meriting change in the plan. 14. Need for hepatitis C screening test  -     Hepatitis C Antibody; Future  15. Encounter for screening for HIV  -     HIV Screen; Future  16. Screening for metabolic disorder  -     Comprehensive Metabolic Panel; Future  -     TSH with Reflex; Future  17. Screening, lipid  -     Lipid Panel; Future  18. Encounter for vitamin deficiency screening  -     Vitamin D 25 Hydroxy; Future  19. Visit for screening mammogram  -     ASIF DIGITAL SCREEN W OR WO CAD BILATERAL;  Future Armin Benites received counseling on the following healthy behaviors: nutrition, exercise and medication adherence  Reviewed prior labs and health maintenance  Continue current medications, diet and exercise. Discussed use, benefit, and side effects of prescribed medications. Barriers to medication compliance addressed. Patient given educational materials - see patient instructions  Was a self-tracking handout given in paper form or via [employer]? Yes    Requested Prescriptions     Signed Prescriptions Disp Refills    albuterol sulfate  (90 Base) MCG/ACT inhaler 18 g 5     Sig: inhale 2 puffs every 6 hours if needed for wheezing    budesonide-formoterol (SYMBICORT) 160-4.5 MCG/ACT AERO 10.2 g 5     Sig: inhale 2 puffs by mouth twice a day    polyethylene glycol-propylene glycol (SYSTANE) 0.4-0.3 % GEL ophthalmic gel 10 mL 0     Sig: place 1 drop into both eyes nightly    Brimonidine Tartrate 0.025 % SOLN 7.5 mL 5     Si drop in each eye every 8 hours as needed for redness.  ALPRAZolam (XANAX) 1 MG tablet 30 tablet 0     Sig: take 1 tablet by mouth every evening if needed for anxiety    insulin glargine (LANTUS SOLOSTAR) 100 UNIT/ML injection pen 5 pen 2     Sig: Inject 10 Units into the skin nightly    glucose monitoring kit (FREESTYLE) monitoring kit 1 kit 0     Si kit by Does not apply route daily    Insulin Pen Needle 32G X 4 MM MISC 100 each 5     Si each by Does not apply route daily       All patient questions answered. Patient voiced understanding. Quality Measures    Body mass index is 38.44 kg/m². Elevated. Weight control planned discussed Healthy diet and regular exercise. BP: 124/80 Blood pressure is normal. Treatment plan consists of No treatment change needed.     Lab Results   Component Value Date    LDLCALC 199 (A) 2018    (goal LDL reduction with dx if diabetes is 50% LDL reduction)      PHQ Scores 2021 8/3/2020 2018 2017   PHQ2 Score 2 1 0 0 PHQ9 Score 2 1 0 0     Interpretation of Total Score Depression Severity: 1-4 = Minimal depression, 5-9 = Mild depression, 10-14 = Moderate depression, 15-19 = Moderately severe depression, 20-27 = Severe depression     Return in about 3 months (around 7/19/2021) for HgbA1C, BP, diabetes.     (Please note that portions of this note were completed with a voice recognition program. Efforts were made to edit the dictations but occasionally words are mis-transcribed.)      Electronically signed by RUBEN Lerma CNP on 4/19/21 at 10:52 AM [email or web address]

## 2021-04-26 DIAGNOSIS — H04.123 DRY EYE SYNDROME OF BOTH EYES: ICD-10-CM

## 2021-04-29 ENCOUNTER — TELEPHONE (OUTPATIENT)
Dept: FAMILY MEDICINE CLINIC | Age: 51
End: 2021-04-29

## 2021-04-29 DIAGNOSIS — H92.09 OTALGIA, UNSPECIFIED LATERALITY: Primary | ICD-10-CM

## 2021-04-29 DIAGNOSIS — J01.90 ACUTE NON-RECURRENT SINUSITIS, UNSPECIFIED LOCATION: ICD-10-CM

## 2021-04-29 NOTE — TELEPHONE ENCOUNTER
Keekelsey Zeke was seen 04/19/21 for ear pressure and fluid. She is still having the same issue and wants to know if you'll call something in for her. She'd like an Antibiotic sent to [facility] on [address].

## 2021-04-30 RX ORDER — AZITHROMYCIN 250 MG/1
TABLET, FILM COATED ORAL
Qty: 1 PACKET | Refills: 0 | Status: SHIPPED | OUTPATIENT
Start: 2021-04-30 | End: 2021-10-08

## 2021-05-10 DIAGNOSIS — I10 ESSENTIAL HYPERTENSION: ICD-10-CM

## 2021-05-10 RX ORDER — METOPROLOL TARTRATE 50 MG/1
TABLET, FILM COATED ORAL
Qty: 180 TABLET | Refills: 1 | Status: SHIPPED | OUTPATIENT
Start: 2021-05-10 | End: 2021-10-26

## 2021-05-10 RX ORDER — AMLODIPINE BESYLATE 10 MG/1
TABLET ORAL
Qty: 90 TABLET | Refills: 1 | Status: SHIPPED | OUTPATIENT
Start: 2021-05-10 | End: 2021-11-01

## 2021-05-27 DIAGNOSIS — F41.9 ANXIETY: ICD-10-CM

## 2021-05-27 DIAGNOSIS — J34.89 NASAL SORE: ICD-10-CM

## 2021-05-27 DIAGNOSIS — L30.9 DERMATITIS: ICD-10-CM

## 2021-05-27 DIAGNOSIS — H04.123 DRY EYE SYNDROME OF BOTH EYES: ICD-10-CM

## 2021-05-27 DIAGNOSIS — F43.0 STRESS REACTION: ICD-10-CM

## 2021-05-27 DIAGNOSIS — G47.09 OTHER INSOMNIA: ICD-10-CM

## 2021-05-28 RX ORDER — TRIAMCINOLONE ACETONIDE 5 MG/G
OINTMENT TOPICAL
Qty: 30 G | Refills: 3 | Status: SHIPPED | OUTPATIENT
Start: 2021-05-28 | End: 2021-10-27

## 2021-05-28 RX ORDER — ALPRAZOLAM 1 MG/1
TABLET ORAL
Qty: 30 TABLET | Refills: 0 | Status: SHIPPED | OUTPATIENT
Start: 2021-05-28 | End: 2021-08-24 | Stop reason: SDUPTHER

## 2021-05-28 RX ORDER — TRAZODONE HYDROCHLORIDE 100 MG/1
TABLET ORAL
Qty: 30 TABLET | Refills: 2 | Status: SHIPPED | OUTPATIENT
Start: 2021-05-28 | End: 2021-08-24 | Stop reason: SDUPTHER

## 2021-06-15 DIAGNOSIS — E66.01 CLASS 2 SEVERE OBESITY DUE TO EXCESS CALORIES WITH SERIOUS COMORBIDITY AND BODY MASS INDEX (BMI) OF 38.0 TO 38.9 IN ADULT (HCC): ICD-10-CM

## 2021-06-15 DIAGNOSIS — E11.8 TYPE 2 DIABETES MELLITUS WITH COMPLICATION, WITHOUT LONG-TERM CURRENT USE OF INSULIN (HCC): ICD-10-CM

## 2021-06-15 NOTE — TELEPHONE ENCOUNTER
Pharm requested    Health Maintenance   Topic Date Due    Hepatitis C screen  Never done    Pneumococcal 0-64 years Vaccine (1 of 2 - PPSV23) Never done    COVID-19 Vaccine (1) Never done    Cervical cancer screen  Never done    Shingles Vaccine (1 of 2) Never done    Colon cancer screen colonoscopy  Never done    Lipid screen  09/17/2019    Potassium monitoring  02/26/2020    Creatinine monitoring  02/26/2020    Hepatitis B vaccine (1 of 3 - Risk 3-dose series) 09/18/2035 (Originally 2/7/1989)    Flu vaccine (Season Ended) 09/01/2021    Breast cancer screen  11/15/2021    Diabetic retinal exam  12/03/2021    Diabetic foot exam  04/19/2022    A1C test (Diabetic or Prediabetic)  04/19/2022    Diabetic microalbuminuria test  04/19/2022    DTaP/Tdap/Td vaccine (2 - Td or Tdap) 01/05/2028    HIV screen  Completed    Hepatitis A vaccine  Aged Out    Hib vaccine  Aged Out    Meningococcal (ACWY) vaccine  Aged Out             (applicable per patient's age: Cancer Screenings, Depression Screening, Fall Risk Screening, Immunizations)    Hemoglobin A1C (%)   Date Value   04/19/2021 8.8   08/03/2020 8.3   02/12/2020 7.5     LDL Calculated (mg/dL)   Date Value   09/17/2018 199 (A)     AST (U/L)   Date Value   12/28/2018 52     ALT (U/L)   Date Value   12/28/2018 72     BUN (mg/dL)   Date Value   02/26/2019 12      (goal A1C is < 7)   (goal LDL is <100) need 30-50% reduction from baseline     BP Readings from Last 3 Encounters:   04/19/21 124/80   08/03/20 124/80   03/10/20 122/78    (goal /80)      All Future Testing planned in [facility]:  Lab Frequency Next Occurrence   EMG Once 08/24/2020   CBC Auto Differential Once 04/04/2022   Comprehensive Metabolic Panel Once 56/27/4636   Hepatitis C Antibody Once 04/04/2022   HIV Screen Once 04/04/2022   Vitamin D 25 Hydroxy Once 04/04/2022   TSH with Reflex Once 04/04/2022   Lipid Panel Once 07/19/2021   ASIF DIGITAL SCREEN W OR WO CAD BILATERAL Once 07/18/2021 Next Visit Date:  Future Appointments   Date Time Provider Phoebe Chuyita   7/19/2021 11:00 AM RUBEN Pacheco [facility]            Patient Active Problem List:     Stress reaction     Essential hypertension     Dysthymia     Anxiety     Mild intermittent asthma without complication     Dermatitis     Osteoarthritis of multiple joints     Fibromyalgia     Left leg cellulitis     Primary osteoarthritis of left knee     Chronic back pain     Class 2 severe obesity due to excess calories with serious comorbidity and body mass index (BMI) of 38.0 to 38.9 in [facility])     Allergic rhinitis     Type 2 diabetes mellitus with complication, without long-term current use of insulin (HCC)     Liver enzyme elevation     Narcolepsy and cataplexy     Hyperglycemia     Hypnagogic hallucinations     Mild recurrent major depression (HCC)     Moderate persistent asthma without complication     Obstructive sleep apnea syndrome     Osteochondritis dissecans     Restless legs     Peroneal tendinitis     Pain in left foot     Primary localized osteoarthrosis of ankle and foot     Dry eye syndrome of both eyes     Other insomnia     Neck pain

## 2021-06-17 RX ORDER — LIRAGLUTIDE 6 MG/ML
INJECTION SUBCUTANEOUS
Qty: 9 ML | Refills: 1 | Status: SHIPPED | OUTPATIENT
Start: 2021-06-17 | End: 2021-08-27 | Stop reason: SDUPTHER

## 2021-07-04 DIAGNOSIS — M70.50 PES ANSERINE BURSITIS: ICD-10-CM

## 2021-07-04 DIAGNOSIS — M71.22 BAKER'S CYST OF KNEE, LEFT: ICD-10-CM

## 2021-07-27 DIAGNOSIS — M15.9 PRIMARY OSTEOARTHRITIS INVOLVING MULTIPLE JOINTS: ICD-10-CM

## 2021-07-28 RX ORDER — IBUPROFEN 800 MG/1
TABLET ORAL
Qty: 90 TABLET | Refills: 0 | Status: SHIPPED | OUTPATIENT
Start: 2021-07-28 | End: 2021-09-24

## 2021-08-02 DIAGNOSIS — E11.9 TYPE 2 DIABETES MELLITUS WITHOUT COMPLICATION, WITHOUT LONG-TERM CURRENT USE OF INSULIN (HCC): ICD-10-CM

## 2021-08-03 RX ORDER — METFORMIN HYDROCHLORIDE 500 MG/1
TABLET, EXTENDED RELEASE ORAL
Qty: 180 TABLET | Refills: 0 | Status: SHIPPED | OUTPATIENT
Start: 2021-08-03 | End: 2021-08-31 | Stop reason: SDUPTHER

## 2021-08-04 DIAGNOSIS — F34.1 DYSTHYMIA: ICD-10-CM

## 2021-08-04 DIAGNOSIS — F43.0 STRESS REACTION: ICD-10-CM

## 2021-08-04 DIAGNOSIS — F41.9 ANXIETY: ICD-10-CM

## 2021-08-04 RX ORDER — VENLAFAXINE HYDROCHLORIDE 150 MG/1
CAPSULE, EXTENDED RELEASE ORAL
Qty: 30 CAPSULE | Refills: 0 | Status: SHIPPED | OUTPATIENT
Start: 2021-08-04 | End: 2021-08-31 | Stop reason: SDUPTHER

## 2021-08-18 DIAGNOSIS — E11.8 TYPE 2 DIABETES MELLITUS WITH COMPLICATION, WITHOUT LONG-TERM CURRENT USE OF INSULIN (HCC): ICD-10-CM

## 2021-08-18 RX ORDER — LANCETS 33 GAUGE
EACH MISCELLANEOUS
Qty: 100 EACH | Refills: 0 | Status: SHIPPED | OUTPATIENT
Start: 2021-08-18

## 2021-08-23 DIAGNOSIS — F43.0 STRESS REACTION: ICD-10-CM

## 2021-08-23 DIAGNOSIS — F41.9 ANXIETY: ICD-10-CM

## 2021-08-23 DIAGNOSIS — G47.09 OTHER INSOMNIA: ICD-10-CM

## 2021-08-24 RX ORDER — TRAZODONE HYDROCHLORIDE 100 MG/1
TABLET ORAL
Qty: 30 TABLET | Refills: 2 | Status: SHIPPED | OUTPATIENT
Start: 2021-08-24 | End: 2021-11-03

## 2021-08-24 RX ORDER — ALPRAZOLAM 1 MG/1
TABLET ORAL
Qty: 30 TABLET | Refills: 0 | Status: SHIPPED | OUTPATIENT
Start: 2021-08-24 | End: 2021-09-24

## 2021-08-27 DIAGNOSIS — E11.8 TYPE 2 DIABETES MELLITUS WITH COMPLICATION, WITHOUT LONG-TERM CURRENT USE OF INSULIN (HCC): ICD-10-CM

## 2021-08-27 DIAGNOSIS — E66.01 CLASS 2 SEVERE OBESITY DUE TO EXCESS CALORIES WITH SERIOUS COMORBIDITY AND BODY MASS INDEX (BMI) OF 38.0 TO 38.9 IN ADULT (HCC): ICD-10-CM

## 2021-08-27 RX ORDER — LIRAGLUTIDE 6 MG/ML
INJECTION SUBCUTANEOUS
Qty: 9 ML | Refills: 1 | Status: SHIPPED | OUTPATIENT
Start: 2021-08-27 | End: 2021-10-29

## 2021-08-31 DIAGNOSIS — F43.0 STRESS REACTION: ICD-10-CM

## 2021-08-31 DIAGNOSIS — F34.1 DYSTHYMIA: ICD-10-CM

## 2021-08-31 DIAGNOSIS — F41.9 ANXIETY: ICD-10-CM

## 2021-08-31 DIAGNOSIS — E11.9 TYPE 2 DIABETES MELLITUS WITHOUT COMPLICATION, WITHOUT LONG-TERM CURRENT USE OF INSULIN (HCC): ICD-10-CM

## 2021-09-01 RX ORDER — VENLAFAXINE HYDROCHLORIDE 150 MG/1
CAPSULE, EXTENDED RELEASE ORAL
Qty: 30 CAPSULE | Refills: 2 | Status: SHIPPED | OUTPATIENT
Start: 2021-09-01 | End: 2021-11-26

## 2021-09-01 RX ORDER — METFORMIN HYDROCHLORIDE 500 MG/1
TABLET, EXTENDED RELEASE ORAL
Qty: 120 TABLET | Refills: 2 | Status: SHIPPED | OUTPATIENT
Start: 2021-09-01 | End: 2021-12-09

## 2021-09-01 NOTE — TELEPHONE ENCOUNTER
Patient has no showed or canceled her last 2 diabetic follow-ups. She was rescheduled for virtual visit 9/9, but this is inappropriate as she needs to be seen in person. Please call patient to reschedule for next available.   If October that is okay

## 2021-09-09 ENCOUNTER — OFFICE VISIT (OUTPATIENT)
Dept: ORTHOPEDIC SURGERY | Age: 51
End: 2021-09-09
Payer: MEDICARE

## 2021-09-09 VITALS — HEIGHT: 65 IN | WEIGHT: 231 LBS | RESPIRATION RATE: 12 BRPM | BODY MASS INDEX: 38.49 KG/M2

## 2021-09-09 DIAGNOSIS — M50.30 DDD (DEGENERATIVE DISC DISEASE), CERVICAL: ICD-10-CM

## 2021-09-09 DIAGNOSIS — J45.20 MILD INTERMITTENT ASTHMA WITHOUT COMPLICATION: ICD-10-CM

## 2021-09-09 DIAGNOSIS — M54.2 NECK PAIN: Primary | ICD-10-CM

## 2021-09-09 PROCEDURE — 99214 OFFICE O/P EST MOD 30 MIN: CPT | Performed by: PHYSICIAN ASSISTANT

## 2021-09-09 PROCEDURE — G8427 DOCREV CUR MEDS BY ELIG CLIN: HCPCS | Performed by: PHYSICIAN ASSISTANT

## 2021-09-09 PROCEDURE — G8417 CALC BMI ABV UP PARAM F/U: HCPCS | Performed by: PHYSICIAN ASSISTANT

## 2021-09-09 PROCEDURE — 3017F COLORECTAL CA SCREEN DOC REV: CPT | Performed by: PHYSICIAN ASSISTANT

## 2021-09-09 PROCEDURE — 1036F TOBACCO NON-USER: CPT | Performed by: PHYSICIAN ASSISTANT

## 2021-09-09 RX ORDER — ALBUTEROL SULFATE 90 UG/1
AEROSOL, METERED RESPIRATORY (INHALATION)
Qty: 18 G | Refills: 5 | Status: SHIPPED | OUTPATIENT
Start: 2021-09-09 | End: 2022-02-11

## 2021-09-09 NOTE — PROGRESS NOTES
[facility], 20 [facility], [address] [place] [address]               [address], [address] [facility]           Dept Phone: [phone]           Dept Fax:  [phone] [address]           Nikki Ortiz          Dept Phone: [phone]           Dept Fax:  [phone]      Chief Compliant:  Chief Complaint   Patient presents with    Neck Pain        History of Present Illness: This is a 46 y.o. female who presents to the clinic today for evaluation of chronic neck pain. She was seen by Dr. Magaly Giordano back in January 2021 for this issue. Patient reports this pain has been present for a number of years she denies any injury, trauma or fall but states the pain has slightly worsened over the past several months. When patient was evaluated by Dr. Magaly Giordano it was recommended she pursue physical therapy for the neck and the adhesive capsulitis of the right shoulder. Patient reports she did pursue physical therapy in March however this was discontinued after she had severe aggravation of pain after traction. She states she has not gone back to PT since as it did not seem to be helping even before the incident. Pain is most severe on the right side patient notes radiation of pain into the right shoulder and all the way down into the right hand. Associated with numbness and tingling in the right arm seems to be made worse by forward flexion of the shoulder and overhead motion. Patient notes some weakness in his right arm subjectively.       Past History:    Current Outpatient Medications:     venlafaxine (EFFEXOR XR) 150 MG extended release capsule, take 1 capsule by mouth every morning, Disp: 30 capsule, Rfl: 2    metFORMIN (GLUCOPHAGE-XR) 500 MG extended release tablet, Take 2 tablets by mouth twice daily, Disp: 120 tablet, Rfl: 2    Liraglutide (VICTOZA) 18 MG/3ML SOPN SC injection, inject 1.8 milligram subcutaneously daily, Disp: 9 mL, Rfl: 1    traZODone (DESYREL) 100 MG tablet, take 1/2-1 tablet by mouth nightly if needed for sleep DO NOT TAKE WITHIN 24 HRS OF XYREM, Disp: 30 tablet, Rfl: 2    ALPRAZolam (XANAX) 1 MG tablet, take 1 tablet by mouth every evening if needed for anxiety, Disp: 30 tablet, Rfl: 0    OneTouch Delica Lancets 23O MISC, CHECK BLOOD SUGARS ONCE A DAY, Disp: 100 each, Rfl: 0    ibuprofen (ADVIL;MOTRIN) 800 MG tablet, take 1 tablet by mouth every 8 hours with food if needed for pain, Disp: 90 tablet, Rfl: 0    diclofenac sodium (VOLTAREN) 1 % GEL, apply 4 grams topically four times a day, Disp: 500 g, Rfl: 2    mupirocin (BACTROBAN) 2 % ointment, apply to affected area three times a day, Disp: 22 g, Rfl: 0    triamcinolone (ARISTOCORT) 0.5 % ointment, apply to affected area twice a day, Disp: 30 g, Rfl: 3    amLODIPine (NORVASC) 10 MG tablet, take 1 tablet by mouth once daily, Disp: 90 tablet, Rfl: 1    metoprolol tartrate (LOPRESSOR) 50 MG tablet, take 1 tablet by mouth twice a day, Disp: 180 tablet, Rfl: 1    azithromycin (ZITHROMAX) 250 MG tablet, Take 2 tabs (500 mg) on Day 1, and take 1 tab (250 mg) on days 2 through 5., Disp: 1 packet, Rfl: 0    Propylene Glycol 0.6 % SOLN, 1 drop 4-5 times per day, Disp: 15 mL, Rfl: 1    albuterol sulfate  (90 Base) MCG/ACT inhaler, inhale 2 puffs every 6 hours if needed for wheezing, Disp: 18 g, Rfl: 5    budesonide-formoterol (SYMBICORT) 160-4.5 MCG/ACT AERO, inhale 2 puffs by mouth twice a day, Disp: 10.2 g, Rfl: 5    polyethylene glycol-propylene glycol (SYSTANE) 0.4-0.3 % GEL ophthalmic gel, place 1 drop into both eyes nightly, Disp: 10 mL, Rfl: 0    Brimonidine Tartrate 0.025 % SOLN, 1 drop in each eye every 8 hours as needed for redness. , Disp: 7.5 mL, Rfl: 5    insulin glargine (LANTUS SOLOSTAR) 100 UNIT/ML injection pen, Inject 10 Units into the skin nightly, Disp: 5 pen, Rfl: 2    glucose monitoring kit (FREESTYLE) monitoring kit, 1 kit by Does not apply route daily, Disp: 1 kit, Rfl: 0    Insulin Pen Needle 32G X 4 MM MISC, 1 each by Does not apply route daily, Disp: 100 each, Rfl: 5    hydroCHLOROthiazide (HYDRODIURIL) 12.5 MG tablet, 1 daily, Disp: 90 tablet, Rfl: 1    fluticasone (FLONASE) 50 MCG/ACT nasal spray, 1 spray by Each Nostril route daily, Disp: 2 Bottle, Rfl: 1    Dextromethorphan-guaiFENesin (MUCINEX DM)  MG TB12, Take 1 tablet by mouth 2 times daily, Disp: 28 tablet, Rfl: 1    Menthol ([employer] COUGH DROPS SUGAR FREE) 7.6 MG LOZG, Take 1 lozenge by mouth every 2 hours as needed (cough), Disp: 75 lozenge, Rfl: 5    rOPINIRole (REQUIP) 1 MG tablet, take 1 tablet by mouth 1-3 HOURS BEFORE BEDTIME DAILY, Disp: , Rfl:     XYREM 500 MG/ML SOLN solution, , Disp: , Rfl:     blood glucose test strips (ONE TOUCH ULTRA TEST) strip, TEST once daily, Disp: 100 strip, Rfl: 3    loratadine (CLARITIN) 10 MG tablet, Take 1 tablet by mouth daily, Disp: 30 tablet, Rfl: 3    Elastic Bandages & Supports (WRIST SPLINT) MISC, 1 Device by Does not apply route daily, Disp: 1 each, Rfl: 0    Elastic Bandages & Supports (WRIST SPLINT/COCK-UP/RIGHT M) MISC, 1 Device by Does not apply route nightly, Disp: 1 each, Rfl: 0    Blood Pressure Monitor [place], 1 Device by Does not apply route 2 times daily, Disp: 1 Device, Rfl: 0    oxyCODONE-acetaminophen (PERCOCET) 7.5-325 MG per tablet, , Disp: , Rfl:     gabapentin (NEURONTIN) 600 MG tablet, Take 1,200 mg by mouth 2 times daily. , Disp: , Rfl:     Handicap Placard MISC, by Does not apply route Expires 6 months after issue, Disp: 1 each, Rfl: 0  Allergies   Allergen Reactions    Lisinopril Anaphylaxis     Difficulty breathing     Zoloft [Sertraline Hcl] Anaphylaxis     Difficulty breathing     Seasonal     Amoxicillin Rash    Augmentin [Amoxicillin-Pot Clavulanate] Rash    Buspirone Rash    Paxil [Paroxetine Hcl] Other (See Comments)     Weight gain      Social History     Socioeconomic History    Marital status:      Spouse name: Not on file    Number of children: Not on file    Years of education: Not on file    Highest education level: Not on file   Occupational History    Not on file   Tobacco Use    Smoking status: Never Smoker    Smokeless tobacco: Never Used   Vaping Use    Vaping Use: Never used   Substance and Sexual Activity    Alcohol use: No    Drug use: No    Sexual activity: Yes     Partners: Male   Other Topics Concern    Not on file   Social History Narrative    Not on file     Social Determinants of Health     Financial Resource Strain:     Difficulty of Paying Living Expenses:    Food Insecurity:     Worried About Running Out of Food in the Last Year:     [address] in the Last Year:    Transportation Needs:     Lack of Transportation (Medical):      Lack of Transportation (Non-Medical):    Physical Activity:     Days of Exercise per Week:     Minutes of Exercise per Session:    Stress:     Feeling of Stress :    Social Connections:     Frequency of Communication with Friends and Family:     Frequency of Social Gatherings with Friends and Family:     Attends Mandaeism Services:     Active Member of Clubs or Organizations:     Attends Club or Organization Meetings:     Marital Status:    Intimate Partner Violence:     Fear of Current or Ex-Partner:     Emotionally Abused:     Physically Abused:     Sexually Abused:      Past Medical History:   Diagnosis Date    Anxiety     Arthritis     Asthma     Cataplexy and narcolepsy     Chronic back pain     Chronic pain     Depression     Fibromyalgia     Hypertension     Liver enzyme elevation 8/19/2019    Obesity     Osteoarthritis      Past Surgical History:   Procedure Laterality Date    CHOLECYSTECTOMY       Family History   Problem Relation Age of Onset    Heart Disease Mother     Diabetes Mother     Stroke Mother     Dementia Father     Alzheimer's Disease Father         Review of Systems   Constitutional: Negative for fever, chills, sweats. Eyes: Negative for changes in vision, or pain. HENT: Negative for ear ache, epistaxis, or sore throat. Respiratory/Cardio: Negative for Chest pain, palpitations, SOB, or cough. Gastrointestinal: Negative for abdominal pain, N/V/D. Genitourinary: Negative for dysuria, frequency, urgency, or hematuria. Neurological: Negative for headache, numbness, or weakness. Integumentary: Negative for rash, itching, laceration, or abrasion. Musculoskeletal: Positive for Neck Pain       Physical Exam:  Constitutional: Patient is oriented to person, place, and time. Patient appears well-developed and well nourished. HENT: Negative otherwise noted  Head: Normocephalic and Atraumatic  Nose: Normal  Eyes: Conjunctivae and EOM are normal  Neck: Normal range of motion Neck supple. Respiratory/Cardio: Effort normal. No respiratory distress. Musculoskeletal:    CERVICAL EXAMINATION:  · Inspection: Local inspection shows no step-off or bruising. Cervical alignment is normal.     · Palpation: Moderate tenderness to the right cervical paraspinal and right trapezius muscle. No evidence of tenderness at the midline. Paraspinal tenderness is present. There is no step-off or paraspinal spasm. · Range of Motion: Cervical flexion, extension, and rotation are mildly reduced with pain. · Strength: 5/5 bilateral upper extremities   · Special Tests:    ·   Spurling's, L'Hermitte's & Rick's negative bilaterally. ·   Lowry and Impingement tests are negative bilaterally. ·  Cubital and Carpal tunnel Tinel's negative bilaterally. · Skin:There are no rashes, ulcerations or lesions in right & left upper extremities. · Reflexes: Bilaterally triceps, biceps and brachioradialis are 2+. Clonus absent bilaterally at the feet.    · Gait & station: normal, patient ambulates without assistance     · Additional Examinations: · RIGHT UPPER EXTREMITY: See shoulder exam below  · LEFT UPPER EXTREMITY: Inspection/examination of the left upper extremity does not show any tenderness, deformity or injury. Range of motion is unremarkable. There is no gross instability. There are no rashes, ulcerations or lesions. Strength and tone are normal.    Right shoulder is examined there is no evidence of erythema, ecchymosis, abrasions or lacerations. No tenderness to shoulder today. Patient has regained a significant range of motion compared to Dr. Jennifer Pope last note. Passive abduction 250 degrees external rotation 70 degrees forward flexion to about 140 degrees. Patient has excellent strength with rotator cuff testing. Neurological: Patient is alert and oriented to person, place, and time. Normal strenght. No sensory deficit. Skin: Skin is warm and dry  Psychiatric: Behavior is normal. Thought content normal.  Nursing note and vitals reviewed. Labs and Imaging:     XR taken today:  No results found. X-rays taken in clinic today and preliminarily reviewed by me:  PA and lateral views of the cervical spine demonstrates straightening of cervical lordosis multilevel cervical DDD most severe at C4-5 and C5-6. There is no evidence of obvious fracture or spondylitic deformity. Previous Imagin2020 MRI cervical spine  Findings:       There is no evidence of cerebellar ectopia, syrinx or myelomalacia. The fluid sensitive sequences do not suggest a focus of bone marrow edema or acute pathology of the supporting ligamentous structures. Alignment:  Maintained     C2-C3:  Within normal limits   C3-C4:  Minor disc endplate osteophyte complex without significant canal or foraminal compromise   C4-C5:  Minor disc endplate osteophyte complex without significant canal or foraminal compromise   C5-C6:  Disc endplate osteophyte complex shallow left paracentral protrusion without cord abutment.    C6-C7:  Minor disc endplate osteophyte complex without significant canal or foraminal compromise   C7-T1:  Within normal limits     Cervical spinal cord morphology is normal without visible white matter disease. IMPRESSION:     *  Minor degenerative change.  No cord impingement, high-grade foraminal compromise or acute disease. Orders Placed This Encounter   Procedures    XR CERVICAL SPINE (2-3 VIEWS)     Standing Status:   Future     Number of Occurrences:   1     Standing Expiration Date:   9/2/2022    MRI CERVICAL SPINE WO CONTRAST     Standing Status:   Future     Standing Expiration Date:   9/9/2022       Assessment and Plan:  1. Neck pain    2. DDD (degenerative disc disease), cervical          PLAN:  This is a 46 y.o. female who presents to the clinic today for evaluation of acute worsening of chronic neck pain. Examination is consistent with cervical radiculopathy. Question etiology of cervical disc herniation versus cervical strain. There is no evidence of compression or other fracture no evidence of other acute osseous abnormality noted in the cervical spine. Patient did have an MRI that was unremarkable on 6/27/2020 unfortunately his images are not available for review today. 1.  Patient has failed conservative management and her pain actually has worsened quite a bit over the past few months. She has failed physical therapy  2. She has also failed multimedication management. Pain management specialist.  3.  Recommend further diagnostic valuation with an MRI of the cervical spine due to failure of conservative management and acute worsening of pain as well as radicular symptoms in the right arm. 4.  Follow-up after MRI. Did discuss possibility of cervical epidural steroid injections however will await MRI results at this time. Please note that this chart was generated using voice recognition Dragon dictation software.   Although every effort was made to ensure the accuracy of this automated transcription, some errors in transcription may have occurred.

## 2021-09-24 DIAGNOSIS — F43.0 STRESS REACTION: ICD-10-CM

## 2021-09-24 DIAGNOSIS — M15.9 PRIMARY OSTEOARTHRITIS INVOLVING MULTIPLE JOINTS: ICD-10-CM

## 2021-09-24 DIAGNOSIS — F41.9 ANXIETY: ICD-10-CM

## 2021-09-24 RX ORDER — ALPRAZOLAM 1 MG/1
TABLET ORAL
Qty: 30 TABLET | Refills: 0 | Status: SHIPPED | OUTPATIENT
Start: 2021-09-24 | End: 2021-10-20 | Stop reason: SDUPTHER

## 2021-09-24 RX ORDER — IBUPROFEN 800 MG/1
TABLET ORAL
Qty: 90 TABLET | Refills: 0 | Status: SHIPPED | OUTPATIENT
Start: 2021-09-24 | End: 2021-10-08 | Stop reason: SDUPTHER

## 2021-09-24 NOTE — TELEPHONE ENCOUNTER
Pharm requested    Health Maintenance   Topic Date Due    Hepatitis C screen  Never done    Pneumococcal 0-64 years Vaccine (1 of 2 - PPSV23) Never done    COVID-19 Vaccine (1) Never done    Cervical cancer screen  Never done    Colon cancer screen colonoscopy  Never done    Shingles Vaccine (1 of 2) Never done    Lipid screen  09/17/2019    Potassium monitoring  02/26/2020    Creatinine monitoring  02/26/2020    Flu vaccine (1) Never done    Hepatitis B vaccine (1 of 3 - Risk 3-dose series) 09/18/2035 (Originally 2/7/1989)    Breast cancer screen  11/15/2021    Diabetic retinal exam  12/03/2021    Diabetic foot exam  04/19/2022    A1C test (Diabetic or Prediabetic)  04/19/2022    Diabetic microalbuminuria test  04/19/2022    DTaP/Tdap/Td vaccine (2 - Td or Tdap) 01/05/2028    HIV screen  Completed    Hepatitis A vaccine  Aged Out    Hib vaccine  Aged Out    Meningococcal (ACWY) vaccine  Aged Out             (applicable per patient's age: Cancer Screenings, Depression Screening, Fall Risk Screening, Immunizations)    Hemoglobin A1C (%)   Date Value   04/19/2021 8.8   08/03/2020 8.3   02/12/2020 7.5     LDL Calculated (mg/dL)   Date Value   09/17/2018 199 (A)     AST (U/L)   Date Value   12/28/2018 52     ALT (U/L)   Date Value   12/28/2018 72     BUN (mg/dL)   Date Value   02/26/2019 12      (goal A1C is < 7)   (goal LDL is <100) need 30-50% reduction from baseline     BP Readings from Last 3 Encounters:   04/19/21 124/80   08/03/20 124/80   03/10/20 122/78    (goal /80)      All Future Testing planned in [facility]:  Lab Frequency Next Occurrence   CBC Auto Differential Once 04/04/2022   Comprehensive Metabolic Panel Once 79/34/1153   Hepatitis C Antibody Once 04/04/2022   HIV Screen Once 04/04/2022   Vitamin D 25 Hydroxy Once 04/04/2022   TSH with Reflex Once 04/04/2022   Lipid Panel Once 07/19/2021   ASIF DIGITAL SCREEN W OR WO CAD BILATERAL Once 10/02/2021   MRI CERVICAL SPINE WO CONTRAST Once 09/09/2021       Next Visit Date:  No future appointments.          Patient Active Problem List:     Stress reaction     Essential hypertension     Dysthymia     Anxiety     Mild intermittent asthma without complication     Dermatitis     Osteoarthritis of multiple joints     Fibromyalgia     Left leg cellulitis     Primary osteoarthritis of left knee     Chronic back pain     Class 2 severe obesity due to excess calories with serious comorbidity and body mass index (BMI) of 38.0 to 38.9 in [facility])     Allergic rhinitis     Type 2 diabetes mellitus with complication, without long-term current use of insulin (HCC)     Liver enzyme elevation     Narcolepsy and cataplexy     Hyperglycemia     Hypnagogic hallucinations     Mild recurrent major depression (HCC)     Moderate persistent asthma without complication     Obstructive sleep apnea syndrome     Osteochondritis dissecans     Restless legs     Peroneal tendinitis     Pain in left foot     Primary localized osteoarthrosis of ankle and foot     Dry eye syndrome of both eyes     Other insomnia     Neck pain

## 2021-09-25 DIAGNOSIS — I10 ESSENTIAL HYPERTENSION: ICD-10-CM

## 2021-09-27 RX ORDER — HYDROCHLOROTHIAZIDE 12.5 MG/1
TABLET ORAL
Qty: 90 TABLET | Refills: 1 | Status: SHIPPED | OUTPATIENT
Start: 2021-09-27 | End: 2022-04-02 | Stop reason: SDUPTHER

## 2021-09-27 NOTE — TELEPHONE ENCOUNTER
Pharm requested    Health Maintenance   Topic Date Due    Hepatitis C screen  Never done    Pneumococcal 0-64 years Vaccine (1 of 2 - PPSV23) Never done    COVID-19 Vaccine (1) Never done    Cervical cancer screen  Never done    Colon cancer screen colonoscopy  Never done    Shingles Vaccine (1 of 2) Never done    Lipid screen  09/17/2019    Potassium monitoring  02/26/2020    Creatinine monitoring  02/26/2020    Flu vaccine (1) Never done    Hepatitis B vaccine (1 of 3 - Risk 3-dose series) 09/18/2035 (Originally 2/7/1989)    Breast cancer screen  11/15/2021    Diabetic retinal exam  12/03/2021    Diabetic foot exam  04/19/2022    A1C test (Diabetic or Prediabetic)  04/19/2022    Diabetic microalbuminuria test  04/19/2022    DTaP/Tdap/Td vaccine (2 - Td or Tdap) 01/05/2028    HIV screen  Completed    Hepatitis A vaccine  Aged Out    Hib vaccine  Aged Out    Meningococcal (ACWY) vaccine  Aged Out             (applicable per patient's age: Cancer Screenings, Depression Screening, Fall Risk Screening, Immunizations)    Hemoglobin A1C (%)   Date Value   04/19/2021 8.8   08/03/2020 8.3   02/12/2020 7.5     LDL Calculated (mg/dL)   Date Value   09/17/2018 199 (A)     AST (U/L)   Date Value   12/28/2018 52     ALT (U/L)   Date Value   12/28/2018 72     BUN (mg/dL)   Date Value   02/26/2019 12      (goal A1C is < 7)   (goal LDL is <100) need 30-50% reduction from baseline     BP Readings from Last 3 Encounters:   04/19/21 124/80   08/03/20 124/80   03/10/20 122/78    (goal /80)      All Future Testing planned in [facility]:  Lab Frequency Next Occurrence   CBC Auto Differential Once 04/04/2022   Comprehensive Metabolic Panel Once 66/72/5814   Hepatitis C Antibody Once 04/04/2022   HIV Screen Once 04/04/2022   Vitamin D 25 Hydroxy Once 04/04/2022   TSH with Reflex Once 04/04/2022   Lipid Panel Once 07/19/2021   ASIF DIGITAL SCREEN W OR WO CAD BILATERAL Once 10/02/2021   MRI CERVICAL SPINE WO CONTRAST Once 09/09/2021       Next Visit Date:  No future appointments.          Patient Active Problem List:     Stress reaction     Essential hypertension     Dysthymia     Anxiety     Mild intermittent asthma without complication     Dermatitis     Osteoarthritis of multiple joints     Fibromyalgia     Left leg cellulitis     Primary osteoarthritis of left knee     Chronic back pain     Class 2 severe obesity due to excess calories with serious comorbidity and body mass index (BMI) of 38.0 to 38.9 in [facility])     Allergic rhinitis     Type 2 diabetes mellitus with complication, without long-term current use of insulin (HCC)     Liver enzyme elevation     Narcolepsy and cataplexy     Hyperglycemia     Hypnagogic hallucinations     Mild recurrent major depression (HCC)     Moderate persistent asthma without complication     Obstructive sleep apnea syndrome     Osteochondritis dissecans     Restless legs     Peroneal tendinitis     Pain in left foot     Primary localized osteoarthrosis of ankle and foot     Dry eye syndrome of both eyes     Other insomnia     Neck pain

## 2021-10-02 DIAGNOSIS — M70.50 PES ANSERINE BURSITIS: ICD-10-CM

## 2021-10-02 DIAGNOSIS — M71.22 BAKER'S CYST OF KNEE, LEFT: ICD-10-CM

## 2021-10-04 NOTE — TELEPHONE ENCOUNTER
Patient is requesting refill on Voltaren 1 % Gel for Baker's cyst of knee, left; Pes anserine bursitis. Last script given 7/6/21 500 grams tube with 2 refills.

## 2021-10-08 ENCOUNTER — OFFICE VISIT (OUTPATIENT)
Dept: FAMILY MEDICINE CLINIC | Age: 51
End: 2021-10-08
Payer: MEDICARE

## 2021-10-08 VITALS
RESPIRATION RATE: 16 BRPM | HEIGHT: 65 IN | OXYGEN SATURATION: 97 % | BODY MASS INDEX: 38.12 KG/M2 | SYSTOLIC BLOOD PRESSURE: 124 MMHG | TEMPERATURE: 97 F | HEART RATE: 68 BPM | WEIGHT: 228.8 LBS | DIASTOLIC BLOOD PRESSURE: 82 MMHG

## 2021-10-08 DIAGNOSIS — M25.561 ACUTE PAIN OF RIGHT KNEE: ICD-10-CM

## 2021-10-08 DIAGNOSIS — H04.123 DRY EYE SYNDROME OF BOTH EYES: ICD-10-CM

## 2021-10-08 DIAGNOSIS — M15.9 PRIMARY OSTEOARTHRITIS INVOLVING MULTIPLE JOINTS: ICD-10-CM

## 2021-10-08 DIAGNOSIS — Z12.11 COLON CANCER SCREENING: ICD-10-CM

## 2021-10-08 DIAGNOSIS — E11.8 TYPE 2 DIABETES MELLITUS WITH COMPLICATION, WITHOUT LONG-TERM CURRENT USE OF INSULIN (HCC): Primary | ICD-10-CM

## 2021-10-08 LAB — HBA1C MFR BLD: 8.6 %

## 2021-10-08 PROCEDURE — G8427 DOCREV CUR MEDS BY ELIG CLIN: HCPCS | Performed by: NURSE PRACTITIONER

## 2021-10-08 PROCEDURE — G8417 CALC BMI ABV UP PARAM F/U: HCPCS | Performed by: NURSE PRACTITIONER

## 2021-10-08 PROCEDURE — 3017F COLORECTAL CA SCREEN DOC REV: CPT | Performed by: NURSE PRACTITIONER

## 2021-10-08 PROCEDURE — 1036F TOBACCO NON-USER: CPT | Performed by: NURSE PRACTITIONER

## 2021-10-08 PROCEDURE — 2022F DILAT RTA XM EVC RTNOPTHY: CPT | Performed by: NURSE PRACTITIONER

## 2021-10-08 PROCEDURE — G8484 FLU IMMUNIZE NO ADMIN: HCPCS | Performed by: NURSE PRACTITIONER

## 2021-10-08 PROCEDURE — 3052F HG A1C>EQUAL 8.0%<EQUAL 9.0%: CPT | Performed by: NURSE PRACTITIONER

## 2021-10-08 PROCEDURE — 83036 HEMOGLOBIN GLYCOSYLATED A1C: CPT | Performed by: NURSE PRACTITIONER

## 2021-10-08 PROCEDURE — 99214 OFFICE O/P EST MOD 30 MIN: CPT | Performed by: NURSE PRACTITIONER

## 2021-10-08 RX ORDER — IBUPROFEN 800 MG/1
TABLET ORAL
Qty: 90 TABLET | Refills: 0 | Status: SHIPPED | OUTPATIENT
Start: 2021-10-08 | End: 2022-03-04 | Stop reason: SDUPTHER

## 2021-10-08 RX ORDER — GABAPENTIN 300 MG/1
CAPSULE ORAL
COMMUNITY
Start: 2021-09-27 | End: 2021-10-27

## 2021-10-08 SDOH — ECONOMIC STABILITY: FOOD INSECURITY: WITHIN THE PAST 12 MONTHS, YOU WORRIED THAT YOUR FOOD WOULD RUN OUT BEFORE YOU GOT MONEY TO BUY MORE.: NEVER TRUE

## 2021-10-08 SDOH — ECONOMIC STABILITY: FOOD INSECURITY: WITHIN THE PAST 12 MONTHS, THE FOOD YOU BOUGHT JUST DIDN'T LAST AND YOU DIDN'T HAVE MONEY TO GET MORE.: NEVER TRUE

## 2021-10-08 ASSESSMENT — SOCIAL DETERMINANTS OF HEALTH (SDOH): HOW HARD IS IT FOR YOU TO PAY FOR THE VERY BASICS LIKE FOOD, HOUSING, MEDICAL CARE, AND HEATING?: NOT HARD AT ALL

## 2021-10-08 ASSESSMENT — PATIENT HEALTH QUESTIONNAIRE - PHQ9
1. LITTLE INTEREST OR PLEASURE IN DOING THINGS: 0
2. FEELING DOWN, DEPRESSED OR HOPELESS: 0
SUM OF ALL RESPONSES TO PHQ QUESTIONS 1-9: 0
SUM OF ALL RESPONSES TO PHQ QUESTIONS 1-9: 0
SUM OF ALL RESPONSES TO PHQ9 QUESTIONS 1 & 2: 0
SUM OF ALL RESPONSES TO PHQ QUESTIONS 1-9: 0

## 2021-10-08 ASSESSMENT — ENCOUNTER SYMPTOMS
DIARRHEA: 0
SHORTNESS OF BREATH: 0
EYES NEGATIVE: 1
ABDOMINAL PAIN: 0
BACK PAIN: 1
GASTROINTESTINAL NEGATIVE: 1
NAUSEA: 0
CONSTIPATION: 0
RESPIRATORY NEGATIVE: 1
VOMITING: 0

## 2021-10-08 NOTE — PROGRESS NOTES
[facility]  [facility]  [address]  [facility] [address]  Dept: [phone]    10/8/2021    CHIEF COMPLAINT    Chief Complaint   Patient presents with    Diabetes    Knee Pain     right knee pain for 2 weeks, hurts to put pressure on it       HPI    Vogel November is a 46 y.o. female who presents   Chief Complaint   Patient presents with    Diabetes    Knee Pain     right knee pain for 2 weeks, hurts to put pressure on it   . Appointment to follow-up on diabetes and hypertension. DM-Continues taking Victoza, metformin XR and nightly lantus 10 units. Previously took Amaryl. This was discontinued due to GI upset. Tolerating Victoza well. She has been taking the lantus 10 units since her last appointment. Was advised to notify office of fasting blood sugars so Lantus could be adjusted if needed, but this was not done  She states her FBG \"hasn't been too bad\", but she cannot remember any values though. Denies hypo-/hyper glycemia lately, but \"a few months back\" she notes blurred vision that she thought was related to missing her victoza shot that AM. Blurred vision improved once she took her shot in the early afternoon. Lab Results       Component                Value               Date                       LABA1C                   8.6                 10/08/2021                 LABA1C                   8.8                 04/19/2021                 LABA1C                   8.3                 08/03/2020            No results found for: EAG    Hypertension -taking hydrochlorothiazide 12.5 mg, amlodipine 10 mg and metoprolol tartrate 50 mg twice daily. Denies chest pain, SOB and heart palpitations. Anxiety/depression-continues taking Effexor 150 mg & Trazodone 100 mg PRN HS when not taking her Xyrem. She continues taking Xanax PRN. She doesn't feel that her anxiety and depression is well controlled, but she feels it is better.  She has not seen a counselor, but would like to sometime when she has time. Noting she is needing to take it more lately due to her stress with her father being in the end stages of dementia. Narcolepsy- Seeing Dr. Peg Cramer. Currently taking Xyrem as it works better than Ambien. She does not like the SE so she is often taking less than prescribed with good relief of sx and reduced side effects    Asthma-continues using albuterol PRN and Symbicort BID. Using Albuterol rarely at this point. Doing well with current regiment, but noting a mild flare up with the season change. Nothing unusual or concerning. Right Knee pain- for the last two weeks. She states that she stood up out of bed and noted pain for the first times. Denies any known injury. Reports pain with putting pressure on her leg to stand. Denies pain with going up or down stairs since she is moving slow. Denies any kneeling or squatting. She is using a cane daily, but has been since prior to the pain starting. She recalled during her appointment that her knee will give out every couple of months. It's possible that this happened prior to the pain starting. She denies falls when her knee gives out. Denies right hip or ankle pain. Right index finger pain- she notes that the dog pulled on her leash roughly 1 week ago. Diabetes  She presents for her follow-up diabetic visit. She has type 2 diabetes mellitus. Her disease course has been improving. Hypoglycemia symptoms include nervousness/anxiousness (Chronic stable. ). Pertinent negatives for hypoglycemia include no dizziness or headaches. Associated symptoms include fatigue. Pertinent negatives for diabetes include no chest pain, no foot paresthesias and no weight loss. There are no hypoglycemic complications. There are no diabetic complications. Risk factors for coronary artery disease include diabetes mellitus and obesity.  Current diabetic treatment includes oral agent (dual therapy) (Victoza). Her weight is decreasing steadily. She is following a diabetic diet. Meal planning includes ADA exchanges. She has had a previous visit with a dietitian. She participates in exercise intermittently. An ACE inhibitor/angiotensin II receptor blocker is contraindicated. She does not see a podiatrist.Eye exam is current. Knee Pain   The incident occurred more than 1 week ago. There was no injury mechanism. The pain is present in the right knee. The quality of the pain is described as shooting. The pain has been worsening since onset. Associated symptoms include a loss of motion and tingling (x 1 day). Pertinent negatives include no loss of sensation, muscle weakness or numbness. She reports no foreign bodies present. The symptoms are aggravated by movement and weight bearing. She has tried ice, elevation, non-weight bearing, rest, heat and immobilization (percocet during the day, motrin in between ) for the symptoms. The treatment provided moderate relief. Hypertension  This is a chronic problem. The problem is unchanged. The problem is controlled. Associated symptoms include malaise/fatigue (chronic. Stable ) and neck pain. Pertinent negatives include no chest pain, headaches, palpitations, peripheral edema or shortness of breath. Agents associated with hypertension include NSAIDs. Risk factors for coronary artery disease include diabetes mellitus, obesity, post-menopausal state, stress and sedentary lifestyle. Past treatments include diuretics, beta blockers, calcium channel blockers and ACE inhibitors. The current treatment provides moderate improvement.          Vitals:    10/08/21 0910   BP: 124/82   Site: Left Upper Arm   Position: Sitting   Cuff Size: Large Adult   Pulse: 68   Resp: 16   Temp: 97 °F (36.1 °C)   TempSrc: Temporal   SpO2: 97%   Weight: 228 lb 12.8 oz (103.8 kg)   Height: 5' 5\" (1.651 m)       Wt Readings from Last 3 Encounters:   10/08/21 228 lb 12.8 oz (103.8 kg)   09/09/21 231 lb (104.8 kg)   04/19/21 231 lb (104.8 kg)     BP Readings from Last 3 Encounters:   10/08/21 124/82   04/19/21 124/80   08/03/20 124/80       REVIEW OF SYSTEMS    Review of Systems   Constitutional: Positive for fatigue and malaise/fatigue (chronic. Stable ). Negative for chills, fever and weight loss. Eyes: Negative. Negative for visual disturbance (wearing glasses ). Respiratory: Negative. Negative for shortness of breath. Cardiovascular: Negative. Negative for chest pain, palpitations and leg swelling. Gastrointestinal: Negative. Negative for abdominal pain, constipation, diarrhea, nausea and vomiting. Genitourinary: Negative. Musculoskeletal: Positive for back pain and neck pain. Chronic. Seeing pain management    Skin: Negative. Neurological: Positive for tingling (x 1 day). Negative for dizziness, numbness and headaches. Psychiatric/Behavioral: Positive for dysphoric mood (See HPI ) and sleep disturbance (See HPI ). The patient is nervous/anxious (Chronic stable. ).         PAST MEDICAL HISTORY    Past Medical History:   Diagnosis Date    Anxiety     Arthritis     Asthma     Cataplexy and narcolepsy     Chronic back pain     Chronic pain     Depression     Fibromyalgia     Hypertension     Liver enzyme elevation 8/19/2019    Obesity     Osteoarthritis        FAMILY HISTORY    Family History   Problem Relation Age of Onset    Heart Disease Mother     Diabetes Mother     Stroke Mother     Dementia Father     Alzheimer's Disease Father        SOCIAL HISTORY    Social History     Socioeconomic History    Marital status:      Spouse name: None    Number of children: None    Years of education: None    Highest education level: None   Occupational History    None   Tobacco Use    Smoking status: Never Smoker    Smokeless tobacco: Never Used   Vaping Use    Vaping Use: Never used   Substance and Sexual Activity    Alcohol use: No    Drug use: No    Sexual activity: Yes     Partners: Male   Other Topics Concern    None   Social History Narrative    None     Social Determinants of Health     Financial Resource Strain: Low Risk     Difficulty of Paying Living Expenses: Not hard at all   Food Insecurity: No Food Insecurity    Worried About Running Out of Food in the Last Year: Never true    [address] in the Last Year: Never true   Transportation Needs: No Transportation Needs    Lack of Transportation (Medical): No    Lack of Transportation (Non-Medical):  No   Physical Activity:     Days of Exercise per Week:     Minutes of Exercise per Session:    Stress:     Feeling of Stress :    Social Connections:     Frequency of Communication with Friends and Family:     Frequency of Social Gatherings with Friends and Family:     Attends Islam Services:     Active Member of Clubs or Organizations:     Attends Club or Organization Meetings:     Marital Status:    Intimate Partner Violence:     Fear of Current or Ex-Partner:     Emotionally Abused:     Physically Abused:     Sexually Abused:        SURGICAL HISTORY    Past Surgical History:   Procedure Laterality Date    CHOLECYSTECTOMY         CURRENT MEDICATIONS    Current Outpatient Medications   Medication Sig Dispense Refill    gabapentin (NEURONTIN) 300 MG capsule take 1-2 capsules by mouth if needed 1-3 HOURS BEFORE BEDTIME      ibuprofen (ADVIL;MOTRIN) 800 MG tablet take 1 tablet by mouth every 8 hours with food AS NEEDED FOR PAIN 90 tablet 0    diclofenac sodium (VOLTAREN) 1 % GEL apply 4 grams topically four times a day 500 g 2    hydroCHLOROthiazide (HYDRODIURIL) 12.5 MG tablet take 1 tablet by mouth daily 90 tablet 1    ALPRAZolam (XANAX) 1 MG tablet take 1 tablet by mouth every evening if needed for anxiety 30 tablet 0    albuterol sulfate  (90 Base) MCG/ACT inhaler inhale 2 puffs every 6 hours if needed for wheezing 18 g 5    venlafaxine (EFFEXOR XR) 150 MG extended release capsule take 1 capsule by mouth every morning 30 capsule 2    Liraglutide (VICTOZA) 18 MG/3ML SOPN SC injection inject 1.8 milligram subcutaneously daily 9 mL 1    traZODone (DESYREL) 100 MG tablet take 1/2-1 tablet by mouth nightly if needed for sleep DO NOT TAKE WITHIN 24 HRS OF XYREM 30 tablet 2    OneTouch Delica Lancets 50T MISC CHECK BLOOD SUGARS ONCE A  each 0    mupirocin (BACTROBAN) 2 % ointment apply to affected area three times a day 22 g 0    triamcinolone (ARISTOCORT) 0.5 % ointment apply to affected area twice a day 30 g 3    amLODIPine (NORVASC) 10 MG tablet take 1 tablet by mouth once daily 90 tablet 1    metoprolol tartrate (LOPRESSOR) 50 MG tablet take 1 tablet by mouth twice a day 180 tablet 1    Propylene Glycol 0.6 % SOLN 1 drop 4-5 times per day 15 mL 1    budesonide-formoterol (SYMBICORT) 160-4.5 MCG/ACT AERO inhale 2 puffs by mouth twice a day 10.2 g 5    polyethylene glycol-propylene glycol (SYSTANE) 0.4-0.3 % GEL ophthalmic gel place 1 drop into both eyes nightly 10 mL 0    Brimonidine Tartrate 0.025 % SOLN 1 drop in each eye every 8 hours as needed for redness.  7.5 mL 5    insulin glargine (LANTUS SOLOSTAR) 100 UNIT/ML injection pen Inject 10 Units into the skin nightly 5 pen 2    glucose monitoring kit (FREESTYLE) monitoring kit 1 kit by Does not apply route daily 1 kit 0    Insulin Pen Needle 32G X 4 MM MISC 1 each by Does not apply route daily 100 each 5    fluticasone (FLONASE) 50 MCG/ACT nasal spray 1 spray by Each Nostril route daily 2 Bottle 1    Menthol ([employer] COUGH DROPS SUGAR FREE) 7.6 MG LOZG Take 1 lozenge by mouth every 2 hours as needed (cough) 75 lozenge 5    rOPINIRole (REQUIP) 1 MG tablet take 1 tablet by mouth 1-3 HOURS BEFORE BEDTIME DAILY      XYREM 500 MG/ML SOLN solution       blood glucose test strips (ONE TOUCH ULTRA TEST) strip TEST once daily 100 strip 3    loratadine (CLARITIN) 10 MG tablet Take 1 tablet by mouth daily 30 tablet 3    Elastic Bandages & Supports (WRIST SPLINT) MISC 1 Device by Does not apply route daily 1 each 0    Elastic Bandages & Supports (WRIST SPLINT/COCK-UP/RIGHT M) MISC 1 Device by Does not apply route nightly 1 each 0    Blood Pressure Monitor [place] 1 Device by Does not apply route 2 times daily 1 Device 0    oxyCODONE-acetaminophen (PERCOCET) 7.5-325 MG per tablet       gabapentin (NEURONTIN) 600 MG tablet Take 1,200 mg by mouth 2 times daily.  Handicap Placard MISC by Does not apply route Expires 6 months after issue 1 each 0    metFORMIN (GLUCOPHAGE-XR) 500 MG extended release tablet Take 2 tablets by mouth twice daily 120 tablet 2     No current facility-administered medications for this visit. ALLERGIES    Allergies   Allergen Reactions    Lisinopril Anaphylaxis     Difficulty breathing     Zoloft [Sertraline Hcl] Anaphylaxis     Difficulty breathing     Seasonal     Amoxicillin Rash    Augmentin [Amoxicillin-Pot Clavulanate] Rash    Buspirone Rash    Paxil [Paroxetine Hcl] Other (See Comments)     Weight gain        PHYSICAL EXAM   Physical Exam  Vitals and nursing note reviewed. Constitutional:       General: She is not in acute distress. Appearance: She is well-developed. She is obese. She is not diaphoretic. HENT:      Head: Normocephalic. Right Ear: Hearing normal.      Left Ear: Hearing normal.   Eyes:      General:         Right eye: No discharge. Left eye: No discharge. Pupils: Pupils are equal.   Cardiovascular:      Rate and Rhythm: Normal rate and regular rhythm. Pulses: Normal pulses. Radial pulses are 2+ on the right side and 2+ on the left side. Heart sounds: Normal heart sounds, S1 normal and S2 normal. No murmur heard. Pulmonary:      Effort: Pulmonary effort is normal. No respiratory distress. Breath sounds: Normal breath sounds. No wheezing. Musculoskeletal:      Cervical back: Normal range of motion.       Right knee: Swelling present. No deformity, effusion, erythema, ecchymosis or lacerations. Decreased range of motion. Tenderness present over the lateral joint line. Normal pulse. Legs:    Skin:     General: Skin is warm and dry. Neurological:      Mental Status: She is alert and oriented to person, place, and time. Psychiatric:         Mood and Affect: Mood is anxious. Behavior: Behavior normal. Behavior is cooperative. ASSESSMENT/PLAN  1. Type 2 diabetes mellitus with complication, without long-term current use of insulin ([facility])  Assessment & Plan:   Uncontrolled, changes made today: Increase Lantus 20 units nightly. Monitor sugars and notify office of any persistent elevations. Discussed use, benefit, and side effects of prescribed medications. Barriers to medication compliance addressed. Advised patient to hold Lantus when sick. Orders:  -     POCT glycosylated hemoglobin (Hb A1C)  2. Primary osteoarthritis involving multiple joints  -     ibuprofen (ADVIL;MOTRIN) 800 MG tablet; take 1 tablet by mouth every 8 hours with food AS NEEDED FOR PAIN, Disp-90 tablet, R-0Normal  3. Dry eye syndrome of both eyes  4. Acute pain of right knee  -     XR KNEE RIGHT (3 VIEWS); Future  5. Colon cancer screening  -     [facility]; Future       Yi Yip received counseling on the following healthy behaviors: nutrition, exercise and medication adherence  Reviewed prior labs and health maintenance  Continue current medications, diet and exercise. Discussed use, benefit, and side effects of prescribed medications. Barriers to medication compliance addressed. Patient given educational materials - see patient instructions  Was a self-tracking handout given in paper form or via [employer]?  Yes    Requested Prescriptions     Signed Prescriptions Disp Refills    ibuprofen (ADVIL;MOTRIN) 800 MG tablet 90 tablet 0     Sig: take 1 tablet by mouth every 8 hours with food AS NEEDED FOR PAIN       All patient questions

## 2021-10-08 NOTE — PROGRESS NOTES
Depression screening done  Financial Resource Strain done  Chief Complaint   Patient presents with    Diabetes    Knee Pain     right knee pain for 2 weeks, hurts to put pressure on it

## 2021-10-08 NOTE — ASSESSMENT & PLAN NOTE
Uncontrolled, changes made today: Increase Lantus 20 units nightly. Monitor sugars and notify office of any persistent elevations. Discussed use, benefit, and side effects of prescribed medications. Barriers to medication compliance addressed. Advised patient to hold Lantus when sick.

## 2021-10-16 DIAGNOSIS — J01.90 ACUTE NON-RECURRENT SINUSITIS, UNSPECIFIED LOCATION: ICD-10-CM

## 2021-10-18 RX ORDER — FLUTICASONE PROPIONATE 50 MCG
SPRAY, SUSPENSION (ML) NASAL
Qty: 32 G | Refills: 5 | Status: SHIPPED | OUTPATIENT
Start: 2021-10-18

## 2021-10-20 DIAGNOSIS — F41.9 ANXIETY: ICD-10-CM

## 2021-10-20 DIAGNOSIS — F43.0 STRESS REACTION: ICD-10-CM

## 2021-10-20 RX ORDER — ALPRAZOLAM 1 MG/1
TABLET ORAL
Qty: 30 TABLET | Refills: 0 | Status: SHIPPED | OUTPATIENT
Start: 2021-10-20 | End: 2021-11-19
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
## 2021-10-24 PROBLEM — M25.561 ACUTE PAIN OF RIGHT KNEE: Status: ACTIVE | Noted: 2021-10-24

## 2021-10-24 PROBLEM — Z12.11 COLON CANCER SCREENING: Status: ACTIVE | Noted: 2021-10-24

## 2021-10-25 DIAGNOSIS — I10 ESSENTIAL HYPERTENSION: ICD-10-CM

## 2021-10-25 LAB
ALBUMIN SERPL-MCNC: NORMAL G/DL
ALP BLD-CCNC: NORMAL U/L
ALT SERPL-CCNC: NORMAL U/L
ANION GAP SERPL CALCULATED.3IONS-SCNC: NORMAL MMOL/L
ANTIBODY: NORMAL
AST SERPL-CCNC: NORMAL U/L
BASOPHILS ABSOLUTE: NORMAL
BASOPHILS RELATIVE PERCENT: NORMAL
BILIRUB SERPL-MCNC: NORMAL MG/DL
BUN BLDV-MCNC: NORMAL MG/DL
CALCIUM SERPL-MCNC: NORMAL MG/DL
CHLORIDE BLD-SCNC: NORMAL MMOL/L
CHOLESTEROL, TOTAL: NORMAL
CHOLESTEROL/HDL RATIO: NORMAL
CO2: NORMAL
CREAT SERPL-MCNC: NORMAL MG/DL
EOSINOPHILS ABSOLUTE: NORMAL
EOSINOPHILS RELATIVE PERCENT: NORMAL
GFR CALCULATED: NORMAL
GLUCOSE BLD-MCNC: NORMAL MG/DL
HCT VFR BLD CALC: NORMAL %
HDLC SERPL-MCNC: NORMAL MG/DL
HEMOGLOBIN: NORMAL
HIV AG/AB: NORMAL
LDL CHOLESTEROL CALCULATED: NORMAL
LYMPHOCYTES ABSOLUTE: NORMAL
LYMPHOCYTES RELATIVE PERCENT: NORMAL
MCH RBC QN AUTO: NORMAL PG
MCHC RBC AUTO-ENTMCNC: NORMAL G/DL
MCV RBC AUTO: NORMAL FL
MONOCYTES ABSOLUTE: NORMAL
MONOCYTES RELATIVE PERCENT: NORMAL
NEUTROPHILS ABSOLUTE: NORMAL
NEUTROPHILS RELATIVE PERCENT: NORMAL
NONHDLC SERPL-MCNC: NORMAL MG/DL
PDW BLD-RTO: NORMAL %
PLATELET # BLD: NORMAL 10*3/UL
PMV BLD AUTO: NORMAL FL
POTASSIUM SERPL-SCNC: NORMAL MMOL/L
RBC # BLD: NORMAL 10*6/UL
SODIUM BLD-SCNC: NORMAL MMOL/L
TOTAL PROTEIN: NORMAL
TRIGL SERPL-MCNC: NORMAL MG/DL
TSH SERPL DL<=0.05 MIU/L-ACNC: NORMAL M[IU]/L
VITAMIN D 25-HYDROXY: NORMAL
VITAMIN D2, 25 HYDROXY: NORMAL
VITAMIN D3,25 HYDROXY: NORMAL
VLDLC SERPL CALC-MCNC: NORMAL MG/DL
WBC # BLD: NORMAL 10*3/UL

## 2021-10-25 NOTE — ASSESSMENT & PLAN NOTE
Borderline controlled, continue current medications, continue active lifestyle and seen specialists as advised

## 2021-10-25 NOTE — ASSESSMENT & PLAN NOTE
Uncontrolled, changes made today: Get knee x-ray and return to Ortho if needed.   Continue utilizing over-the-counter pain relievers prior to narcotics, use heat or ice as needed and perform gentle stretching as able

## 2021-10-26 DIAGNOSIS — F43.0 STRESS REACTION: ICD-10-CM

## 2021-10-26 DIAGNOSIS — F41.9 ANXIETY: ICD-10-CM

## 2021-10-26 RX ORDER — METOPROLOL TARTRATE 50 MG/1
TABLET, FILM COATED ORAL
Qty: 180 TABLET | Refills: 1 | Status: SHIPPED | OUTPATIENT
Start: 2021-10-26 | End: 2022-04-27 | Stop reason: SDUPTHER

## 2021-10-27 PROBLEM — S01.21XA LACERATION OF NOSE: Status: ACTIVE | Noted: 2021-10-27

## 2021-10-27 PROBLEM — S02.2XXA CLOSED FRACTURE OF NASAL BONES: Status: ACTIVE | Noted: 2021-10-27

## 2021-10-27 RX ORDER — ALPRAZOLAM 1 MG/1
TABLET ORAL
Qty: 30 TABLET | Refills: 0 | OUTPATIENT
Start: 2021-10-27 | End: 2021-11-27

## 2021-10-27 NOTE — PROGRESS NOTES
DAY OF SURGERY/PROCEDURE  GUIDELINES    As a patient at the [facility] you can expect quality medical and nursing care that is centered on your individual needs. It is our goal to make your surgical experience as comfortable and excellent as possible.  ________________________________________________________________________    The following instructions are general guidelines, if any information on this sheet is different from what your doctor has instructed you to do, please follow your doctor's instructions. · Please arrive on time or your procedure may be rescheduled  · Enter through front entrance of the building. Surgery Center will be located to your right. · Upon arrival you will be taken to the pre-operative area to get ready for surgery, your family will stay in the waiting room and visit with you once you are ready for surgery. Due to special limitations please limit visitation to 1-2 members of your family at a time. When it is time for surgery your family will return to the waiting room. · You will be given a specific time when you will NOT be able to eat, drink, smoke, suck or chew ANYTHING (no water, gum, mints, cigarettes, cigars, pipes, snuff, chewing tobacco, etc.) or your surgery may be canceled. This will occur during your PAT appointment or by phone 1-2 days before surgery  · Take a shower or bath on the morning of your surgery/procedure (Hibiclens if directed)  · Brush your teeth, but do not swallow any water  · IN CASE OF ILLNESS - If you have a cold or flu symptoms (high fever, runny nose, sore throat, cough, etc.) rash, nausea, vomiting, loose stools, and/or recent contact with someone who has a contagious disease (chick pox, measles, etc.) please call your doctor before coming to the surgery center  · Take a small sip of water with heart, blood pressure, and/or seizure medication the morning of surgery.  (DO NOT take blood pressure medications that contain a diuretic)  · If applicable bring your:  · Inhaler (s)  · Hearing aid(s)  · Eyeglasses and Case (If you wear contacts they have to be removed before surgery, bring case and solution)  · Any paperwork given to you by your doctor  · Any X-rays you were told to bring  · A copy of your Living Will or Durable Power of   · DO NOT take anticoagulants (blood thinners, aspirin or aspirin-containing products) two weeks prior to your surgery. You may start taking again 2 days post-operatively, unless otherwise directed by your doctor. · DO NOT take any diabetic pills or insulin. Please bring your sliding scale instructions and sick day plan with you. If you have a low blood sugar reaction after midnight, drink 4 ounces of apple juice or regular pop. · Wear loose, comfortable clothing that is easy to put on and take off. A locker will be provided to store your clothing during the procedure. The key can be given to a family member/friend or it will remain in post-op with the nurse. · You will be returning home the same day as your surgery, you will need to have a responsible adult (25years of age or older) present to drive you home. You will need someone stay with you at home for the first 24 hours following your surgery. This is due to the anesthesia and the medication given to you during surgery and recovery. · Your doctor may talk with your family immediately following your procedure. Depending on your needs, you will stay in the recovery room between 30 minutes to 2 hours. While you are recovering, the surgery family waiting room  can answer many of your family's questions. All medically related questions will be answered by a medical professional. If you had outpatient surgery, you will meet your family for discharge instructions before leaving. DAY OF SURGERY/PROCEDURE  GUIDELINES    As a patient at the [facility], you can expect quality medical and nursing care that is centered on your individual needs. It is our goal to make your surgical experience as comfortable and excellent as possible.  ________________________________________________________________________    The following instructions are general guidelines, if any information on this sheet is different from what your doctor has instructed you to do, please follow your doctor's instructions. Please arrive on 10/28 @ 1400      Enter through entrance C. Check in at registration     Upon arrival you will be taken to the pre-operative area to get ready for surgery, your family will stay in the waiting room and visit with you once you are ready for surgery. Due to special limitations please limit visitation to 1-2 members of your family at a time. When it is time for surgery your family will return to the waiting room. You will be given a specific time when you will NOT be able to eat, drink, smoke, suck or chew ANYTHING (no water, gum, mints, cigarettes, cigars, pipes, snuff, chewing tobacco, etc.) or your surgery may be canceled. This will occur during your PAT appointment or by phone 1-2 days before surgery    Take a shower or bath on the morning of your surgery/procedure (Hibiclens if directed)    Brush your teeth, but do not swallow any water    IN CASE OF ILLNESS - If you have a cold or flu symptoms (high fever, runny nose, sore throat, cough, etc.) rash, nausea, vomiting, loose stools, and/or recent contact with someone who has a contagious disease (chick pox, measles, etc.) please call your doctor before coming to the surgery center    Take a small sip of water with heart, blood pressure, and/or seizure medication the morning of surgery.  Amlodipine, metorprolol    If applicable bring your:  Inhaler (s)  Hearing aid(s)  Eyeglasses and Case (If you wear contacts they have to be removed before surgery, bring case and solution)  CPAP     DO NOT take anticoagulants (blood thinners, aspirin or aspirin-containing products) as instructed by your physician. DO NOT take any diabetic pills or insulin morning of your surgery. Wear loose, comfortable clothing that is easy to put on and take off. They will remain in post-op with the nurse. If you will be returning home the same day as your surgery, you will need to have a responsible adult (25years of age or older) present to drive you home. You will need someone stay with you at home for the first 24 hours following your surgery. This is due to the anesthesia and the medication given to you during surgery and recovery.             ·

## 2021-10-28 ENCOUNTER — ANESTHESIA EVENT (OUTPATIENT)
Dept: OPERATING ROOM | Age: 51
End: 2021-10-28
Payer: MEDICARE

## 2021-10-28 ENCOUNTER — ANESTHESIA (OUTPATIENT)
Dept: OPERATING ROOM | Age: 51
End: 2021-10-28
Payer: MEDICARE

## 2021-10-28 ENCOUNTER — HOSPITAL ENCOUNTER (OUTPATIENT)
Age: 51
Setting detail: OUTPATIENT SURGERY
Discharge: HOME | End: 2021-10-28
Attending: PLASTIC SURGERY | Admitting: PLASTIC SURGERY
Payer: MEDICARE

## 2021-10-28 VITALS
WEIGHT: 215.5 LBS | RESPIRATION RATE: 18 BRPM | TEMPERATURE: 96.4 F | DIASTOLIC BLOOD PRESSURE: 81 MMHG | HEART RATE: 88 BPM | HEIGHT: 64 IN | OXYGEN SATURATION: 92 % | BODY MASS INDEX: 36.79 KG/M2 | SYSTOLIC BLOOD PRESSURE: 161 MMHG

## 2021-10-28 VITALS — OXYGEN SATURATION: 94 % | SYSTOLIC BLOOD PRESSURE: 133 MMHG | TEMPERATURE: 97 F | DIASTOLIC BLOOD PRESSURE: 83 MMHG

## 2021-10-28 DIAGNOSIS — E66.01 CLASS 2 SEVERE OBESITY DUE TO EXCESS CALORIES WITH SERIOUS COMORBIDITY AND BODY MASS INDEX (BMI) OF 38.0 TO 38.9 IN ADULT (HCC): ICD-10-CM

## 2021-10-28 DIAGNOSIS — G89.18 PAIN FOLLOWING SURGERY OR PROCEDURE: Primary | ICD-10-CM

## 2021-10-28 DIAGNOSIS — E11.8 TYPE 2 DIABETES MELLITUS WITH COMPLICATION, WITHOUT LONG-TERM CURRENT USE OF INSULIN (HCC): ICD-10-CM

## 2021-10-28 PROBLEM — S02.2XXB: Status: ACTIVE | Noted: 2021-10-28

## 2021-10-28 LAB
GLUCOSE BLD-MCNC: 168 MG/DL (ref 65–105)
SARS-COV-2, RAPID: NOT DETECTED
SPECIMEN DESCRIPTION: NORMAL

## 2021-10-28 PROCEDURE — 6370000000 HC RX 637 (ALT 250 FOR IP)

## 2021-10-28 PROCEDURE — 2709999900 HC NON-CHARGEABLE SUPPLY: Performed by: PLASTIC SURGERY

## 2021-10-28 PROCEDURE — 3700000001 HC ADD 15 MINUTES (ANESTHESIA): Performed by: PLASTIC SURGERY

## 2021-10-28 PROCEDURE — 3600000002 HC SURGERY LEVEL 2 BASE: Performed by: PLASTIC SURGERY

## 2021-10-28 PROCEDURE — 2500000003 HC RX 250 WO HCPCS: Performed by: ANESTHESIOLOGY

## 2021-10-28 PROCEDURE — 3600000012 HC SURGERY LEVEL 2 ADDTL 15MIN: Performed by: PLASTIC SURGERY

## 2021-10-28 PROCEDURE — 7100000001 HC PACU RECOVERY - ADDTL 15 MIN: Performed by: PLASTIC SURGERY

## 2021-10-28 PROCEDURE — 2580000003 HC RX 258: Performed by: ANESTHESIOLOGY

## 2021-10-28 PROCEDURE — 6360000002 HC RX W HCPCS: Performed by: ANESTHESIOLOGY

## 2021-10-28 PROCEDURE — 3700000000 HC ANESTHESIA ATTENDED CARE: Performed by: PLASTIC SURGERY

## 2021-10-28 PROCEDURE — 7100000011 HC PHASE II RECOVERY - ADDTL 15 MIN: Performed by: PLASTIC SURGERY

## 2021-10-28 PROCEDURE — 7100000010 HC PHASE II RECOVERY - FIRST 15 MIN: Performed by: PLASTIC SURGERY

## 2021-10-28 PROCEDURE — 82947 ASSAY GLUCOSE BLOOD QUANT: CPT

## 2021-10-28 PROCEDURE — 7100000000 HC PACU RECOVERY - FIRST 15 MIN: Performed by: PLASTIC SURGERY

## 2021-10-28 PROCEDURE — 87635 SARS-COV-2 COVID-19 AMP PRB: CPT

## 2021-10-28 PROCEDURE — 2500000003 HC RX 250 WO HCPCS: Performed by: PLASTIC SURGERY

## 2021-10-28 PROCEDURE — 6360000002 HC RX W HCPCS

## 2021-10-28 RX ORDER — 0.9 % SODIUM CHLORIDE 0.9 %
500 INTRAVENOUS SOLUTION INTRAVENOUS
Status: DISCONTINUED | OUTPATIENT
Start: 2021-10-28 | End: 2021-10-28 | Stop reason: HOSPADM

## 2021-10-28 RX ORDER — FENTANYL CITRATE 50 UG/ML
INJECTION, SOLUTION INTRAMUSCULAR; INTRAVENOUS PRN
Status: DISCONTINUED | OUTPATIENT
Start: 2021-10-28 | End: 2021-10-28 | Stop reason: SDUPTHER

## 2021-10-28 RX ORDER — MORPHINE SULFATE 2 MG/ML
2 INJECTION, SOLUTION INTRAMUSCULAR; INTRAVENOUS EVERY 5 MIN PRN
Status: DISCONTINUED | OUTPATIENT
Start: 2021-10-28 | End: 2021-10-28 | Stop reason: HOSPADM

## 2021-10-28 RX ORDER — MIDAZOLAM HYDROCHLORIDE 1 MG/ML
INJECTION INTRAMUSCULAR; INTRAVENOUS PRN
Status: DISCONTINUED | OUTPATIENT
Start: 2021-10-28 | End: 2021-10-28 | Stop reason: SDUPTHER

## 2021-10-28 RX ORDER — ONDANSETRON 2 MG/ML
INJECTION INTRAMUSCULAR; INTRAVENOUS PRN
Status: DISCONTINUED | OUTPATIENT
Start: 2021-10-28 | End: 2021-10-28 | Stop reason: SDUPTHER

## 2021-10-28 RX ORDER — CEPHALEXIN 500 MG/1
500 CAPSULE ORAL 3 TIMES DAILY
Qty: 21 CAPSULE | Refills: 0 | Status: SHIPPED | OUTPATIENT
Start: 2021-10-28 | End: 2021-11-04

## 2021-10-28 RX ORDER — DEXAMETHASONE SODIUM PHOSPHATE 10 MG/ML
INJECTION INTRAMUSCULAR; INTRAVENOUS PRN
Status: DISCONTINUED | OUTPATIENT
Start: 2021-10-28 | End: 2021-10-28 | Stop reason: SDUPTHER

## 2021-10-28 RX ORDER — OXYCODONE HYDROCHLORIDE AND ACETAMINOPHEN 5; 325 MG/1; MG/1
1 TABLET ORAL EVERY 6 HOURS PRN
Qty: 28 TABLET | Refills: 0 | Status: SHIPPED | OUTPATIENT
Start: 2021-10-28 | End: 2021-11-04

## 2021-10-28 RX ORDER — NEOSTIGMINE METHYLSULFATE 5 MG/5 ML
SYRINGE (ML) INTRAVENOUS PRN
Status: DISCONTINUED | OUTPATIENT
Start: 2021-10-28 | End: 2021-10-28 | Stop reason: SDUPTHER

## 2021-10-28 RX ORDER — PROMETHAZINE HYDROCHLORIDE 25 MG/ML
6.25 INJECTION, SOLUTION INTRAMUSCULAR; INTRAVENOUS
Status: DISCONTINUED | OUTPATIENT
Start: 2021-10-28 | End: 2021-10-28 | Stop reason: HOSPADM

## 2021-10-28 RX ORDER — HYDRALAZINE HYDROCHLORIDE 20 MG/ML
10 INJECTION INTRAMUSCULAR; INTRAVENOUS EVERY 10 MIN PRN
Status: DISCONTINUED | OUTPATIENT
Start: 2021-10-28 | End: 2021-10-28 | Stop reason: HOSPADM

## 2021-10-28 RX ORDER — MIDAZOLAM HYDROCHLORIDE 2 MG/2ML
1 INJECTION, SOLUTION INTRAMUSCULAR; INTRAVENOUS ONCE
Status: DISCONTINUED | OUTPATIENT
Start: 2021-10-28 | End: 2021-10-28 | Stop reason: HOSPADM

## 2021-10-28 RX ORDER — ONDANSETRON 2 MG/ML
4 INJECTION INTRAMUSCULAR; INTRAVENOUS
Status: DISCONTINUED | OUTPATIENT
Start: 2021-10-28 | End: 2021-10-28 | Stop reason: HOSPADM

## 2021-10-28 RX ORDER — GLYCOPYRROLATE 1 MG/5 ML
SYRINGE (ML) INTRAVENOUS PRN
Status: DISCONTINUED | OUTPATIENT
Start: 2021-10-28 | End: 2021-10-28 | Stop reason: SDUPTHER

## 2021-10-28 RX ORDER — OXYCODONE HYDROCHLORIDE AND ACETAMINOPHEN 5; 325 MG/1; MG/1
1 TABLET ORAL PRN
Status: COMPLETED | OUTPATIENT
Start: 2021-10-28 | End: 2021-10-28

## 2021-10-28 RX ORDER — LIDOCAINE HYDROCHLORIDE 10 MG/ML
INJECTION, SOLUTION EPIDURAL; INFILTRATION; INTRACAUDAL; PERINEURAL PRN
Status: DISCONTINUED | OUTPATIENT
Start: 2021-10-28 | End: 2021-10-28 | Stop reason: SDUPTHER

## 2021-10-28 RX ORDER — OXYCODONE HYDROCHLORIDE AND ACETAMINOPHEN 5; 325 MG/1; MG/1
TABLET ORAL
Status: COMPLETED
Start: 2021-10-28 | End: 2021-10-28

## 2021-10-28 RX ORDER — ROCURONIUM BROMIDE 10 MG/ML
INJECTION, SOLUTION INTRAVENOUS PRN
Status: DISCONTINUED | OUTPATIENT
Start: 2021-10-28 | End: 2021-10-28 | Stop reason: SDUPTHER

## 2021-10-28 RX ORDER — OXYCODONE HYDROCHLORIDE AND ACETAMINOPHEN 5; 325 MG/1; MG/1
2 TABLET ORAL PRN
Status: COMPLETED | OUTPATIENT
Start: 2021-10-28 | End: 2021-10-28

## 2021-10-28 RX ORDER — PHENYLEPHRINE HCL IN 0.9% NACL 1 MG/10 ML
SYRINGE (ML) INTRAVENOUS PRN
Status: DISCONTINUED | OUTPATIENT
Start: 2021-10-28 | End: 2021-10-28 | Stop reason: SDUPTHER

## 2021-10-28 RX ORDER — PROPOFOL 10 MG/ML
INJECTION, EMULSION INTRAVENOUS PRN
Status: DISCONTINUED | OUTPATIENT
Start: 2021-10-28 | End: 2021-10-28 | Stop reason: SDUPTHER

## 2021-10-28 RX ORDER — MEPERIDINE HYDROCHLORIDE 50 MG/ML
12.5 INJECTION INTRAMUSCULAR; INTRAVENOUS; SUBCUTANEOUS EVERY 5 MIN PRN
Status: DISCONTINUED | OUTPATIENT
Start: 2021-10-28 | End: 2021-10-28 | Stop reason: HOSPADM

## 2021-10-28 RX ORDER — LABETALOL 20 MG/4 ML (5 MG/ML) INTRAVENOUS SYRINGE
10 EVERY 10 MIN PRN
Status: DISCONTINUED | OUTPATIENT
Start: 2021-10-28 | End: 2021-10-28 | Stop reason: HOSPADM

## 2021-10-28 RX ORDER — CEFAZOLIN SODIUM 2 G/50ML
SOLUTION INTRAVENOUS PRN
Status: DISCONTINUED | OUTPATIENT
Start: 2021-10-28 | End: 2021-10-28 | Stop reason: SDUPTHER

## 2021-10-28 RX ORDER — SODIUM CHLORIDE, SODIUM LACTATE, POTASSIUM CHLORIDE, CALCIUM CHLORIDE 600; 310; 30; 20 MG/100ML; MG/100ML; MG/100ML; MG/100ML
INJECTION, SOLUTION INTRAVENOUS CONTINUOUS PRN
Status: DISCONTINUED | OUTPATIENT
Start: 2021-10-28 | End: 2021-10-28 | Stop reason: SDUPTHER

## 2021-10-28 RX ORDER — BUPIVACAINE HYDROCHLORIDE AND EPINEPHRINE 2.5; 5 MG/ML; UG/ML
INJECTION, SOLUTION EPIDURAL; INFILTRATION; INTRACAUDAL; PERINEURAL PRN
Status: DISCONTINUED | OUTPATIENT
Start: 2021-10-28 | End: 2021-10-28 | Stop reason: ALTCHOICE

## 2021-10-28 RX ORDER — BUPIVACAINE HYDROCHLORIDE AND EPINEPHRINE 2.5; 5 MG/ML; UG/ML
INJECTION, SOLUTION INFILTRATION; PERINEURAL
Status: DISCONTINUED
Start: 2021-10-28 | End: 2021-10-28 | Stop reason: HOSPADM

## 2021-10-28 RX ORDER — MEPERIDINE HYDROCHLORIDE 50 MG/ML
INJECTION INTRAMUSCULAR; INTRAVENOUS; SUBCUTANEOUS
Status: COMPLETED
Start: 2021-10-28 | End: 2021-10-28

## 2021-10-28 RX ORDER — DIPHENHYDRAMINE HYDROCHLORIDE 50 MG/ML
12.5 INJECTION INTRAMUSCULAR; INTRAVENOUS
Status: DISCONTINUED | OUTPATIENT
Start: 2021-10-28 | End: 2021-10-28 | Stop reason: HOSPADM

## 2021-10-28 RX ADMIN — PROPOFOL INJECTABLE EMULSION 200 MG: 10 INJECTION, EMULSION INTRAVENOUS at 17:40

## 2021-10-28 RX ADMIN — MIDAZOLAM HYDROCHLORIDE 2 MG: 1 INJECTION, SOLUTION INTRAMUSCULAR; INTRAVENOUS at 17:35

## 2021-10-28 RX ADMIN — Medication 0.4 MG: at 18:31

## 2021-10-28 RX ADMIN — MEPERIDINE HYDROCHLORIDE 12.5 MG: 50 INJECTION, SOLUTION INTRAMUSCULAR; INTRAVENOUS; SUBCUTANEOUS at 19:29

## 2021-10-28 RX ADMIN — ONDANSETRON 4 MG: 2 INJECTION, SOLUTION INTRAMUSCULAR; INTRAVENOUS at 18:31

## 2021-10-28 RX ADMIN — PROPOFOL INJECTABLE EMULSION 50 MG: 10 INJECTION, EMULSION INTRAVENOUS at 18:27

## 2021-10-28 RX ADMIN — DEXAMETHASONE SODIUM PHOSPHATE 8 MG: 10 INJECTION INTRAMUSCULAR; INTRAVENOUS at 17:40

## 2021-10-28 RX ADMIN — Medication 200 MCG: at 18:07

## 2021-10-28 RX ADMIN — ROCURONIUM BROMIDE 30 MG: 10 INJECTION INTRAVENOUS at 17:40

## 2021-10-28 RX ADMIN — OXYCODONE HYDROCHLORIDE AND ACETAMINOPHEN 1 TABLET: 5; 325 TABLET ORAL at 19:12

## 2021-10-28 RX ADMIN — CEFAZOLIN SODIUM 2000 MG: 2 SOLUTION INTRAVENOUS at 17:35

## 2021-10-28 RX ADMIN — MEPERIDINE HYDROCHLORIDE 12.5 MG: 50 INJECTION INTRAMUSCULAR; INTRAVENOUS; SUBCUTANEOUS at 19:29

## 2021-10-28 RX ADMIN — SODIUM CHLORIDE, POTASSIUM CHLORIDE, SODIUM LACTATE AND CALCIUM CHLORIDE: 600; 310; 30; 20 INJECTION, SOLUTION INTRAVENOUS at 17:35

## 2021-10-28 RX ADMIN — FENTANYL CITRATE 100 MCG: 50 INJECTION, SOLUTION INTRAMUSCULAR; INTRAVENOUS at 17:40

## 2021-10-28 RX ADMIN — Medication 3 MG: at 18:31

## 2021-10-28 RX ADMIN — Medication 200 MCG: at 17:53

## 2021-10-28 RX ADMIN — LIDOCAINE HYDROCHLORIDE 50 MG: 10 INJECTION, SOLUTION EPIDURAL; INFILTRATION; INTRACAUDAL; PERINEURAL at 17:40

## 2021-10-28 RX ADMIN — Medication 200 MCG: at 17:49

## 2021-10-28 ASSESSMENT — PULMONARY FUNCTION TESTS
PIF_VALUE: 22
PIF_VALUE: 2
PIF_VALUE: 24
PIF_VALUE: 21
PIF_VALUE: 19
PIF_VALUE: 19
PIF_VALUE: 25
PIF_VALUE: 20
PIF_VALUE: 21
PIF_VALUE: 21
PIF_VALUE: 22
PIF_VALUE: 23
PIF_VALUE: 22
PIF_VALUE: 23
PIF_VALUE: 2
PIF_VALUE: 21
PIF_VALUE: 21
PIF_VALUE: 27
PIF_VALUE: 25
PIF_VALUE: 1
PIF_VALUE: 24
PIF_VALUE: 22
PIF_VALUE: 21
PIF_VALUE: 24
PIF_VALUE: 22
PIF_VALUE: 1
PIF_VALUE: 19
PIF_VALUE: 22
PIF_VALUE: 24
PIF_VALUE: 17
PIF_VALUE: 19
PIF_VALUE: 20
PIF_VALUE: 22
PIF_VALUE: 1
PIF_VALUE: 23
PIF_VALUE: 25
PIF_VALUE: 21
PIF_VALUE: 23
PIF_VALUE: 19
PIF_VALUE: 1
PIF_VALUE: 23
PIF_VALUE: 19
PIF_VALUE: 1
PIF_VALUE: 21
PIF_VALUE: 19
PIF_VALUE: 24
PIF_VALUE: 24
PIF_VALUE: 23
PIF_VALUE: 16
PIF_VALUE: 17
PIF_VALUE: 23
PIF_VALUE: 19
PIF_VALUE: 24
PIF_VALUE: 1
PIF_VALUE: 1
PIF_VALUE: 22
PIF_VALUE: 22
PIF_VALUE: 1
PIF_VALUE: 19
PIF_VALUE: 22
PIF_VALUE: 23
PIF_VALUE: 16
PIF_VALUE: 17
PIF_VALUE: 19

## 2021-10-28 ASSESSMENT — PAIN DESCRIPTION - LOCATION
LOCATION: NOSE
LOCATION: NOSE

## 2021-10-28 ASSESSMENT — PAIN - FUNCTIONAL ASSESSMENT: PAIN_FUNCTIONAL_ASSESSMENT: 0-10

## 2021-10-28 ASSESSMENT — PAIN SCALES - GENERAL
PAINLEVEL_OUTOF10: 4
PAINLEVEL_OUTOF10: 3
PAINLEVEL_OUTOF10: 3
PAINLEVEL_OUTOF10: 4

## 2021-10-28 ASSESSMENT — PAIN DESCRIPTION - PAIN TYPE
TYPE: SURGICAL PAIN

## 2021-10-28 NOTE — ANESTHESIA POSTPROCEDURE EVALUATION
Department of Anesthesiology  Postprocedure Note    Patient: Natividad Mccabe  MRN: [id number]  YOB: 1970  Date of evaluation: 10/28/2021  Time:  6:49 PM     Procedure Summary     Date: 10/28/21 Room / Location: [facility] / [address] [place] [address]    Anesthesia Start: 7753 Anesthesia Stop: 1846    Procedures:       SHARP EXCISIONAL DEBRIDEMENT AND COMPLEX CLOSURE OF NASAL LACERATION (N/A Nose)      NASAL CLOSED REDUCTION WITH SPLINTING (N/A Nose) Diagnosis: (NASAL FRACTURE WITH LACERATION)    Surgeons: Fernando Ordoñez MD Responsible Provider: Mu Lofton MD    Anesthesia Type: general ASA Status: 3          Anesthesia Type: general    Pema Phase I: Pema Score: 10    Pema Phase II:      Last vitals: Reviewed and per EMR flowsheets.        Anesthesia Post Evaluation    Patient location during evaluation: PACU  Patient participation: complete - patient participated  Level of consciousness: awake and alert  Airway patency: patent  Nausea & Vomiting: no nausea and no vomiting  Complications: no  Cardiovascular status: hemodynamically stable  Respiratory status: face mask and spontaneous ventilation  Hydration status: euvolemic  Multimodal analgesia pain management approach

## 2021-10-28 NOTE — OP NOTE
Operative Note      Patient: Neva Dhaliwal  YOB: 1970  MRN: [id number]    Date of Procedure: 10/28/2021    Pre-Op Diagnosis: NASAL FRACTURE WITH LACERATION    Post-Op Diagnosis: Same       Procedure(s):  SHARP EXCISIONAL DEBRIDEMENT AND COMPLEX CLOSURE OF NASAL LACERATION  NASAL CLOSED REDUCTION WITH SPLINTING  1. Sharp excisional debridement and complex closure of left nasal laceration measuring a total of 8.2 cm involving the external nose and internal nasal mucosa as well as reapproximation of the alar cartilages. 2.  Open reduction and internal fixation with repair of nasal septal laceration  3. Open reduction with splinting of nasal bone fractures    Surgeon(s): Rene Apple MD    Assistant:   * No surgical staff found *    Anesthesia: General    Estimated Blood Loss (mL): Minimal    Complications: None    Specimens:   * No specimens in log *    Implants:  * No implants in log *      Drains: * No LDAs found *    Findings: Through and through laceration to the left lateral nose, alar cartilage, nasal septum, and open nasal fracture dorsum of nose. Detailed Description of Procedure: The patient was brought into the operating room and placed under general anesthesia. An LMA was inserted without difficulty. Her face was prepped and draped in sterile fashion. Patient had a complex laceration to the left side of the nose extending to the cheek and through and through lacerations into the nasal mucosa as well as open fracture of the dorsum of the nose and the septum. Her loosely approximated sutures were removed and the underlying wounds irrigated and explored. The skin edges were very tattered and torn. Sharp excisional debridement of the skin edges was performed with a #15 blade to good healthy tissue at this point the nasal bones were reduced with open reduction and the septum reapproximated where it had been lacerated on the left side.   This was reapproximated with 4-0 Vicryl

## 2021-10-28 NOTE — ANESTHESIA PRE PROCEDURE
Department of Anesthesiology  Preprocedure Note       Name:  Jaspreet November   Age:  46 y.o.  :  1970                                          MRN:  [id number]         Date:  10/28/2021      Surgeon: Jam Cunha): Leidy Camacho MD    Procedure: Procedure(s):  SHARP EXCISIONAL DEBRIDEMENT AND COMPLEX CLOSURE OF NASAL LACERATION  NASAL CLOSED REDUCTION WITH SPLINTING    Medications prior to admission:   Prior to Admission medications    Medication Sig Start Date End Date Taking?  Authorizing Provider   metoprolol tartrate (LOPRESSOR) 50 MG tablet take 1 tablet by mouth twice a day 10/26/21  Yes RUBEN Denis CNP   fluticasone Leslie May) 50 MCG/ACT nasal spray instill 1 spray into each nostril once daily 10/18/21  Yes RUBEN Denis CNP   ibuprofen (ADVIL;MOTRIN) 800 MG tablet take 1 tablet by mouth every 8 hours with food AS NEEDED FOR PAIN 10/8/21  Yes RUBEN Denis CNP   diclofenac sodium (VOLTAREN) 1 % GEL apply 4 grams topically four times a day 10/5/21  Yes ROCIO Sen   hydroCHLOROthiazide (HYDRODIURIL) 12.5 MG tablet take 1 tablet by mouth daily 21  Yes RUBEN Denis CNP   venlafaxine (EFFEXOR XR) 150 MG extended release capsule take 1 capsule by mouth every morning 21  Yes RUBEN Denis CNP   metFORMIN (GLUCOPHAGE-XR) 500 MG extended release tablet Take 2 tablets by mouth twice daily 9/1/21 10/27/21 Yes RUBEN Denis CNP   Liraglutide (VICTOZA) 18 MG/3ML SOPN SC injection inject 1.8 milligram subcutaneously daily 21  Yes RUBEN Denis CNP   traZODone (DESYREL) 100 MG tablet take 1/2-1 tablet by mouth nightly if needed for sleep DO NOT TAKE WITHIN 24 HRS OF XYREM 21  Yes RUBEN Denis CNP   amLODIPine (NORVASC) 10 MG tablet take 1 tablet by mouth once daily 5/10/21  Yes RUBEN Denis CNP   Propylene Glycol 0.6 % SOLN 1 drop 4-5 times per day 21  Yes RUBEN Denis - KIAH   polyethylene glycol-propylene glycol (SYSTANE) 0.4-0.3 % GEL ophthalmic gel place 1 drop into both eyes nightly 4/19/21  Yes RUBEN De La Cruz CNP   Brimonidine Tartrate 0.025 % SOLN 1 drop in each eye every 8 hours as needed for redness. 4/19/21  Yes RUBEN De La Cruz CNP   insulin glargine (LANTUS SOLOSTAR) 100 UNIT/ML injection pen Inject 10 Units into the skin nightly 4/19/21  Yes RUBEN De La Cruz CNP   rOPINIRole (REQUIP) 1 MG tablet take 1 tablet by mouth 1-3 HOURS BEFORE BEDTIME DAILY 11/8/20  Yes Historical Provider, MD   XYREM 500 MG/ML SOLN solution  11/13/20  Yes Historical Provider, MD   loratadine (CLARITIN) 10 MG tablet Take 1 tablet by mouth daily 8/4/20  Yes RUBEN De La Cruz CNP   oxyCODONE-acetaminophen (PERCOCET) 7.5-325 MG per tablet  10/9/19  Yes Historical Provider, MD   gabapentin (NEURONTIN) 600 MG tablet Take 1,200 mg by mouth 2 times daily.  10/9/19  Yes Historical Provider, MD   ALPRAZolam Miguelina Close) 1 MG tablet Take 1 Tablet By Mouth Every Evening If Needed for Anxiety 10/20/21 11/19/21  RUBEN De La Cruz CNP   albuterol sulfate  (90 Base) MCG/ACT inhaler inhale 2 puffs every 6 hours if needed for wheezing 9/9/21   RUBEN De La Cruz CNP   OneTouch Delica Lancets 26T MISC CHECK BLOOD SUGARS ONCE A DAY 8/18/21   RUBEN De La Cruz CNP   mupirocin [facility]) 2 % ointment apply to affected area three times a day 5/28/21   RUBEN De La Cruz CNP   budesonide-formoterol [facility]) 160-4.5 MCG/ACT AERO inhale 2 puffs by mouth twice a day 4/19/21   RUBEN De La Cruz CNP   glucose monitoring kit (FREESTYLE) monitoring kit 1 kit by Does not apply route daily 4/19/21   RUBEN De La Cruz CNP   Insulin Pen Needle 32G X 4 MM MISC 1 each by Does not apply route daily 4/19/21   RUBEN De La Cruz - CNP   blood glucose test strips (ONE TOUCH ULTRA TEST) strip TEST once daily 8/12/20   RUBEN De La Cruz - CNP   Elastic Bandages & Supports (WRIST SPLINT) MISC 1 Device by Does not apply route daily 3/10/20   RUBEN Be CNP   Elastic Bandages & Supports (WRIST SPLINT/COCK-UP/RIGHT M) MISC 1 Device by Does not apply route nightly 3/10/20   RUBEN Be CNP   Blood Pressure Monitor [place] 1 Device by Does not apply route 2 times daily 11/20/19   RUBEN Be CNP   Handicap Placard MISC by Does not apply route Expires 6 months after issue 4/5/18   RUBEN Deluca CNP       Current medications:    Current Facility-Administered Medications   Medication Dose Route Frequency Provider Last Rate Last Admin    ceFAZolin (ANCEF) IVPB                Allergies: Allergies   Allergen Reactions    Lisinopril Anaphylaxis     Difficulty breathing     Zoloft [Sertraline Hcl] Anaphylaxis     Difficulty breathing     Seasonal     Amoxicillin Rash    Augmentin [Amoxicillin-Pot Clavulanate] Rash    Buspirone Rash    Paxil [Paroxetine Hcl] Other (See Comments)     Weight gain        Problem List:    Patient Active Problem List   Diagnosis Code    Stress reaction F43.0    Essential hypertension I10    Dysthymia F34.1    Anxiety F41.9    Mild intermittent asthma without complication E92.86    Dermatitis L30.9    Osteoarthritis of multiple joints M15.9    Fibromyalgia M79.7    Left leg cellulitis L03. 80    Primary osteoarthritis of left knee M17.12    Chronic back pain M54.9, G89.29    Class 2 severe obesity due to excess calories with serious comorbidity and body mass index (BMI) of 38.0 to 38.9 in adult ([facility]) E66.01, Z68.38    Allergic rhinitis J30.9    Type 2 diabetes mellitus with complication, without long-term current use of insulin ([facility]) E11.8    Liver enzyme elevation R74.8    Narcolepsy and cataplexy G47. 411    Hyperglycemia R73.9    Hypnagogic hallucinations R44.2    Mild recurrent major depression ([facility]) F33.0    Moderate persistent asthma without complication A84.35    Obstructive sleep apnea syndrome G47.33    Osteochondritis dissecans M93.20    Restless legs G25.81    Peroneal tendinitis M76.70    Pain in left foot M79.672    Primary localized osteoarthrosis of ankle and foot M19.079    Dry eye syndrome of both eyes H04.123    Other insomnia G47.09    Neck pain M54.2    Acute pain of right knee M25.561    Colon cancer screening Z12.11    Closed fracture of nasal bones S02. 2XXA    Laceration of nose S01. 21XA       Past Medical History:        Diagnosis Date    Anxiety     Arthritis     Asthma     Cataplexy and narcolepsy     Chronic back pain     Chronic pain     Depression     Diabetes mellitus ([facility] [address]     Fibromyalgia     Hypertension     Liver enzyme elevation 8/19/2019    Obesity     Osteoarthritis        Past Surgical History:        Procedure Laterality Date    CHOLECYSTECTOMY      FOOT SURGERY Right        Social History:    Social History     Tobacco Use    Smoking status: Never Smoker    Smokeless tobacco: Never Used   Substance Use Topics    Alcohol use: No     Comment: rare                                Counseling given: Not Answered      Vital Signs (Current):   Vitals:    10/27/21 1339 10/28/21 1453 10/28/21 1508   BP:   (!) 146/85   Pulse:   83   Resp:   16   Temp:   98.3 °F (36.8 °C)   TempSrc:   Temporal   SpO2:   94%   Weight: 180 lb (81.6 kg) 215 lb 8 oz (97.8 kg)    Height: 5' 4\" (1.626 m)                                                BP Readings from Last 3 Encounters:   10/28/21 (!) 146/85   10/27/21 (!) 141/92   10/08/21 124/82       NPO Status: Time of last liquid consumption: 0800 (sips of water with am meds)                        Time of last solid consumption: 2000                        Date of last liquid consumption: 10/28/21                        Date of last solid food consumption: 10/27/21    BMI:   Wt Readings from Last 3 Encounters:   10/28/21 215 lb 8 oz (97.8 kg)   10/27/21 180 lb (81.6 kg)   10/08/21 228 lb 12.8 oz (103.8 kg)     Body mass index is 36.99 kg/m².    CBC:   Lab Results   Component Value Date    WBC 9.50 09/17/2018    RBC 5.02 09/17/2018    HGB 14.1 09/17/2018    HCT 43.4 09/17/2018    MCV 86.4 09/17/2018     09/17/2018       CMP:   Lab Results   Component Value Date     02/26/2019    K 4.9 02/26/2019    CL 94 02/26/2019    CO2 29 02/26/2019    BUN 12 02/26/2019    CREATININE 0.73 02/26/2019    LABGLOM 66.3 09/17/2018    GLUCOSE 364 11/20/2019    CALCIUM 9.9 02/26/2019    BILITOT 0.5 12/28/2018    ALKPHOS 96 12/28/2018    AST 52 12/28/2018    ALT 72 12/28/2018       POC Tests:   Recent Labs     10/28/21  1513   POCGLU 168*       Coags: No results found for: PROTIME, INR, APTT    HCG (If Applicable): No results found for: PREGTESTUR, PREGSERUM, HCG, HCGQUANT     ABGs: No results found for: PHART, PO2ART, NXO3HPP, JMD1DQO, BEART, A5LIPPRT     Type & Screen (If Applicable):  No results found for: LABABO, LABRH    Drug/Infectious Status (If Applicable):  No results found for: HIV, HEPCAB    COVID-19 Screening (If Applicable):   Lab Results   Component Value Date    COVID19 Not Detected 10/28/2021           Anesthesia Evaluation  Patient summary reviewed and Nursing notes reviewed  Airway: Mallampati: III  TM distance: >3 FB   Neck ROM: full  Mouth opening: > = 3 FB Dental:      Comment: -MISSING SOME UPPER TEETH BILATERALLY    Pulmonary:normal exam    (+) sleep apnea: on CPAP,  asthma:                           ROS comment: -ASTHMA WELL CONTROLLED   Cardiovascular:    (+) hypertension:,                   Neuro/Psych:   Negative Neuro/Psych ROS              GI/Hepatic/Renal:   (+) morbid obesity          Endo/Other:    (+) Diabetes, : arthritis:., .                  ROS comment: -NPO AFTER MIDNIGHT  -ALLERGIES - LISINOPRIL, ZOLOFT, AMOXICILLIN, BUSPIRONE, PAXIL Abdominal:             Vascular: negative vascular ROS. Other Findings:             Anesthesia Plan      general     ASA 3     (GETA)  Induction: intravenous.     MIPS: Postoperative opioids intended and Prophylactic antiemetics administered. Anesthetic plan and risks discussed with patient. Plan discussed with CRNA.     Attending anesthesiologist reviewed and agrees with Cinthya Martinez MD   10/28/2021

## 2021-10-28 NOTE — H&P
Office Note     DIGNA Rodriguez MD, FACS     Subjective:      Patient ID: Jarred Denton is a 46 y.o. female.     WESTLEY Godfrey presents to the office today for an ED follow up from [facility] on 10/19/2021. She was standing at home when she passed out after taking medication and hit her face on a table. She sustained a laceration to her nose and a nasal fracture. Sutures were placed in the ER. She is here for evaluation and consultation.      Review of Systems   HENT: Negative for congestion. Skin: Positive for wound (nasal laceration. ).          Past Medical History   Past Medical History:   Diagnosis Date    Anxiety      Arthritis      Asthma      Cataplexy and narcolepsy      Chronic back pain      Chronic pain      Depression      Diabetes mellitus (HCC)      Fibromyalgia      Hypertension      Liver enzyme elevation 8/19/2019    Obesity      Osteoarthritis           Past Surgical History         Past Surgical History:   Procedure Laterality Date    CHOLECYSTECTOMY        FOOT SURGERY Right                 Allergies   Allergen Reactions    Lisinopril Anaphylaxis       Difficulty breathing     Zoloft [Sertraline Hcl] Anaphylaxis       Difficulty breathing     Seasonal      Amoxicillin Rash    Augmentin [Amoxicillin-Pot Clavulanate] Rash    Buspirone Rash    Paxil [Paroxetine Hcl] Other (See Comments)       Weight gain       Current Facility-Administered Medications   No current facility-administered medications for this visit.      No current outpatient medications on file.                Facility-Administered Medications Ordered in Other Visits   Medication Dose Route Frequency Provider Last Rate Last Admin    ceFAZolin (ANCEF) IVPB                meperidine (DEMEROL) injection 12.5 mg  12.5 mg IntraVENous Q5 Min PRN Justice Bullard MD        morphine (PF) injection 2 mg  2 mg IntraVENous Q5 Min PRN Darleene Goodpasture, MD        HYDROmorphone (DILAUDID) injection 0.5 mg  0.5 mg IntraVENous Q5 Min PRN Sera Benito MD        HYDROmorphone (DILAUDID) injection 0.25 mg  0.25 mg IntraVENous Q5 Min PRN Sera Benito MD        HYDROmorphone (DILAUDID) injection 0.5 mg  0.5 mg IntraVENous Q5 Min PRN Sera Benito MD        oxyCODONE-acetaminophen (PERCOCET) 5-325 MG per tablet 1 tablet  1 tablet Oral PRN Sera Benito MD         Or    oxyCODONE-acetaminophen (PERCOCET) 5-325 MG per tablet 2 tablet  2 tablet Oral PRN Sera Benito MD        ondansetron (ZOFRAN) injection 4 mg  4 mg IntraVENous Once PRN Sera Benito MD        promethazine (PHENERGAN) injection 6.25 mg  6.25 mg IntraVENous Q15 Min PRN Sera Benito MD        0.9 % sodium chloride bolus  500 mL IntraVENous Once PRN Sera Benito MD        diphenhydrAMINE (BENADRYL) injection 12.5 mg  12.5 mg IntraVENous Once PRN Sera Benito MD        labetalol (NORMODYNE;TRANDATE) injection syringe 10 mg  10 mg IntraVENous Q10 Min PRN Sera Benito MD        hydrALAZINE (APRESOLINE) injection 10 mg  10 mg IntraVENous Q10 Min PRN Sera Benito MD        midazolam PF (VERSED) injection 1 mg  1 mg IntraVENous Once Sera Benito MD             Social History               Socioeconomic History    Marital status:        Spouse name: Not on file    Number of children: Not on file    Years of education: Not on file    Highest education level: Not on file   Occupational History    Not on file   Tobacco Use    Smoking status: Never Smoker    Smokeless tobacco: Never Used   Vaping Use    Vaping Use: Never used   Substance and Sexual Activity    Alcohol use:  No       Comment: rare    Drug use: No    Sexual activity: Yes       Partners: Male   Other Topics Concern    Not on file   Social History Narrative    Not on file      Social Determinants of Health          Financial Resource Strain: Low Risk     Difficulty of Paying Living Expenses: Not hard at all   Food Insecurity: No Food Insecurity    Worried About [address] [email or web address] in the Last Year: Never true    April of Food in the Last Year: Never true   Transportation Needs: No Transportation Needs    Lack of Transportation (Medical): No    Lack of Transportation (Non-Medical): No   Physical Activity:     Days of Exercise per Week:     Minutes of Exercise per Session:    Stress:     Feeling of Stress :    Social Connections:     Frequency of Communication with Friends and Family:     Frequency of Social Gatherings with Friends and Family:     Attends Church Services:     Active Member of Clubs or Organizations:     Attends Club or Organization Meetings:     Marital Status:    Intimate Partner Violence:     Fear of Current or Ex-Partner:     Emotionally Abused:     Physically Abused:     Sexually Abused:          Family History         Family History   Problem Relation Age of Onset    Heart Disease Mother      Diabetes Mother      Stroke Mother      Dementia Father      Alzheimer's Disease Father           Review of systems is otherwise negative. BP (!) 141/92   Pulse 88   Resp 22   Ht 5' 4\" (1.626 m)   Wt 180 lb (81.6 kg)   BMI 30.90 kg/m²         Objective:   Physical Exam  Vitals and nursing note reviewed. Exam conducted with a chaperone present. Constitutional:       Appearance: Normal appearance. She is well-developed. She is not diaphoretic. HENT:      Head: Normocephalic and atraumatic. Nose: Signs of injury, laceration and nasal tenderness present. Comments: Open nasal laceration with open fracture that is displaced. Disruption of the alar cartilage and septum. Eyes:      Extraocular Movements: Extraocular movements intact. Conjunctiva/sclera: Conjunctivae normal.      Pupils: Pupils are equal, round, and reactive to light. Neck:      Vascular: No JVD. Trachea: No tracheal deviation. Cardiovascular:      Rate and Rhythm: Normal rate. Pulmonary:      Effort: Pulmonary effort is normal. No respiratory distress. Breath sounds: No wheezing. Abdominal:      General: There is no distension. Palpations: Abdomen is soft. Musculoskeletal:         General: No tenderness. Normal range of motion. Cervical back: Normal range of motion. Lymphadenopathy:      Cervical: No cervical adenopathy. Skin:     General: Skin is warm and dry. Coloration: Skin is not pale. Findings: No erythema or rash. Nails: There is no clubbing. Neurological:      Mental Status: She is alert and oriented to person, place, and time. Cranial Nerves: No cranial nerve deficit. Psychiatric:         Mood and Affect: Mood normal.         Speech: Speech normal.         Behavior: Behavior normal.         Thought Content: Thought content normal.         Judgment: Judgment normal.         The patient's external medical records and imaging was independently reviewed by me.     Assessment:        Diagnosis Orders   1. Fracture of nasal bones, initial encounter for open fracture      2. Laceration of nose, initial encounter                       Plan:       Consent forms signed. Schedule for surgery.                   The patient was evaluated and examined with my nurse in the room at all times. Portions of this note were transcribed using Dragon voice recognition technology and as such may reflect some variations in voice recognition.     COVID-19 precautions were taken throughout the entire office visit. Patient was screened for COVID-19 symptoms and temperature was taken prior to coming back to the exam room.    A mask as well as gloves was worn throughout the entire office visit and distancing maintained as much as possible in between the physical examination periods.     TRUNG Goodwin MD

## 2021-10-29 RX ORDER — LIRAGLUTIDE 6 MG/ML
INJECTION SUBCUTANEOUS
Qty: 9 ML | Refills: 1 | Status: SHIPPED | OUTPATIENT
Start: 2021-10-29 | End: 2021-12-28 | Stop reason: SDUPTHER

## 2021-10-30 DIAGNOSIS — I10 ESSENTIAL HYPERTENSION: ICD-10-CM

## 2021-11-01 RX ORDER — AMLODIPINE BESYLATE 10 MG/1
TABLET ORAL
Qty: 90 TABLET | Refills: 1 | Status: SHIPPED | OUTPATIENT
Start: 2021-11-01 | End: 2022-05-19 | Stop reason: SDUPTHER

## 2021-11-01 RX ORDER — PROPYLENE GLYCOL/PEG 400 0.3 %-0.4%
DROPS OPHTHALMIC (EYE)
Qty: 30 ML | Refills: 1 | Status: SHIPPED | OUTPATIENT
Start: 2021-11-01 | End: 2022-07-24

## 2021-11-02 DIAGNOSIS — J45.20 MILD INTERMITTENT ASTHMA WITHOUT COMPLICATION: ICD-10-CM

## 2021-11-02 NOTE — TELEPHONE ENCOUNTER
Pharm requested    Health Maintenance   Topic Date Due    Hepatitis C screen  Never done    Cervical cancer screen  Never done    Colon cancer screen colonoscopy  Never done    Lipid screen  09/17/2019    Potassium monitoring  02/26/2020    Creatinine monitoring  02/26/2020    Breast cancer screen  11/15/2021    Pneumococcal 0-64 years Vaccine (1 of 2 - PPSV23) 04/08/2022 (Originally [birth year]7/1974)    COVID-19 Vaccine (1) 04/08/2022 (Originally 2/7/1980)    Flu vaccine (1) 10/08/2022 (Originally 9/1/2021)    Shingles Vaccine (1 of 2) 10/08/2022 (Originally 2/7/2018)    Hepatitis B vaccine (1 of 3 - Risk 3-dose series) 09/18/2035 (Originally 2/7/1987)    Diabetic retinal exam  12/03/2021    Diabetic foot exam  04/19/2022    Diabetic microalbuminuria test  04/19/2022    A1C test (Diabetic or Prediabetic)  10/08/2022    DTaP/Tdap/Td vaccine (3 - Td or Tdap) 10/19/2031    HIV screen  Completed    Hepatitis A vaccine  Aged Out    Hib vaccine  Aged Out    Meningococcal (ACWY) vaccine  Aged Out             (applicable per patient's age: Cancer Screenings, Depression Screening, Fall Risk Screening, Immunizations)    Hemoglobin A1C (%)   Date Value   10/08/2021 8.6   04/19/2021 8.8   08/03/2020 8.3     LDL Calculated (mg/dL)   Date Value   09/17/2018 199 (A)     AST (U/L)   Date Value   12/28/2018 52     ALT (U/L)   Date Value   12/28/2018 72     BUN (mg/dL)   Date Value   02/26/2019 12      (goal A1C is < 7)   (goal LDL is <100) need 30-50% reduction from baseline     BP Readings from Last 3 Encounters:   10/28/21 (!) 161/81   10/28/21 133/83   10/27/21 (!) 141/92    (goal /80)      All Future Testing planned in [facility]:  Lab Frequency Next Occurrence   CBC Auto Differential Once 04/04/2022   Comprehensive Metabolic Panel Once 71/22/7552   Hepatitis C Antibody Once 04/04/2022   HIV Screen Once 04/04/2022   Vitamin D 25 Hydroxy Once 04/04/2022   TSH with Reflex Once 04/04/2022   Lipid Panel Once 07/19/2021   ASIF DIGITAL SCREEN W OR WO CAD BILATERAL Once 10/02/2021   MRI CERVICAL SPINE WO CONTRAST Once 09/09/2021   XR KNEE RIGHT (3 VIEWS) Once 12/25/2021   Cologuard Once 12/25/2021       Next Visit Date:  Future Appointments   Date Time Provider Phoebe Boogie   11/3/2021 11:15 AM TRUNG Wilkerson MD [facility] None   1/14/2022 10:30 AM RUBEN De La Cruz - CNP [address]  [id number]            Patient Active Problem List:     Stress reaction     Essential hypertension     Dysthymia     Anxiety     Mild intermittent asthma without complication     Dermatitis     Osteoarthritis of multiple joints     Fibromyalgia     Left leg cellulitis     Primary osteoarthritis of left knee     Chronic back pain     Class 2 severe obesity due to excess calories with serious comorbidity and body mass index (BMI) of 38.0 to 38.9 in [facility])     Allergic rhinitis     Type 2 diabetes mellitus with complication, without long-term current use of insulin (HCC)     Liver enzyme elevation     Narcolepsy and cataplexy     Hyperglycemia     Hypnagogic hallucinations     Mild recurrent major depression (HCC)     Moderate persistent asthma without complication     Obstructive sleep apnea syndrome     Osteochondritis dissecans     Restless legs     Peroneal tendinitis     Pain in left foot     Primary localized osteoarthrosis of ankle and foot     Dry eye syndrome of both eyes     Other insomnia     Neck pain     Acute pain of right knee     Colon cancer screening     Closed fracture of nasal bones     Laceration of nose     Fracture of nasal bones, initial encounter for open fracture

## 2021-11-03 DIAGNOSIS — G47.09 OTHER INSOMNIA: ICD-10-CM

## 2021-11-03 RX ORDER — BUDESONIDE AND FORMOTEROL FUMARATE DIHYDRATE 160; 4.5 UG/1; UG/1
AEROSOL RESPIRATORY (INHALATION)
Qty: 10.2 G | Refills: 5 | Status: SHIPPED | OUTPATIENT
Start: 2021-11-03

## 2021-11-03 RX ORDER — TRAZODONE HYDROCHLORIDE 100 MG/1
TABLET ORAL
Qty: 30 TABLET | Refills: 5 | Status: SHIPPED | OUTPATIENT
Start: 2021-11-03 | End: 2022-04-14 | Stop reason: SDUPTHER

## 2021-11-22 DIAGNOSIS — Z11.59 NEED FOR HEPATITIS C SCREENING TEST: ICD-10-CM

## 2021-11-22 DIAGNOSIS — M25.561 ACUTE PAIN OF RIGHT KNEE: ICD-10-CM

## 2021-11-22 DIAGNOSIS — Z13.228 SCREENING FOR METABOLIC DISORDER: ICD-10-CM

## 2021-11-22 DIAGNOSIS — E11.8 TYPE 2 DIABETES MELLITUS WITH COMPLICATION, WITHOUT LONG-TERM CURRENT USE OF INSULIN (HCC): ICD-10-CM

## 2021-11-22 DIAGNOSIS — Z11.4 ENCOUNTER FOR SCREENING FOR HIV: ICD-10-CM

## 2021-11-22 DIAGNOSIS — Z13.21 ENCOUNTER FOR VITAMIN DEFICIENCY SCREENING: ICD-10-CM

## 2021-11-22 DIAGNOSIS — Z13.220 SCREENING, LIPID: ICD-10-CM

## 2021-11-23 PROBLEM — Z12.11 COLON CANCER SCREENING: Status: RESOLVED | Noted: 2021-10-24 | Resolved: 2021-11-23

## 2021-11-25 DIAGNOSIS — F43.0 STRESS REACTION: ICD-10-CM

## 2021-11-25 DIAGNOSIS — F34.1 DYSTHYMIA: ICD-10-CM

## 2021-11-25 DIAGNOSIS — F41.9 ANXIETY: ICD-10-CM

## 2021-11-26 RX ORDER — VENLAFAXINE HYDROCHLORIDE 150 MG/1
CAPSULE, EXTENDED RELEASE ORAL
Qty: 30 CAPSULE | Refills: 5 | Status: SHIPPED | OUTPATIENT
Start: 2021-11-26 | End: 2022-05-12 | Stop reason: SDUPTHER

## 2021-11-28 ENCOUNTER — PATIENT MESSAGE (OUTPATIENT)
Dept: FAMILY MEDICINE CLINIC | Age: 51
End: 2021-11-28

## 2021-11-29 NOTE — TELEPHONE ENCOUNTER
From: Arcadio Prajapati  To: Nilton Krause  Sent: 11/28/2021 12:41 PM EST  Subject: Prescription Question    Regarding my liver enzymes i don't think it is from the percocet that is doing that but right now that's the only thing helping my knee. I think it might be the xyrem it has high sodium and i watch my salt intake i just had like 16 x-rays at [address] per Dr Alea Vital my knee hurts to bad i think i need an MRI to see what is going on i will contact Dr Chet Olsen on monday for my knee.

## 2021-12-02 ENCOUNTER — OFFICE VISIT (OUTPATIENT)
Dept: ORTHOPEDIC SURGERY | Age: 51
End: 2021-12-02
Payer: MEDICARE

## 2021-12-02 VITALS — HEIGHT: 64 IN | WEIGHT: 215 LBS | BODY MASS INDEX: 36.7 KG/M2

## 2021-12-02 DIAGNOSIS — G89.29 CHRONIC PAIN OF RIGHT KNEE: Primary | ICD-10-CM

## 2021-12-02 DIAGNOSIS — M25.561 CHRONIC PAIN OF RIGHT KNEE: Primary | ICD-10-CM

## 2021-12-02 DIAGNOSIS — M23.91 INTERNAL DERANGEMENT OF KNEE JOINT, RIGHT: ICD-10-CM

## 2021-12-02 PROCEDURE — 1036F TOBACCO NON-USER: CPT | Performed by: PHYSICIAN ASSISTANT

## 2021-12-02 PROCEDURE — G8417 CALC BMI ABV UP PARAM F/U: HCPCS | Performed by: PHYSICIAN ASSISTANT

## 2021-12-02 PROCEDURE — 3017F COLORECTAL CA SCREEN DOC REV: CPT | Performed by: PHYSICIAN ASSISTANT

## 2021-12-02 PROCEDURE — 99214 OFFICE O/P EST MOD 30 MIN: CPT | Performed by: PHYSICIAN ASSISTANT

## 2021-12-02 PROCEDURE — G8484 FLU IMMUNIZE NO ADMIN: HCPCS | Performed by: PHYSICIAN ASSISTANT

## 2021-12-02 PROCEDURE — G8427 DOCREV CUR MEDS BY ELIG CLIN: HCPCS | Performed by: PHYSICIAN ASSISTANT

## 2021-12-03 NOTE — PROGRESS NOTES
[facility], [address] [facility] [place], [address] [place] [address], [facility] [address].           Dept Phone: [phone]           Dept Fax:  [phone]           Nikki Ortiz          Dept Phone: [phone]           Dept Fax:  [phone]      Chief Compliant:  Chief Complaint   Patient presents with    Knee Pain     right        History of Present Illness: This is a 46 y.o. female who presents to the clinic today for evaluation of had concerns including Knee Pain (right). Ms. Ludie Goodell is a 73-year-old female presents for evaluation of 6-week history of right knee pain. She does not recall any mechanism of injury prior to the onset of pain. Patient reports pain is most severe to the medial aspect of the knee and is aggravated by pretty much any activity. She does state that pain seems to be relieved by rest and elevation. She reports that her pain management physician, Dr. Wyatt Conrad did an aspiration and injection on November 3. She reports he got \"quite a bit of fluid \"did a cortisone injection at that time. She reports this actually did help quite a bit for 2 weeks however over the last 2 to 3 weeks her pain has significantly worsened. Patient denies any knee joint warmth, redness, fever or chills. She did have x-rays in mid November in the [facility] system which are available for review today.        Past History:    Current Outpatient Medications:     venlafaxine (EFFEXOR XR) 150 MG extended release capsule, take 1 capsule by mouth every morning, Disp: 30 capsule, Rfl: 5    budesonide-formoterol (SYMBICORT) 160-4.5 MCG/ACT AERO, inhale 2 puffs by mouth and INTO THE LUNGS twice a day, Disp: 10.2 g, Rfl: 5    traZODone (DESYREL) 100 MG tablet, take 1/2 to 1 tablet by mouth at bedtime if needed for sleep (DO NOT TAKE WITHIN 24 HOURS OF XYREM), Disp: 30 tablet, Rfl: 5    amLODIPine (NORVASC) 10 MG tablet, take 1 tablet by mouth once daily, Disp: 90 tablet, Rfl: 1    SYSTANE ULTRA 0.4-0.3 % ophthalmic solution, instill 1 to 2 drops INTO AFFECTED EYE(S) 4-5 TIMES PER DAY, Disp: 30 mL, Rfl: 1    VICTOZA 18 MG/3ML SOPN SC injection, inject 1.8 milligram subcutaneously daily, Disp: 9 mL, Rfl: 1    metoprolol tartrate (LOPRESSOR) 50 MG tablet, take 1 tablet by mouth twice a day, Disp: 180 tablet, Rfl: 1    fluticasone (FLONASE) 50 MCG/ACT nasal spray, instill 1 spray into each nostril once daily, Disp: 32 g, Rfl: 5    ibuprofen (ADVIL;MOTRIN) 800 MG tablet, take 1 tablet by mouth every 8 hours with food AS NEEDED FOR PAIN, Disp: 90 tablet, Rfl: 0    diclofenac sodium (VOLTAREN) 1 % GEL, apply 4 grams topically four times a day, Disp: 500 g, Rfl: 2    hydroCHLOROthiazide (HYDRODIURIL) 12.5 MG tablet, take 1 tablet by mouth daily, Disp: 90 tablet, Rfl: 1    albuterol sulfate  (90 Base) MCG/ACT inhaler, inhale 2 puffs every 6 hours if needed for wheezing, Disp: 18 g, Rfl: 5    metFORMIN (GLUCOPHAGE-XR) 500 MG extended release tablet, Take 2 tablets by mouth twice daily, Disp: 120 tablet, Rfl: 2    OneTouch Delica Lancets 36A MISC, CHECK BLOOD SUGARS ONCE A DAY, Disp: 100 each, Rfl: 0    mupirocin (BACTROBAN) 2 % ointment, apply to affected area three times a day, Disp: 22 g, Rfl: 0    Propylene Glycol 0.6 % SOLN, 1 drop 4-5 times per day, Disp: 15 mL, Rfl: 1    polyethylene glycol-propylene glycol (SYSTANE) 0.4-0.3 % GEL ophthalmic gel, place 1 drop into both eyes nightly, Disp: 10 mL, Rfl: 0    Brimonidine Tartrate 0.025 % SOLN, 1 drop in each eye every 8 hours as needed for redness. , Disp: 7.5 mL, Rfl: 5    insulin glargine (LANTUS SOLOSTAR) 100 UNIT/ML injection pen, Inject 10 Units into the skin nightly, Disp: 5 pen, Rfl: 2    glucose monitoring kit (FREESTYLE) monitoring kit, 1 kit by Does not apply route daily, Disp: 1 kit, Rfl: 0   Insulin Pen Needle 32G X 4 MM MISC, 1 each by Does not apply route daily, Disp: 100 each, Rfl: 5    rOPINIRole (REQUIP) 1 MG tablet, take 1 tablet by mouth 1-3 HOURS BEFORE BEDTIME DAILY, Disp: , Rfl:     XYREM 500 MG/ML SOLN solution, , Disp: , Rfl:     blood glucose test strips (ONE TOUCH ULTRA TEST) strip, TEST once daily, Disp: 100 strip, Rfl: 3    loratadine (CLARITIN) 10 MG tablet, Take 1 tablet by mouth daily, Disp: 30 tablet, Rfl: 3    Elastic Bandages & Supports (WRIST SPLINT) MISC, 1 Device by Does not apply route daily, Disp: 1 each, Rfl: 0    Elastic Bandages & Supports (WRIST SPLINT/COCK-UP/RIGHT M) MISC, 1 Device by Does not apply route nightly, Disp: 1 each, Rfl: 0    Blood Pressure Monitor [place], 1 Device by Does not apply route 2 times daily, Disp: 1 Device, Rfl: 0    gabapentin (NEURONTIN) 600 MG tablet, Take 1,200 mg by mouth 2 times daily. , Disp: , Rfl:     Handicap Placard MISC, by Does not apply route Expires 6 months after issue, Disp: 1 each, Rfl: 0  Allergies   Allergen Reactions    Lisinopril Anaphylaxis     Difficulty breathing     Zoloft [Sertraline Hcl] Anaphylaxis     Difficulty breathing     Seasonal     Amoxicillin Rash    Augmentin [Amoxicillin-Pot Clavulanate] Rash    Buspirone Rash    Paxil [Paroxetine Hcl] Other (See Comments)     Weight gain      Social History     Socioeconomic History    Marital status:      Spouse name: Not on file    Number of children: Not on file    Years of education: Not on file    Highest education level: Not on file   Occupational History    Not on file   Tobacco Use    Smoking status: Never Smoker    Smokeless tobacco: Never Used   Vaping Use    Vaping Use: Never used   Substance and Sexual Activity    Alcohol use: No     Comment: rare    Drug use: No    Sexual activity: Yes     Partners: Male   Other Topics Concern    Not on file   Social History Narrative    Not on file     Social Determinants of Health MD Tar[place] at [address] [address] [facility].     Family History   Problem Relation Age of Onset    Heart Disease Mother     Diabetes Mother     Stroke Mother     Dementia Father     Alzheimer's Disease Father         Review of Systems   Constitutional: Negative for fever, chills, sweats. Eyes: Negative for changes in vision, or pain. HENT: Negative for ear ache, epistaxis, or sore throat. Respiratory/Cardio: Negative for Chest pain, palpitations, SOB, or cough. Gastrointestinal: Negative for abdominal pain, N/V/D. Genitourinary: Negative for dysuria, frequency, urgency, or hematuria. Neurological: Negative for headache, numbness, or weakness. Integumentary: Negative for rash, itching, laceration, or abrasion. Musculoskeletal: Positive for Knee Pain (right)       Physical Exam:  Constitutional: Patient is oriented to person, place, and time. Patient appears well-developed and well nourished. HENT: Negative otherwise noted  Head: Normocephalic and Atraumatic  Nose: Normal  Eyes: Conjunctivae and EOM are normal  Neck: Normal range of motion Neck supple. Respiratory/Cardio: Effort normal. No respiratory distress. Musculoskeletal:    Right Knee:     Skin: warm and dry, no rash or erythema  Vasculature: 2+ pedal pulses bilaterally  Neuro: Sensation grossly intact to light touch diffusely  Alignment: Normal  Tenderness: Moderate tenderness to medial joint line. No tenderness to quad/patellar tendon, pes anserine bursa or posterior knee.   Effusion: Small    ROM: (Degrees)       A P       Extension  -5 -5       Flexion   115 120       Crepitation  Yes       Muscle strength:         Flexion   5      Extension  5      SLR   5        Extensor lag   y          Special testing:  y    Pain with deep knee flexion     y    Patellar grind       n    Patellar apprehension      n    Patellar glide         n    Lachman       n    Anterior drawer      n    Pivot shift       n    Posterior drawer      n    Dial test       n    Posterolateral drawer      n    Posterior Sag       n    MCL        n    LCL          Moderate   Medial joint line tenderness     n    Lateral joint line tenderness     y    McMurrey's         Neurological: Patient is alert and oriented to person, place, and time. Normal strenght. No sensory deficit. Skin: Skin is warm and dry  Psychiatric: Behavior is normal. Thought content normal.  Nursing note and vitals reviewed. Labs and Imaging:           X-rays taken in clinic today and preliminarily reviewed by me:  AP and lateral views of the right knee on 12/3/2021 demonstrate moderate tricompartmental degenerative changes most significant in the patellofemoral compartment. Tricompartmental osteophytes present. No evidence of acute fracture. Small effusion present. Orders Placed This Encounter   Procedures    MRI KNEE RIGHT WO CONTRAST     Standing Status:   Future     Standing Expiration Date:   12/2/2022     Order Specific Question:   Reason for exam:     Answer:   medial meniscus tear       Assessment and Plan:  1. Chronic pain of right knee    2. Internal derangement of knee joint, right          PLAN:  Sharmila Bird is a 46 y.o. old female who presents to the clinic today for evaluation of right knee pain concerning for possible medial meniscus tear of right  knee. Patient is educated on all treatment options including both nonoperative and operative intervention. At this time I believe patient will benefit from further diagnostic evaluation with MRI of the right  knee to evaluate for possible medial meniscus tear. Patient underwent aspiration and injection which only provided mild temporary relief of pain in her pain has gradually returned to previous level consistent with a medial meniscus tear.     Follow up after MRI is completed      Electronically signed by ROCIO Boswell on 12/3/21 at 9:04 AM EST        Please note that this chart was generated using voice recognition Dragon dictation software. Although every effort was made to ensure the accuracy of this automated transcription, some errors in transcription may have occurred.

## 2021-12-08 DIAGNOSIS — E11.9 TYPE 2 DIABETES MELLITUS WITHOUT COMPLICATION, WITHOUT LONG-TERM CURRENT USE OF INSULIN (HCC): ICD-10-CM

## 2021-12-09 RX ORDER — METFORMIN HYDROCHLORIDE 500 MG/1
TABLET, EXTENDED RELEASE ORAL
Qty: 120 TABLET | Refills: 2 | Status: SHIPPED | OUTPATIENT
Start: 2021-12-09 | End: 2022-06-27

## 2021-12-15 ENCOUNTER — TELEPHONE (OUTPATIENT)
Dept: ORTHOPEDIC SURGERY | Age: 51
End: 2021-12-15

## 2021-12-15 NOTE — TELEPHONE ENCOUNTER
[place] from [address] scheduling is requesting patients MRI referral be faxed at earliest convenience. Please fax to [phone]. Thank you.

## 2021-12-24 ENCOUNTER — HOSPITAL ENCOUNTER (OUTPATIENT)
Dept: MRI IMAGING | Age: 51
Discharge: HOME | End: 2021-12-26
Payer: MEDICARE

## 2021-12-24 DIAGNOSIS — G89.29 CHRONIC PAIN OF RIGHT KNEE: ICD-10-CM

## 2021-12-24 DIAGNOSIS — M25.561 CHRONIC PAIN OF RIGHT KNEE: ICD-10-CM

## 2021-12-24 PROCEDURE — 73721 MRI JNT OF LWR EXTRE W/O DYE: CPT

## 2021-12-27 ENCOUNTER — TELEPHONE (OUTPATIENT)
Dept: ORTHOPEDIC SURGERY | Age: 51
End: 2021-12-27

## 2021-12-27 NOTE — TELEPHONE ENCOUNTER
Patient is asking for a return call regarding her MRI results she has completed on 12/24/21. Phone number on file has been verified. Thank you.

## 2021-12-27 NOTE — TELEPHONE ENCOUNTER
Called patient and let her know Rose Dooley will review the results on Wednesday and we will get back with her when advised.

## 2021-12-28 DIAGNOSIS — E11.8 TYPE 2 DIABETES MELLITUS WITH COMPLICATION, WITHOUT LONG-TERM CURRENT USE OF INSULIN (HCC): ICD-10-CM

## 2021-12-28 DIAGNOSIS — M71.22 BAKER'S CYST OF KNEE, LEFT: ICD-10-CM

## 2021-12-28 DIAGNOSIS — E66.01 CLASS 2 SEVERE OBESITY DUE TO EXCESS CALORIES WITH SERIOUS COMORBIDITY AND BODY MASS INDEX (BMI) OF 38.0 TO 38.9 IN ADULT (HCC): ICD-10-CM

## 2021-12-28 DIAGNOSIS — M70.50 PES ANSERINE BURSITIS: ICD-10-CM

## 2021-12-29 ENCOUNTER — TELEPHONE (OUTPATIENT)
Dept: ORTHOPEDIC SURGERY | Age: 51
End: 2021-12-29

## 2021-12-29 RX ORDER — LIRAGLUTIDE 6 MG/ML
INJECTION SUBCUTANEOUS
Qty: 9 ML | Refills: 3 | Status: SHIPPED | OUTPATIENT
Start: 2021-12-29 | End: 2022-05-09 | Stop reason: ALTCHOICE

## 2021-12-29 NOTE — TELEPHONE ENCOUNTER
LVM for pt to return call to discuss results        Rakan Arizamoantonio  [facility]- have patient follow up with Dr. Yana Schuster to discuss possible arthroscopy. Moderate tendinosis of patellar tendon. Osteoarthritis.

## 2022-02-10 DIAGNOSIS — J45.20 MILD INTERMITTENT ASTHMA WITHOUT COMPLICATION: ICD-10-CM

## 2022-02-11 RX ORDER — ALBUTEROL SULFATE 90 UG/1
AEROSOL, METERED RESPIRATORY (INHALATION)
Qty: 18 G | Refills: 5 | Status: SHIPPED | OUTPATIENT
Start: 2022-02-11 | End: 2022-07-31

## 2022-02-17 ENCOUNTER — TELEMEDICINE (OUTPATIENT)
Dept: FAMILY MEDICINE CLINIC | Age: 52
End: 2022-02-17
Payer: MEDICARE

## 2022-02-17 DIAGNOSIS — Z12.31 SCREENING MAMMOGRAM FOR BREAST CANCER: ICD-10-CM

## 2022-02-17 DIAGNOSIS — Z79.4 TYPE 2 DIABETES MELLITUS WITHOUT COMPLICATION, WITH LONG-TERM CURRENT USE OF INSULIN (HCC): Primary | ICD-10-CM

## 2022-02-17 DIAGNOSIS — I10 ESSENTIAL HYPERTENSION: ICD-10-CM

## 2022-02-17 DIAGNOSIS — G47.411 NARCOLEPSY AND CATAPLEXY: ICD-10-CM

## 2022-02-17 DIAGNOSIS — S02.2XXD FRACTURE OF NASAL BONES, SUBSEQUENT ENCOUNTER FOR FRACTURE WITH ROUTINE HEALING: ICD-10-CM

## 2022-02-17 DIAGNOSIS — G47.09 OTHER INSOMNIA: ICD-10-CM

## 2022-02-17 DIAGNOSIS — J45.20 MILD INTERMITTENT ASTHMA WITHOUT COMPLICATION: ICD-10-CM

## 2022-02-17 DIAGNOSIS — E11.9 TYPE 2 DIABETES MELLITUS WITHOUT COMPLICATION, WITH LONG-TERM CURRENT USE OF INSULIN (HCC): Primary | ICD-10-CM

## 2022-02-17 DIAGNOSIS — Z12.11 COLON CANCER SCREENING: ICD-10-CM

## 2022-02-17 DIAGNOSIS — F41.9 ANXIETY: ICD-10-CM

## 2022-02-17 DIAGNOSIS — F33.0 MILD RECURRENT MAJOR DEPRESSION (HCC): ICD-10-CM

## 2022-02-17 DIAGNOSIS — E66.01 CLASS 2 SEVERE OBESITY DUE TO EXCESS CALORIES WITH SERIOUS COMORBIDITY AND BODY MASS INDEX (BMI) OF 38.0 TO 38.9 IN ADULT (HCC): ICD-10-CM

## 2022-02-17 PROCEDURE — 3017F COLORECTAL CA SCREEN DOC REV: CPT | Performed by: NURSE PRACTITIONER

## 2022-02-17 PROCEDURE — G8427 DOCREV CUR MEDS BY ELIG CLIN: HCPCS | Performed by: NURSE PRACTITIONER

## 2022-02-17 PROCEDURE — 2022F DILAT RTA XM EVC RTNOPTHY: CPT | Performed by: NURSE PRACTITIONER

## 2022-02-17 PROCEDURE — 3046F HEMOGLOBIN A1C LEVEL >9.0%: CPT | Performed by: NURSE PRACTITIONER

## 2022-02-17 PROCEDURE — 99213 OFFICE O/P EST LOW 20 MIN: CPT | Performed by: NURSE PRACTITIONER

## 2022-02-17 RX ORDER — GABAPENTIN 300 MG/1
CAPSULE ORAL
COMMUNITY
Start: 2022-01-20 | End: 2022-05-09 | Stop reason: ALTCHOICE

## 2022-02-17 RX ORDER — OXYCODONE AND ACETAMINOPHEN 7.5; 325 MG/1; MG/1
TABLET ORAL
COMMUNITY
Start: 2022-01-26 | End: 2022-06-10

## 2022-02-17 SDOH — ECONOMIC STABILITY: FOOD INSECURITY: WITHIN THE PAST 12 MONTHS, YOU WORRIED THAT YOUR FOOD WOULD RUN OUT BEFORE YOU GOT MONEY TO BUY MORE.: NEVER TRUE

## 2022-02-17 SDOH — ECONOMIC STABILITY: FOOD INSECURITY: WITHIN THE PAST 12 MONTHS, THE FOOD YOU BOUGHT JUST DIDN'T LAST AND YOU DIDN'T HAVE MONEY TO GET MORE.: NEVER TRUE

## 2022-02-17 ASSESSMENT — ENCOUNTER SYMPTOMS
DIARRHEA: 0
SHORTNESS OF BREATH: 0
ABDOMINAL PAIN: 0
GASTROINTESTINAL NEGATIVE: 1
COUGH: 0
NAUSEA: 0
RESPIRATORY NEGATIVE: 1
VOMITING: 0
EYES NEGATIVE: 1
BACK PAIN: 0

## 2022-02-17 ASSESSMENT — PATIENT HEALTH QUESTIONNAIRE - PHQ9
SUM OF ALL RESPONSES TO PHQ9 QUESTIONS 1 & 2: 0
SUM OF ALL RESPONSES TO PHQ QUESTIONS 1-9: 0
2. FEELING DOWN, DEPRESSED OR HOPELESS: 0
1. LITTLE INTEREST OR PLEASURE IN DOING THINGS: 0

## 2022-02-17 ASSESSMENT — SOCIAL DETERMINANTS OF HEALTH (SDOH): HOW HARD IS IT FOR YOU TO PAY FOR THE VERY BASICS LIKE FOOD, HOUSING, MEDICAL CARE, AND HEATING?: NOT HARD AT ALL

## 2022-02-17 NOTE — PROGRESS NOTES
[facility]  [address]  [place] [address] [facility] [address]  Dept: [phone]    2022    TELEHEALTH EVALUATION -- Audio/Visual (During QYREM-02 public health emergency)  Julia Rosario (:  1970) has requested an audio/video evaluation for the following concern(s):    HPI:  Appointment for diabetes follow-up. Performed using doxy. me due to patient cancelling or not showing for multiple in person appointments. Diabetes- At appointment in October Lantus was increased to 20 units nightly, but holds it if her BG is \"too low\". She \"doesn't remember what range that she holds the medication\". Continues taking Victoza, metformin XR  Previously took Amaryl. This was discontinued due to GI upset. Tolerating Victoza well. She states her FBG \"has been good\", but she cannot remember any values though. Denies symptoms of hypo-/hyper glycemia. Lab Results       Component                Value               Date                       LABA1C                   8.6                 10/08/2021                 LABA1C                   8.8                 2021                 LABA1C                   8.3                 2020            No results found for: EAG    Hypertension -taking hydrochlorothiazide 12.5 mg, amlodipine 10 mg and metoprolol tartrate 50 mg twice daily. Denies chest pain, SOB and heart palpitations. Anxiety/depression-continues taking Effexor 150 mg & Trazodone 100 mg PRN HS when not taking her Xyrem. She continues taking Xanax PRN. Narcolepsy- Seeing Dr. Matilda Olszewski. Currently taking Xyrem as it works better than Ambien. She does not like the SE so she is often taking less than prescribed with good relief of sx and reduced side effects. Asthma-continues using albuterol PRN and Symbicort BID. Using Albuterol rarely at this point.   Doing well with current regiment, but noting a mild flare up with the season change. Nothing unusual or concerning. Right Knee pain- following with [facility]. She was told that by ortho that she has torn meniscus in her knee. She has to f/u with Dr. Jeff Talamantes to get a scope scheduled, but has cancelled or rescheduled a few appointments. Hospitalization for nasal laceration in October-patient has seen plastics for laceration repair. She feels she can breathe and smell better than prior to her injury. She sustained the injury number falling forward onto her face. She believes that she took her to Xyrem doses too close together. Patient indicates that her Xyrem dose was not adjusted by Dr. Claudetta Fare s/p fall, but he is aware and since she's taken the medication since 2006 he didn't feel it needed changed. Diabetes  She presents for her follow-up diabetic visit. She has type 2 diabetes mellitus. Disease course: unclear  Hypoglycemia symptoms include nervousness/anxiousness (Chronic stable. ). Pertinent negatives for hypoglycemia include no dizziness or headaches. Associated symptoms include fatigue. Pertinent negatives for diabetes include no chest pain, no foot paresthesias and no weight loss. There are no hypoglycemic complications. There are no diabetic complications. Risk factors for coronary artery disease include diabetes mellitus and obesity. Current diabetic treatment includes oral agent (dual therapy) (Victoza). Her weight is decreasing steadily. She is following a diabetic diet. Meal planning includes ADA exchanges. She has had a previous visit with a dietitian. She participates in exercise intermittently. An ACE inhibitor/angiotensin II receptor blocker is contraindicated. She does not see a podiatrist.Eye exam is current. Hypertension  This is a chronic problem. The problem is unchanged. The problem is controlled. Associated symptoms include malaise/fatigue (chronic. Stable ) and neck pain (chronic. seeing pain managment. ).  Pertinent negatives include no chest pain, headaches, palpitations, peripheral edema or shortness of breath. Agents associated with hypertension include NSAIDs. Risk factors for coronary artery disease include diabetes mellitus, obesity, post-menopausal state, stress and sedentary lifestyle. Past treatments include diuretics, beta blockers, calcium channel blockers and ACE inhibitors. The current treatment provides moderate improvement. Patient-Reported Vitals 2/17/2022   Patient-Reported Weight 200 lbs   Patient-Reported Height 5 feet 4 inches   Patient-Reported Systolic -   Patient-Reported Diastolic -   Patient-Reported Temperature -        There were no vitals filed for this visit. Wt Readings from Last 3 Encounters:   12/02/21 215 lb (97.5 kg)   11/03/21 215 lb (97.5 kg)   10/28/21 215 lb 8 oz (97.8 kg)     BP Readings from Last 3 Encounters:   11/03/21 (!) 162/100   10/28/21 (!) 161/81   10/28/21 133/83       REVIEW OF SYSTEMS:   Review of Systems   Constitutional: Positive for fatigue and malaise/fatigue (chronic. Stable ). Negative for chills, fever and weight loss. HENT: Negative. Eyes: Negative. Negative for visual disturbance. Respiratory: Negative. Negative for cough and shortness of breath. Cardiovascular: Negative. Negative for chest pain and palpitations. Gastrointestinal: Negative. Negative for abdominal pain, diarrhea, nausea and vomiting. Genitourinary: Negative. Negative for difficulty urinating. Musculoskeletal: Positive for neck pain (chronic. seeing pain managment. ). Negative for back pain. Skin: Negative. Negative for rash. Neurological: Positive for tingling (x 1 day). Negative for dizziness, numbness and headaches. Hematological: Negative. Negative for adenopathy. Psychiatric/Behavioral: Negative for dysphoric mood. The patient is nervous/anxious (Chronic stable. ).         PAST MEDICAL HISTORY:    Past Medical History:   Diagnosis Date    Allergic rhinitis     Anxiety     Arthritis  Asthma     Cataplexy and narcolepsy     Chronic back pain     Chronic pain     Depression     [facility]     Fibromyalgia     Hypertension     Irritable bowel syndrome     Liver enzyme elevation 8/19/2019    Neuropathy 2021    Obesity     Osteoarthritis     Peripheral vascular disease (HCC)     Restless legs syndrome     Sleep apnea        FAMILY HISTORY:    Family History   Problem Relation Age of Onset    Heart Disease Mother     Diabetes Mother     Stroke Mother     Arthritis Mother     Asthma Mother     Depression Mother     High Blood Pressure Mother     High Cholesterol Mother     Dementia Father     Alzheimer's Disease Father        SOCIAL HISTORY:    Social History     Socioeconomic History    Marital status:      Spouse name: None    Number of children: None    Years of education: None    Highest education level: None   Occupational History    None   Tobacco Use    Smoking status: Never Smoker    Smokeless tobacco: Never Used   Vaping Use    Vaping Use: Never used   Substance and Sexual Activity    Alcohol use: No     Comment: rare    Drug use: No    Sexual activity: Yes     Partners: Male   Other Topics Concern    None   Social History Narrative    None     Social Determinants of Health     Financial Resource Strain: Low Risk     Difficulty of Paying Living Expenses: Not hard at all   Food Insecurity: No Food Insecurity    Worried About Running Out of Food in the Last Year: Never true    April of Food in the Last Year: Never true   Transportation Needs: No Transportation Needs    Lack of Transportation (Medical): No    Lack of Transportation (Non-Medical):  No   Physical Activity:     Days of Exercise per Week: Not on file    Minutes of Exercise per Session: Not on file   Stress:     Feeling of Stress : Not on file   Social Connections:     Frequency of Communication with Friends and Family: Not on file    Frequency of Social Gatherings with Friends and Family: Not on file    Attends Confucianism Services: Not on file    Active Member of Clubs or Organizations: Not on file    Attends Club or Organization Meetings: Not on file    Marital Status: Not on file   Intimate Partner Violence:     Fear of Current or Ex-Partner: Not on file    Emotionally Abused: Not on file    Physically Abused: Not on file    Sexually Abused: Not on file   Housing Stability:     Unable to Pay for Housing in the Last Year: Not on file    Number of [place] in the Last Year: Not on file    Unstable Housing in the Last Year: Not on file       SURGICAL HISTORY:    Past Surgical History:   Procedure Laterality Date    CHOLECYSTECTOMY      FACIAL SURGERY N/A 10/28/2021    SHARP EXCISIONAL [address] performed by Katie Carlson MD at [address] [facility]    [facility] N/A 10/28/2021    NASAL CLOSED REDUCTION WITH SPLINTING performed by Katie Carlson MD at [address]:    Current Outpatient Medications   Medication Sig Dispense Refill    gabapentin (NEURONTIN) 300 MG capsule take 1-2 capsules by mouth if needed 1-3 HOURS BEFORE BEDTIME 30      albuterol sulfate HFA (VENTOLIN HFA) 108 (90 Base) MCG/ACT inhaler inhale 2 puffs by mouth and INTO THE LUNGS every 6 hours if needed for wheezing 18 g 5    diclofenac sodium (VOLTAREN) 1 % GEL apply 4 grams topically four times a day 500 g 2    Liraglutide (VICTOZA) 18 MG/3ML SOPN SC injection inject 1.8 milligram subcutaneously daily 9 mL 3    metFORMIN (GLUCOPHAGE-XR) 500 MG extended release tablet take 2 tablet by mouth twice a day 120 tablet 2    venlafaxine (EFFEXOR XR) 150 MG extended release capsule take 1 capsule by mouth every morning 30 capsule 5    budesonide-formoterol (SYMBICORT) 160-4.5 MCG/ACT AERO inhale 2 puffs by mouth and INTO THE LUNGS twice a day 10.2 g 5    traZODone (DESYREL) 100 MG tablet take 1/2 to 1 tablet by mouth at bedtime if needed for sleep (DO NOT TAKE WITHIN 24 HOURS OF XYREM) 30 tablet 5    amLODIPine (NORVASC) 10 MG tablet take 1 tablet by mouth once daily 90 tablet 1    SYSTANE ULTRA 0.4-0.3 % ophthalmic solution instill 1 to 2 drops INTO AFFECTED EYE(S) 4-5 TIMES PER DAY 30 mL 1    metoprolol tartrate (LOPRESSOR) 50 MG tablet take 1 tablet by mouth twice a day 180 tablet 1    fluticasone (FLONASE) 50 MCG/ACT nasal spray instill 1 spray into each nostril once daily 32 g 5    ibuprofen (ADVIL;MOTRIN) 800 MG tablet take 1 tablet by mouth every 8 hours with food AS NEEDED FOR PAIN 90 tablet 0    hydroCHLOROthiazide (HYDRODIURIL) 12.5 MG tablet take 1 tablet by mouth daily 90 tablet 1    OneTouch Delica Lancets 89V MISC CHECK BLOOD SUGARS ONCE A  each 0    mupirocin (BACTROBAN) 2 % ointment apply to affected area three times a day 22 g 0    Propylene Glycol 0.6 % SOLN 1 drop 4-5 times per day 15 mL 1    polyethylene glycol-propylene glycol (SYSTANE) 0.4-0.3 % GEL ophthalmic gel place 1 drop into both eyes nightly 10 mL 0    Brimonidine Tartrate 0.025 % SOLN 1 drop in each eye every 8 hours as needed for redness.  7.5 mL 5    insulin glargine (LANTUS SOLOSTAR) 100 UNIT/ML injection pen Inject 10 Units into the skin nightly 5 pen 2    glucose monitoring kit (FREESTYLE) monitoring kit 1 kit by Does not apply route daily 1 kit 0    Insulin Pen Needle 32G X 4 MM MISC 1 each by Does not apply route daily 100 each 5    rOPINIRole (REQUIP) 1 MG tablet take 1 tablet by mouth 1-3 HOURS BEFORE BEDTIME DAILY      XYREM 500 MG/ML SOLN solution       blood glucose test strips (ONE TOUCH ULTRA TEST) strip TEST once daily 100 strip 3    loratadine (CLARITIN) 10 MG tablet Take 1 tablet by mouth daily 30 tablet 3    Elastic Bandages & Supports (WRIST SPLINT) MISC 1 Device by Does not apply route daily 1 each 0    Elastic Bandages & Supports (WRIST SPLINT/COCK-UP/RIGHT M) MISC 1 Device by Does not apply route nightly 1 each 0    Blood Pressure Monitor [place] 1 Device by Does not apply route 2 times daily 1 Device 0    gabapentin (NEURONTIN) 600 MG tablet Take 1,200 mg by mouth 2 times daily.  Handicap Placard MISC by Does not apply route Expires 6 months after issue (Patient taking differently: by Does not apply route Patient has lifetime one) 1 each 0    oxyCODONE-acetaminophen (PERCOCET) 7.5-325 MG per tablet take 1 tablet by mouth every 6 hours if needed       No current facility-administered medications for this visit. ALLERGIES:   Allergies   Allergen Reactions    Lisinopril Anaphylaxis     Difficulty breathing     Zoloft [Sertraline Hcl] Anaphylaxis     Difficulty breathing     Seasonal     Amoxicillin Rash    Augmentin [Amoxicillin-Pot Clavulanate] Rash    Buspirone Rash    Paxil [Paroxetine Hcl] Other (See Comments)     Weight gain        PHYSICAL EXAM:   Physical Exam  Vitals reviewed. Constitutional:       General: She is not in acute distress. Appearance: Normal appearance. She is well-developed. She is obese. She is not ill-appearing or toxic-appearing. HENT:      Head: Normocephalic. Right Ear: Hearing normal.      Left Ear: Hearing normal.      Mouth/Throat:      Lips: Pink. Eyes:      General: Lids are normal.      Conjunctiva/sclera: Conjunctivae normal.   Pulmonary:      Effort: Pulmonary effort is normal. No respiratory distress. Musculoskeletal:         General: Normal range of motion. Cervical back: Normal range of motion. Skin:     Findings: No rash. Neurological:      Mental Status: She is alert and oriented to person, place, and time. Mental status is at baseline. Coordination: Coordination is intact. Comments: Patient slightly disoriented.  When questioned she said she was very tired after having to get up early and take her son to work    Psychiatric:         Mood and Affect: Mood normal.         Behavior: Behavior normal. Behavior is cooperative. Thought Content: Thought content normal.         Judgment: Judgment normal.          ASSESSMENT/PLAN:  1. Type 2 diabetes mellitus without complication, with long-term current use of insulin (HCC)  Assessment & Plan:   Unclear control, continue current medications to include Lantus 20 mg nightly, Victoza &  Metformin XR. Encouraged importance of regular follow-ups with office as patient has missed or canceled several follow-ups. 2. Essential hypertension  Assessment & Plan:  Well-controlled, continue current treatment plan   3. Mild intermittent asthma without complication  Assessment & Plan:   Well-controlled, continue current treatment plan. Continue taking allergy medications as prescribed in an effort to maintain control of asthma. 4. Other insomnia  Assessment & Plan:   Monitored by specialist- no acute findings meriting change in the plan. Asked patient directly if Dr. Felecia Gallardo was aware of her fall and laceration. She indicated he was. 5. Class 2 severe obesity due to excess calories with serious comorbidity and body mass index (BMI) of 38.0 to 38.9 in [facility])  Assessment & Plan:  Discussed dietary and activity modifications to assist in weight loss. 6. Fracture of nasal bones, subsequent encounter for fracture with routine healing  Assessment & Plan:   Monitored by specialist- no acute findings meriting change in the plan  7. Mild recurrent major depression ([address]  Assessment & Plan:   Well-controlled, continue current medications  8. Anxiety  Assessment & Plan:   Well-controlled, continue current treatment plan. 9. Narcolepsy and cataplexy  Assessment & Plan:  Monitored by specialist- no acute findings meriting change in the plan. Patient to continue taking Xyrem as ordered by pulmonary. 10. Screening mammogram for breast cancer  -     ASIF DIGITAL SCREEN W OR WO CAD BILATERAL; Future  11.  Colon cancer screening       Return in about 2 months (around 4/17/2022) for HgbA1C, diabetes. Tab King is a 45 y.o. female being evaluated by a Virtual Visit (video visit) encounter to address concerns as mentioned above. A caregiver was present when appropriate. Due to this being a TeleHealth encounter ([facility]), evaluation of the following organ systems was limited: Vitals/Constitutional/EENT/Resp/CV/GI//MS/Neuro/Skin/Heme-Lymph-Imm. Pursuant to the emergency declaration under the [address] [place] [address], [address] [place] [address] authority and the [employer] Act, this Virtual Visit was conducted with patient's (and/or legal guardian's) consent, to reduce the patient's risk of exposure to COVID-19 and provide necessary medical care. The patient (and/or legal guardian) has also been advised to contact this office for worsening conditions or problems, and seek emergency medical treatment and/or call 911 if deemed necessary. Services were provided through a video synchronous discussion virtually to substitute for in-person clinic visit. Patient and provider were located at their individual homes. --RUBEN Hartley - CNP on 2/17/2022 at 11:08 AM    (Please note that portions of this note were completed with a voice recognition program. Efforts were made to edit the dictations but occasionally words are mis-transcribed. )      An electronic signature was used to authenticate this note.

## 2022-02-17 NOTE — PROGRESS NOTES
Depression screening done  Financial resource strain done  Chief Complaint   Patient presents with    Diabetes     HIM: addressed, patient will consider having these things done

## 2022-02-21 ENCOUNTER — TELEPHONE (OUTPATIENT)
Dept: FAMILY MEDICINE CLINIC | Age: 52
End: 2022-02-21

## 2022-02-21 NOTE — TELEPHONE ENCOUNTER
----- Message from Shira Varela sent at 2/21/2022 12:58 PM EST -----  Subject: Message to Provider    QUESTIONS  Information for Provider? patient would like dr to send the mamogram to   [facility] instead of [facility] -- would like a call back   ---------------------------------------------------------------------------  --------------  [address]  What is the best way for the office to contact you? OK to leave message on   voicemail  Preferred Call Back Phone Number? [phone]  ---------------------------------------------------------------------------  --------------  SCRIPT ANSWERS  Relationship to Patient?  Self

## 2022-02-27 PROBLEM — S02.2XXD FRACTURE OF NASAL BONES, SUBSEQUENT ENCOUNTER FOR FRACTURE WITH ROUTINE HEALING: Status: ACTIVE | Noted: 2021-10-28

## 2022-02-27 NOTE — ASSESSMENT & PLAN NOTE
Unclear control, continue current medications to include Lantus 20 mg nightly, Victoza &  Metformin XR. Encouraged importance of regular follow-ups with office as patient has missed or canceled several follow-ups.

## 2022-02-27 NOTE — ASSESSMENT & PLAN NOTE
Monitored by specialist- no acute findings meriting change in the plan. Patient to continue taking Xyrem as ordered by pulmonary.

## 2022-02-27 NOTE — ASSESSMENT & PLAN NOTE
Monitored by specialist- no acute findings meriting change in the plan. Asked patient directly if Dr. Lindsay Abbasi was aware of her fall and laceration. She indicated he was.

## 2022-03-04 DIAGNOSIS — E11.9 TYPE 2 DIABETES MELLITUS WITHOUT COMPLICATION, WITH LONG-TERM CURRENT USE OF INSULIN (HCC): Primary | ICD-10-CM

## 2022-03-04 DIAGNOSIS — M15.9 PRIMARY OSTEOARTHRITIS INVOLVING MULTIPLE JOINTS: ICD-10-CM

## 2022-03-04 DIAGNOSIS — Z79.4 TYPE 2 DIABETES MELLITUS WITHOUT COMPLICATION, WITH LONG-TERM CURRENT USE OF INSULIN (HCC): Primary | ICD-10-CM

## 2022-03-04 RX ORDER — GLUCOSAMINE HCL/CHONDROITIN SU 500-400 MG
CAPSULE ORAL
Qty: 100 STRIP | Refills: 1 | Status: CANCELLED | OUTPATIENT
Start: 2022-03-04

## 2022-03-05 RX ORDER — GLUCOSAMINE HCL/CHONDROITIN SU 500-400 MG
CAPSULE ORAL
Qty: 100 STRIP | Refills: 3 | Status: SHIPPED | OUTPATIENT
Start: 2022-03-05 | End: 2022-05-09

## 2022-03-05 RX ORDER — IBUPROFEN 800 MG/1
TABLET ORAL
Qty: 90 TABLET | Refills: 1 | Status: SHIPPED | OUTPATIENT
Start: 2022-03-05 | End: 2022-05-02 | Stop reason: SDUPTHER

## 2022-03-14 ENCOUNTER — TELEPHONE (OUTPATIENT)
Dept: FAMILY MEDICINE CLINIC | Age: 52
End: 2022-03-14

## 2022-03-14 NOTE — TELEPHONE ENCOUNTER
Please call patient for more information. How much weight has she lost, any nausea, medication changes, changes in her bowels? Fevers? How has her BG been?  She does have an apt next Wednesday, but that's a ways out if she is uncomfortable

## 2022-03-14 NOTE — TELEPHONE ENCOUNTER
----- Message from Jonathon Espinal sent at 3/14/2022 12:25 PM EDT -----  Subject: Message to Provider    QUESTIONS  Information for Provider? Patient is been having issues with stomach pain   after eating and loss weight been going on for 2 weeks doesnt know if she   should go to ER or what she should do.  ---------------------------------------------------------------------------  --------------  CALL BACK INFO  What is the best way for the office to contact you? OK to leave message on   voicemail  Preferred Call Back Phone Number? [phone]  ---------------------------------------------------------------------------  --------------  SCRIPT ANSWERS  Relationship to Patient?  Self

## 2022-03-16 NOTE — TELEPHONE ENCOUNTER
She finally answered and is feeling better. Every time she eats she gets diarrhea and nausea. It's been going on for 2 weeks. She did eat some french fries today and was able to keep it down. She will keep us informed and see us at her appointment on the 23rd.

## 2022-03-22 DIAGNOSIS — M71.22 BAKER'S CYST OF KNEE, LEFT: ICD-10-CM

## 2022-03-22 DIAGNOSIS — M70.50 PES ANSERINE BURSITIS: ICD-10-CM

## 2022-03-24 ENCOUNTER — OFFICE VISIT (OUTPATIENT)
Dept: ORTHOPEDIC SURGERY | Age: 52
End: 2022-03-24
Payer: MEDICARE

## 2022-03-24 DIAGNOSIS — M25.561 CHRONIC PAIN OF RIGHT KNEE: Primary | ICD-10-CM

## 2022-03-24 DIAGNOSIS — G89.29 CHRONIC PAIN OF RIGHT KNEE: Primary | ICD-10-CM

## 2022-03-24 PROCEDURE — 99213 OFFICE O/P EST LOW 20 MIN: CPT | Performed by: ORTHOPAEDIC SURGERY

## 2022-03-24 PROCEDURE — 1036F TOBACCO NON-USER: CPT | Performed by: ORTHOPAEDIC SURGERY

## 2022-03-24 PROCEDURE — 3017F COLORECTAL CA SCREEN DOC REV: CPT | Performed by: ORTHOPAEDIC SURGERY

## 2022-03-24 PROCEDURE — G8428 CUR MEDS NOT DOCUMENT: HCPCS | Performed by: ORTHOPAEDIC SURGERY

## 2022-03-24 PROCEDURE — G8484 FLU IMMUNIZE NO ADMIN: HCPCS | Performed by: ORTHOPAEDIC SURGERY

## 2022-03-24 PROCEDURE — G8417 CALC BMI ABV UP PARAM F/U: HCPCS | Performed by: ORTHOPAEDIC SURGERY

## 2022-03-24 NOTE — PROGRESS NOTES
Sherwin Maddox M.D.            [address] [facility] [address] [facility],[address], [address] [facility]           Dept Phone: [phone]           Dept Fax:  [address]           [place], [address]          Dept Phone: [phone]           Dept Fax:  [phone]      Chief Compliant:  Chief Complaint   Patient presents with    Pain     Rt knee        History of Present Illness: This is a 45 y.o. female who presents to the clinic today for evaluation / follow up of right knee pain. Patient is a 75-year-old female who was really seen by [facility] for right knee pain and injury. She had findings consistent with a medial meniscus tear. She did have an MRI dated 12/24/2021 which is indicative of a tear and volume loss of the posterior horn root ligament of the medial meniscus. Patient describes stabbing catching pain and becoming to be more difficult for her to ambulate on a regular basis. Review of Systems   Constitutional: Negative for fever, chills, sweats. Eyes: Negative for changes in vision, or pain. HENT: Negative for ear ache, epistaxis, or sore throat. Respiratory/Cardio: Negative for Chest pain, palpitations, SOB, or cough. Gastrointestinal: Negative for abdominal pain, N/V/D. Genitourinary: Negative for dysuria, frequency, urgency, or hematuria. Neurological: Negative for headache, numbness, or weakness. Integumentary: Negative for rash, itching, laceration, or abrasion. Musculoskeletal: Positive for Pain (Rt knee)       Physical Exam:  Constitutional: Patient is oriented to person, place, and time. Patient appears well-developed and well nourished. HENT: Negative otherwise noted  Head: Normocephalic and Atraumatic  Nose: Normal  Eyes: Conjunctivae and EOM are normal  Neck: Normal range of motion Neck supple.     Respiratory/Cardio: Effort normal. No respiratory distress. Musculoskeletal: Physical examination patient's right knee notes that she has slight swelling to the knee. She also has some soft tissue edema below the knee in the area of the pes that somewhat larger than typical pes bursitis. She has a little bit of tenderness area but do not appear to be a mass of anyform more of a bursitis. Patient does have a very moderate to positive Nicole's which is reproducible. She has some patellofemoral pain with compression there is no collateral findings no cruciate findings no hip findings. Neurological: Patient is alert and oriented to person, place, and time. Normal strenght. No sensory deficit. Skin: Skin is warm and dry  Psychiatric: Behavior is normal. Thought content normal.  Nursing note and vitals reviewed. Labs and Imaging:     XR taken today:          1. Complex tearing and volume loss in the root ligament of the posterior horn   and posterior horn of the medial meniscus with outward extrusion of a torn,   degenerated medial meniscus body. 2. Grade 1 MCL sprain. 3. Moderate tendinosis of the proximal popliteus tendon.  Fluid in the   popliteus hiatus. 4. Trace Abebe's cyst.   5. Tricompartmental osteoarthrosis.  Mild lateral compartment chondromalacia. Mild-to-moderate medial compartment chondromalacia.  Severe lateral   patellofemoral chondromalacia. 6. Mild superior and inferior patellar tendinosis.              No orders of the defined types were placed in this encounter. Assessment and Plan:  1. Chronic pain of right knee            This is a 45 y.o. female who presents to the clinic today for evaluation / follow up of medial meniscus tear right knee. Past History:    Current Outpatient Medications:     diclofenac sodium (VOLTAREN) 1 % GEL, apply 4 grams topically four times a day, Disp: 500 g, Rfl: 2    blood glucose monitor strips, Test 1 times a day & as needed for symptoms of irregular blood glucose.  (I show patient is using Freestyle meter, Pharmacy is requesting One Touch Ultra,.  Please dispense per machine patient has), Disp: 100 strip, Rfl: 3    ibuprofen (ADVIL;MOTRIN) 800 MG tablet, take 1 tablet by mouth every 8 hours with food AS NEEDED FOR PAIN, Disp: 90 tablet, Rfl: 1    gabapentin (NEURONTIN) 300 MG capsule, take 1-2 capsules by mouth if needed 1-3 HOURS BEFORE BEDTIME 30, Disp: , Rfl:     oxyCODONE-acetaminophen (PERCOCET) 7.5-325 MG per tablet, take 1 tablet by mouth every 6 hours if needed, Disp: , Rfl:     albuterol sulfate HFA (VENTOLIN HFA) 108 (90 Base) MCG/ACT inhaler, inhale 2 puffs by mouth and INTO THE LUNGS every 6 hours if needed for wheezing, Disp: 18 g, Rfl: 5    Liraglutide (VICTOZA) 18 MG/3ML SOPN SC injection, inject 1.8 milligram subcutaneously daily, Disp: 9 mL, Rfl: 3    metFORMIN (GLUCOPHAGE-XR) 500 MG extended release tablet, take 2 tablet by mouth twice a day, Disp: 120 tablet, Rfl: 2    venlafaxine (EFFEXOR XR) 150 MG extended release capsule, take 1 capsule by mouth every morning, Disp: 30 capsule, Rfl: 5    budesonide-formoterol (SYMBICORT) 160-4.5 MCG/ACT AERO, inhale 2 puffs by mouth and INTO THE LUNGS twice a day, Disp: 10.2 g, Rfl: 5    traZODone (DESYREL) 100 MG tablet, take 1/2 to 1 tablet by mouth at bedtime if needed for sleep (DO NOT TAKE WITHIN 24 HOURS OF XYREM), Disp: 30 tablet, Rfl: 5    amLODIPine (NORVASC) 10 MG tablet, take 1 tablet by mouth once daily, Disp: 90 tablet, Rfl: 1    SYSTANE ULTRA 0.4-0.3 % ophthalmic solution, instill 1 to 2 drops INTO AFFECTED EYE(S) 4-5 TIMES PER DAY, Disp: 30 mL, Rfl: 1    metoprolol tartrate (LOPRESSOR) 50 MG tablet, take 1 tablet by mouth twice a day, Disp: 180 tablet, Rfl: 1    fluticasone (FLONASE) 50 MCG/ACT nasal spray, instill 1 spray into each nostril once daily, Disp: 32 g, Rfl: 5    hydroCHLOROthiazide (HYDRODIURIL) 12.5 MG tablet, take 1 tablet by mouth daily, Disp: 90 tablet, Rfl: 1    OneTouch Delica Lancets 33G MISC, CHECK BLOOD SUGARS ONCE A DAY, Disp: 100 each, Rfl: 0    mupirocin (BACTROBAN) 2 % ointment, apply to affected area three times a day, Disp: 22 g, Rfl: 0    Propylene Glycol 0.6 % SOLN, 1 drop 4-5 times per day, Disp: 15 mL, Rfl: 1    polyethylene glycol-propylene glycol (SYSTANE) 0.4-0.3 % GEL ophthalmic gel, place 1 drop into both eyes nightly, Disp: 10 mL, Rfl: 0    Brimonidine Tartrate 0.025 % SOLN, 1 drop in each eye every 8 hours as needed for redness. , Disp: 7.5 mL, Rfl: 5    insulin glargine (LANTUS SOLOSTAR) 100 UNIT/ML injection pen, Inject 10 Units into the skin nightly, Disp: 5 pen, Rfl: 2    glucose monitoring kit (FREESTYLE) monitoring kit, 1 kit by Does not apply route daily, Disp: 1 kit, Rfl: 0    Insulin Pen Needle 32G X 4 MM MISC, 1 each by Does not apply route daily, Disp: 100 each, Rfl: 5    rOPINIRole (REQUIP) 1 MG tablet, take 1 tablet by mouth 1-3 HOURS BEFORE BEDTIME DAILY, Disp: , Rfl:     XYREM 500 MG/ML SOLN solution, , Disp: , Rfl:     blood glucose test strips (ONE TOUCH ULTRA TEST) strip, TEST once daily, Disp: 100 strip, Rfl: 3    loratadine (CLARITIN) 10 MG tablet, Take 1 tablet by mouth daily, Disp: 30 tablet, Rfl: 3    Elastic Bandages & Supports (WRIST SPLINT) MISC, 1 Device by Does not apply route daily, Disp: 1 each, Rfl: 0    Elastic Bandages & Supports (WRIST SPLINT/COCK-UP/RIGHT M) MISC, 1 Device by Does not apply route nightly, Disp: 1 each, Rfl: 0    Blood Pressure Monitor [place], 1 Device by Does not apply route 2 times daily, Disp: 1 Device, Rfl: 0    gabapentin (NEURONTIN) 600 MG tablet, Take 1,200 mg by mouth 2 times daily. , Disp: , Rfl:     Handicap Placard MISC, by Does not apply route Expires 6 months after issue (Patient taking differently: by Does not apply route Patient has lifetime one), Disp: 1 each, Rfl: 0  Allergies   Allergen Reactions    Lisinopril Anaphylaxis     Difficulty breathing     Zoloft [Sertraline Hcl] Anaphylaxis Difficulty breathing     Seasonal     Amoxicillin Rash    Augmentin [Amoxicillin-Pot Clavulanate] Rash    Buspirone Rash    Paxil [Paroxetine Hcl] Other (See Comments)     Weight gain      Social History     Socioeconomic History    Marital status:      Spouse name: Not on file    Number of children: Not on file    Years of education: Not on file    Highest education level: Not on file   Occupational History    Not on file   Tobacco Use    Smoking status: Never Smoker    Smokeless tobacco: Never Used   Vaping Use    Vaping Use: Never used   Substance and Sexual Activity    Alcohol use: No     Comment: rare    Drug use: No    Sexual activity: Yes     Partners: Male   Other Topics Concern    Not on file   Social History Narrative    Not on file     Social Determinants of Health     Financial Resource Strain: Low Risk     Difficulty of Paying Living Expenses: Not hard at all   Food Insecurity: No Food Insecurity    Worried About [address] in the Last Year: Never true    April of Food in the Last Year: Never true   Transportation Needs: No Transportation Needs    Lack of Transportation (Medical): No    Lack of Transportation (Non-Medical):  No   Physical Activity:     Days of Exercise per Week: Not on file    Minutes of Exercise per Session: Not on file   Stress:     Feeling of Stress : Not on file   Social Connections:     Frequency of Communication with Friends and Family: Not on file    Frequency of Social Gatherings with Friends and Family: Not on file    Attends Synagogue Services: Not on file    Active Member of Clubs or Organizations: Not on file    Attends Club or Organization Meetings: Not on file    Marital Status: Not on file   Intimate Partner Violence:     Fear of Current or Ex-Partner: Not on file    Emotionally Abused: Not on file    Physically Abused: Not on file    Sexually Abused: Not on file   Housing Stability:     Unable to Pay for Housing in the Last Year: Not on file    Number of Places Lived in the Last Year: Not on file    Unstable Housing in the Last Year: Not on file     Past Medical History:   Diagnosis Date    Allergic rhinitis     Anxiety     Arthritis     Asthma     Cataplexy and narcolepsy     Chronic back pain     Chronic pain     Depression     Diabetes mellitus (HCC)     Fibromyalgia     Hypertension     Irritable bowel syndrome     Liver enzyme elevation 8/19/2019    Neuropathy 2021    Obesity     Osteoarthritis     Peripheral vascular disease (HCC)     Restless legs syndrome     Sleep apnea      Past Surgical History:   Procedure Laterality Date    CHOLECYSTECTOMY      FACIAL SURGERY N/A 10/28/2021    SHARP EXCISIONAL DEBRIDEMENT AND COMPLEX CLOSURE OF NASAL LACERATION performed by Rosa Maria Carter MD at [address] [facility] Right     NASAL FRACTURE SURGERY N/A 10/28/2021    NASAL CLOSED REDUCTION WITH SPLINTING performed by Rosa Maria Carter MD at [address] [facility].     Family History   Problem Relation Age of Onset    Heart Disease Mother     Diabetes Mother     Stroke Mother     Arthritis Mother     Asthma Mother     Depression Mother     High Blood Pressure Mother     High Cholesterol Mother     Dementia Father     Alzheimer's Disease Father    Plan  Patient to be scheduled for arthroscopic partial meniscectomy of her right knee. She was made aware of all the risk and benefits of this. We will get her scheduled accordingly. I did tell the patient that appears that she does have like a somewhat enlarged pes bursitis rather unusual in character but there is no mention of any humongous edema other than slight edema on her MRI. Anticipate this to be self resolving. Provider Attestation:  Kaitlin Cardoso, personally performed the services described in this documentation. All medical record entries made by the scribe were at my direction and in my presence.  I have reviewed the chart and discharge instructions and agree that the records reflect my personal performance and is accurate and complete. Daryn Hayward MD. 03/24/22      Please note that this chart was generated using voice recognition Dragon dictation software. Although every effort was made to ensure the accuracy of this automated transcription, some errors in transcription may have occurred.

## 2022-03-31 DIAGNOSIS — I10 ESSENTIAL HYPERTENSION: ICD-10-CM

## 2022-04-01 ENCOUNTER — TELEPHONE (OUTPATIENT)
Dept: FAMILY MEDICINE CLINIC | Age: 52
End: 2022-04-01

## 2022-04-01 NOTE — TELEPHONE ENCOUNTER
CHIEF COMPLAINT: Depression and Anxiety       HPI:   Deon Bermudez is a 26 year old male presenting with multiple issues:   Been stressful at work, working at [employer]. Has been seeing a counselor. Having difficulty with getting to work on time. Stressful, mentally taking a toll on him. Not want to miss work. More tired with the third shift job, but not stressful. 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 Having trouble unwinding or relaxing. Having to cover his back, feeling stressed all the time. Has appointment with another counselor due to change in coverage through the work. Constantly trying to play catch up. Exhausting himself due to constant demands. Recently feeling depressed.          Outpatient Prescriptions Marked as Taking for the 6/25/18 encounter (Office Visit) with Dee Munoz MD   Medication Sig Dispense Refill   • MAGNESIUM OXIDE PO        ALLERGIES:  No Known Allergies  History   Smoking Status   • Former Smoker   • Packs/day: 0.10   • Quit date: 5/9/2016   Smokeless Tobacco   • Never Used        I have reviewed the patient's past medical, surgical, social and family history, updating these as appropriate.  See Histories section of the EMR for a display of this information.     ROS:   Negative for head, eyes, ears, nose, throat, Cardiac, pulmonary, GI, , neuro, endo, psych except as noted in HPI.     Physical examination:    Visit Vitals  /70   Pulse 70   Wt 90.7 kg   SpO2 99%   BMI 25.00 kg/m²     General: Well developed, well nourished, alert 26 year old male in no distress.  Eyes: PERRLA, EOMI, conjunctiva/sclera clear.  Neck: Supple, no adenopathy, no jugular venous distention, no bruits,   Thyroid: No thyromegaly, nodules or tenderness.  Lungs: Clear to auscultation, no rales, rhonchi, retraction, or wheezes.  Heart: Regular rate and rhythm without murmur, no heave or thrill.  Extremities: No cyanosis, no clubbing, warm, no edema.  Psych: Alert and oriented x 3.  Did she have testing that identified a DVT? If so where?     Sounds like she needs an appointment to discuss this further Cooperative. Memory intact. Mood and affect normal.    Assessment:    1. Moderate major depression (CMS/HCC)    2. Generalized anxiety disorder    3. Genital warts        PLAN:  · Moderate major depression: Discussed options regarding treatment, and is agreeable to starting on Wellbutrin  mg p.o. q. day to help with the depression. I would recommend that he continue with counseling. Did also explain to him that Wellbutrin will not help with anxiety symptoms.  · Generalized anxiety disorder: Patient only notes this 2-3 times a week at most. For most part has been able to work through this but with stress at work is been difficult. Will give him a prescription for alprazolam 0.25 mg p.o. b.i.d. p.r.n., #60 and one refill sent pharmacy. Have explained that this medication can be addicting, and is a controlled substance. He will need to follow-up in 3 months for refills.  · Genital warts: On penile shaft. It had in the past. Did have them frozen in the past, but would prefer to avoid this if possible. We'll give him a prescription for Aldara 5% cream apply 3 times a week ×8 hours then wash off with soap and water. We'll follow-up if persisting.    Depression screening:  Over the last 2 weeks, how often have you been bothered by the following problems?         PHQ2 Score:  5  1. Little interest or pleasure in doing things?:  Nearly every day  2. Feeling down, depressed, or hopeless?:  More than half the days     PHQ9 Score:  17  3. Trouble falling, staying asleep or sleeping too much?:  Nearly every day  4. Feeling tired or having little energy?:  More than half the days  5. Poor appetite or overeating?:  Not at all  6. Feeling bad about yourself - or that you are a failure or that you have let yourself or your family down?:  More than half the days  7. Trouble concentrating on things such as reading a newspaper or watching television?:  More than half the days  8. Moving or speaking so slowly that other people  could have noticed? Or the opposite - being so fidgety or restless that you were moving around a lot more than usual?:  Nearly every day  9. Thoughts that you would be better off dead, or of hurting yourself in some way?:  Not at all     If you checked off any problems, how difficult have these problems made it for you to do your work, take care of tings at home, or get along with other people?:  very difficult  0 - 4 = Normal  5 - 9 = Mild Symptoms  10 - 14 = Moderate Symptoms  15 - 19 = Moderate - Severe Symptoms  20 - 27 = Severe Symptoms         DEPRESSION ASSESSMENT/PLAN:  See note above. Treatment with Wellbutrin  mg p.o. q. day #30 and 3 refills sent pharmacy. Asked patient to follow-up in 4-6 weeks for reevaluation.

## 2022-04-01 NOTE — TELEPHONE ENCOUNTER
Pt called in stated she is  due to have surgery for a torn meniscus she sated her doctor that is doing her surgery stated that it is a deep vein thrombosis on her right leg in her inner thigh  next to her shin its swollen and red she stated she is scared to have surgery now because she wants to get this checked out first.    Please advise

## 2022-04-02 RX ORDER — HYDROCHLOROTHIAZIDE 12.5 MG/1
TABLET ORAL
Qty: 90 TABLET | Refills: 1 | Status: SHIPPED | OUTPATIENT
Start: 2022-04-02 | End: 2022-09-30

## 2022-04-14 DIAGNOSIS — G47.09 OTHER INSOMNIA: ICD-10-CM

## 2022-04-14 RX ORDER — TRAZODONE HYDROCHLORIDE 100 MG/1
TABLET ORAL
Qty: 30 TABLET | Refills: 1 | Status: SHIPPED | OUTPATIENT
Start: 2022-04-14 | End: 2022-06-08

## 2022-04-14 NOTE — TELEPHONE ENCOUNTER
Pharm requested    Health Maintenance   Topic Date Due    COVID-19 Vaccine (1) Never done    Pneumococcal 0-64 years Vaccine (1 - PCV) Never done    Cervical cancer screen  Never done    Colorectal Cancer Screen  Never done    Breast cancer screen  11/15/2021    Diabetic retinal exam  12/03/2021    Diabetic foot exam  04/19/2022    Diabetic microalbuminuria test  04/19/2022    Flu vaccine (Season Ended) 10/08/2022 (Originally 9/1/2022)    Shingles Vaccine (1 of 2) 10/08/2022 (Originally 2/7/2018)    Hepatitis B vaccine (1 of 3 - Risk 3-dose series) 09/18/2035 (Originally 2[birth year]7/1987)    A1C test (Diabetic or Prediabetic)  10/08/2022    Lipid screen  10/25/2022    Potassium monitoring  10/25/2022    Creatinine monitoring  10/25/2022    Depression Monitoring  02/17/2023    DTaP/Tdap/Td vaccine (3 - Td or Tdap) 10/19/2031    Hepatitis C screen  Completed    HIV screen  Completed    Hepatitis A vaccine  Aged Out    Hib vaccine  Aged Out    Meningococcal (ACWY) vaccine  Aged Out             (applicable per patient's age: Cancer Screenings, Depression Screening, Fall Risk Screening, Immunizations)    Hemoglobin A1C (%)   Date Value   10/08/2021 8.6   04/19/2021 8.8   08/03/2020 8.3     LDL Calculated (mg/dL)   Date Value   09/17/2018 199 (A)     AST (U/L)   Date Value   12/28/2018 52     ALT (U/L)   Date Value   12/28/2018 72     BUN (mg/dL)   Date Value   02/26/2019 12      (goal A1C is < 7)   (goal LDL is <100) need 30-50% reduction from baseline     BP Readings from Last 3 Encounters:   11/03/21 (!) 162/100   10/28/21 (!) 161/81   10/28/21 133/83    (goal /80)      All Future Testing planned in [facility]:  Lab Frequency Next Occurrence   MRI CERVICAL SPINE WO CONTRAST Once 09/09/2021   ASIF DIGITAL SCREEN W OR WO CAD BILATERAL Once 06/04/2022       Next Visit Date:  Future Appointments   Date Time Provider Phoebe Boogie   5/2/2022  1:00 PM Diana Riedel, APRN - CNP [facility] [id number] [facility] Patient Active Problem List:     Stress reaction     Essential hypertension     Dysthymia     Anxiety     Mild intermittent asthma without complication     Dermatitis     Osteoarthritis of multiple joints     Fibromyalgia     Left leg cellulitis     Primary osteoarthritis of left knee     Chronic back pain     Class 2 severe obesity due to excess calories with serious comorbidity and body mass index (BMI) of 38.0 to 38.9 in adult [facility])     Allergic rhinitis     Type 2 diabetes mellitus with complication, without long-term current use of insulin (HCC)     Liver enzyme elevation     Narcolepsy and cataplexy     Hyperglycemia     Hypnagogic hallucinations     Mild recurrent major depression (HCC)     Moderate persistent asthma without complication     Obstructive sleep apnea syndrome     Osteochondritis dissecans     Restless legs     Peroneal tendinitis     Pain in left foot     Primary localized osteoarthrosis of ankle and foot     Dry eye syndrome of both eyes     Other insomnia     Neck pain     Acute pain of right knee     Closed fracture of nasal bones     Laceration of nose     Fracture of nasal bones, subsequent encounter for fracture with routine healing

## 2022-04-27 DIAGNOSIS — I10 ESSENTIAL HYPERTENSION: ICD-10-CM

## 2022-04-27 DIAGNOSIS — E11.8 TYPE 2 DIABETES MELLITUS WITH COMPLICATION, WITHOUT LONG-TERM CURRENT USE OF INSULIN (HCC): ICD-10-CM

## 2022-04-27 RX ORDER — NALOXONE HYDROCHLORIDE 4 MG/.1ML
SPRAY NASAL
COMMUNITY
Start: 2022-03-09

## 2022-04-29 RX ORDER — METOPROLOL TARTRATE 50 MG/1
TABLET, FILM COATED ORAL
Qty: 180 TABLET | Refills: 1 | Status: SHIPPED | OUTPATIENT
Start: 2022-04-29

## 2022-05-02 ENCOUNTER — TELEPHONE (OUTPATIENT)
Dept: FAMILY MEDICINE CLINIC | Age: 52
End: 2022-05-02

## 2022-05-02 DIAGNOSIS — M15.9 PRIMARY OSTEOARTHRITIS INVOLVING MULTIPLE JOINTS: Primary | ICD-10-CM

## 2022-05-02 DIAGNOSIS — M54.42 CHRONIC MIDLINE LOW BACK PAIN WITH BILATERAL SCIATICA: ICD-10-CM

## 2022-05-02 DIAGNOSIS — E11.8 TYPE 2 DIABETES MELLITUS WITH COMPLICATION, WITHOUT LONG-TERM CURRENT USE OF INSULIN (HCC): ICD-10-CM

## 2022-05-02 DIAGNOSIS — G89.29 CHRONIC MIDLINE LOW BACK PAIN WITH BILATERAL SCIATICA: ICD-10-CM

## 2022-05-02 DIAGNOSIS — M54.41 CHRONIC MIDLINE LOW BACK PAIN WITH BILATERAL SCIATICA: ICD-10-CM

## 2022-05-02 RX ORDER — INSULIN GLARGINE 100 [IU]/ML
10 INJECTION, SOLUTION SUBCUTANEOUS NIGHTLY
Qty: 5 PEN | Refills: 2 | Status: SHIPPED | OUTPATIENT
Start: 2022-05-02

## 2022-05-02 RX ORDER — IBUPROFEN 800 MG/1
TABLET ORAL
Qty: 90 TABLET | Refills: 1 | Status: SHIPPED | OUTPATIENT
Start: 2022-05-02 | End: 2022-07-05

## 2022-05-02 NOTE — TELEPHONE ENCOUNTER
Patient called and would like a referral for  pain management in [place]. Her current pain doctor gives her anxiety so bad and she doesn't like seeing him.

## 2022-05-02 NOTE — TELEPHONE ENCOUNTER
Referral signed. Please call patient to reschedule her apt from 5/9. She cancelled today, but needs to be seen IN PERSON. Can be in 4-6 weeks if needed.

## 2022-05-02 NOTE — TELEPHONE ENCOUNTER
Sorry for the misunderstanding, but the 5/9 apt is not appropriate. She rescheduled her apt from today in person to a VV.  This needs to be moved to a time when she can come in

## 2022-05-05 ENCOUNTER — TELEPHONE (OUTPATIENT)
Dept: ORTHOPEDIC SURGERY | Age: 52
End: 2022-05-05

## 2022-05-05 NOTE — TELEPHONE ENCOUNTER
Patient had to cancel surgery (Arth Rt Knee/PMM 4/18/22) due to sickness. She is asking for a return call to get surgery rescheduled.      Thank you

## 2022-05-09 ENCOUNTER — OFFICE VISIT (OUTPATIENT)
Dept: FAMILY MEDICINE CLINIC | Age: 52
End: 2022-05-09
Payer: MEDICARE

## 2022-05-09 VITALS
OXYGEN SATURATION: 97 % | DIASTOLIC BLOOD PRESSURE: 82 MMHG | HEIGHT: 64 IN | WEIGHT: 221.6 LBS | RESPIRATION RATE: 16 BRPM | SYSTOLIC BLOOD PRESSURE: 148 MMHG | BODY MASS INDEX: 37.83 KG/M2 | TEMPERATURE: 97.5 F | HEART RATE: 90 BPM

## 2022-05-09 DIAGNOSIS — G47.09 OTHER INSOMNIA: ICD-10-CM

## 2022-05-09 DIAGNOSIS — I10 ESSENTIAL HYPERTENSION: Primary | ICD-10-CM

## 2022-05-09 DIAGNOSIS — E78.2 MIXED HYPERLIPIDEMIA: ICD-10-CM

## 2022-05-09 DIAGNOSIS — E55.9 VITAMIN D DEFICIENCY: ICD-10-CM

## 2022-05-09 DIAGNOSIS — Z12.11 COLON CANCER SCREENING: ICD-10-CM

## 2022-05-09 DIAGNOSIS — E66.01 CLASS 2 SEVERE OBESITY DUE TO EXCESS CALORIES WITH SERIOUS COMORBIDITY AND BODY MASS INDEX (BMI) OF 38.0 TO 38.9 IN ADULT (HCC): ICD-10-CM

## 2022-05-09 DIAGNOSIS — E11.8 TYPE 2 DIABETES MELLITUS WITH COMPLICATION, WITHOUT LONG-TERM CURRENT USE OF INSULIN (HCC): ICD-10-CM

## 2022-05-09 DIAGNOSIS — M54.42 CHRONIC MIDLINE LOW BACK PAIN WITH BILATERAL SCIATICA: ICD-10-CM

## 2022-05-09 DIAGNOSIS — M54.41 CHRONIC MIDLINE LOW BACK PAIN WITH BILATERAL SCIATICA: ICD-10-CM

## 2022-05-09 DIAGNOSIS — M15.9 PRIMARY OSTEOARTHRITIS INVOLVING MULTIPLE JOINTS: ICD-10-CM

## 2022-05-09 DIAGNOSIS — J30.9 ALLERGIC RHINITIS, UNSPECIFIED SEASONALITY, UNSPECIFIED TRIGGER: ICD-10-CM

## 2022-05-09 DIAGNOSIS — R74.8 ELEVATED LIVER ENZYMES: ICD-10-CM

## 2022-05-09 DIAGNOSIS — G89.29 CHRONIC MIDLINE LOW BACK PAIN WITH BILATERAL SCIATICA: ICD-10-CM

## 2022-05-09 LAB
CREATININE URINE POCT: ABNORMAL
MICROALBUMIN/CREAT 24H UR: ABNORMAL MG/G{CREAT}
MICROALBUMIN/CREAT UR-RTO: ABNORMAL

## 2022-05-09 PROCEDURE — 1036F TOBACCO NON-USER: CPT | Performed by: NURSE PRACTITIONER

## 2022-05-09 PROCEDURE — 82044 UR ALBUMIN SEMIQUANTITATIVE: CPT | Performed by: NURSE PRACTITIONER

## 2022-05-09 PROCEDURE — G8427 DOCREV CUR MEDS BY ELIG CLIN: HCPCS | Performed by: NURSE PRACTITIONER

## 2022-05-09 PROCEDURE — 2022F DILAT RTA XM EVC RTNOPTHY: CPT | Performed by: NURSE PRACTITIONER

## 2022-05-09 PROCEDURE — 3017F COLORECTAL CA SCREEN DOC REV: CPT | Performed by: NURSE PRACTITIONER

## 2022-05-09 PROCEDURE — 3046F HEMOGLOBIN A1C LEVEL >9.0%: CPT | Performed by: NURSE PRACTITIONER

## 2022-05-09 PROCEDURE — G8417 CALC BMI ABV UP PARAM F/U: HCPCS | Performed by: NURSE PRACTITIONER

## 2022-05-09 PROCEDURE — 99214 OFFICE O/P EST MOD 30 MIN: CPT | Performed by: NURSE PRACTITIONER

## 2022-05-09 RX ORDER — PEN NEEDLE, DIABETIC 31 GX5/16"
1 NEEDLE, DISPOSABLE MISCELLANEOUS 3 TIMES DAILY
Qty: 100 EACH | Refills: 11 | Status: SHIPPED | OUTPATIENT
Start: 2022-05-09

## 2022-05-09 RX ORDER — GABAPENTIN 600 MG/1
1200 TABLET ORAL 2 TIMES DAILY
Qty: 120 TABLET | Refills: 0 | Status: SHIPPED | OUTPATIENT
Start: 2022-05-09 | End: 2022-06-02 | Stop reason: SDUPTHER

## 2022-05-09 RX ORDER — DULAGLUTIDE 0.75 MG/.5ML
0.75 INJECTION, SOLUTION SUBCUTANEOUS WEEKLY
Qty: 0.5 ML | Refills: 4 | Status: SHIPPED | OUTPATIENT
Start: 2022-05-09

## 2022-05-09 RX ORDER — BLOOD-GLUCOSE METER
1 KIT MISCELLANEOUS DAILY
Qty: 1 KIT | Refills: 0 | Status: SHIPPED | OUTPATIENT
Start: 2022-05-09

## 2022-05-09 RX ORDER — CETIRIZINE HYDROCHLORIDE 10 MG/1
10 TABLET ORAL DAILY
Qty: 30 TABLET | Refills: 5 | Status: SHIPPED | OUTPATIENT
Start: 2022-05-09

## 2022-05-09 RX ORDER — GLUCOSAMINE HCL/CHONDROITIN SU 500-400 MG
CAPSULE ORAL
Qty: 100 STRIP | Refills: 11 | Status: SHIPPED | OUTPATIENT
Start: 2022-05-09

## 2022-05-09 RX ORDER — BENZOCAINE/MENTHOL 6 MG-10 MG
1 LOZENGE MUCOUS MEMBRANE DAILY
Qty: 1 EACH | Refills: 0 | Status: SHIPPED | OUTPATIENT
Start: 2022-05-09

## 2022-05-09 RX ORDER — LANCETS 30 GAUGE
1 EACH MISCELLANEOUS 2 TIMES DAILY
Qty: 100 EACH | Refills: 11 | Status: SHIPPED | OUTPATIENT
Start: 2022-05-09

## 2022-05-09 RX ORDER — DULAGLUTIDE 1.5 MG/.5ML
1.5 INJECTION, SOLUTION SUBCUTANEOUS WEEKLY
Qty: 0.5 ML | Refills: 5 | Status: SHIPPED | OUTPATIENT
Start: 2022-05-09

## 2022-05-09 SDOH — ECONOMIC STABILITY: FOOD INSECURITY: WITHIN THE PAST 12 MONTHS, YOU WORRIED THAT YOUR FOOD WOULD RUN OUT BEFORE YOU GOT MONEY TO BUY MORE.: NEVER TRUE

## 2022-05-09 SDOH — ECONOMIC STABILITY: FOOD INSECURITY: WITHIN THE PAST 12 MONTHS, THE FOOD YOU BOUGHT JUST DIDN'T LAST AND YOU DIDN'T HAVE MONEY TO GET MORE.: NEVER TRUE

## 2022-05-09 ASSESSMENT — ENCOUNTER SYMPTOMS
RESPIRATORY NEGATIVE: 1
BACK PAIN: 0
COUGH: 0
VOMITING: 0
SHORTNESS OF BREATH: 0
GASTROINTESTINAL NEGATIVE: 1
DIARRHEA: 0
EYES NEGATIVE: 1
ABDOMINAL PAIN: 0
NAUSEA: 0

## 2022-05-09 ASSESSMENT — PATIENT HEALTH QUESTIONNAIRE - PHQ9
4. FEELING TIRED OR HAVING LITTLE ENERGY: 0
SUM OF ALL RESPONSES TO PHQ QUESTIONS 1-9: 1
9. THOUGHTS THAT YOU WOULD BE BETTER OFF DEAD, OR OF HURTING YOURSELF: 0
1. LITTLE INTEREST OR PLEASURE IN DOING THINGS: 0
SUM OF ALL RESPONSES TO PHQ QUESTIONS 1-9: 1
3. TROUBLE FALLING OR STAYING ASLEEP: 1
6. FEELING BAD ABOUT YOURSELF - OR THAT YOU ARE A FAILURE OR HAVE LET YOURSELF OR YOUR FAMILY DOWN: 0
SUM OF ALL RESPONSES TO PHQ9 QUESTIONS 1 & 2: 0
SUM OF ALL RESPONSES TO PHQ QUESTIONS 1-9: 1
10. IF YOU CHECKED OFF ANY PROBLEMS, HOW DIFFICULT HAVE THESE PROBLEMS MADE IT FOR YOU TO DO YOUR WORK, TAKE CARE OF THINGS AT HOME, OR GET ALONG WITH OTHER PEOPLE: 0
2. FEELING DOWN, DEPRESSED OR HOPELESS: 0
7. TROUBLE CONCENTRATING ON THINGS, SUCH AS READING THE NEWSPAPER OR WATCHING TELEVISION: 0
8. MOVING OR SPEAKING SO SLOWLY THAT OTHER PEOPLE COULD HAVE NOTICED. OR THE OPPOSITE, BEING SO FIGETY OR RESTLESS THAT YOU HAVE BEEN MOVING AROUND A LOT MORE THAN USUAL: 0
5. POOR APPETITE OR OVEREATING: 0
SUM OF ALL RESPONSES TO PHQ QUESTIONS 1-9: 1

## 2022-05-09 ASSESSMENT — SOCIAL DETERMINANTS OF HEALTH (SDOH): HOW HARD IS IT FOR YOU TO PAY FOR THE VERY BASICS LIKE FOOD, HOUSING, MEDICAL CARE, AND HEATING?: NOT HARD AT ALL

## 2022-05-09 NOTE — ASSESSMENT & PLAN NOTE
At goal, continue current medications and continue current treatment plan and following with Dr. Minoo Garza as advised.

## 2022-05-09 NOTE — PROGRESS NOTES
Depression screening done  Financial resource strain done  Chief Complaint   Patient presents with    Diabetes    Leg Pain     right leg, deep vein and swollen     Having surgery on 05/20/2022 Meniscus tear  Unable to do A1C, office is out, they are on back order

## 2022-05-09 NOTE — ASSESSMENT & PLAN NOTE
Uncontrolled, continue current medications to include Lantus 20 mg nightly and Metformin XR 1000 mg twice daily. Switch victoza for Trulicity.      Results for orders placed or performed in visit on 05/09/22   POCT microalbumin   Result Value Ref Range    Microalb, Ur 80 mg/L     Creatinine Ur POCT 300 mg/dL     Microalbumin Creatinine Ratio 30 - 300 mg/g          Lab Results   Component Value Date    LABA1C 8.6 10/08/2021     No results found for: EAG

## 2022-05-09 NOTE — PROGRESS NOTES
[facility]  [facility]  [place] [address] [facility] [id number]  Dept: [phone]    5/9/2022    CHIEF COMPLAINT    Chief Complaint   Patient presents with    Diabetes    Leg Pain     right leg, deep vein and swollen       HPI    Ronan Aceves is a 45 y.o. female who presents   Chief Complaint   Patient presents with    Diabetes    Leg Pain     right leg, deep vein and swollen     Appointment to f/u on multiple medical conditions. Diabetes- she notes that her fasting BG was 165 this AM.   She notes that it ranges between 140-160 in the AM. BG is in the 127-134 during the day. She is trying to improve her diet and increase her exercise. She continues taking her Victoza 18 mg daily, Metformin XR 1000 mg BID and Lantus 10 mg nightly. She is holding it when her BG is between 130-140. Lab Results       Component                Value               Date                       LABA1C                   8.6                 10/08/2021                 LABA1C                   8.8                 04/19/2021                 LABA1C                   8.3                 08/03/2020            No results found for: EAG    Right leg vessel pain- pain in the deep vein and swelling. She has never seen vascular. She will note that she intermittently has acheiness in her right     Hypertension -taking hydrochlorothiazide 12.5 mg, amlodipine 10 mg and metoprolol tartrate 50 mg twice daily. Denies chest pain, SOB and heart palpitations. Anxiety/depression-continues taking Effexor 150 mg & Trazodone 100 mg PRN HS when not taking her Xyrem. She continues taking Xanax PRN. She doesn't feel that her anxiety and depression is well controlled, but she feels it is better. She has not seen a counselor, but would like to sometime when she has time. Noting she is needing to take it more lately due to her stress with her father being in the end stages of dementia. Narcolepsy- Seeing Dr. Kristine Yap. Currently taking Xyrem as it works better than Ambien. She does not like the SE so she is often taking less than prescribed with good relief of sx and reduced side effects      Diabetes  She presents for her follow-up diabetic visit. She has type 2 diabetes mellitus. Her disease course has been improving. Hypoglycemia symptoms include nervousness/anxiousness (Chronic stable. ). Pertinent negatives for hypoglycemia include no dizziness or headaches. Associated symptoms include fatigue. Pertinent negatives for diabetes include no chest pain, no foot paresthesias and no weight loss. There are no hypoglycemic complications. There are no diabetic complications. Risk factors for coronary artery disease include diabetes mellitus and obesity. Current diabetic treatment includes oral agent (dual therapy) (Victoza). Her weight is decreasing steadily. She is following a diabetic diet. Meal planning includes ADA exchanges. She has had a previous visit with a dietitian. She participates in exercise intermittently. An ACE inhibitor/angiotensin II receptor blocker is contraindicated. She does not see a podiatrist.Eye exam is current. Hypertension  This is a chronic problem. The problem is unchanged. The problem is controlled. Associated symptoms include malaise/fatigue (chronic. Stable ) and neck pain. Pertinent negatives include no chest pain, headaches, palpitations, peripheral edema or shortness of breath. Agents associated with hypertension include NSAIDs. Risk factors for coronary artery disease include diabetes mellitus, obesity, post-menopausal state, stress and sedentary lifestyle. Past treatments include diuretics, beta blockers, calcium channel blockers and ACE inhibitors. The current treatment provides moderate improvement.      Vitals:    05/09/22 1536 05/09/22 1636   BP: (!) 152/86 (!) 148/82   Site: Left Upper Arm Left Upper Arm   Position: Sitting Sitting   Cuff Size: Large Adult Large Adult   Pulse: 94 90   Resp: 16    Temp: 97.5 °F (36.4 °C)    TempSrc: Temporal    SpO2: 97% 97%   Weight: 221 lb 9.6 oz (100.5 kg)    Height: 5' 4\" (1.626 m)        Wt Readings from Last 3 Encounters:   05/16/22 213 lb 6.4 oz (96.8 kg)   05/09/22 221 lb 9.6 oz (100.5 kg)   12/02/21 215 lb (97.5 kg)     BP Readings from Last 3 Encounters:   05/09/22 (!) 148/82   11/03/21 (!) 162/100   10/28/21 (!) 161/81       REVIEW OF SYSTEMS    Review of Systems   Constitutional: Positive for fatigue and malaise/fatigue (chronic. Stable ). Negative for chills, fever and weight loss. HENT: Negative. Eyes: Negative. Negative for visual disturbance. Respiratory: Negative. Negative for cough and shortness of breath. Cardiovascular: Negative. Negative for chest pain and palpitations. Gastrointestinal: Negative. Negative for abdominal pain, diarrhea, nausea and vomiting. Genitourinary: Negative. Negative for difficulty urinating. Musculoskeletal: Positive for neck pain. Negative for back pain. Skin: Negative. Negative for rash. Neurological: Negative. Negative for dizziness, numbness and headaches. Hematological: Negative. Negative for adenopathy. Psychiatric/Behavioral: Negative for dysphoric mood. The patient is nervous/anxious (Chronic stable. ).         PAST MEDICAL HISTORY    Past Medical History:   Diagnosis Date    Allergic rhinitis     Anxiety     Arthritis     Asthma     Cataplexy and narcolepsy     Chronic back pain     Chronic pain     Depression     Diabetes mellitus ([facility] [address]     Fibromyalgia     Hypertension     Irritable bowel syndrome     Liver enzyme elevation 8/19/2019    Neuropathy 2021    Obesity     Osteoarthritis     Peripheral vascular disease (HCC)     Restless legs syndrome     Sleep apnea        FAMILY HISTORY    Family History   Problem Relation Age of Onset    Heart Disease Mother     Diabetes Mother     Stroke Mother     Arthritis Mother    [place] Asthma Mother     Depression Mother     High Blood Pressure Mother     High Cholesterol Mother     Dementia Father     Alzheimer's Disease Father        SOCIAL HISTORY    Social History     Socioeconomic History    Marital status:      Spouse name: None    Number of children: None    Years of education: None    Highest education level: None   Occupational History    None   Tobacco Use    Smoking status: Never Smoker    Smokeless tobacco: Never Used   Vaping Use    Vaping Use: Never used   Substance and Sexual Activity    Alcohol use: No     Comment: rare    Drug use: No    Sexual activity: Yes     Partners: Male   Other Topics Concern    None   Social History Narrative    None     Social Determinants of Health     Financial Resource Strain: Low Risk     Difficulty of Paying Living Expenses: Not hard at all   Food Insecurity: No Food Insecurity    Worried About Running Out of Food in the Last Year: Never true    April of Food in the Last Year: Never true   Transportation Needs: No Transportation Needs    Lack of Transportation (Medical): No    Lack of Transportation (Non-Medical):  No   Physical Activity:     Days of Exercise per Week: Not on file    Minutes of Exercise per Session: Not on file   Stress:     Feeling of Stress : Not on file   Social Connections:     Frequency of Communication with Friends and Family: Not on file    Frequency of Social Gatherings with Friends and Family: Not on file    Attends [employer] Services: Not on file    Active Member of Clubs or Organizations: Not on file    Attends Club or Organization Meetings: Not on file    Marital Status: Not on file   Intimate Partner Violence:     Fear of Current or Ex-Partner: Not on file    Emotionally Abused: Not on file    Physically Abused: Not on file    Sexually Abused: Not on file   Housing Stability:     Unable to Pay for Housing in the Last Year: Not on file    Number of [place] in the Last Year: Not on file    Unstable Housing in the Last Year: Not on file       SURGICAL HISTORY    Past Surgical History:   Procedure Laterality Date    CHOLECYSTECTOMY      FACIAL SURGERY N/A 10/28/2021    SHARP EXCISIONAL DEBRIDEMENT AND COMPLEX CLOSURE OF NASAL LACERATION performed by Alex Arias MD at [address]  Right     NASAL FRACTURE SURGERY N/A 10/28/2021    NASAL CLOSED REDUCTION WITH SPLINTING performed by Alex Arias MD at [address]    Current Outpatient Medications   Medication Sig Dispense Refill    Dulaglutide (TRULICITY) 3.29 FJ/0.2HL SOPN Inject 0.75 mg into the skin once a week 2 pen 0    venlafaxine (EFFEXOR XR) 150 MG extended release capsule take 1 capsule by mouth every morning 30 capsule 5    gabapentin (NEURONTIN) 600 MG tablet Take 2 tablets by mouth 2 times daily for 30 days. 120 tablet 0    cetirizine (ZYRTEC) 10 MG tablet Take 1 tablet by mouth daily 30 tablet 5    Blood Pressure Monitor MISC 1 Device by Does not apply route daily 1 each 0    glucose monitoring (FREESTYLE FREEDOM) kit 1 kit by Does not apply route daily 1 kit 0    Lancets MISC 1 each by Does not apply route 2 times daily 100 each 11    blood glucose monitor strips Test 2 times a day & as needed for symptoms of irregular blood glucose. Dispense sufficient amount for indicated testing frequency plus additional to accommodate PRN testing needs.  100 strip 11    Alcohol Swabs (ALCOHOL PREP) PADS 1 Device by Does not apply route in the morning, at noon, and at bedtime 100 each 11    Dulaglutide (TRULICITY) 6.52 HQ/4.7JS SOPN Inject 0.75 mg into the skin once a week 0.5 mL 4    Dulaglutide (TRULICITY) 1.5 DR/8.2LX SOPN Inject 1.5 mg into the skin once a week After 4 weeks of 0.75 mg 0.5 mL 5    ibuprofen (ADVIL;MOTRIN) 800 MG tablet take 1 tablet by mouth every 8 hours with food AS NEEDED FOR PAIN 90 tablet 1    insulin glargine (LANTUS SOLOSTAR) 100 UNIT/ML injection pen Inject 10 Units into the skin nightly 5 pen 2    Insulin Pen Needle 32G X 4 MM MISC 1 each by Does not apply route daily 100 each 5    metoprolol tartrate (LOPRESSOR) 50 MG tablet take 1 tablet by mouth twice a day 180 tablet 1    naloxone 4 MG/0.1ML LIQD nasal spray instill 1 spray in 1 NOSTRIL if needed FOR OPIOID OVERDOSE may re. ..  (REFER TO PRESCRIPTION NOTES).  traZODone (DESYREL) 100 MG tablet take 1/2 to 1 tablet by mouth at bedtime if needed for sleep (DO NOT TAKE WITHIN 24 HOURS OF XYREM) 30 tablet 1    hydroCHLOROthiazide (HYDRODIURIL) 12.5 MG tablet take 1 tablet by mouth daily 90 tablet 1    diclofenac sodium (VOLTAREN) 1 % GEL apply 4 grams topically four times a day 500 g 2    albuterol sulfate HFA (VENTOLIN HFA) 108 (90 Base) MCG/ACT inhaler inhale 2 puffs by mouth and INTO THE LUNGS every 6 hours if needed for wheezing 18 g 5    metFORMIN (GLUCOPHAGE-XR) 500 MG extended release tablet take 2 tablet by mouth twice a day 120 tablet 2    budesonide-formoterol (SYMBICORT) 160-4.5 MCG/ACT AERO inhale 2 puffs by mouth and INTO THE LUNGS twice a day 10.2 g 5    SYSTANE ULTRA 0.4-0.3 % ophthalmic solution instill 1 to 2 drops INTO AFFECTED EYE(S) 4-5 TIMES PER DAY 30 mL 1    fluticasone (FLONASE) 50 MCG/ACT nasal spray instill 1 spray into each nostril once daily 32 g 5    OneTouch Delica Lancets 89L MISC CHECK BLOOD SUGARS ONCE A  each 0    mupirocin (BACTROBAN) 2 % ointment apply to affected area three times a day 22 g 0    Propylene Glycol 0.6 % SOLN 1 drop 4-5 times per day 15 mL 1    polyethylene glycol-propylene glycol (SYSTANE) 0.4-0.3 % GEL ophthalmic gel place 1 drop into both eyes nightly 10 mL 0    Brimonidine Tartrate 0.025 % SOLN 1 drop in each eye every 8 hours as needed for redness.  7.5 mL 5    rOPINIRole (REQUIP) 1 MG tablet take 1 tablet by mouth 1-3 HOURS BEFORE BEDTIME DAILY      XYREM 500 MG/ML SOLN solution       blood glucose test strips (ONE TOUCH ULTRA TEST) strip TEST once daily 100 strip 3    loratadine (CLARITIN) 10 MG tablet Take 1 tablet by mouth daily 30 tablet 3    Elastic Bandages & Supports (WRIST SPLINT) MISC 1 Device by Does not apply route daily 1 each 0    Elastic Bandages & Supports (WRIST SPLINT/COCK-UP/RIGHT M) MISC 1 Device by Does not apply route nightly 1 each 0    Blood Pressure Monitor [place] 1 Device by Does not apply route 2 times daily 1 Device 0    Handicap Placard MISC by Does not apply route Expires 6 months after issue (Patient taking differently: by Does not apply route Patient has lifetime one) 1 each 0    doxycycline hyclate (VIBRA-TABS) 100 MG tablet Take 1 tablet by mouth 2 times daily for 7 days 14 tablet 0    amLODIPine (NORVASC) 10 MG tablet take 1 tablet by mouth once daily 90 tablet 1    oxyCODONE-acetaminophen (PERCOCET) 7.5-325 MG per tablet take 1 tablet by mouth every 6 hours if needed (Patient not taking: Reported on 5/9/2022)       No current facility-administered medications for this visit. ALLERGIES    Allergies   Allergen Reactions    Lisinopril Anaphylaxis     Difficulty breathing     Zoloft [Sertraline Hcl] Anaphylaxis     Difficulty breathing     Seasonal     Amoxicillin Rash    Augmentin [Amoxicillin-Pot Clavulanate] Rash    Buspirone Rash    Paxil [Paroxetine Hcl] Other (See Comments)     Weight gain        PHYSICAL EXAM   Physical Exam  Vitals and nursing note reviewed. Constitutional:       General: She is not in acute distress. Appearance: She is well-developed. She is obese. She is not diaphoretic. HENT:      Head: Normocephalic. Right Ear: Hearing normal.      Left Ear: Hearing normal.   Eyes:      General:         Right eye: No discharge. Left eye: No discharge. Pupils: Pupils are equal.   Cardiovascular:      Rate and Rhythm: Normal rate and regular rhythm. Pulses: Normal pulses.            Radial pulses are 2+ on the right side and 2+ on the left side. Dorsalis pedis pulses are 2+ on the right side and 2+ on the left side. Posterior tibial pulses are 2+ on the right side and 2+ on the left side. Heart sounds: Normal heart sounds, S1 normal and S2 normal. No murmur heard. Pulmonary:      Effort: Pulmonary effort is normal. No respiratory distress. Breath sounds: Normal breath sounds. No wheezing. Musculoskeletal:      Cervical back: Normal range of motion. Right knee: Swelling present. No deformity, effusion, erythema, ecchymosis or lacerations. Decreased range of motion. Tenderness present over the lateral joint line. Normal pulse. Right foot: Normal range of motion. No deformity. Left foot: Normal range of motion. No deformity. Legs:    Feet:      Right foot:      Protective Sensation: 10 sites tested. 9 sites sensed. Skin integrity: No skin breakdown, erythema or warmth. Toenail Condition: Right toenails are normal.      Left foot:      Protective Sensation: 10 sites tested. 9 sites sensed. Skin integrity: No skin breakdown, erythema or warmth. Toenail Condition: Left toenails are normal.   Skin:     General: Skin is warm and dry. Neurological:      Mental Status: She is alert and oriented to person, place, and time. Psychiatric:         Mood and Affect: Mood is anxious. Behavior: Behavior normal. Behavior is cooperative. ASSESSMENT/PLAN  1. Essential hypertension  Assessment & Plan:   Borderline controlled, continue current medications, continue current treatment plan, medication adherence emphasized and lifestyle modifications recommended  Orders:  -     Comprehensive Metabolic Panel; Future  -     Blood Pressure Monitor MISC; DAILY Starting Mon 5/9/2022, Disp-1 each, R-0, Print  2.  Type 2 diabetes mellitus with complication, without long-term current use of insulin ([facility] [address]  Assessment & Plan:   Uncontrolled, continue current medications to include Lantus 20 mg nightly and Metformin XR 1000 mg twice daily. Switch victoza for Trulicity. Results for orders placed or performed in visit on 05/09/22   POCT microalbumin   Result Value Ref Range    Microalb, Ur 80 mg/L     Creatinine Ur POCT 300 mg/dL     Microalbumin Creatinine Ratio 30 - 300 mg/g          Lab Results   Component Value Date    LABA1C 8.6 10/08/2021     No results found for: EAG    Orders:  -     POCT microalbumin  -     Comprehensive Metabolic Panel; Future  -     Hemoglobin A1C; Future  -     glucose monitoring (FREESTYLE FREEDOM) kit; DAILY Starting Mon 5/9/2022, Disp-1 kit, R-0, Normal  -     Lancets MISC; 2 TIMES DAILY Starting Mon 5/9/2022, Disp-100 each, R-11, Normal  -     blood glucose monitor strips; Test 2 times a day & as needed for symptoms of irregular blood glucose. Dispense sufficient amount for indicated testing frequency plus additional to accommodate PRN testing needs. , Disp-100 strip, R-11, Normal  -     Alcohol Swabs (ALCOHOL PREP) PADS; 3 times daily Starting Mon 5/9/2022, Disp-100 each, R-11, Normal  -     HM DIABETES FOOT EXAM  -     Dulaglutide (TRULICITY) 3.27 KO/0.4KK SOPN; Inject 0.75 mg into the skin once a week, Disp-0.5 mL, R-4Normal  -     Dulaglutide (TRULICITY) 1.5 NB/6.6NX SOPN; Inject 1.5 mg into the skin once a week After 4 weeks of 0.75 mg, Disp-0.5 mL, R-5Normal  -     Dulaglutide (TRULICITY) 8.29 EH/1.0UB SOPN; Inject 0.75 mg into the skin once a week, Disp-2 pen, R-0Sample  3. Chronic midline low back pain with bilateral sciatica  Assessment & Plan:   At goal, continue current medications. Patient hoping to establish with new pain management provider. Referral placed last week. Orders:  -     gabapentin (NEURONTIN) 600 MG tablet; Take 2 tablets by mouth 2 times daily for 30 days. , Disp-120 tablet, R-0Normal  4.  Primary osteoarthritis involving multiple joints  Assessment & Plan:   Borderline controlled, continue current medications, continue active lifestyle and seen specialists as advised  5. Other insomnia  Assessment & Plan:   At goal, continue current medications and continue current treatment plan and following with Dr. Shala Sauer as advised. 6. Class 2 severe obesity due to excess calories with serious comorbidity and body mass index (BMI) of 38.0 to 38.9 in adult [facility])  Assessment & Plan:  Discussed dietary and activity modifications to assist in weight loss. 7. Elevated liver enzymes  -     Comprehensive Metabolic Panel; Future  8. Vitamin D deficiency  -     Vitamin D 25 Hydroxy; Future  9. Mixed hyperlipidemia  -     Lipid Panel; Future  10. Allergic rhinitis, unspecified seasonality, unspecified trigger  Assessment & Plan:   Well-controlled, continue current treatment plan   Orders:  -     cetirizine (ZYRTEC) 10 MG tablet; Take 1 tablet by mouth daily, Disp-30 tablet, R-5Normal  11. Colon cancer screening       Cheryle Johanny received counseling on the following healthy behaviors: nutrition, exercise and medication adherence  Reviewed prior labs and health maintenance  Continue current medications, diet and exercise. Discussed use, benefit, and side effects of prescribed medications. Barriers to medication compliance addressed. Patient given educational materials - see patient instructions  Was a self-tracking handout given in paper form or via [employer]? Yes    Requested Prescriptions     Signed Prescriptions Disp Refills    gabapentin (NEURONTIN) 600 MG tablet 120 tablet 0     Sig: Take 2 tablets by mouth 2 times daily for 30 days.     cetirizine (ZYRTEC) 10 MG tablet 30 tablet 5     Sig: Take 1 tablet by mouth daily    Blood Pressure Monitor MISC 1 each 0     Si Device by Does not apply route daily    glucose monitoring (FREESTYLE FREEDOM) kit 1 kit 0     Si kit by Does not apply route daily    Lancets MISC 100 each 11     Si each by Does not apply route 2 times daily    blood glucose monitor strips 100 strip 11     Sig: Test 2 times a day & as needed for symptoms of irregular blood glucose. Dispense sufficient amount for indicated testing frequency plus additional to accommodate PRN testing needs.  Alcohol Swabs (ALCOHOL PREP) PADS 100 each 11     Si Device by Does not apply route in the morning, at noon, and at bedtime    Dulaglutide (TRULICITY) 6.70 XO/8.6OU SOPN 0.5 mL 4     Sig: Inject 0.75 mg into the skin once a week    Dulaglutide (TRULICITY) 1.5 AO/9.5TL SOPN 0.5 mL 5     Sig: Inject 1.5 mg into the skin once a week After 4 weeks of 0.75 mg    Dulaglutide (TRULICITY) 9.86 KK/1.3GE SOPN 2 pen 0     Sig: Inject 0.75 mg into the skin once a week       All patient questions answered. Patient voiced understanding. Quality Measures    Body mass index is 38.04 kg/m². Elevated. Weight control planned discussed Healthy diet and regular exercise. BP: (!) 148/82 Blood pressure is high. Treatment plan consists of Weight Reduction, DASH Eating Plan, Dietary Sodium Restriction, Increased Physical Activity, Patient In-home Blood Pressure Monitoring and Medication adherence reinforced. Lab Results   Component Value Date    LDLCALC 199 (A) 2018    (goal LDL reduction with dx if diabetes is 50% LDL reduction)      PHQ Scores 2022 2022 10/8/2021 2021 8/3/2020 2018 2017   PHQ2 Score 0 0 0 2 1 0 0   PHQ9 Score 1 0 0 2 1 0 0     Interpretation of Total Score Depression Severity: 1-4 = Minimal depression, 5-9 = Mild depression, 10-14 = Moderate depression, 15-19 = Moderately severe depression, 20-27 = Severe depression     Return in about 13 weeks (around 2022) for HgbA1C, BP, diabetes.     (Please note that portions of this note were completed with a voice recognition program. Efforts were made to edit the dictations but occasionally words are mis-transcribed.)      Electronically signed by RUBEN Blum CNP on 22 at 3:27 PM EDT

## 2022-05-09 NOTE — ASSESSMENT & PLAN NOTE
At goal, continue current medications. Patient hoping to establish with new pain management provider. Referral placed last week.

## 2022-05-12 DIAGNOSIS — F43.0 STRESS REACTION: ICD-10-CM

## 2022-05-12 DIAGNOSIS — F41.9 ANXIETY: ICD-10-CM

## 2022-05-12 DIAGNOSIS — F34.1 DYSTHYMIA: ICD-10-CM

## 2022-05-13 RX ORDER — VENLAFAXINE HYDROCHLORIDE 150 MG/1
CAPSULE, EXTENDED RELEASE ORAL
Qty: 30 CAPSULE | Refills: 5 | Status: SHIPPED | OUTPATIENT
Start: 2022-05-13

## 2022-05-16 ENCOUNTER — INITIAL CONSULT (OUTPATIENT)
Dept: PAIN MANAGEMENT | Age: 52
End: 2022-05-16
Payer: MEDICARE

## 2022-05-16 ENCOUNTER — HOSPITAL ENCOUNTER (OUTPATIENT)
Age: 52
Setting detail: SPECIMEN
Discharge: HOME | End: 2022-05-16

## 2022-05-16 VITALS — BODY MASS INDEX: 35.56 KG/M2 | WEIGHT: 213.4 LBS | HEIGHT: 65 IN

## 2022-05-16 DIAGNOSIS — Z79.891 ENCOUNTER FOR LONG-TERM OPIATE ANALGESIC USE: ICD-10-CM

## 2022-05-16 DIAGNOSIS — M47.812 CERVICAL SPONDYLOSIS: ICD-10-CM

## 2022-05-16 DIAGNOSIS — I10 ESSENTIAL HYPERTENSION: ICD-10-CM

## 2022-05-16 DIAGNOSIS — G89.29 OTHER CHRONIC PAIN: ICD-10-CM

## 2022-05-16 DIAGNOSIS — G89.29 OTHER CHRONIC PAIN: Primary | ICD-10-CM

## 2022-05-16 PROCEDURE — 3017F COLORECTAL CA SCREEN DOC REV: CPT | Performed by: PAIN MEDICINE

## 2022-05-16 PROCEDURE — 99204 OFFICE O/P NEW MOD 45 MIN: CPT | Performed by: PAIN MEDICINE

## 2022-05-16 PROCEDURE — G8427 DOCREV CUR MEDS BY ELIG CLIN: HCPCS | Performed by: PAIN MEDICINE

## 2022-05-16 PROCEDURE — G8417 CALC BMI ABV UP PARAM F/U: HCPCS | Performed by: PAIN MEDICINE

## 2022-05-16 PROCEDURE — 1036F TOBACCO NON-USER: CPT | Performed by: PAIN MEDICINE

## 2022-05-16 NOTE — PROGRESS NOTES
elevation 8/19/2019    Neuropathy 2021    Obesity     Osteoarthritis     Peripheral vascular disease (HCC)     Restless legs syndrome     Sleep apnea        Past Surgical History:   Procedure Laterality Date    CHOLECYSTECTOMY      FACIAL SURGERY N/A 10/28/2021    SHARP EXCISIONAL DEBRIDEMENT AND COMPLEX CLOSURE OF NASAL LACERATION performed by TRUNG Tesfaye MD at [address] [facility] Right     NASAL FRACTURE SURGERY N/A 10/28/2021    NASAL CLOSED REDUCTION WITH SPLINTING performed by Del Ng MD at [address]   Allergen Reactions    Lisinopril Anaphylaxis     Difficulty breathing     Zoloft [Sertraline Hcl] Anaphylaxis     Difficulty breathing     Seasonal     Amoxicillin Rash    Augmentin [Amoxicillin-Pot Clavulanate] Rash    Buspirone Rash    Paxil [Paroxetine Hcl] Other (See Comments)     Weight gain          Current Outpatient Medications:     venlafaxine (EFFEXOR XR) 150 MG extended release capsule, take 1 capsule by mouth every morning, Disp: 30 capsule, Rfl: 5    gabapentin (NEURONTIN) 600 MG tablet, Take 2 tablets by mouth 2 times daily for 30 days. , Disp: 120 tablet, Rfl: 0    cetirizine (ZYRTEC) 10 MG tablet, Take 1 tablet by mouth daily, Disp: 30 tablet, Rfl: 5    Blood Pressure Monitor MISC, 1 Device by Does not apply route daily, Disp: 1 each, Rfl: 0    glucose monitoring (FREESTYLE FREEDOM) kit, 1 kit by Does not apply route daily, Disp: 1 kit, Rfl: 0    Lancets MISC, 1 each by Does not apply route 2 times daily, Disp: 100 each, Rfl: 11    blood glucose monitor strips, Test 2 times a day & as needed for symptoms of irregular blood glucose. Dispense sufficient amount for indicated testing frequency plus additional to accommodate PRN testing needs. , Disp: 100 strip, Rfl: 11    Alcohol Swabs (ALCOHOL PREP) PADS, 1 Device by Does not apply route in the morning, at noon, and at bedtime, Disp: 100 each, Rfl: 11   Dulaglutide (TRULICITY) 1.89 NB/1.3VM SOPN, Inject 0.75 mg into the skin once a week, Disp: 0.5 mL, Rfl: 4    Dulaglutide (TRULICITY) 1.5 SP/3.2PC SOPN, Inject 1.5 mg into the skin once a week After 4 weeks of 0.75 mg, Disp: 0.5 mL, Rfl: 5    ibuprofen (ADVIL;MOTRIN) 800 MG tablet, take 1 tablet by mouth every 8 hours with food AS NEEDED FOR PAIN, Disp: 90 tablet, Rfl: 1    insulin glargine (LANTUS SOLOSTAR) 100 UNIT/ML injection pen, Inject 10 Units into the skin nightly, Disp: 5 pen, Rfl: 2    Insulin Pen Needle 32G X 4 MM MISC, 1 each by Does not apply route daily, Disp: 100 each, Rfl: 5    metoprolol tartrate (LOPRESSOR) 50 MG tablet, take 1 tablet by mouth twice a day, Disp: 180 tablet, Rfl: 1    naloxone 4 MG/0.1ML LIQD nasal spray, instill 1 spray in 1 NOSTRIL if needed FOR OPIOID OVERDOSE may re. ..  (REFER TO PRESCRIPTION NOTES). , Disp: , Rfl:     traZODone (DESYREL) 100 MG tablet, take 1/2 to 1 tablet by mouth at bedtime if needed for sleep (DO NOT TAKE WITHIN 24 HOURS OF XYREM), Disp: 30 tablet, Rfl: 1    hydroCHLOROthiazide (HYDRODIURIL) 12.5 MG tablet, take 1 tablet by mouth daily, Disp: 90 tablet, Rfl: 1    diclofenac sodium (VOLTAREN) 1 % GEL, apply 4 grams topically four times a day, Disp: 500 g, Rfl: 2    oxyCODONE-acetaminophen (PERCOCET) 7.5-325 MG per tablet, take 1 tablet by mouth every 6 hours if needed (Patient not taking: Reported on 5/9/2022), Disp: , Rfl:     albuterol sulfate HFA (VENTOLIN HFA) 108 (90 Base) MCG/ACT inhaler, inhale 2 puffs by mouth and INTO THE LUNGS every 6 hours if needed for wheezing, Disp: 18 g, Rfl: 5    metFORMIN (GLUCOPHAGE-XR) 500 MG extended release tablet, take 2 tablet by mouth twice a day, Disp: 120 tablet, Rfl: 2    budesonide-formoterol (SYMBICORT) 160-4.5 MCG/ACT AERO, inhale 2 puffs by mouth and INTO THE LUNGS twice a day, Disp: 10.2 g, Rfl: 5    amLODIPine (NORVASC) 10 MG tablet, take 1 tablet by mouth once daily, Disp: 90 tablet, Rfl: 1   SYSTANE ULTRA 0.4-0.3 % ophthalmic solution, instill 1 to 2 drops INTO AFFECTED EYE(S) 4-5 TIMES PER DAY, Disp: 30 mL, Rfl: 1    fluticasone (FLONASE) 50 MCG/ACT nasal spray, instill 1 spray into each nostril once daily, Disp: 32 g, Rfl: 5    OneTouch Delica Lancets 71Z MISC, CHECK BLOOD SUGARS ONCE A DAY, Disp: 100 each, Rfl: 0    mupirocin (BACTROBAN) 2 % ointment, apply to affected area three times a day, Disp: 22 g, Rfl: 0    Propylene Glycol 0.6 % SOLN, 1 drop 4-5 times per day, Disp: 15 mL, Rfl: 1    polyethylene glycol-propylene glycol (SYSTANE) 0.4-0.3 % GEL ophthalmic gel, place 1 drop into both eyes nightly, Disp: 10 mL, Rfl: 0    Brimonidine Tartrate 0.025 % SOLN, 1 drop in each eye every 8 hours as needed for redness. , Disp: 7.5 mL, Rfl: 5    rOPINIRole (REQUIP) 1 MG tablet, take 1 tablet by mouth 1-3 HOURS BEFORE BEDTIME DAILY, Disp: , Rfl:     XYREM 500 MG/ML SOLN solution, , Disp: , Rfl:     blood glucose test strips (ONE TOUCH ULTRA TEST) strip, TEST once daily, Disp: 100 strip, Rfl: 3    loratadine (CLARITIN) 10 MG tablet, Take 1 tablet by mouth daily, Disp: 30 tablet, Rfl: 3    Elastic Bandages & Supports (WRIST SPLINT) MISC, 1 Device by Does not apply route daily, Disp: 1 each, Rfl: 0    Elastic Bandages & Supports (WRIST SPLINT/COCK-UP/RIGHT M) MISC, 1 Device by Does not apply route nightly, Disp: 1 each, Rfl: 0    Blood Pressure Monitor [place], 1 Device by Does not apply route 2 times daily, Disp: 1 Device, Rfl: 0    Handicap Placard MISC, by Does not apply route Expires 6 months after issue (Patient taking differently: by Does not apply route Patient has lifetime one), Disp: 1 each, Rfl: 0    Family History   Problem Relation Age of Onset    Heart Disease Mother     Diabetes Mother     Stroke Mother     Arthritis Mother     Asthma Mother     Depression Mother     High Blood Pressure Mother     High Cholesterol Mother     Dementia Father     Alzheimer's Disease Father Social History     Socioeconomic History    Marital status:      Spouse name: Not on file    Number of children: Not on file    Years of education: Not on file    Highest education level: Not on file   Occupational History    Not on file   Tobacco Use    Smoking status: Never Smoker    Smokeless tobacco: Never Used   Vaping Use    Vaping Use: Never used   Substance and Sexual Activity    Alcohol use: No     Comment: rare    Drug use: No    Sexual activity: Yes     Partners: Male   Other Topics Concern    Not on file   Social History Narrative    Not on file     Social Determinants of Health     Financial Resource Strain: Low Risk     Difficulty of Paying Living Expenses: Not hard at all   Food Insecurity: No Food Insecurity    Worried About [address] [employer] in the Last Year: Never true    [address] in the Last Year: Never true   Transportation Needs: No Transportation Needs    Lack of Transportation (Medical): No    Lack of Transportation (Non-Medical):  No   Physical Activity:     Days of Exercise per Week: Not on file    Minutes of Exercise per Session: Not on file   Stress:     Feeling of Stress : Not on file   Social Connections:     Frequency of Communication with Friends and Family: Not on file    Frequency of Social Gatherings with Friends and Family: Not on file    Attends Ca[employer] Services: Not on file    Active Member of [address] [place] [address] or Organizations: Not on file    Attends Club or Organization Meetings: Not on file    Marital Status: Not on file   Intimate Partner Violence:     Fear of Current or Ex-Partner: Not on file    Emotionally Abused: Not on file    Physically Abused: Not on file    Sexually Abused: Not on file   Housing Stability:     Unable to Pay for Housing in the Last Year: Not on file    Number of [place] in the Last Year: Not on file    Unstable Housing in the Last Year: Not on file       Review of Systems:  Review of Systems Constitutional: Negative for fever. Cardiovascular: Negative for chest pain. Musculoskeletal: Positive for neck pain. Neurological: Positive for headaches. Negative for tingling. Physical Exam:  Ht 5' 4.8\" (1.646 m)   Wt 213 lb 6.4 oz (96.8 kg)   BMI 35.73 kg/m²     Physical Exam  Constitutional:       Appearance: Normal appearance. Pulmonary:      Effort: Pulmonary effort is normal.   Neurological:      Mental Status: She is alert. Psychiatric:         Attention and Perception: Attention and perception normal.         Mood and Affect: Mood and affect normal.         Record/Diagnostics Review:    As above, I did review the imaging    Orders:  Orders Placed This Encounter   Procedures    MRI CERVICAL SPINE WO CONTRAST       Assessment:  1. Other chronic pain    2. Cervical spondylosis    3. Encounter for long-term opiate analgesic use        Treatment Plan:  DISCUSSION: Treatment options discussed with patient and all questions answered to patient's satisfaction. OARRS Review: Reviewed and acceptable for medications prescribed. TREATMENT OPTIONS:     Discussed different treatment options including continued conservative care such as physical therapy, chiropractic care, acupuncture. Discussed different interventional options such as epidural steroids or medial branch blocks. Also discussed neuromodulation in the form of spinal cord stimulation. Also discussed surgical evaluation. Has failed conservative measures, including physical therapy and the pain is worsening, I do believe an MRI of the Cervical spine is indicated to further evaluate pathology and guide treatment plan. Consider injection therapies versus surgical evaluation based on imaging results. Recent discharge from pain management for failed UDS, has also seen my partner in [facility] in 2016 who did not recommend opioids.  Discussed with her that chronic opioid therapy would not be part of my plan for her at this time.    UDS today    Consider Low dose naltrexone      Phani Green M.D. I have reviewed the chief complaint and history of present illness (including ROS and PFSH) and vital documentation by my staff and I agree with their documentation and have added where applicable.

## 2022-05-19 RX ORDER — AMLODIPINE BESYLATE 10 MG/1
TABLET ORAL
Qty: 90 TABLET | Refills: 1 | Status: SHIPPED | OUTPATIENT
Start: 2022-05-19

## 2022-05-20 ENCOUNTER — TELEPHONE (OUTPATIENT)
Dept: ORTHOPEDIC SURGERY | Age: 52
End: 2022-05-20

## 2022-05-20 NOTE — TELEPHONE ENCOUNTER
06/20 - RIGHT KNEE ARTHROSCOPY PARTIAL MEDIAL MENISCECTOMY    Patient has a few questions about surgery. Specifically what time to arrive, what doors to enter, when to stop eating and drinking prior too.      Please call her   Thank you

## 2022-05-23 NOTE — TELEPHONE ENCOUNTER
Patient returned call and advised of surgery time 1130am and [facility] [address] PAT appt 1100 6/6 and first op appt 7/1 835. Patient voiced understanding.       Attempted to LVM but unable due to VM not being set up

## 2022-05-26 ENCOUNTER — TELEPHONE (OUTPATIENT)
Dept: FAMILY MEDICINE CLINIC | Age: 52
End: 2022-05-26

## 2022-05-26 RX ORDER — DULAGLUTIDE 0.75 MG/.5ML
0.75 INJECTION, SOLUTION SUBCUTANEOUS WEEKLY
Qty: 2 PEN | Refills: 0 | COMMUNITY
Start: 2022-05-26 | End: 2022-06-09

## 2022-05-26 RX ORDER — DOXYCYCLINE HYCLATE 100 MG
100 TABLET ORAL 2 TIMES DAILY
Qty: 14 TABLET | Refills: 0 | Status: SHIPPED | OUTPATIENT
Start: 2022-05-26 | End: 2022-06-02

## 2022-05-26 NOTE — TELEPHONE ENCOUNTER
Patient called and stated that 2 days ago she was cleaning out her shed and bump her nose and lip. Pt does have a cut on her lip and she has been keeping it clean, but she said it has a little odor to it and there is a little pus coming from the cut. Pt is asking if she can have a antibiotic called in please.  Thank you

## 2022-06-02 DIAGNOSIS — M54.42 CHRONIC MIDLINE LOW BACK PAIN WITH BILATERAL SCIATICA: ICD-10-CM

## 2022-06-02 DIAGNOSIS — G89.29 CHRONIC MIDLINE LOW BACK PAIN WITH BILATERAL SCIATICA: ICD-10-CM

## 2022-06-02 DIAGNOSIS — M54.41 CHRONIC MIDLINE LOW BACK PAIN WITH BILATERAL SCIATICA: ICD-10-CM

## 2022-06-02 RX ORDER — GABAPENTIN 600 MG/1
1200 TABLET ORAL 2 TIMES DAILY
Qty: 120 TABLET | Refills: 0 | Status: SHIPPED | OUTPATIENT
Start: 2022-06-02 | End: 2022-06-20 | Stop reason: SDUPTHER

## 2022-06-06 DIAGNOSIS — G47.09 OTHER INSOMNIA: ICD-10-CM

## 2022-06-08 RX ORDER — TRAZODONE HYDROCHLORIDE 100 MG/1
TABLET ORAL
Qty: 30 TABLET | Refills: 1 | Status: SHIPPED | OUTPATIENT
Start: 2022-06-08 | End: 2022-07-24

## 2022-06-09 ENCOUNTER — HOSPITAL ENCOUNTER (OUTPATIENT)
Age: 52
Discharge: HOME | End: 2022-06-09
Payer: MEDICARE

## 2022-06-09 ENCOUNTER — HOSPITAL ENCOUNTER (OUTPATIENT)
Dept: PREADMISSION TESTING | Age: 52
Discharge: HOME | End: 2022-06-13
Payer: MEDICARE

## 2022-06-09 VITALS
DIASTOLIC BLOOD PRESSURE: 101 MMHG | HEART RATE: 88 BPM | OXYGEN SATURATION: 95 % | BODY MASS INDEX: 34.82 KG/M2 | WEIGHT: 209 LBS | TEMPERATURE: 97.1 F | SYSTOLIC BLOOD PRESSURE: 159 MMHG | RESPIRATION RATE: 16 BRPM | HEIGHT: 65 IN

## 2022-06-09 DIAGNOSIS — Z01.818 PRE-OP TESTING: Primary | ICD-10-CM

## 2022-06-09 LAB
ALBUMIN SERPL-MCNC: 4.6 G/DL (ref 3.5–5.2)
ALBUMIN/GLOBULIN RATIO: 1.8 (ref 1–2.5)
ALP BLD-CCNC: 83 U/L (ref 35–104)
ALT SERPL-CCNC: 50 U/L (ref 5–33)
ANION GAP SERPL CALCULATED.3IONS-SCNC: 13 MMOL/L (ref 9–17)
ANION GAP SERPL CALCULATED.3IONS-SCNC: 13 MMOL/L (ref 9–17)
AST SERPL-CCNC: 24 U/L
BILIRUB SERPL-MCNC: 0.16 MG/DL (ref 0.3–1.2)
BUN BLDV-MCNC: 13 MG/DL (ref 6–20)
BUN BLDV-MCNC: 13 MG/DL (ref 6–20)
CALCIUM SERPL-MCNC: 9.1 MG/DL (ref 8.6–10.4)
CALCIUM SERPL-MCNC: 9.1 MG/DL (ref 8.6–10.4)
CHLORIDE BLD-SCNC: 99 MMOL/L (ref 98–107)
CHLORIDE BLD-SCNC: 99 MMOL/L (ref 98–107)
CHOLESTEROL, FASTING: 296 MG/DL
CHOLESTEROL/HDL RATIO: 4.8
CO2: 25 MMOL/L (ref 20–31)
CO2: 25 MMOL/L (ref 20–31)
CREAT SERPL-MCNC: 0.62 MG/DL (ref 0.5–0.9)
CREAT SERPL-MCNC: 0.62 MG/DL (ref 0.5–0.9)
GFR AFRICAN AMERICAN: >60 ML/MIN
GFR AFRICAN AMERICAN: >60 ML/MIN
GFR NON-AFRICAN AMERICAN: >60 ML/MIN
GFR NON-AFRICAN AMERICAN: >60 ML/MIN
GFR SERPL CREATININE-BSD FRML MDRD: ABNORMAL ML/MIN/{1.73_M2}
GFR SERPL CREATININE-BSD FRML MDRD: ABNORMAL ML/MIN/{1.73_M2}
GLUCOSE BLD-MCNC: 122 MG/DL (ref 70–99)
GLUCOSE BLD-MCNC: 122 MG/DL (ref 70–99)
HCT VFR BLD CALC: 42.4 % (ref 36.3–47.1)
HDLC SERPL-MCNC: 62 MG/DL
HEMOGLOBIN: 14 G/DL (ref 11.9–15.1)
LDL CHOLESTEROL: 192 MG/DL (ref 0–130)
MCH RBC QN AUTO: 29.2 PG (ref 25.2–33.5)
MCHC RBC AUTO-ENTMCNC: 33 G/DL (ref 28.4–34.8)
MCV RBC AUTO: 88.3 FL (ref 82.6–102.9)
NRBC AUTOMATED: 0 PER 100 WBC
PDW BLD-RTO: 12.9 % (ref 11.8–14.4)
PLATELET # BLD: 368 K/UL (ref 138–453)
PMV BLD AUTO: 10.6 FL (ref 8.1–13.5)
POTASSIUM SERPL-SCNC: 4.2 MMOL/L (ref 3.7–5.3)
POTASSIUM SERPL-SCNC: 4.2 MMOL/L (ref 3.7–5.3)
RBC # BLD: 4.8 M/UL (ref 3.95–5.11)
SODIUM BLD-SCNC: 137 MMOL/L (ref 135–144)
SODIUM BLD-SCNC: 137 MMOL/L (ref 135–144)
TOTAL PROTEIN: 7.2 G/DL (ref 6.4–8.3)
TRIGLYCERIDE, FASTING: 211 MG/DL
VITAMIN D 25-HYDROXY: 18.6 NG/ML
WBC # BLD: 11.5 K/UL (ref 3.5–11.3)

## 2022-06-09 PROCEDURE — 82306 VITAMIN D 25 HYDROXY: CPT

## 2022-06-09 PROCEDURE — 80061 LIPID PANEL: CPT

## 2022-06-09 PROCEDURE — 83036 HEMOGLOBIN GLYCOSYLATED A1C: CPT

## 2022-06-09 PROCEDURE — 80048 BASIC METABOLIC PNL TOTAL CA: CPT

## 2022-06-09 PROCEDURE — 80053 COMPREHEN METABOLIC PANEL: CPT

## 2022-06-09 PROCEDURE — 85027 COMPLETE CBC AUTOMATED: CPT

## 2022-06-09 PROCEDURE — 93005 ELECTROCARDIOGRAM TRACING: CPT | Performed by: ANESTHESIOLOGY

## 2022-06-09 PROCEDURE — 36415 COLL VENOUS BLD VENIPUNCTURE: CPT

## 2022-06-09 ASSESSMENT — PAIN DESCRIPTION - LOCATION: LOCATION: KNEE

## 2022-06-09 ASSESSMENT — PAIN SCALES - GENERAL: PAINLEVEL_OUTOF10: 8

## 2022-06-09 ASSESSMENT — PAIN DESCRIPTION - ORIENTATION: ORIENTATION: RIGHT

## 2022-06-10 ENCOUNTER — TELEPHONE (OUTPATIENT)
Dept: ORTHOPEDIC SURGERY | Age: 52
End: 2022-06-10

## 2022-06-10 ENCOUNTER — TELEPHONE (OUTPATIENT)
Dept: FAMILY MEDICINE CLINIC | Age: 52
End: 2022-06-10

## 2022-06-10 ENCOUNTER — HOSPITAL ENCOUNTER (EMERGENCY)
Age: 52
Discharge: HOME | End: 2022-06-10
Attending: EMERGENCY MEDICINE
Payer: MEDICARE

## 2022-06-10 ENCOUNTER — APPOINTMENT (OUTPATIENT)
Dept: ULTRASOUND IMAGING | Age: 52
End: 2022-06-10
Payer: MEDICARE

## 2022-06-10 VITALS
WEIGHT: 202 LBS | BODY MASS INDEX: 33.66 KG/M2 | TEMPERATURE: 98.6 F | HEIGHT: 65 IN | RESPIRATION RATE: 20 BRPM | OXYGEN SATURATION: 95 % | HEART RATE: 80 BPM | SYSTOLIC BLOOD PRESSURE: 143 MMHG | DIASTOLIC BLOOD PRESSURE: 84 MMHG

## 2022-06-10 DIAGNOSIS — M25.561 ACUTE PAIN OF RIGHT KNEE: Primary | ICD-10-CM

## 2022-06-10 LAB
6-ACETYLMORPHINE, UR: NOT DETECTED
7-AMINOCLONAZEPAM, URINE: NOT DETECTED
ALPHA-OH-ALPRAZ, URINE: NOT DETECTED
ALPHA-OH-MIDAZOLAM, URINE: NOT DETECTED
ALPRAZOLAM, URINE: NOT DETECTED
AMPHETAMINES, URINE: NOT DETECTED
BARBITURATES, URINE: NOT DETECTED
BENZOYLECGONINE, UR: NOT DETECTED
BUPRENORPHINE URINE: NOT DETECTED
CARISOPRODOL, UR: NOT DETECTED
CLONAZEPAM, URINE: NOT DETECTED
CODEINE, URINE: NOT DETECTED
CREATININE URINE: 145.4 MG/DL (ref 20–400)
DIAZEPAM, URINE: NOT DETECTED
DRUGS EXPECTED, UR: NORMAL
EER HI RES INTERP UR: NORMAL
EKG ATRIAL RATE: 80 BPM
EKG P AXIS: 40 DEGREES
EKG P-R INTERVAL: 156 MS
EKG Q-T INTERVAL: 388 MS
EKG QRS DURATION: 82 MS
EKG QTC CALCULATION (BAZETT): 447 MS
EKG R AXIS: 26 DEGREES
EKG T AXIS: 33 DEGREES
EKG VENTRICULAR RATE: 80 BPM
ESTIMATED AVERAGE GLUCOSE: 163 MG/DL
ETHYL GLUCURONIDE UR: NOT DETECTED
FENTANYL URINE: NOT DETECTED
GABAPENTIN: NOT DETECTED
HBA1C MFR BLD: 7.3 % (ref 4–6)
HYDROCODONE, URINE: NOT DETECTED
HYDROMORPHONE, URINE: NOT DETECTED
LORAZEPAM, URINE: NOT DETECTED
MARIJUANA METAB, UR: NOT DETECTED
MDA, UR: NOT DETECTED
MDEA, EVE, UR: NOT DETECTED
MDMA URINE: NOT DETECTED
MEPERIDINE METAB, UR: NOT DETECTED
METHADONE, URINE: NOT DETECTED
METHAMPHETAMINE, URINE: NOT DETECTED
METHYLPHENIDATE: NOT DETECTED
MIDAZOLAM, URINE: NOT DETECTED
MORPHINE URINE: NOT DETECTED
NALOXONE URINE: NOT DETECTED
NORBUPRENORPHINE, URINE: NOT DETECTED
NORDIAZEPAM, URINE: NOT DETECTED
NORFENTANYL, URINE: NOT DETECTED
NORHYDROCODONE, URINE: NOT DETECTED
NOROXYCODONE, URINE: NOT DETECTED
NOROXYMORPHONE, URINE: NOT DETECTED
OXAZEPAM, URINE: NOT DETECTED
OXYCODONE URINE: NOT DETECTED
OXYMORPHONE, URINE: NOT DETECTED
PAIN MANAGEMENT DRUG PANEL INTERP, URINE: NORMAL
PAIN MGT DRUG PANEL, HI RES, UR: NORMAL
PCP,URINE: NOT DETECTED
PHENTERMINE, UR: NOT DETECTED
PREGABALIN: NOT DETECTED
TAPENTADOL, URINE: NOT DETECTED
TAPENTADOL-O-SULFATE, URINE: NOT DETECTED
TEMAZEPAM, URINE: NOT DETECTED
TRAMADOL, URINE: NOT DETECTED
ZOLPIDEM METABOLITE (ZCA), URINE: NOT DETECTED
ZOLPIDEM, URINE: NOT DETECTED

## 2022-06-10 PROCEDURE — 99284 EMERGENCY DEPT VISIT MOD MDM: CPT

## 2022-06-10 PROCEDURE — 93971 EXTREMITY STUDY: CPT

## 2022-06-10 ASSESSMENT — PAIN DESCRIPTION - LOCATION: LOCATION: LEG

## 2022-06-10 ASSESSMENT — PAIN DESCRIPTION - FREQUENCY: FREQUENCY: INTERMITTENT

## 2022-06-10 ASSESSMENT — PAIN DESCRIPTION - DESCRIPTORS: DESCRIPTORS: ACHING

## 2022-06-10 ASSESSMENT — PAIN - FUNCTIONAL ASSESSMENT
PAIN_FUNCTIONAL_ASSESSMENT: PREVENTS OR INTERFERES SOME ACTIVE ACTIVITIES AND ADLS
PAIN_FUNCTIONAL_ASSESSMENT: 0-10

## 2022-06-10 ASSESSMENT — PAIN SCALES - GENERAL: PAINLEVEL_OUTOF10: 8

## 2022-06-10 ASSESSMENT — PAIN DESCRIPTION - PAIN TYPE: TYPE: ACUTE PAIN

## 2022-06-10 ASSESSMENT — PAIN DESCRIPTION - ORIENTATION: ORIENTATION: RIGHT

## 2022-06-10 ASSESSMENT — LIFESTYLE VARIABLES: HOW OFTEN DO YOU HAVE A DRINK CONTAINING ALCOHOL: NEVER

## 2022-06-10 NOTE — TELEPHONE ENCOUNTER
Patient states she do not think it is a blood clot , but is worried about the surgery . States she have not contacted the surgeon with any of her concern . Patient states in her chart it says she have vascular disease she  was not aware of this .  Please advise Thank you

## 2022-06-10 NOTE — TELEPHONE ENCOUNTER
Patient states she have a swollen right leg . Her symptom are pain when walking . Patient is currently not taking anything for the pain , but states she is keeping her leg elevated . Patient is schedule for a torn meniscus surgery on 06/20/22 .  Please advise Thank you

## 2022-06-10 NOTE — TELEPHONE ENCOUNTER
Pt called in concerned because yesterday at her PAT she was told that she has vascular disease and she has not been told that before. PCP is unaware as well. I told her that I do not see on her problem list that vascular disease has been documented. She also stated that she has an area under her knee that is very swollen. It is not red, painful or warm to the touch so she does not believe that it is a clot. She stated that she has had this before and Dr. Lazara Bah does not believe this has to do with her meniscus tear. Dr. Lazara Bah, any concern with pocket of swelling by her knee with upcoming surgery on 6/20/22?

## 2022-06-11 NOTE — ED PROVIDER NOTES
[id number] [facility] ED  [address] [facility] [address] [facility] [address]  Phone: [phone]  Fax: [phone]      eMERGENCY dEPARTMENT eNCOUnter      Pt Name: Erin Cardenas  MRN: [id number]  [place] 1970  Date of evaluation: 6/10/22      CHIEF COMPLAINT:  Chief Complaint   Patient presents with    Leg Pain       HISTORY OF PRESENT ILLNESS    Erin Cardenas is a 45 y.o. female who presents with evaluation for orthopedic pain:    Location/Symptom:   Right lower leg swelling and pain  Timing/Onset: 1 week  Context/Setting:    Patient here for evaluation of intermittent levels of swelling and pain of her right lower leg, specifically her mid to upper right shin. Patient states that she has meniscal injury that will require arthroscopic evaluation and potential repair that is forthcoming with her orthopedist.  Patient denies having any rash or redness over this area. Patient states that the swelling is worse when she is up walking on it and better with rest and some elevation. No other new pain of the right knee, right posterior leg/calf or her right foot or ankle. Patient has concerns for DVT. Patient has been using a cane due to the pain of the right knee so she has not been ambulating normally. No paresthesias/focal weakness. No chest/respiratory/abdominal pain or symptoms. Quality:   Achy, sharp  Duration:   constant  Modifying Factors: Worse with movement and weightbearing, better with rest  Severity:   Mild-moderate    Nursing Notes were reviewed. REVIEW OF SYSTEMS       Constitutional: Denies recent fever, chills. Eyes: No vision changes. Neck: No neck pain. Respiratory: Denies recent shortness of breath. Cardiac:  Denies recent chest pain. GI:  Denies abdominal pain/nausea/vomiting/diarrhea. : Denies dysuria. Musculoskeletal:   Per HPI  Neurologic:  No headache. No focal weakness. No paresthesias. Skin:  Denies any rash.       Negative in 10 essential Systems except as mentioned above and in the HPI. PAST MEDICAL HISTORY   PMH:  has a past medical history of Allergic rhinitis, Anxiety, Arthritis, Asthma, Cataplexy and narcolepsy, Chronic back pain, Chronic pain, Depression, Diabetes mellitus ([address] Fibromyalgia, Hypertension, Irritable bowel syndrome, Liver enzyme elevation, Neuropathy, Obesity, Osteoarthritis, Peripheral vascular disease ([address] Restless legs syndrome, and Sleep apnea. Surgical History:  has a past surgical history that includes Cholecystectomy; Foot surgery (Right); Facial Surgery (N/A, 10/28/2021); and Nasal fracture surgery (N/A, 10/28/2021). Social History:  reports that she has never smoked. She has never used smokeless tobacco. She reports current drug use. Drug: Marijuana Paullette Nim). She reports that she does not drink alcohol. Family History: None  Psychiatric History: None    Allergies:is allergic to lisinopril, zoloft [sertraline hcl], seasonal, amoxicillin, augmentin [amoxicillin-pot clavulanate], buspirone, and paxil [paroxetine hcl]. PHYSICAL EXAM     INITIAL VITALS: BP (!) 143/84   Pulse 80   Temp 98.6 °F (37 °C) (Oral)   Resp 20   Ht 5' 5\" (1.651 m)   Wt 91.6 kg (202 lb)   SpO2 95%   BMI 33.61 kg/m²   Constitutional:  Well developed   Eyes:  Pupils equal/round  HENT:  Atraumatic, external ears normal, nose normal  Respiratory:  LCTA bilat, no W/R/R  Cardiovascular:  RRR with normal S1 and S2    Musculoskeletal: Cystic fluctuance of the right upper tibial surface, there is some mild tenderness palpation but there is no warmth or erythema. There is no induration. There is no guarding with my palpation. Her right calf and posterior muscle compartments are soft and nontender. There is no bony deformity of the knee or right lower extremity. Remainder of her right lower extremity is nontender and within normal limits. 2+ DP on the right. Pulses, NV intact distally. No signs of acute limb ischemia. Integument:   No rash. Neurologic:  Alert & appropriate mentation/interaction, no focal deficits noted       DIAGNOSTIC RESULTS     EKG: All EKG's are interpreted by the Emergency Department Physician who either signs or Co-signs this chart in the absence of a cardiologist.  Not indicated    RADIOLOGY:   Reviewed the radiologist:  US DUP LOWER EXTREMITY RIGHT ABIODUN   Final Result   No evidence of right lower extremity deep venous thrombosis. LABS:  Labs Reviewed - No data to display  Not indicated    [facility]:       2026  US paged for Doppler study to r/o DVT. Pt has been using cane for upcoming meniscal repair/athroscopy to follow. Could be some cystic structure or even some bursitis. NV intact distally, no ischemic changes. 2117  Attending to complete all remaining care, diagnosis and disposition for this patient. We discussed this patient prior to my departure. My note will be refreshed to reflect these details, though I was not actively involved in this patient's care following the time stamp above. .    No orders of the defined types were placed in this encounter. CONSULTS:  None      FINAL IMPRESSION      1.  Acute pain of right knee          DISPOSITION/PLAN:  DISPOSITION          PATIENT REFERRED TO:  RUBEN Jensen - CNP  [address]  [address]  [phone]    Schedule an appointment as soon as possible for a visit in 3 days        DISCHARGE MEDICATIONS:  Discharge Medication List as of 6/10/2022  9:30 PM          (Please note that portions of this note were completed with a voice recognition program.  Efforts were made to edit the dictations but occasionally words are mis-transcribed.)    GRETCHEN Aranda PA-C  06/11/22 2038

## 2022-06-11 NOTE — ED PROVIDER NOTES
[id number] [facility] ED  [address] [place] [address]  Phone: [phone]  Fax: [phone]      Attending Physician Attestation    I performed a history and physical examination of the patient and discussed management with the mid level provideer. I reviewed the mid level provider's note and agree with the documented findings and plan of care. Any areas of disagreement are noted on the chart. I was personally present for the key portions of any procedures. I have documented in the chart those procedures where I was not present during the key portions. I have reviewed the emergency nurses triage note. I agree with the chief complaint, past medical history, past surgical history, allergies, medications, social and family history as documented unless otherwise noted below. Documentation of the HPI, Physical Exam and Medical Decision Making performed by mid level providers is based on my personal performance of the HPI, PE and MDM. For Physician Assistant/ Nurse Practitioner cases/documentation I have personally evaluated this patient and have completed at least one if not all key elements of the E/M (history, physical exam, and MDM). Additional findings are as noted. CHIEF COMPLAINT       Chief Complaint   Patient presents with    Leg Pain         PAST MEDICAL HISTORY    has a past medical history of Allergic rhinitis, Anxiety, Arthritis, Asthma, Cataplexy and narcolepsy, Chronic back pain, Chronic pain, Depression, Diabetes mellitus ([address] Fibromyalgia, Hypertension, Irritable bowel syndrome, Liver enzyme elevation, Neuropathy, Obesity, Osteoarthritis, Peripheral vascular disease ([address] Restless legs syndrome, and Sleep apnea. SURGICAL HISTORY      has a past surgical history that includes Cholecystectomy; Foot surgery (Right); Facial Surgery (N/A, 10/28/2021); and Nasal fracture surgery (N/A, 10/28/2021).     CURRENT MEDICATIONS       Discharge Medication List as of 6/10/2022  9:30 PM CONTINUE these medications which have NOT CHANGED    Details   !! Sodium Oxybate (XYREM PO) Take by mouthHistorical Med      venlafaxine (EFFEXOR XR) 150 MG extended release capsule take 1 capsule by mouth every morning, Disp-30 capsule, R-5Normal      traZODone (DESYREL) 100 MG tablet take 1/2 to 1 tablet by mouth at bedtime if needed for sleep (**DO NOT TAKE WITHIN 24 HOURS OF XYREM), Disp-30 tablet, R-1Normal      gabapentin (NEURONTIN) 600 MG tablet Take 2 tablets by mouth 2 times daily for 30 days. , Disp-120 tablet, R-0Normal      amLODIPine (NORVASC) 10 MG tablet take 1 tablet by mouth once daily, Disp-90 tablet, R-1Normal      cetirizine (ZYRTEC) 10 MG tablet Take 1 tablet by mouth daily, Disp-30 tablet, R-5Normal      !! Blood Pressure Monitor MISC DAILY Starting Mon 5/9/2022, Disp-1 each, R-0, Print      glucose monitoring (FREESTYLE FREEDOM) kit DAILY Starting Mon 5/9/2022, Disp-1 kit, R-0, Normal      !! Lancets MISC 2 TIMES DAILY Starting Mon 5/9/2022, Disp-100 each, R-11, Normal      !! blood glucose monitor strips Test 2 times a day & as needed for symptoms of irregular blood glucose. Dispense sufficient amount for indicated testing frequency plus additional to accommodate PRN testing needs. , Disp-100 strip, R-11, Normal      Alcohol Swabs (ALCOHOL PREP) PADS 3 times daily Starting Mon 5/9/2022, Disp-100 each, R-11, Normal      Dulaglutide (TRULICITY) 7.77 FREDERICK/9.8IA SOPN Inject 0.75 mg into the skin once a week, Disp-0.5 mL, R-4Normal      Dulaglutide (TRULICITY) 1.5 UA/9.9DH SOPN Inject 1.5 mg into the skin once a week After 4 weeks of 0.75 mg, Disp-0.5 mL, R-5Normal      ibuprofen (ADVIL;MOTRIN) 800 MG tablet take 1 tablet by mouth every 8 hours with food AS NEEDED FOR PAIN, Disp-90 tablet, R-1Normal      insulin glargine (LANTUS SOLOSTAR) 100 UNIT/ML injection pen Inject 10 Units into the skin nightly, Disp-5 pen, R-2Normal      Insulin Pen Needle 32G X 4 MM MISC DAILY Starting Fri 4/29/2022, Disp-100 each, R-5, Normal      metoprolol tartrate (LOPRESSOR) 50 MG tablet take 1 tablet by mouth twice a day, Disp-180 tablet, R-1Normal      naloxone 4 MG/0.1ML LIQD nasal spray instill 1 spray in 1 NOSTRIL if needed FOR OPIOID OVERDOSE may re. ..  (REFER TO PRESCRIPTION NOTES). Historical Med      hydroCHLOROthiazide (HYDRODIURIL) 12.5 MG tablet take 1 tablet by mouth daily, Disp-90 tablet, R-1Normal      diclofenac sodium (VOLTAREN) 1 % GEL apply 4 grams topically four times a day, Disp-500 g, R-2, Normal      albuterol sulfate HFA (VENTOLIN HFA) 108 (90 Base) MCG/ACT inhaler inhale 2 puffs by mouth and INTO THE LUNGS every 6 hours if needed for wheezing, Disp-18 g, R-5Normal      metFORMIN (GLUCOPHAGE-XR) 500 MG extended release tablet take 2 tablet by mouth twice a day, Disp-120 tablet, R-2Normal      budesonide-formoterol (SYMBICORT) 160-4.5 MCG/ACT AERO inhale 2 puffs by mouth and INTO THE LUNGS twice a day, Disp-10.2 g, R-5Normal      SYSTANE ULTRA 0.4-0.3 % ophthalmic solution instill 1 to 2 drops INTO AFFECTED EYE(S) 4-5 TIMES PER DAY, Disp-30 mL, R-1Normal      fluticasone (FLONASE) 50 MCG/ACT nasal spray instill 1 spray into each nostril once daily, Disp-32 g, R-5Normal      !! OneTouch Delica Lancets 43L MISC CHECK BLOOD SUGARS ONCE A DAY, Disp-100 each, R-0Normal      Propylene Glycol 0.6 % SOLN 1 drop 4-5 times per day, Disp-15 mL, R-1Normal      polyethylene glycol-propylene glycol (SYSTANE) 0.4-0.3 % GEL ophthalmic gel place 1 drop into both eyes nightly, Disp-10 mL, R-0Normal      Brimonidine Tartrate 0.025 % SOLN 1 drop in each eye every 8 hours as needed for redness. , Disp-7.5 mL, R-5Normal      rOPINIRole (REQUIP) 1 MG tablet take 1 tablet by mouth 1-3 HOURS BEFORE BEDTIME DAILYHistorical Med      !! XYREM 500 MG/ML SOLN solution DAWHistorical Med      !! blood glucose test strips (ONE TOUCH ULTRA TEST) strip TEST once daily, Disp-100 strip,R-3Normal      loratadine (CLARITIN) 10 MG tablet Take 1 tablet by mouth daily, Disp-30 tablet,R-3Normal      !! Elastic Bandages & Supports (WRIST SPLINT) MISC DAILY Starting Tue 3/10/2020, Disp-1 each, R-0, Print      !! Elastic Bandages & Supports (WRIST SPLINT/COCK-UP/RIGHT M) MISC NIGHTLY Starting Tue 3/10/2020, Disp-1 each, R-0, Print      !! Blood Pressure Monitor [place] 2 TIMES DAILY Starting 2019, Disp-1 Device, R-0, Print      Handicap Placard MISC Starting 2018, Disp-1 each, R-0, PrintExpires 6 months after issue       !! - Potential duplicate medications found. Please discuss with provider. ALLERGIES     is allergic to lisinopril, zoloft [sertraline hcl], seasonal, amoxicillin, augmentin [amoxicillin-pot clavulanate], buspirone, and paxil [paroxetine hcl]. FAMILY HISTORY     She indicated that her mother is . She indicated that her father is alive. She indicated that both of her daughters are alive. She indicated that both of her sons are alive. family history includes Alzheimer's Disease in her father; Arthritis in her mother; Asthma in her mother; Dementia in her father; Depression in her mother; Diabetes in her mother; Heart Disease in her mother; High Blood Pressure in her mother; High Cholesterol in her mother; Stroke in her mother. SOCIAL HISTORY      reports that she has never smoked. She has never used smokeless tobacco. She reports current drug use. Drug: Marijuana Norval Remak). She reports that she does not drink alcohol. DIAGNOSTIC RESULTS     EKG: All EKG's are interpreted by the Emergency Department Physician who either signs or Co-signs this chart in the absence of a cardiologist.      RADIOLOGY:   Non-plain film images such as CT, Ultrasound and MRI are read by the radiologist. Plain radiographic images are visualized and the radiologist interpretations are reviewed as follows:     US DUP LOWER EXTREMITY RIGHT ABIODUN   Final Result   No evidence of right lower extremity deep venous thrombosis. No results found. LABS:  No results found for this visit on 06/10/22. EMERGENCY DEPARTMENT COURSE:   Vitals:    Vitals:    06/10/22 2000   BP: (!) 143/84   Pulse: 80   Resp: 20   Temp: 98.6 °F (37 °C)   TempSrc: Oral   SpO2: 95%   Weight: 91.6 kg (202 lb)   Height: 5' 5\" (1.651 m)     -------------------------  BP: (!) 143/84, Temp: 98.6 °F (37 °C), Heart Rate: 80, Resp: 20      PERTINENT ATTENDING PHYSICIAN COMMENTS:    Patient presents with right knee pain. She is scheduled to have her meniscus repaired. She reports that on occasion her knee and leg swells when she ambulates on it. No history of DVT. No history of PE. No dyspnea or cardiopulmonary symptoms otherwise. She had an E KG done yesterday as preop that was within normal limits and I also reviewed it. When she rests her swelling goes down and seems to be at baseline. I feel the knee prior injury is the cause of her symptoms and not DVT at this time. Plan will be to discharge patient home as the imaging is negative for DVT. I do not feel she requires PE work-up or further work-up in the emergency department. She knows she can return at any time or for any new or concerning symptoms. The patient presented with knee pain. A DVT was not detected on imaging. The foot/ankle and hip joints were not affected. No skin lesions were seen. There are no signs of compartment syndrome. The pulses are 2/4. The motor is 5/5. The sensation is intact. The patient was advised to return to the Emergency Department for increasing pain, numbness, weakness, or coldness to the extremity. The patient was further instructed to follow up in 2-3 days with their family doctor or orthopedics. The patient voiced understanding of these instructions.     The patient understands that at this time there is no evidence for a more malignant underlying process, but the patient also understands that early in the process of an illness or injury, an emergency department workup can be falsely reassuring. Routine discharge counseling was given, and the patient understands that worsening, changing or persistent symptoms should prompt an immediate call or follow up with their primary physician or return to the emergency department. The importance of appropriate follow up was also discussed. I have reviewed the disposition diagnosis with the patient and or their family/guardian. I have answered their questions and given discharge instructions. They voiced understanding of these instructions and did not have any further questions or complaints. CONSULTS:    None    CRITICAL CARE:     None    PROCEDURES:    None    FINAL IMPRESSION      1.  Acute pain of right knee          DISPOSITION/PLAN   DISPOSITION        Condition on Disposition    Improved    PATIENT REFERRED TO:  RUBEN Navarrete - CNP  [address]  [place] [address] [place] [address]  [phone]    Schedule an appointment as soon as possible for a visit in 3 days        DISCHARGE MEDICATIONS:  Discharge Medication List as of 6/10/2022  9:30 PM          (Please note that portions of this note were completed with a voice recognition program.  Efforts were made to edit the dictations but occasionally words are mis-transcribed.)    Aminta Hodge DO, DO  Attending Emergency Physician       Aminta Hodge DO  06/10/22 0027

## 2022-06-14 NOTE — TELEPHONE ENCOUNTER
Called pt back and advised that from Dr. Kelton Tinajero stand point, it is not up to him and they can proceed with surgery as long as all clearances are in order.

## 2022-06-16 ENCOUNTER — ANESTHESIA EVENT (OUTPATIENT)
Dept: OPERATING ROOM | Age: 52
End: 2022-06-16
Payer: MEDICARE

## 2022-06-18 DIAGNOSIS — E66.01 CLASS 2 SEVERE OBESITY DUE TO EXCESS CALORIES WITH SERIOUS COMORBIDITY AND BODY MASS INDEX (BMI) OF 38.0 TO 38.9 IN ADULT (HCC): ICD-10-CM

## 2022-06-18 DIAGNOSIS — E11.8 TYPE 2 DIABETES MELLITUS WITH COMPLICATION, WITHOUT LONG-TERM CURRENT USE OF INSULIN (HCC): ICD-10-CM

## 2022-06-20 ENCOUNTER — ANESTHESIA (OUTPATIENT)
Dept: OPERATING ROOM | Age: 52
End: 2022-06-20
Payer: MEDICARE

## 2022-06-20 ENCOUNTER — HOSPITAL ENCOUNTER (OUTPATIENT)
Age: 52
Setting detail: OUTPATIENT SURGERY
Discharge: HOME | End: 2022-06-20
Attending: ORTHOPAEDIC SURGERY | Admitting: ORTHOPAEDIC SURGERY
Payer: MEDICARE

## 2022-06-20 VITALS
BODY MASS INDEX: 35.96 KG/M2 | SYSTOLIC BLOOD PRESSURE: 147 MMHG | TEMPERATURE: 96.5 F | HEART RATE: 93 BPM | DIASTOLIC BLOOD PRESSURE: 91 MMHG | RESPIRATION RATE: 16 BRPM | WEIGHT: 215.8 LBS | OXYGEN SATURATION: 92 % | HEIGHT: 65 IN

## 2022-06-20 DIAGNOSIS — G89.29 CHRONIC MIDLINE LOW BACK PAIN WITH BILATERAL SCIATICA: ICD-10-CM

## 2022-06-20 DIAGNOSIS — M54.42 CHRONIC MIDLINE LOW BACK PAIN WITH BILATERAL SCIATICA: ICD-10-CM

## 2022-06-20 DIAGNOSIS — S83.241A ACUTE MEDIAL MENISCUS TEAR OF RIGHT KNEE, INITIAL ENCOUNTER: Primary | ICD-10-CM

## 2022-06-20 DIAGNOSIS — M54.41 CHRONIC MIDLINE LOW BACK PAIN WITH BILATERAL SCIATICA: ICD-10-CM

## 2022-06-20 LAB — GLUCOSE BLD-MCNC: 225 MG/DL (ref 65–105)

## 2022-06-20 PROCEDURE — 29881 ARTHRS KNE SRG MNISECTMY M/L: CPT | Performed by: ORTHOPAEDIC SURGERY

## 2022-06-20 PROCEDURE — 3600000003 HC SURGERY LEVEL 3 BASE: Performed by: ORTHOPAEDIC SURGERY

## 2022-06-20 PROCEDURE — 82947 ASSAY GLUCOSE BLOOD QUANT: CPT

## 2022-06-20 PROCEDURE — 2580000003 HC RX 258: Performed by: ANESTHESIOLOGY

## 2022-06-20 PROCEDURE — 3700000000 HC ANESTHESIA ATTENDED CARE: Performed by: ORTHOPAEDIC SURGERY

## 2022-06-20 PROCEDURE — 7100000011 HC PHASE II RECOVERY - ADDTL 15 MIN: Performed by: ORTHOPAEDIC SURGERY

## 2022-06-20 PROCEDURE — 7100000000 HC PACU RECOVERY - FIRST 15 MIN: Performed by: ORTHOPAEDIC SURGERY

## 2022-06-20 PROCEDURE — 2500000003 HC RX 250 WO HCPCS: Performed by: ANESTHESIOLOGY

## 2022-06-20 PROCEDURE — 7100000010 HC PHASE II RECOVERY - FIRST 15 MIN: Performed by: ORTHOPAEDIC SURGERY

## 2022-06-20 PROCEDURE — 3600000013 HC SURGERY LEVEL 3 ADDTL 15MIN: Performed by: ORTHOPAEDIC SURGERY

## 2022-06-20 PROCEDURE — 6360000002 HC RX W HCPCS: Performed by: ANESTHESIOLOGY

## 2022-06-20 PROCEDURE — 2709999900 HC NON-CHARGEABLE SUPPLY: Performed by: ORTHOPAEDIC SURGERY

## 2022-06-20 PROCEDURE — 3700000001 HC ADD 15 MINUTES (ANESTHESIA): Performed by: ORTHOPAEDIC SURGERY

## 2022-06-20 PROCEDURE — 6360000002 HC RX W HCPCS: Performed by: ORTHOPAEDIC SURGERY

## 2022-06-20 RX ORDER — SODIUM CHLORIDE 9 MG/ML
INJECTION, SOLUTION INTRAVENOUS CONTINUOUS
Status: DISCONTINUED | OUTPATIENT
Start: 2022-06-20 | End: 2022-06-20 | Stop reason: HOSPADM

## 2022-06-20 RX ORDER — PROPOFOL 10 MG/ML
INJECTION, EMULSION INTRAVENOUS PRN
Status: DISCONTINUED | OUTPATIENT
Start: 2022-06-20 | End: 2022-06-20 | Stop reason: SDUPTHER

## 2022-06-20 RX ORDER — GABAPENTIN 600 MG/1
1200 TABLET ORAL 2 TIMES DAILY
Qty: 120 TABLET | Refills: 0 | Status: SHIPPED | OUTPATIENT
Start: 2022-06-20 | End: 2022-08-08

## 2022-06-20 RX ORDER — SODIUM CHLORIDE 0.9 % (FLUSH) 0.9 %
5-40 SYRINGE (ML) INJECTION EVERY 12 HOURS SCHEDULED
Status: DISCONTINUED | OUTPATIENT
Start: 2022-06-20 | End: 2022-06-20 | Stop reason: HOSPADM

## 2022-06-20 RX ORDER — FENTANYL CITRATE 50 UG/ML
INJECTION, SOLUTION INTRAMUSCULAR; INTRAVENOUS PRN
Status: DISCONTINUED | OUTPATIENT
Start: 2022-06-20 | End: 2022-06-20 | Stop reason: SDUPTHER

## 2022-06-20 RX ORDER — HYDROCODONE BITARTRATE AND ACETAMINOPHEN 5; 325 MG/1; MG/1
1 TABLET ORAL EVERY 6 HOURS PRN
Qty: 20 TABLET | Refills: 0 | Status: SHIPPED | OUTPATIENT
Start: 2022-06-20 | End: 2022-06-25

## 2022-06-20 RX ORDER — DIPHENHYDRAMINE HYDROCHLORIDE 50 MG/ML
12.5 INJECTION INTRAMUSCULAR; INTRAVENOUS
Status: DISCONTINUED | OUTPATIENT
Start: 2022-06-20 | End: 2022-06-20 | Stop reason: HOSPADM

## 2022-06-20 RX ORDER — LIDOCAINE HYDROCHLORIDE 10 MG/ML
INJECTION, SOLUTION EPIDURAL; INFILTRATION; INTRACAUDAL; PERINEURAL PRN
Status: DISCONTINUED | OUTPATIENT
Start: 2022-06-20 | End: 2022-06-20 | Stop reason: SDUPTHER

## 2022-06-20 RX ORDER — ROPIVACAINE HYDROCHLORIDE 5 MG/ML
INJECTION, SOLUTION EPIDURAL; INFILTRATION; PERINEURAL PRN
Status: DISCONTINUED | OUTPATIENT
Start: 2022-06-20 | End: 2022-06-20 | Stop reason: ALTCHOICE

## 2022-06-20 RX ORDER — ONDANSETRON 2 MG/ML
4 INJECTION INTRAMUSCULAR; INTRAVENOUS
Status: DISCONTINUED | OUTPATIENT
Start: 2022-06-20 | End: 2022-06-20 | Stop reason: HOSPADM

## 2022-06-20 RX ORDER — SODIUM CHLORIDE, SODIUM LACTATE, POTASSIUM CHLORIDE, CALCIUM CHLORIDE 600; 310; 30; 20 MG/100ML; MG/100ML; MG/100ML; MG/100ML
INJECTION, SOLUTION INTRAVENOUS CONTINUOUS PRN
Status: DISCONTINUED | OUTPATIENT
Start: 2022-06-20 | End: 2022-06-20 | Stop reason: SDUPTHER

## 2022-06-20 RX ORDER — MORPHINE SULFATE 1 MG/ML
1 INJECTION, SOLUTION EPIDURAL; INTRATHECAL; INTRAVENOUS EVERY 5 MIN PRN
Status: DISCONTINUED | OUTPATIENT
Start: 2022-06-20 | End: 2022-06-20 | Stop reason: HOSPADM

## 2022-06-20 RX ORDER — SODIUM CHLORIDE 9 MG/ML
25 INJECTION, SOLUTION INTRAVENOUS PRN
Status: DISCONTINUED | OUTPATIENT
Start: 2022-06-20 | End: 2022-06-20 | Stop reason: HOSPADM

## 2022-06-20 RX ORDER — KETOROLAC TROMETHAMINE 30 MG/ML
INJECTION, SOLUTION INTRAMUSCULAR; INTRAVENOUS PRN
Status: DISCONTINUED | OUTPATIENT
Start: 2022-06-20 | End: 2022-06-20 | Stop reason: SDUPTHER

## 2022-06-20 RX ORDER — MEPERIDINE HYDROCHLORIDE 50 MG/ML
12.5 INJECTION INTRAMUSCULAR; INTRAVENOUS; SUBCUTANEOUS ONCE
Status: DISCONTINUED | OUTPATIENT
Start: 2022-06-20 | End: 2022-06-20 | Stop reason: HOSPADM

## 2022-06-20 RX ORDER — DEXAMETHASONE SODIUM PHOSPHATE 10 MG/ML
INJECTION, SOLUTION INTRAMUSCULAR; INTRAVENOUS PRN
Status: DISCONTINUED | OUTPATIENT
Start: 2022-06-20 | End: 2022-06-20 | Stop reason: SDUPTHER

## 2022-06-20 RX ORDER — LIRAGLUTIDE 6 MG/ML
INJECTION SUBCUTANEOUS
Qty: 9 ML | Refills: 3 | OUTPATIENT
Start: 2022-06-20

## 2022-06-20 RX ORDER — MIDAZOLAM HYDROCHLORIDE 1 MG/ML
INJECTION INTRAMUSCULAR; INTRAVENOUS PRN
Status: DISCONTINUED | OUTPATIENT
Start: 2022-06-20 | End: 2022-06-20 | Stop reason: SDUPTHER

## 2022-06-20 RX ORDER — ONDANSETRON 2 MG/ML
INJECTION INTRAMUSCULAR; INTRAVENOUS PRN
Status: DISCONTINUED | OUTPATIENT
Start: 2022-06-20 | End: 2022-06-20 | Stop reason: SDUPTHER

## 2022-06-20 RX ORDER — SODIUM CHLORIDE, SODIUM LACTATE, POTASSIUM CHLORIDE, CALCIUM CHLORIDE 600; 310; 30; 20 MG/100ML; MG/100ML; MG/100ML; MG/100ML
INJECTION, SOLUTION INTRAVENOUS CONTINUOUS
Status: DISCONTINUED | OUTPATIENT
Start: 2022-06-20 | End: 2022-06-20 | Stop reason: HOSPADM

## 2022-06-20 RX ORDER — SODIUM CHLORIDE 0.9 % (FLUSH) 0.9 %
5-40 SYRINGE (ML) INJECTION PRN
Status: DISCONTINUED | OUTPATIENT
Start: 2022-06-20 | End: 2022-06-20 | Stop reason: HOSPADM

## 2022-06-20 RX ORDER — SODIUM CHLORIDE 9 MG/ML
INJECTION, SOLUTION INTRAVENOUS PRN
Status: DISCONTINUED | OUTPATIENT
Start: 2022-06-20 | End: 2022-06-20 | Stop reason: HOSPADM

## 2022-06-20 RX ADMIN — PROPOFOL 50 MG: 10 INJECTION, EMULSION INTRAVENOUS at 11:06

## 2022-06-20 RX ADMIN — PROPOFOL 150 MG: 10 INJECTION, EMULSION INTRAVENOUS at 10:59

## 2022-06-20 RX ADMIN — FENTANYL CITRATE 25 MCG: 50 INJECTION, SOLUTION INTRAMUSCULAR; INTRAVENOUS at 11:31

## 2022-06-20 RX ADMIN — LIDOCAINE HYDROCHLORIDE 50 MG: 10 INJECTION, SOLUTION EPIDURAL; INFILTRATION; INTRACAUDAL; PERINEURAL at 10:59

## 2022-06-20 RX ADMIN — MIDAZOLAM HYDROCHLORIDE 2 MG: 1 INJECTION, SOLUTION INTRAMUSCULAR; INTRAVENOUS at 10:54

## 2022-06-20 RX ADMIN — FENTANYL CITRATE 25 MCG: 50 INJECTION, SOLUTION INTRAMUSCULAR; INTRAVENOUS at 11:13

## 2022-06-20 RX ADMIN — DEXAMETHASONE SODIUM PHOSPHATE 8 MG: 10 INJECTION INTRAMUSCULAR; INTRAVENOUS at 10:59

## 2022-06-20 RX ADMIN — SODIUM CHLORIDE, POTASSIUM CHLORIDE, SODIUM LACTATE AND CALCIUM CHLORIDE: 600; 310; 30; 20 INJECTION, SOLUTION INTRAVENOUS at 10:54

## 2022-06-20 RX ADMIN — ONDANSETRON 4 MG: 2 INJECTION INTRAMUSCULAR; INTRAVENOUS at 11:23

## 2022-06-20 RX ADMIN — FENTANYL CITRATE 100 MCG: 50 INJECTION, SOLUTION INTRAMUSCULAR; INTRAVENOUS at 10:54

## 2022-06-20 RX ADMIN — FENTANYL CITRATE 25 MCG: 50 INJECTION, SOLUTION INTRAMUSCULAR; INTRAVENOUS at 11:09

## 2022-06-20 RX ADMIN — SODIUM CHLORIDE: 9 INJECTION, SOLUTION INTRAVENOUS at 10:15

## 2022-06-20 RX ADMIN — FENTANYL CITRATE 25 MCG: 50 INJECTION, SOLUTION INTRAMUSCULAR; INTRAVENOUS at 11:16

## 2022-06-20 RX ADMIN — KETOROLAC TROMETHAMINE 30 MG: 30 INJECTION, SOLUTION INTRAMUSCULAR at 11:23

## 2022-06-20 ASSESSMENT — PAIN DESCRIPTION - DESCRIPTORS: DESCRIPTORS: ACHING

## 2022-06-20 NOTE — ANESTHESIA POSTPROCEDURE EVALUATION
Department of Anesthesiology  Postprocedure Note    Patient: Morris Rascon  MRN: [id number]  [place]: 1970  Date of evaluation: 6/20/2022  Time:  11:36 AM     Procedure Summary     Date: 06/20/22 Room / Location: [address]    Anesthesia Start: 1054 Anesthesia Stop: 1134    Procedure: RIGHT KNEE ARTHROSCOPY PARTIAL MEDIAL MENISCECTOMY (Right Knee) Diagnosis:       Acute medial meniscus tear of right knee, initial encounter      (RIGHT KNEE MEDIAL MENISCUS TEAR)    Surgeons: Christiana Valente MD Responsible Provider: Lizette Buchanan MD    Anesthesia Type: general ASA Status: 3          Anesthesia Type: No value filed. Pema Phase I: Pema Score: 10    Pema Phase II:      Last vitals: Reviewed and per EMR flowsheets.        Anesthesia Post Evaluation    Patient location during evaluation: PACU  Patient participation: complete - patient participated  Level of consciousness: awake and alert  Airway patency: patent  Nausea & Vomiting: no nausea and no vomiting  Complications: no  Cardiovascular status: hemodynamically stable  Respiratory status: nasal cannula and spontaneous ventilation  Hydration status: euvolemic  Multimodal analgesia pain management approach

## 2022-06-20 NOTE — TELEPHONE ENCOUNTER
Onofre Polanco is calling to request a refill on the following medication(s):    Last Visit Date (If Applicable):  4/7/5202    Next Visit Date:    8/10/2022    Medication Request:  Requested Prescriptions     Pending Prescriptions Disp Refills    gabapentin (NEURONTIN) 600 MG tablet 120 tablet 0     Sig: Take 2 tablets by mouth 2 times daily for 30 days.

## 2022-06-20 NOTE — OP NOTE
Operative Note      Patient: Feliz Calles  YOB: 1970  MRN: [id number]    Date of Procedure: 6/20/2022    Pre-Op Diagnosis: RIGHT KNEE MEDIAL MENISCUS TEAR    Post-Op Diagnosis: Same       Procedure(s):  RIGHT KNEE ARTHROSCOPY PARTIAL MEDIAL MENISCECTOMY    Surgeon(s):  Varsha Suárez MD    Assistant:   Resident: Albert Jimenez DO    Anesthesia: General    Estimated Blood Loss (mL): Minimal    Complications: None    Specimens:   * No specimens in log *    Implants:  * No implants in log *      Drains: * No LDAs found *    Findings: Small medial meniscus tear extending near the root ligament of the right knee    Detailed Description of Procedure:     Patient is a 45y.o. year old female who presents with a history of pain, locking, and giving away sensations of their right knee. Physical examination was positive for Nicole's examination. MRI was consistent with a tear of the posterior horn near the root ligament of the right knee. . Having failed conservative treatment, it was advised that arthroscopic examination and treatment of their knee would be beneficial and consent was obtained with risk and benefits explained to the patient. The patient was taken tot he operative room and general anesthesia was administered. A tourniquet was placed to the operatives lower extremity and then placed in the leg vasquez. The leg was exsanguinated and the tourniquet inflated to the 300mmHg. The leg was the prepped and draped in the usual sterile fashion. Time-out was called to verify laterality. Medial and lateral portals were made and the scope placed in the lateral portal. The patella femoral joint was examined and noted grade 2 to grade III chondromalacia but nothing required intervention. . The scope was then passes down the medial gutter into the medial compartment. A probe was used to assess the medial meniscus and a tear was identified in the posterior horn near the root of the medial meniscus.  A partial medial menisectomy was carried ou with basket forceps and then smoothed out with a shaver. Repeat probing of the meniscus found it to be stable. There was no significant cartilage damage or wear. The arthroscope was then passed into the notch of the knee. The ACL was found not to have any significant damage or laxity. The scope was then passed in the lateral compartment. Thorough probing of the lateral meniscus and the articular cartilage did not demonstrate any significant finding that requires surgical intervention    The scope was then passed into the suprapatellar area and the shaver was used to remove any further soft tissue debris. The scope was removed and the knee evacuated of fluid and injected with 20cc of 0.5% ropivacaine. The sterile bulky dressing was applied and the leg then wrapped with a large 6-inch ace bandage from toes to the mid-thigh. The tourniquet was then deflated at less than 30 minutes. The patient was awaken and taken to the PACU in good condition.      Electronically signed by Khadar Soto MD on 6/20/2022 at 11:23 AM

## 2022-06-20 NOTE — H&P
ORTHOPEDIC PATIENT EVALUATION      HPI / Chief Complaint  Dominga Nickerson is a 45 y.o. female who presents for arthroscopy for partial medial meniscectomy of her right knee. Patient has a MRI was indicative of a tear of the meniscus. Patient is symptomatic with sharp stabbing catching pain. Past Medical History  Milena Villa  has a past medical history of Allergic rhinitis, Anxiety, Arthritis, Asthma, Cataplexy and narcolepsy, Chronic back pain, Chronic pain, Depression, Diabetes mellitus ([address] Fibromyalgia, Hypertension, Irritable bowel syndrome, Liver enzyme elevation, Neuropathy, Obesity, Osteoarthritis, Peripheral vascular disease ([address] Restless legs syndrome, and Sleep apnea. Past Surgical History  Milena Villa  has a past surgical history that includes Cholecystectomy; Foot surgery (Right); Facial Surgery (N/A, 10/28/2021); and Nasal fracture surgery (N/A, 10/28/2021). Current Medications  Current Facility-Administered Medications   Medication Dose Route Frequency Provider Last Rate Last Admin    0.9 % sodium chloride infusion   IntraVENous Continuous Fela Navas MD        lactated ringers infusion   IntraVENous Continuous Fela Navas MD        sodium chloride flush 0.9 % injection 5-40 mL  5-40 mL IntraVENous 2 times per day Fela Navas MD        sodium chloride flush 0.9 % injection 5-40 mL  5-40 mL IntraVENous PRN Fela Navas MD        0.9 % sodium chloride infusion   IntraVENous PRN Fela Navas MD           Allergies  Allergies have been reviewed. Milena Villa is allergic to lisinopril, zoloft [sertraline hcl], seasonal, amoxicillin, augmentin [amoxicillin-pot clavulanate], buspirone, and paxil [paroxetine hcl]. Social History  Milena Villa  reports that she has never smoked. She has never used smokeless tobacco. She reports current drug use. Drug: Marijuana Braden Pare). She reports that she does not drink alcohol.     Family History  Noemi's family history includes Alzheimer's Disease in her father; Arthritis in her mother; Asthma in her mother; Dementia in her father; Depression in her mother; Diabetes in her mother; Heart Disease in her mother; High Blood Pressure in her mother; High Cholesterol in her mother; Stroke in her mother. Review of Systems   History obtained from the patient. REVIEW OF SYSTEMS:   Constitution: negative for fever, chills, weight loss or malaise   Musculoskeletal: As noted in the HPI   Neurologic: As noted in the HPI    Physical Exam  BP (!) 154/91   Pulse 97   Temp 98.1 °F (36.7 °C) (Temporal)   Resp 18   Ht 5' 5\" (1.651 m)   Wt 215 lb 12.8 oz (97.9 kg)   SpO2 96%   BMI 35.91 kg/m²    General Appearance: alert, well appearing, and in no distress  Mental Status: alert, oriented to person, place, and time  Positive Hdez's right knee    Imaging Studies  MRI from December 2021 is indicative of complex tear of the posterior horn medial meniscus. Diagnostics and Labs  Relevant diagnostic, laboratory and radiological studies have been reviewed in the Electronic Medical Record. Assessment and Plan  Dena Aparicio is a 45 y.o. old female who presents for arthroscopic partial medial meniscectomy of her right knee. She has consented with good comprehension of all risk and benefits    This note is created with the assistance of a speech recognition program.  While intending to generate adocument that actually reflects the content of the visit, the document can still have some errors including those of syntax and sound a like substitutions which may escape proof reading. It such instances, actual meaningcan be extrapolated by contextual diversion.

## 2022-06-20 NOTE — ANESTHESIA PRE PROCEDURE
Department of Anesthesiology  Preprocedure Note       Name:  Erin Cardenas   Age:  45 y.o.  :  1970                                          MRN:  [id number]         Date:  2022      Surgeon: Sasha Luo):  Jeff Lerma MD    Procedure: Procedure(s):  RIGHT KNEE ARTHROSCOPY PARTIAL MEDIAL MENISCECTOMY    Medications prior to admission:   Prior to Admission medications    Medication Sig Start Date End Date Taking? Authorizing Provider   venlafaxine (EFFEXOR XR) 150 MG extended release capsule take 1 capsule by mouth every morning 22   Althia Blinks, APRN - CNP   Sodium Oxybate (XYREM PO) Take by mouth    Historical Provider, MD   traZODone (DESYREL) 100 MG tablet take 1/2 to 1 tablet by mouth at bedtime if needed for sleep (**DO NOT TAKE WITHIN 24 HOURS OF XYREM) 22   Althia Blinks, APRN - CNP   gabapentin (NEURONTIN) 600 MG tablet Take 2 tablets by mouth 2 times daily for 30 days. 22  Betty Morgan MD   amLODIPine (NORVASC) 10 MG tablet take 1 tablet by mouth once daily 22   Althia Blinks, APRN - CNP   cetirizine (ZYRTEC) 10 MG tablet Take 1 tablet by mouth daily 22   Althia Blinks, APRN - CNP   Blood Pressure Monitor MISC 1 Device by Does not apply route daily 22   Althia Blinks, APRN - CNP   glucose monitoring (FREESTYLE FREEDOM) kit 1 kit by Does not apply route daily 22   Althia Blinks, APRN - CNP   Lancets MISC 1 each by Does not apply route 2 times daily 22   Althia Blinks, APRN - CNP   blood glucose monitor strips Test 2 times a day & as needed for symptoms of irregular blood glucose. Dispense sufficient amount for indicated testing frequency plus additional to accommodate PRN testing needs.  22   Althia Blinks, APRN - CNP   Alcohol Swabs (ALCOHOL PREP) PADS 1 Device by Does not apply route in the morning, at noon, and at bedtime 22   Althia Blinks, APRN - CNP   Dulaglutide (TRULICITY) 1.28 YH/0.2IK SOPN Inject 0.75 mg into the skin once a week 5/9/22   Heaven MishaRUBEN granados CNP   Dulaglutide (TRULICITY) 1.5 SV/5.8PX SOPN Inject 1.5 mg into the skin once a week After 4 weeks of 0.75 mg 5/9/22   Heaven RUBEN Cabral CNP   ibuprofen (ADVIL;MOTRIN) 800 MG tablet take 1 tablet by mouth every 8 hours with food AS NEEDED FOR PAIN 5/2/22   Heaven Misha, APRN - CNP   insulin glargine (LANTUS SOLOSTAR) 100 UNIT/ML injection pen Inject 10 Units into the skin nightly 5/2/22   Heaven Misha, APRN - CNP   Insulin Pen Needle 32G X 4 MM MISC 1 each by Does not apply route daily 4/29/22   Heaven RUBEN Cabral CNP   metoprolol tartrate (LOPRESSOR) 50 MG tablet take 1 tablet by mouth twice a day 4/29/22   Heaven RUBEN Cabral CNP   naloxone 4 MG/0.1ML LIQD nasal spray instill 1 spray in 1 NOSTRIL if needed FOR OPIOID OVERDOSE may re. ..  (REFER TO PRESCRIPTION NOTES).  3/9/22   Historical Provider, MD   hydroCHLOROthiazide (HYDRODIURIL) 12.5 MG tablet take 1 tablet by mouth daily 4/2/22   Heaven RUBEN Cabral CNP   diclofenac sodium (VOLTAREN) 1 % GEL apply 4 grams topically four times a day 3/23/22   ROCIO Carroll   albuterol sulfate HFA (VENTOLIN HFA) 108 (90 Base) MCG/ACT inhaler inhale 2 puffs by mouth and INTO THE LUNGS every 6 hours if needed for wheezing 2/11/22   Heaven RUBEN Cabral CNP   metFORMIN (GLUCOPHAGE-XR) 500 MG extended release tablet take 2 tablet by mouth twice a day 12/9/21   Heaven RUBEN Cabral CNP   budesonide-formoterol [facility]) 160-4.5 MCG/ACT AERO inhale 2 puffs by mouth and INTO THE LUNGS twice a day 11/3/21   Heaven Misha, APRN - CNP   SYSTANE ULTRA 0.4-0.3 % ophthalmic solution instill 1 to 2 drops INTO AFFECTED EYE(S) 4-5 TIMES PER DAY 11/1/21   Heaven Misha, APRN - CNP   fluticasone [facility]) 50 MCG/ACT nasal spray instill 1 spray into each nostril once daily 10/18/21   Heaven Misha, APRN - CNP   OneTouch Delica Lancets 29U MISC CHECK BLOOD SUGARS ONCE A DAY 8/18/21   Heaven Misha, APRN - CNP Propylene Glycol 0.6 % SOLN 1 drop 4-5 times per day 4/26/21   RUBEN Peacock CNP   polyethylene glycol-propylene glycol (SYSTANE) 0.4-0.3 % GEL ophthalmic gel place 1 drop into both eyes nightly 4/19/21   RUBEN Peacock CNP   Brimonidine Tartrate 0.025 % SOLN 1 drop in each eye every 8 hours as needed for redness. 4/19/21   RUBEN Peacock CNP   rOPINIRole (REQUIP) 1 MG tablet take 1 tablet by mouth 1-3 HOURS BEFORE BEDTIME DAILY 11/8/20   Historical Provider, MD   XYREM 500 MG/ML SOLN solution  11/13/20   Historical Provider, MD   blood glucose test strips (ONE TOUCH ULTRA TEST) strip TEST once daily 8/12/20   RUBEN Peacock CNP   loratadine (CLARITIN) 10 MG tablet Take 1 tablet by mouth daily 8/4/20   RUBEN Peacock CNP   Elastic Bandages & Supports (WRIST SPLINT) [address] [facility] 1 Device by Does not apply route daily 3/10/20   RUBEN Peacock CNP   Elastic Bandages & Supports (WRIST SPLINT/COCK-UP/RIGHT M) MISC 1 Device by Does not apply route nightly 3/10/20   RUBEN Peacock CNP   Blood Pressure Monitor [place] 1 Device by Does not apply route 2 times daily 11/20/19   RUBEN Peacock CNP   Handicap Placard MISC by Does not apply route Expires 6 months after issue  Patient taking differently: by Does not apply route Patient has lifetime one 4/5/18   RUBEN James CNP       Current medications:    Current Facility-Administered Medications   Medication Dose Route Frequency Provider Last Rate Last Admin    0.9 % sodium chloride infusion   IntraVENous Continuous Jagdish Whiting MD        lactated ringers infusion   IntraVENous Continuous Jagdish Whiting MD        sodium chloride flush 0.9 % injection 5-40 mL  5-40 mL IntraVENous 2 times per day Jagdish Whiting MD        sodium chloride flush 0.9 % injection 5-40 mL  5-40 mL IntraVENous PRN Jagdish Whiting MD        0.9 % sodium chloride infusion   IntraVENous PRN Jagdihs Whiting MD           Allergies:     Allergies   Allergen Reactions  Lisinopril Anaphylaxis     Difficulty breathing     Zoloft [Sertraline Hcl] Anaphylaxis     Difficulty breathing     Seasonal     Amoxicillin Rash    Augmentin [Amoxicillin-Pot Clavulanate] Rash    Buspirone Rash    Paxil [Paroxetine Hcl] Other (See Comments)     Weight gain        Problem List:    Patient Active Problem List   Diagnosis Code    Stress reaction F43.0    Essential hypertension I10    Dysthymia F34.1    Anxiety F41.9    Mild intermittent asthma without complication Z62.21    Dermatitis L30.9    Osteoarthritis of multiple joints M15.9    Fibromyalgia M79.7    Left leg cellulitis L03. 80    Primary osteoarthritis of left knee M17.12    Chronic back pain M54.9, G89.29    Class 2 severe obesity due to excess calories with serious comorbidity and body mass index (BMI) of 38.0 to 38.9 in adult ([facility]) E66.01, Z68.38    Allergic rhinitis J30.9    Type 2 diabetes mellitus with complication, without long-term current use of insulin ([facility]) E11.8    Liver enzyme elevation R74.8    Narcolepsy and cataplexy G47. 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 2XXA    Laceration of nose S01. 21XA    Fracture of nasal bones, subsequent encounter for fracture with routine healing S02. 2XXD       Past Medical History:        Diagnosis Date    Allergic rhinitis     Anxiety     Arthritis     Asthma     Cataplexy and narcolepsy     Chronic back pain     Chronic pain     Depression  Diabetes mellitus ([facility] [address]     Fibromyalgia     Hypertension     Irritable bowel syndrome     Liver enzyme elevation 8/19/2019    Neuropathy 2021    Obesity     Osteoarthritis     Peripheral vascular disease (HCC)     Restless legs syndrome     Sleep apnea        Past Surgical History:        Procedure Laterality Date    CHOLECYSTECTOMY      FACIAL SURGERY N/A 10/28/2021    SHARP EXCISIONAL DEBRIDEMENT AND COMPLEX CLOSURE OF NASAL LACERATION performed by Barber Perdue MD at [address] [facility] [address]     NASAL FRACTURE SURGERY N/A 10/28/2021    NASAL CLOSED REDUCTION WITH SPLINTING performed by Barber Perdue MD at [facility] History:    Social History     Tobacco Use    Smoking status: Never Smoker    Smokeless tobacco: Never Used   Substance Use Topics    Alcohol use: No     Comment: rare                                Counseling given: Not Answered      Vital Signs (Current): There were no vitals filed for this visit.                                            BP Readings from Last 3 Encounters:   06/09/22 (!) 159/101   06/10/22 (!) 143/84   05/09/22 (!) 148/82       NPO Status:                                                                                 BMI:   Wt Readings from Last 3 Encounters:   06/09/22 209 lb (94.8 kg)   06/10/22 202 lb (91.6 kg)   05/16/22 213 lb 6.4 oz (96.8 kg)     There is no height or weight on file to calculate BMI.    CBC:   Lab Results   Component Value Date    WBC 11.5 06/09/2022    RBC 4.80 06/09/2022    HGB 14.0 06/09/2022    HCT 42.4 06/09/2022    MCV 88.3 06/09/2022    RDW 12.9 06/09/2022     06/09/2022       CMP:   Lab Results   Component Value Date     06/09/2022     06/09/2022    K 4.2 06/09/2022    K 4.2 06/09/2022    CL 99 06/09/2022    CL 99 06/09/2022    CO2 25 06/09/2022    CO2 25 06/09/2022    BUN 13 06/09/2022    BUN 13 06/09/2022    CREATININE 0.62 06/09/2022    CREATININE 0.62 06/09/2022    GFRAA >60 06/09/2022    GFRAA >60 06/09/2022    LABGLOM >60 06/09/2022    LABGLOM >60 06/09/2022    GLUCOSE 122 06/09/2022    GLUCOSE 122 06/09/2022    PROT 7.2 06/09/2022    CALCIUM 9.1 06/09/2022    CALCIUM 9.1 06/09/2022    BILITOT 0.16 06/09/2022    ALKPHOS 83 06/09/2022    AST 24 06/09/2022    ALT 50 06/09/2022       POC Tests: No results for input(s): POCGLU, POCNA, POCK, POCCL, POCBUN, POCHEMO, POCHCT in the last 72 hours. Coags: No results found for: PROTIME, INR, APTT    HCG (If Applicable): No results found for: PREGTESTUR, PREGSERUM, HCG, HCGQUANT     ABGs: No results found for: PHART, PO2ART, TMZ9MHQ, FWN2TEZ, BEART, O5NXPAXG     Type & Screen (If Applicable):  No results found for: LABABO, LABRH    Drug/Infectious Status (If Applicable):  No results found for: HIV, HEPCAB    COVID-19 Screening (If Applicable):   Lab Results   Component Value Date    COVID19 Not Detected 10/28/2021           Anesthesia Evaluation  Patient summary reviewed and Nursing notes reviewed no history of anesthetic complications:   Airway: Mallampati: III  TM distance: >3 FB   Neck ROM: full  Mouth opening: > = 3 FB   Dental: normal exam         Pulmonary:normal exam  breath sounds clear to auscultation  (+) sleep apnea:  asthma:                            Cardiovascular:    (+) hypertension: no interval change,         Rhythm: regular  Rate: normal                    Neuro/Psych:   (+) neuromuscular disease:, psychiatric history: stable with treatmentdepression/anxiety              ROS comment: Neuropathy GI/Hepatic/Renal:             Endo/Other:    (+) DiabetesType II DM, no interval change, , : arthritis: OA and no interval change. , . Abdominal:       Abdomen: soft. Vascular:   + PVD, aortic or cerebral, . ROS comment: Peripheral vascular disease. Other Findings:           Anesthesia Plan      general     ASA 3       Induction: intravenous.       Anesthetic plan and risks discussed with patient. Plan discussed with CRNA.                     Tate Arnett MD   6/20/2022

## 2022-06-20 NOTE — TELEPHONE ENCOUNTER
Please call patient to see if she started Trulicity. We had discontinued Victoza with plans to switch to Trulicity.

## 2022-06-21 NOTE — PROGRESS NOTES
CLINICAL PHARMACY NOTE: MEDS TO BEDS    Total # of Prescriptions Filled: 1   The following medications were delivered to the patient:  · Milena Finley [phone]    Additional Documentation:

## 2022-06-22 ENCOUNTER — TELEPHONE (OUTPATIENT)
Dept: FAMILY MEDICINE CLINIC | Age: 52
End: 2022-06-22

## 2022-06-22 DIAGNOSIS — G47.411 NARCOLEPSY AND CATAPLEXY: ICD-10-CM

## 2022-06-22 DIAGNOSIS — J45.20 MILD INTERMITTENT ASTHMA WITHOUT COMPLICATION: Primary | ICD-10-CM

## 2022-06-22 NOTE — TELEPHONE ENCOUNTER
Patient called the office and requested a referral to a pulmonologist.  Patient did give the name: Shonna Paredes and a fax number: [phone].  Please advise

## 2022-06-27 DIAGNOSIS — E11.9 TYPE 2 DIABETES MELLITUS WITHOUT COMPLICATION, WITHOUT LONG-TERM CURRENT USE OF INSULIN (HCC): ICD-10-CM

## 2022-06-27 RX ORDER — METFORMIN HYDROCHLORIDE 500 MG/1
TABLET, EXTENDED RELEASE ORAL
Qty: 120 TABLET | Refills: 5 | Status: SHIPPED | OUTPATIENT
Start: 2022-06-27

## 2022-07-02 DIAGNOSIS — M15.9 PRIMARY OSTEOARTHRITIS INVOLVING MULTIPLE JOINTS: ICD-10-CM

## 2022-07-05 RX ORDER — IBUPROFEN 800 MG/1
TABLET ORAL
Qty: 90 TABLET | Refills: 1 | Status: SHIPPED | OUTPATIENT
Start: 2022-07-05 | End: 2022-09-06

## 2022-07-08 ENCOUNTER — TELEPHONE (OUTPATIENT)
Dept: FAMILY MEDICINE CLINIC | Age: 52
End: 2022-07-08

## 2022-07-08 ENCOUNTER — OFFICE VISIT (OUTPATIENT)
Dept: ORTHOPEDIC SURGERY | Age: 52
End: 2022-07-08

## 2022-07-08 DIAGNOSIS — Z98.890 S/P RIGHT KNEE ARTHROSCOPY: Primary | ICD-10-CM

## 2022-07-08 PROCEDURE — 99024 POSTOP FOLLOW-UP VISIT: CPT | Performed by: ORTHOPAEDIC SURGERY

## 2022-07-08 NOTE — PROGRESS NOTES
Patient returns today status post right knee arthroscopy with partial (medial/lateral) meniscectomy. Patient has no major complaints other than expected tightness/swelling with ROM. Sharp/stabbing pain has improved. On exam, portal sites are without redness or drainage. No calf tenderness; negative Estrella's sign. Motion is 0-100 degrees. No significant effusion. Assessment  Status post right knee arthroscopy    Plan  Patient given exercises to perform. Patient given activities/ motions to complete. Continue activities at home. Return to work. RTO PRN. Call with any future problems.

## 2022-07-08 NOTE — TELEPHONE ENCOUNTER
Outreach call to f/u on cologuard kit ordered 10/8/21. Client states she did not receive kit. Made aware I will call  [employer] and get another kit. Address verified. Should receive in 7-14 days.

## 2022-07-15 ENCOUNTER — TELEPHONE (OUTPATIENT)
Dept: ORTHOPEDIC SURGERY | Age: 52
End: 2022-07-15

## 2022-07-15 NOTE — TELEPHONE ENCOUNTER
Patient had knee surgery with Dr Elva Plummer on 6/20/22 and states there was a growth in her knee. She is asking what exactly that growth is/was. Please contact her at earliest convenience to discuss. Thank you.

## 2022-07-19 NOTE — TELEPHONE ENCOUNTER
Called pt back and notified her that Dr. Chandra Marquis was not aware of a growth. She stated that she was told by her boyfirend that a 'mushy part' was removed during [address]. After review the op note, she was advised that she had a partial meniscectomy so I am not sure if that is what her boyfriend was referring to. She also wanted to schedule a follow up appointment due to having some increased in her right knee at the surgical site and the other side of the knee. She stated that she was doing well at her first post op and they has been no new injury.

## 2022-07-22 DIAGNOSIS — J34.89 NASAL SORE: ICD-10-CM

## 2022-07-22 DIAGNOSIS — G47.09 OTHER INSOMNIA: ICD-10-CM

## 2022-07-22 DIAGNOSIS — L30.9 DERMATITIS: ICD-10-CM

## 2022-07-24 RX ORDER — PROPYLENE GLYCOL/PEG 400 0.3 %-0.4%
DROPS OPHTHALMIC (EYE)
Qty: 30 ML | Refills: 1 | Status: SHIPPED | OUTPATIENT
Start: 2022-07-24

## 2022-07-24 RX ORDER — TRAZODONE HYDROCHLORIDE 100 MG/1
TABLET ORAL
Qty: 30 TABLET | Refills: 1 | Status: SHIPPED | OUTPATIENT
Start: 2022-07-24 | End: 2022-09-12

## 2022-07-24 RX ORDER — TRIAMCINOLONE ACETONIDE 5 MG/G
OINTMENT TOPICAL
Qty: 30 G | Refills: 3 | Status: SHIPPED | OUTPATIENT
Start: 2022-07-24

## 2022-07-28 DIAGNOSIS — J45.20 MILD INTERMITTENT ASTHMA WITHOUT COMPLICATION: ICD-10-CM

## 2022-07-31 RX ORDER — ALBUTEROL SULFATE 90 UG/1
AEROSOL, METERED RESPIRATORY (INHALATION)
Qty: 8.5 G | Refills: 5 | Status: SHIPPED | OUTPATIENT
Start: 2022-07-31

## 2022-08-08 DIAGNOSIS — M54.41 CHRONIC MIDLINE LOW BACK PAIN WITH BILATERAL SCIATICA: ICD-10-CM

## 2022-08-08 DIAGNOSIS — M54.42 CHRONIC MIDLINE LOW BACK PAIN WITH BILATERAL SCIATICA: ICD-10-CM

## 2022-08-08 DIAGNOSIS — G89.29 CHRONIC MIDLINE LOW BACK PAIN WITH BILATERAL SCIATICA: ICD-10-CM

## 2022-08-08 RX ORDER — GABAPENTIN 600 MG/1
TABLET ORAL
Qty: 120 TABLET | Refills: 0 | Status: SHIPPED | OUTPATIENT
Start: 2022-08-08 | End: 2022-09-06

## 2022-08-08 NOTE — TELEPHONE ENCOUNTER
Sharmila Bird is calling to request a refill on the following medication(s):    Last Visit Date (If Applicable):  1/1/9664    Next Visit Date:    8/10/2022    Medication Request:  Requested Prescriptions     Pending Prescriptions Disp Refills    gabapentin (NEURONTIN) 600 MG tablet [Pharmacy Med Name: GABAPENTIN 600MG TABLETS] 120 tablet 0     Sig: TAKE 2 TABLETS BY MOUTH TWICE DAILY

## 2022-09-02 DIAGNOSIS — M15.9 PRIMARY OSTEOARTHRITIS INVOLVING MULTIPLE JOINTS: ICD-10-CM

## 2022-09-04 DIAGNOSIS — M54.42 CHRONIC MIDLINE LOW BACK PAIN WITH BILATERAL SCIATICA: ICD-10-CM

## 2022-09-04 DIAGNOSIS — G89.29 CHRONIC MIDLINE LOW BACK PAIN WITH BILATERAL SCIATICA: ICD-10-CM

## 2022-09-04 DIAGNOSIS — M54.41 CHRONIC MIDLINE LOW BACK PAIN WITH BILATERAL SCIATICA: ICD-10-CM

## 2022-09-06 RX ORDER — IBUPROFEN 800 MG/1
TABLET ORAL
Qty: 90 TABLET | Refills: 1 | Status: SHIPPED | OUTPATIENT
Start: 2022-09-06

## 2022-09-06 RX ORDER — GABAPENTIN 600 MG/1
TABLET ORAL
Qty: 120 TABLET | Refills: 0 | Status: SHIPPED | OUTPATIENT
Start: 2022-09-06 | End: 2022-10-27 | Stop reason: SDUPTHER

## 2022-09-11 DIAGNOSIS — G47.09 OTHER INSOMNIA: ICD-10-CM

## 2022-09-12 RX ORDER — TRAZODONE HYDROCHLORIDE 100 MG/1
TABLET ORAL
Qty: 30 TABLET | Refills: 0 | Status: SHIPPED | OUTPATIENT
Start: 2022-09-12

## 2022-10-13 DIAGNOSIS — G47.09 OTHER INSOMNIA: ICD-10-CM

## 2022-10-13 NOTE — TELEPHONE ENCOUNTER
Jaimee Calderon is calling to request a refill on the following medication(s):    Medication Request:  Requested Prescriptions     Pending Prescriptions Disp Refills    traZODone (DESYREL) 100 MG tablet [Pharmacy Med Name: TRAZODONE 100MG TABLETS] 30 tablet 0     Sig: TAKE 1/2 TO 1 TABLET BY MOUTH AT BEDTIME AS NEEDED FOR SLEEP(DO NOT TAKE WITHIN 24 HOUR OF XYREM)       Last Visit Date (If Applicable):  8/3/5197    Next Visit Date:    Visit date not found

## 2022-10-14 RX ORDER — TRAZODONE HYDROCHLORIDE 100 MG/1
TABLET ORAL
Qty: 30 TABLET | Refills: 0 | OUTPATIENT
Start: 2022-10-14

## 2022-10-27 DIAGNOSIS — G89.29 CHRONIC MIDLINE LOW BACK PAIN WITH BILATERAL SCIATICA: ICD-10-CM

## 2022-10-27 DIAGNOSIS — M54.42 CHRONIC MIDLINE LOW BACK PAIN WITH BILATERAL SCIATICA: ICD-10-CM

## 2022-10-27 DIAGNOSIS — M54.41 CHRONIC MIDLINE LOW BACK PAIN WITH BILATERAL SCIATICA: ICD-10-CM

## 2022-10-27 RX ORDER — GABAPENTIN 600 MG/1
TABLET ORAL
Qty: 120 TABLET | Refills: 0 | OUTPATIENT
Start: 2022-10-27 | End: 2022-11-26

## 2022-10-27 RX ORDER — GABAPENTIN 600 MG/1
TABLET ORAL
Qty: 120 TABLET | Refills: 0 | Status: SHIPPED | OUTPATIENT
Start: 2022-10-27 | End: 2022-11-28

## 2022-10-27 NOTE — TELEPHONE ENCOUNTER
Patient was scheduled for 11/23/22 @ 1:40pm.  Patient would like to know if she can get the refill on her gabapentin 600 mg until she is seen.   Please advise  Writer pended the medication again and awaiting approval.

## 2022-11-04 DIAGNOSIS — J01.90 ACUTE NON-RECURRENT SINUSITIS, UNSPECIFIED LOCATION: ICD-10-CM

## 2022-11-04 NOTE — TELEPHONE ENCOUNTER
Vaishali Webb is calling to request a refill on the following medication(s):    Last Visit Date (If Applicable):  9/7/0050    Next Visit Date:    11/23/2022    Medication Request:  Requested Prescriptions     Pending Prescriptions Disp Refills    fluticasone (FLONASE) 50 MCG/ACT nasal spray [Pharmacy Med Name: FLUTICASONE 50MCG NASAL SP (120) RX] 96 g      Sig: INSTILL 1 SPRAY INTO EACH NOSTRIL EVERY DAY

## 2022-11-05 RX ORDER — FLUTICASONE PROPIONATE 50 MCG
SPRAY, SUSPENSION (ML) NASAL
Qty: 96 G | Refills: 1 | Status: SHIPPED | OUTPATIENT
Start: 2022-11-05

## 2022-11-10 DIAGNOSIS — I10 ESSENTIAL HYPERTENSION: ICD-10-CM

## 2022-11-10 DIAGNOSIS — F41.9 ANXIETY: ICD-10-CM

## 2022-11-10 DIAGNOSIS — J45.20 MILD INTERMITTENT ASTHMA WITHOUT COMPLICATION: ICD-10-CM

## 2022-11-10 DIAGNOSIS — F34.1 DYSTHYMIA: ICD-10-CM

## 2022-11-10 DIAGNOSIS — F43.0 STRESS REACTION: ICD-10-CM

## 2022-11-10 RX ORDER — VENLAFAXINE HYDROCHLORIDE 150 MG/1
CAPSULE, EXTENDED RELEASE ORAL
Qty: 30 CAPSULE | Refills: 0 | Status: SHIPPED | OUTPATIENT
Start: 2022-11-10

## 2022-11-10 RX ORDER — AMLODIPINE BESYLATE 10 MG/1
TABLET ORAL
Qty: 90 TABLET | Refills: 0 | Status: SHIPPED | OUTPATIENT
Start: 2022-11-10

## 2022-11-10 RX ORDER — BUDESONIDE AND FORMOTEROL FUMARATE DIHYDRATE 160; 4.5 UG/1; UG/1
AEROSOL RESPIRATORY (INHALATION)
Qty: 10.2 G | Refills: 0 | Status: SHIPPED | OUTPATIENT
Start: 2022-11-10

## 2022-11-10 RX ORDER — METOPROLOL TARTRATE 50 MG/1
TABLET, FILM COATED ORAL
Qty: 180 TABLET | Refills: 0 | Status: SHIPPED | OUTPATIENT
Start: 2022-11-10

## 2022-11-10 NOTE — TELEPHONE ENCOUNTER
Samson Byrd is calling to request a refill on the following medication(s):    Last Visit Date (If Applicable):  8/7/8279    Next Visit Date:    11/23/2022    Medication Request:  Requested Prescriptions     Pending Prescriptions Disp Refills    budesonide-formoterol (SYMBICORT) 160-4.5 MCG/ACT AERO [Pharmacy Med Name: BUDESONIDE/FORM 160/4.5MCG(120 INH)] 10.2 g 5     Sig: INHALE 2 PUFFS BY MOUTH AND INTO THE LUNGS TWICE DAILY    venlafaxine (EFFEXOR XR) 150 MG extended release capsule [Pharmacy Med Name: VENLAFAXINE 150MG ER CAPSULES] 30 capsule 5     Sig: TAKE 1 CAPSULE BY MOUTH EVERY MORNING    metoprolol tartrate (LOPRESSOR) 50 MG tablet [Pharmacy Med Name: METOPROLOL TARTRATE 50MG TABLETS] 180 tablet 1     Sig: TAKE 1 TABLET BY MOUTH TWICE DAILY

## 2022-11-16 DIAGNOSIS — E11.8 TYPE 2 DIABETES MELLITUS WITH COMPLICATION, WITHOUT LONG-TERM CURRENT USE OF INSULIN (HCC): ICD-10-CM

## 2022-11-16 RX ORDER — DULAGLUTIDE 0.75 MG/.5ML
INJECTION, SOLUTION SUBCUTANEOUS
Qty: 2 ML | Refills: 0 | OUTPATIENT
Start: 2022-11-16

## 2022-11-16 RX ORDER — DULAGLUTIDE 1.5 MG/.5ML
1.5 INJECTION, SOLUTION SUBCUTANEOUS WEEKLY
Qty: 0.5 ML | Refills: 0 | Status: SHIPPED | OUTPATIENT
Start: 2022-11-16

## 2022-11-16 NOTE — TELEPHONE ENCOUNTER
Misha Caldwell is calling to request a refill on the following medication(s):    Last Visit Date (If Applicable):  3/9/3732    Next Visit Date:    11/23/2022    Medication Request:  Requested Prescriptions     Pending Prescriptions Disp Refills    TRULICITY 4.82 DL/0.3ZE SOPN [Pharmacy Med Name: TRULICITY 5.20LG/3.5YE SDP 0.5ML] 2 mL      Sig: INJECT 0.75 MG SUBCUTANEOUS ONCE A WEEK

## 2022-11-26 DIAGNOSIS — G89.29 CHRONIC MIDLINE LOW BACK PAIN WITH BILATERAL SCIATICA: ICD-10-CM

## 2022-11-26 DIAGNOSIS — M54.42 CHRONIC MIDLINE LOW BACK PAIN WITH BILATERAL SCIATICA: ICD-10-CM

## 2022-11-26 DIAGNOSIS — M54.41 CHRONIC MIDLINE LOW BACK PAIN WITH BILATERAL SCIATICA: ICD-10-CM

## 2022-11-28 RX ORDER — GABAPENTIN 600 MG/1
TABLET ORAL
Qty: 120 TABLET | Refills: 0 | Status: SHIPPED | OUTPATIENT
Start: 2022-11-28 | End: 2022-12-28

## 2022-11-28 NOTE — TELEPHONE ENCOUNTER
Nelson Meng is calling to request a refill on the following medication(s):    Last Visit Date (If Applicable):  1/4/5155    Next Visit Date:    12/8/2022    Medication Request:  Requested Prescriptions     Pending Prescriptions Disp Refills    gabapentin (NEURONTIN) 600 MG tablet [Pharmacy Med Name: GABAPENTIN 600MG TABLETS] 120 tablet 0     Sig: TAKE 2 TABLETS BY MOUTH TWICE DAILY

## 2022-12-02 ENCOUNTER — OFFICE VISIT (OUTPATIENT)
Dept: ORTHOPEDIC SURGERY | Age: 52
End: 2022-12-02

## 2022-12-02 DIAGNOSIS — G89.29 CHRONIC PAIN OF RIGHT KNEE: Primary | ICD-10-CM

## 2022-12-02 DIAGNOSIS — M25.561 CHRONIC PAIN OF RIGHT KNEE: Primary | ICD-10-CM

## 2022-12-02 RX ORDER — LIDOCAINE HYDROCHLORIDE 10 MG/ML
2 INJECTION, SOLUTION INFILTRATION; PERINEURAL ONCE
Status: COMPLETED | OUTPATIENT
Start: 2022-12-02 | End: 2022-12-02

## 2022-12-02 RX ORDER — BUPIVACAINE HYDROCHLORIDE 5 MG/ML
2 INJECTION, SOLUTION PERINEURAL ONCE
Status: COMPLETED | OUTPATIENT
Start: 2022-12-02 | End: 2022-12-02

## 2022-12-02 RX ORDER — BETAMETHASONE SODIUM PHOSPHATE AND BETAMETHASONE ACETATE 3; 3 MG/ML; MG/ML
12 INJECTION, SUSPENSION INTRA-ARTICULAR; INTRALESIONAL; INTRAMUSCULAR; SOFT TISSUE ONCE
Status: COMPLETED | OUTPATIENT
Start: 2022-12-02 | End: 2022-12-02

## 2022-12-02 RX ADMIN — BETAMETHASONE SODIUM PHOSPHATE AND BETAMETHASONE ACETATE 12 MG: 3; 3 INJECTION, SUSPENSION INTRA-ARTICULAR; INTRALESIONAL; INTRAMUSCULAR; SOFT TISSUE at 10:31

## 2022-12-02 RX ADMIN — BUPIVACAINE HYDROCHLORIDE 10 MG: 5 INJECTION, SOLUTION PERINEURAL at 10:30

## 2022-12-02 RX ADMIN — LIDOCAINE HYDROCHLORIDE 2 ML: 10 INJECTION, SOLUTION INFILTRATION; PERINEURAL at 10:30

## 2022-12-02 NOTE — PROGRESS NOTES
Moriah Ervin M.D.            [address] [facility] [address] [facility] [address], [address] [facility]           Dept Phone: [phone]           Dept Fax:  [phone] [address] [facility], [place]          Dept Phone: [phone]           Dept Fax:  [phone]      Chief Compliant:  Chief Complaint   Patient presents with    Pain     Rt knee        History of Present Illness: This is a 45 y.o. female who presents to the clinic today for evaluation / follow up of right knee pain. Patient is a 53-year female who had a previous arthroscopic partial medial meniscectomy of her right knee on 6/20/2022. She states that she really has had pretty much persistent swelling all along and states she has a hard time getting all the way straight does not really give a sharp stabbing sensation that she had prior to her surgery. She states it does sometimes wake her up in the middle the night when she turns her wrong way. .       Review of Systems   Constitutional: Negative for fever, chills, sweats. Eyes: Negative for changes in vision, or pain. HENT: Negative for ear ache, epistaxis, or sore throat. Respiratory/Cardio: Negative for Chest pain, palpitations, SOB, or cough. Gastrointestinal: Negative for abdominal pain, N/V/D. Genitourinary: Negative for dysuria, frequency, urgency, or hematuria. Neurological: Negative for headache, numbness, or weakness. Integumentary: Negative for rash, itching, laceration, or abrasion. Musculoskeletal: Positive for Pain (Rt knee)       Physical Exam:  Constitutional: Patient is oriented to person, place, and time. Patient appears well-developed and well nourished. HENT: Negative otherwise noted  Head: Normocephalic and Atraumatic  Nose: Normal  Eyes: Conjunctivae and EOM are normal  Neck: Normal range of motion Neck supple. Respiratory/Cardio: Effort normal. No respiratory distress. Musculoskeletal: Examination of the right knee no she does have a modest effusion. She is a couple degrees and get all the way to full extension flexion to about 125 degrees Nicole's is negative Lachman's is negative collaterals are negative mild patellofemoral pain with compression no other contributory findings. Neurological: Patient is alert and oriented to person, place, and time. Normal strength. No sensory deficit. Skin: Skin is warm and dry  Psychiatric: Behavior is normal. Thought content normal.  Nursing note and vitals reviewed. Labs and Imaging:     XR taken today: No new x-rays taken today  No results found. Orders Placed This Encounter   Procedures    20610 - DRAIN/INJECT LARGE JOINT BURSA       Assessment and Plan:  1. Chronic pain of right knee    2.       Mild effusion right knee status post previous arthroscopic partial medial meniscectomy    Administrations This Visit       betamethasone acetate-betamethasone sodium phosphate (CELESTONE) injection 12 mg       Admin Date  12/02/2022  10:31 Action  Given Dose  12 mg Route  Intra-artICUlar Site  Knee Right Administered By  Michael Gutierrez LPN    Ordering Provider: Bashir Gill MD    NDC: 1513-9649-83    Lot#: [id number]    : [employer]    Patient Supplied?: No              bupivacaine (MARCAINE) 0.5 % injection 10 mg       Admin Date  12/02/2022  10:30 Action  Given Dose  10 mg Route  Intra-artICUlar Site  Knee Right Administered By  Michael Gutierrez LPN    Ordering Provider: Bashir Gill MD    . [place] [address]: [id number]    Lot#: [id number]    : [address] [place] [address]    Patient Supplied?: No              lidocaine 1 % injection 2 mL       Admin Date  12/02/2022  10:30 Action  Given Dose  2 mL Route  Intra-artICUlar Site  Knee Right Administered By  Michael Gutierrez LPN    Ordering Provider: Bashir Gill MD    . [place] [address]: [id number]    Lot#: [id number] : [employer]    Patient Supplied?: No                    This is a 45 y.o. female who presents to the clinic today for evaluation / follow up of mild effusion right knee.      Past History:    Current Outpatient Medications:     gabapentin (NEURONTIN) 600 MG tablet, TAKE 2 TABLETS BY MOUTH TWICE DAILY, Disp: 120 tablet, Rfl: 0    dulaglutide (TRULICITY) 1.5 NV/5.8WL SC injection, Inject 0.5 mLs into the skin once a week, Disp: 0.5 mL, Rfl: 0    budesonide-formoterol (SYMBICORT) 160-4.5 MCG/ACT AERO, INHALE 2 PUFFS BY MOUTH AND INTO THE LUNGS TWICE DAILY, Disp: 10.2 g, Rfl: 0    venlafaxine (EFFEXOR XR) 150 MG extended release capsule, TAKE 1 CAPSULE BY MOUTH EVERY MORNING, Disp: 30 capsule, Rfl: 0    metoprolol tartrate (LOPRESSOR) 50 MG tablet, TAKE 1 TABLET BY MOUTH TWICE DAILY, Disp: 180 tablet, Rfl: 0    amLODIPine (NORVASC) 10 MG tablet, take 1 tablet by mouth once daily, Disp: 90 tablet, Rfl: 0    fluticasone (FLONASE) 50 MCG/ACT nasal spray, INSTILL 1 SPRAY INTO EACH NOSTRIL EVERY DAY, Disp: 96 g, Rfl: 1    hydroCHLOROthiazide (HYDRODIURIL) 12.5 MG tablet, take 1 tablet by mouth once daily, Disp: 90 tablet, Rfl: 0    traZODone (DESYREL) 100 MG tablet, take 1/2 to 1 tablet by mouth at bedtime if needed for sleep (**DO NOT TAKE WITHIN 24 HOURS OF XYREM), Disp: 30 tablet, Rfl: 0    ibuprofen (ADVIL;MOTRIN) 800 MG tablet, take 1 tablet by mouth every 8 hours AS NEEDED FOR PAIN with food, Disp: 90 tablet, Rfl: 1    albuterol sulfate HFA (PROAIR HFA) 108 (90 Base) MCG/ACT inhaler, inhale 2 puffs by mouth and INTO THE LUNGS every 6 hours if needed for wheezing, Disp: 8.5 g, Rfl: 5    SYSTANE ULTRA 0.4-0.3 % ophthalmic solution, instill 1 to 2 drops INTO AFFECTED EYE(S) 4-5 TIMES PER DAY, Disp: 30 mL, Rfl: 1    mupirocin (BACTROBAN) 2 % ointment, APPLY TOPICALLY TO AFFECTED AREAS three times a day, Disp: 22 g, Rfl: 0    triamcinolone (ARISTOCORT) 0.5 % ointment, apply to affected area twice a day, Disp: 30 g, Rfl: 3    metFORMIN (GLUCOPHAGE-XR) 500 MG extended release tablet, take 2 tablet by mouth twice a day, Disp: 120 tablet, Rfl: 5    Sodium Oxybate (XYREM PO), Take by mouth, Disp: , Rfl:     cetirizine (ZYRTEC) 10 MG tablet, Take 1 tablet by mouth daily, Disp: 30 tablet, Rfl: 5    Blood Pressure Monitor MISC, 1 Device by Does not apply route daily, Disp: 1 each, Rfl: 0    glucose monitoring (FREESTYLE FREEDOM) kit, 1 kit by Does not apply route daily, Disp: 1 kit, Rfl: 0    Lancets MISC, 1 each by Does not apply route 2 times daily, Disp: 100 each, Rfl: 11    blood glucose monitor strips, Test 2 times a day & as needed for symptoms of irregular blood glucose. Dispense sufficient amount for indicated testing frequency plus additional to accommodate PRN testing needs. , Disp: 100 strip, Rfl: 11    Alcohol Swabs (ALCOHOL PREP) PADS, 1 Device by Does not apply route in the morning, at noon, and at bedtime, Disp: 100 each, Rfl: 11    Dulaglutide (TRULICITY) 9.67 NK/6.0RR SOPN, Inject 0.75 mg into the skin once a week, Disp: 0.5 mL, Rfl: 4    insulin glargine (LANTUS SOLOSTAR) 100 UNIT/ML injection pen, Inject 10 Units into the skin nightly, Disp: 5 pen, Rfl: 2    Insulin Pen Needle 32G X 4 MM MISC, 1 each by Does not apply route daily, Disp: 100 each, Rfl: 5    naloxone 4 MG/0.1ML LIQD nasal spray, instill 1 spray in 1 NOSTRIL if needed FOR OPIOID OVERDOSE may re. ..  (REFER TO PRESCRIPTION NOTES). , Disp: , Rfl:     diclofenac sodium (VOLTAREN) 1 % GEL, apply 4 grams topically four times a day, Disp: 500 g, Rfl: 2    OneTouch Delica Lancets 92S MISC, CHECK BLOOD SUGARS ONCE A DAY, Disp: 100 each, Rfl: 0    Propylene Glycol 0.6 % SOLN, 1 drop 4-5 times per day, Disp: 15 mL, Rfl: 1    Brimonidine Tartrate 0.025 % SOLN, 1 drop in each eye every 8 hours as needed for redness. , Disp: 7.5 mL, Rfl: 5    rOPINIRole (REQUIP) 1 MG tablet, take 1 tablet by mouth 1-3 HOURS BEFORE BEDTIME DAILY, Disp: , Rfl:     XYREM 500 MG/ML SOLN solution, , Disp: , Rfl:     blood glucose test strips (ONE TOUCH ULTRA TEST) strip, TEST once daily, Disp: 100 strip, Rfl: 3    loratadine (CLARITIN) 10 MG tablet, Take 1 tablet by mouth daily, Disp: 30 tablet, Rfl: 3    Elastic Bandages & Supports (WRIST SPLINT) MISC, 1 Device by Does not apply route daily, Disp: 1 each, Rfl: 0    Elastic Bandages & Supports (WRIST SPLINT/COCK-UP/RIGHT M) MISC, 1 Device by Does not apply route nightly, Disp: 1 each, Rfl: 0    Blood Pressure Monitor [place], 1 Device by Does not apply route 2 times daily, Disp: 1 Device, Rfl: 0    Handicap Placard MISC, by Does not apply route Expires 6 months after issue (Patient taking differently: by Does not apply route Patient has lifetime one), Disp: 1 each, Rfl: 0  Allergies   Allergen Reactions    Lisinopril Anaphylaxis     Difficulty breathing     Zoloft [Sertraline Hcl] Anaphylaxis     Difficulty breathing     Seasonal     Amoxicillin Rash    Augmentin [Amoxicillin-Pot Clavulanate] Rash    Buspirone Rash    Paxil [Paroxetine Hcl] Other (See Comments)     Weight gain      Social History     Socioeconomic History    Marital status:      Spouse name: Not on file    Number of children: Not on file    Years of education: Not on file    Highest education level: Not on file   Occupational History    Not on file   Tobacco Use    Smoking status: Never    Smokeless tobacco: Never   Vaping Use    Vaping Use: Never used   Substance and Sexual Activity    Alcohol use: No     Comment: rare    Drug use: Yes     Types: Marijuana [place])     Comment: yesterday     Sexual activity: Yes     Partners: Male   Other Topics Concern    Not on file   Social History Narrative    Not on file     Social Determinants of Health     Financial Resource Strain: Low Risk     Difficulty of Paying Living Expenses: Not hard at all   Food Insecurity: No Food Insecurity    Worried About Running Out of Food in the Last Year: Never true    Ran Out of Food in the Last Year: Never true   Transportation Needs: No Transportation Needs    Lack of Transportation (Medical): No    Lack of Transportation (Non-Medical):  No   Physical Activity: Not on file   Stress: Not on file   Social Connections: Not on file   Intimate Partner Violence: Not on file   Housing Stability: Not on file     Past Medical History:   Diagnosis Date    Allergic rhinitis     Anxiety     Arthritis     Asthma     Cataplexy and narcolepsy     Chronic back pain     Chronic pain     Depression     Diabetes mellitus (HCC)     Fibromyalgia     Hypertension     Irritable bowel syndrome     Liver enzyme elevation 8/19/2019    Neuropathy 2021    Obesity     Osteoarthritis     Peripheral vascular disease (HCC)     Restless legs syndrome     Sleep apnea      Past Surgical History:   Procedure Laterality Date    CHOLECYSTECTOMY      FACIAL SURGERY N/A 10/28/2021    SHARP EXCISIONAL DEBRIDEMENT AND COMPLEX CLOSURE OF NASAL LACERATION performed by Chicho Ward MD at [address] [facility] Right     KNEE ARTHROSCOPY Right 06/20/2022    RIGHT KNEE ARTHROSCOPY PARTIAL MEDIAL MENISCECTOMY     KNEE ARTHROSCOPY Right 6/20/2022    RIGHT KNEE ARTHROSCOPY PARTIAL MEDIAL MENISCECTOMY performed by Dianna Gonzalez MD at [address] N/A 10/28/2021    NASAL CLOSED REDUCTION WITH SPLINTING performed by Chicho Ward MD at [address] [facility].     Family History   Problem Relation Age of Onset    Heart Disease Mother     Diabetes Mother     Stroke Mother     Arthritis Mother     Asthma Mother     Depression Mother     High Blood Pressure Mother     High Cholesterol Mother     Dementia Father     Alzheimer's Disease Father    Plan  An informed verbal consent for the procedure was obtained and risks including, but not limited to: allergy to medications, injection, bleeding, stiffness of joint, recurrence of symptoms, loss of function, swelling, drainage, irrigation, need for surgery and pseudo-septic inflammation, were explained to the patient. Also, discussed was the potential for further injections, irrigation and debridement and surgery. Alternate means of treatment have also been discussed with the patient. Administrations This Visit       betamethasone acetate-betamethasone sodium phosphate (CELESTONE) injection 12 mg       Admin Date  12/02/2022  10:31 Action  Given Dose  12 mg Route  Intra-artICUlar Site  Knee Right Administered By  Medina Domingo LPN    Ordering Provider: Omaira Lima MD    NDC: 8060-4185-75    Lot#: [id number]    : [employer] [place]    Patient Supplied?: No              bupivacaine (MARCAINE) 0.5 % injection 10 mg       Admin Date  12/02/2022  10:30 Action  Given Dose  10 mg Route  Intra-artICUlar Site  Knee Right Administered By  Medina Domingo LPN    Ordering Provider: Omaira Lima MD    . [facility] 47: [id number]    Lot#: [id number]    : [address] [place] [address]    Patient Supplied?: No              lidocaine 1 % injection 2 mL       Admin Date  12/02/2022  10:30 Action  Given Dose  2 mL Route  Intra-artICUlar Site  Knee Right Administered By  Medina Domingo LPN    Ordering Provider: Omaira Lima MD    . [facility] 47: [id number]    Lot#: [id number]    : [address] [place] [address]    Patient Supplied?: No                Under sterile conditions the patient's right knee was injected with lidocaine 2 cc and bupivacaine 2 cc. Then aspirated of 10 cc straw-colored synovial fluid then injected 2 cc Celestone. She tolerated procedure well    Hope this will help the patient with her symptoms she is told to take it easy for the over the weekend back here. Provider Attestation:  Jyothi Davis, personally performed the services described in this documentation. All medical record entries made by the scribe were at my direction and in my presence.  I have reviewed the chart and discharge instructions and agree that the records reflect my personal performance and is accurate and complete. Tobin Natarajan MD. 12/02/22      Please note that this chart was generated using voice recognition Dragon dictation software. Although every effort was made to ensure the accuracy of this automated transcription, some errors in transcription may have occurred.

## 2022-12-08 ENCOUNTER — TELEMEDICINE (OUTPATIENT)
Dept: FAMILY MEDICINE CLINIC | Age: 52
End: 2022-12-08
Payer: MEDICARE

## 2022-12-08 DIAGNOSIS — J45.20 MILD INTERMITTENT ASTHMA WITHOUT COMPLICATION: ICD-10-CM

## 2022-12-08 DIAGNOSIS — B97.89 VIRAL SINUSITIS: ICD-10-CM

## 2022-12-08 DIAGNOSIS — I10 ESSENTIAL HYPERTENSION: ICD-10-CM

## 2022-12-08 DIAGNOSIS — G47.411 NARCOLEPSY AND CATAPLEXY: ICD-10-CM

## 2022-12-08 DIAGNOSIS — J32.9 VIRAL SINUSITIS: ICD-10-CM

## 2022-12-08 DIAGNOSIS — E11.8 TYPE 2 DIABETES MELLITUS WITH COMPLICATION, WITHOUT LONG-TERM CURRENT USE OF INSULIN (HCC): Primary | ICD-10-CM

## 2022-12-08 PROCEDURE — G8427 DOCREV CUR MEDS BY ELIG CLIN: HCPCS | Performed by: NURSE PRACTITIONER

## 2022-12-08 PROCEDURE — 2022F DILAT RTA XM EVC RTNOPTHY: CPT | Performed by: NURSE PRACTITIONER

## 2022-12-08 PROCEDURE — 3017F COLORECTAL CA SCREEN DOC REV: CPT | Performed by: NURSE PRACTITIONER

## 2022-12-08 PROCEDURE — 99214 OFFICE O/P EST MOD 30 MIN: CPT | Performed by: NURSE PRACTITIONER

## 2022-12-08 PROCEDURE — 3051F HG A1C>EQUAL 7.0%<8.0%: CPT | Performed by: NURSE PRACTITIONER

## 2022-12-08 RX ORDER — DEXTROMETHORPHAN POLISTIREX 30 MG/5ML
60 SUSPENSION ORAL 2 TIMES DAILY PRN
Qty: 148 ML | Refills: 1 | Status: SHIPPED | OUTPATIENT
Start: 2022-12-08 | End: 2022-12-18

## 2022-12-08 ASSESSMENT — ENCOUNTER SYMPTOMS
COUGH: 1
SINUS PRESSURE: 1
EYES NEGATIVE: 1
BACK PAIN: 0
SHORTNESS OF BREATH: 1
DIARRHEA: 0
GASTROINTESTINAL NEGATIVE: 1
NAUSEA: 0
ABDOMINAL PAIN: 0
VOMITING: 0

## 2022-12-08 ASSESSMENT — PATIENT HEALTH QUESTIONNAIRE - PHQ9
10. IF YOU CHECKED OFF ANY PROBLEMS, HOW DIFFICULT HAVE THESE PROBLEMS MADE IT FOR YOU TO DO YOUR WORK, TAKE CARE OF THINGS AT HOME, OR GET ALONG WITH OTHER PEOPLE: 0
5. POOR APPETITE OR OVEREATING: 0
SUM OF ALL RESPONSES TO PHQ QUESTIONS 1-9: 0
9. THOUGHTS THAT YOU WOULD BE BETTER OFF DEAD, OR OF HURTING YOURSELF: 0
SUM OF ALL RESPONSES TO PHQ9 QUESTIONS 1 & 2: 0
3. TROUBLE FALLING OR STAYING ASLEEP: 0
8. MOVING OR SPEAKING SO SLOWLY THAT OTHER PEOPLE COULD HAVE NOTICED. OR THE OPPOSITE, BEING SO FIGETY OR RESTLESS THAT YOU HAVE BEEN MOVING AROUND A LOT MORE THAN USUAL: 0
4. FEELING TIRED OR HAVING LITTLE ENERGY: 0
6. FEELING BAD ABOUT YOURSELF - OR THAT YOU ARE A FAILURE OR HAVE LET YOURSELF OR YOUR FAMILY DOWN: 0
SUM OF ALL RESPONSES TO PHQ QUESTIONS 1-9: 0
7. TROUBLE CONCENTRATING ON THINGS, SUCH AS READING THE NEWSPAPER OR WATCHING TELEVISION: 0
1. LITTLE INTEREST OR PLEASURE IN DOING THINGS: 0
2. FEELING DOWN, DEPRESSED OR HOPELESS: 0

## 2022-12-08 NOTE — PATIENT INSTRUCTIONS
New Updates for [facility] CHELA    Thank you for choosing [facility] to give you the best care! [facility] is always trying to think of new ways to help their patients. We are asking all patients to try out the new digital registration that is now available through the [employer] [address]. Down load today! . Via the chela you're now able to update your personal and registration information prior to your upcoming appointment. This will save you time once you arrive at the office to check-in, not to mention your information remains safe!! Many other perks come from signing up for an account, such as:  Requesting refills  Scheduling an appointment  Completing an E-Visit  Sending a message to the office/provider  Having access to your medication list  Paying your bill/copay prior to your appointment  Scheduling your yearly mammogram  Review your test results    If you are not familiar the [facility] CHELA, please ask one of us and we will be happy to answer any questions or help you set-up your account. unknown

## 2022-12-08 NOTE — PROGRESS NOTES
for ear pain, postnasal drip and sinus pressure. Eyes: Negative. Negative for visual disturbance. Respiratory:  Positive for cough and shortness of breath (\"some\"). Cardiovascular: Negative. Negative for chest pain and palpitations. Gastrointestinal: Negative. Negative for abdominal pain, diarrhea, nausea and vomiting. Genitourinary: Negative. Negative for difficulty urinating. Musculoskeletal:  Positive for neck pain. Negative for back pain. Skin: Negative. Negative for rash. Neurological: Negative. Negative for dizziness, numbness and headaches. Hematological: Negative. Negative for adenopathy. Psychiatric/Behavioral:  Negative for dysphoric mood. The patient is nervous/anxious (Chronic stable. ).       PAST MEDICAL HISTORY:    Past Medical History:   Diagnosis Date    Allergic rhinitis     Anxiety     Arthritis     Asthma     Cataplexy and narcolepsy     Chronic back pain     Chronic pain     Depression     [facility]     Fibromyalgia     Hypertension     Irritable bowel syndrome     Liver enzyme elevation 8/19/2019    Neuropathy 2021    Obesity     Osteoarthritis     Peripheral vascular disease (HCC)     Restless legs syndrome     Sleep apnea        FAMILY HISTORY:    Family History   Problem Relation Age of Onset    Heart Disease Mother     Diabetes Mother     Stroke Mother     Arthritis Mother     Asthma Mother     Depression Mother     High Blood Pressure Mother     High Cholesterol Mother     Dementia Father     Alzheimer's Disease Father        SOCIAL HISTORY:    Social History     Socioeconomic History    Marital status:    Tobacco Use    Smoking status: Never    Smokeless tobacco: Never   Vaping Use    Vaping Use: Never used   Substance and Sexual Activity    Alcohol use: No     Comment: rare    Drug use: Yes     Types: Marijuana Garrel Calender)     Comment: yesterday     Sexual activity: Yes     Partners: Male     Social Determinants of Health     Financial Resource Strain: Low Risk     Difficulty of Paying Living Expenses: Not hard at all   Food Insecurity: No Food Insecurity    Worried About [facility] in the Last Year: Never true    Ran Out of Food in the Last Year: Never true   Transportation Needs: No Transportation Needs    Lack of Transportation (Medical): No    Lack of Transportation (Non-Medical): No       SURGICAL HISTORY:    Past Surgical History:   Procedure Laterality Date    CHOLECYSTECTOMY      FACIAL SURGERY N/A 10/28/2021    SHARP EXCISIONAL DEBRIDEMENT AND COMPLEX CLOSURE OF NASAL LACERATION performed by Max Toussaint MD at [facility] Right     KNEE ARTHROSCOPY Right 06/20/2022    RIGHT KNEE ARTHROSCOPY PARTIAL MEDIAL MENISCECTOMY     KNEE ARTHROSCOPY Right 6/20/2022    RIGHT KNEE ARTHROSCOPY PARTIAL MEDIAL MENISCECTOMY performed by Tierra Koch MD at [address] 10/28/2021    NASAL CLOSED REDUCTION WITH [address] performed by Max Toussaint MD at [address]:    Current Outpatient Medications   Medication Sig Dispense Refill    dextromethorphan (DELSYM) 30 MG/5ML extended release liquid Take 10 mLs by mouth 2 times daily as needed for Cough 148 mL 1    empagliflozin (JARDIANCE) 10 MG tablet Take 1 tablet by mouth daily 30 tablet 0    [START ON 1/8/2023] empagliflozin (JARDIANCE) 25 MG tablet Take 1 tablet by mouth daily 30 tablet 5    Chlorpheniramine-APAP (CORICIDIN HBP) 2-325 MG TABS Take 2 tablets by mouth every 4 hours as needed (cough and congestion) 80 tablet 1    gabapentin (NEURONTIN) 600 MG tablet TAKE 2 TABLETS BY MOUTH TWICE DAILY 120 tablet 0    dulaglutide (TRULICITY) 1.5 TG/1.0QT SC injection Inject 0.5 mLs into the skin once a week 0.5 mL 0    budesonide-formoterol (SYMBICORT) 160-4.5 MCG/ACT AERO INHALE 2 PUFFS BY MOUTH AND INTO THE LUNGS TWICE DAILY 10.2 g 0    venlafaxine (EFFEXOR XR) 150 MG extended release capsule TAKE 1 CAPSULE BY MOUTH EVERY MORNING 30 capsule 0    metoprolol tartrate (LOPRESSOR) 50 MG tablet TAKE 1 TABLET BY MOUTH TWICE DAILY 180 tablet 0    amLODIPine (NORVASC) 10 MG tablet take 1 tablet by mouth once daily 90 tablet 0    fluticasone (FLONASE) 50 MCG/ACT nasal spray INSTILL 1 SPRAY INTO EACH NOSTRIL EVERY DAY 96 g 1    ibuprofen (ADVIL;MOTRIN) 800 MG tablet take 1 tablet by mouth every 8 hours AS NEEDED FOR PAIN with food 90 tablet 1    albuterol sulfate HFA (PROAIR HFA) 108 (90 Base) MCG/ACT inhaler inhale 2 puffs by mouth and INTO THE LUNGS every 6 hours if needed for wheezing 8.5 g 5    SYSTANE ULTRA 0.4-0.3 % ophthalmic solution instill 1 to 2 drops INTO AFFECTED EYE(S) 4-5 TIMES PER DAY 30 mL 1    mupirocin (BACTROBAN) 2 % ointment APPLY TOPICALLY TO AFFECTED AREAS three times a day 22 g 0    triamcinolone (ARISTOCORT) 0.5 % ointment apply to affected area twice a day 30 g 3    metFORMIN (GLUCOPHAGE-XR) 500 MG extended release tablet take 2 tablet by mouth twice a day 120 tablet 5    Sodium Oxybate (XYREM PO) Take by mouth      cetirizine (ZYRTEC) 10 MG tablet Take 1 tablet by mouth daily 30 tablet 5    Blood Pressure Monitor MISC 1 Device by Does not apply route daily 1 each 0    glucose monitoring (FREESTYLE FREEDOM) kit 1 kit by Does not apply route daily 1 kit 0    Lancets MISC 1 each by Does not apply route 2 times daily 100 each 11    blood glucose monitor strips Test 2 times a day & as needed for symptoms of irregular blood glucose. Dispense sufficient amount for indicated testing frequency plus additional to accommodate PRN testing needs.  100 strip 11    Alcohol Swabs (ALCOHOL PREP) PADS 1 Device by Does not apply route in the morning, at noon, and at bedtime 100 each 11    insulin glargine (LANTUS SOLOSTAR) 100 UNIT/ML injection pen Inject 10 Units into the skin nightly 5 pen 2    Insulin Pen Needle 32G X 4 MM MISC 1 each by Does not apply route daily 100 each 5    naloxone 4 Exam  Vitals reviewed. Constitutional:       General: She is not in acute distress. Appearance: Normal appearance. She is well-developed. She is obese. She is not ill-appearing or toxic-appearing. HENT:      Head: Normocephalic. Right Ear: Hearing normal.      Left Ear: Hearing normal.      Mouth/Throat:      Lips: Pink. Eyes:      General: Lids are normal.      Conjunctiva/sclera: Conjunctivae normal.   Pulmonary:      Effort: Pulmonary effort is normal. No respiratory distress. Musculoskeletal:         General: Normal range of motion. Cervical back: Normal range of motion. Skin:     Findings: No rash. Neurological:      Mental Status: She is alert and oriented to person, place, and time. Mental status is at baseline. Coordination: Coordination is intact. Psychiatric:         Mood and Affect: Mood normal.         Behavior: Behavior normal. Behavior is cooperative. Thought Content: Thought content normal.         Judgment: Judgment normal.        ASSESSMENT/PLAN:  1. Type 2 diabetes mellitus with complication, without long-term current use of insulin ([facility])  -     empagliflozin (JARDIANCE) 10 MG tablet; Take 1 tablet by mouth daily, Disp-30 tablet, R-0Advance to 25 mg dosage in January pleaseNormal  -     empagliflozin (JARDIANCE) 25 MG tablet; Take 1 tablet by mouth daily, Disp-30 tablet, R-5Normal  2. Viral sinusitis  -     dextromethorphan (DELSYM) 30 MG/5ML extended release liquid; Take 10 mLs by mouth 2 times daily as needed for Cough, Disp-148 mL, R-1Normal  -     Chlorpheniramine-APAP (CORICIDIN HBP) 2-325 MG TABS; Take 2 tablets by mouth every 4 hours as needed (cough and congestion), Disp-80 tablet, R-1Normal  3. Essential hypertension  4. Mild intermittent asthma without complication  5. Narcolepsy and cataplexy     Stop metformin due to GI side effects. Start Jardiance 10 mg today and advance to 25 mg in January if tolerated.   Will treat viral sinus symptoms with Coricidin HBP and Delsym cough syrup. Call if symptoms worsen or fail to fully improve with symptom treatment and antibiotic will be sent. Encouraged patient to make in person appointment for follow-up in February. Discussed importance of treatment compliance regarding diabetes. Advised that in person appointment is a part of this. Discussed Cologuard. We will plan on reordering test at appointment in February if diarrhea has resolved. Unclear on stability of BP. Restart hydrochlorothiazide as no recall for this single medication could be found. Patient advised that it is a combination pill that she is not on that was recalled. Will work on getting in home monitor. Narcolepsy stable. Continue current medications and following with sleep specialist.    Return in about 2 months (around 2/8/2023) for HgbA1C, BP, diabetes, IN PERSON. Ba Tomlin is a 45 y.o. female being evaluated by a Virtual Visit (video visit) encounter to address concerns as mentioned above. A caregiver was present when appropriate. Due to this being a TeleHealth encounter (During [facility] public health emergency), evaluation of the following organ systems was limited: Vitals/Constitutional/EENT/Resp/CV/GI//MS/Neuro/Skin/Heme-Lymph-Imm. Pursuant to the emergency declaration under the [facility], [address] [place] [address] authority and the [employer] Act, this Virtual Visit was conducted with patient's (and/or legal guardian's) consent, to reduce the patient's risk of exposure to COVID-19 and provide necessary medical care. The patient (and/or legal guardian) has also been advised to contact this office for worsening conditions or problems, and seek emergency medical treatment and/or call 911 if deemed necessary. Services were provided through a video synchronous discussion virtually to substitute for in-person clinic visit.  Patient and

## 2022-12-14 DIAGNOSIS — I10 ESSENTIAL HYPERTENSION: ICD-10-CM

## 2022-12-14 RX ORDER — AMLODIPINE BESYLATE 10 MG/1
TABLET ORAL
Qty: 90 TABLET | Refills: 0 | Status: SHIPPED | OUTPATIENT
Start: 2022-12-14

## 2022-12-15 ENCOUNTER — PATIENT MESSAGE (OUTPATIENT)
Dept: FAMILY MEDICINE CLINIC | Age: 52
End: 2022-12-15

## 2022-12-15 ENCOUNTER — TELEPHONE (OUTPATIENT)
Dept: FAMILY MEDICINE CLINIC | Age: 52
End: 2022-12-15

## 2022-12-15 DIAGNOSIS — B96.89 ACUTE BACTERIAL SINUSITIS: Primary | ICD-10-CM

## 2022-12-15 DIAGNOSIS — B37.9 YEAST INFECTION: Primary | ICD-10-CM

## 2022-12-15 DIAGNOSIS — J01.90 ACUTE BACTERIAL SINUSITIS: Primary | ICD-10-CM

## 2022-12-15 RX ORDER — AZITHROMYCIN 250 MG/1
TABLET, FILM COATED ORAL
Qty: 1 PACKET | Refills: 0 | Status: SHIPPED | OUTPATIENT
Start: 2022-12-15

## 2022-12-15 RX ORDER — BENZONATATE 200 MG/1
200 CAPSULE ORAL 3 TIMES DAILY PRN
Qty: 30 CAPSULE | Refills: 1 | Status: SHIPPED | OUTPATIENT
Start: 2022-12-15

## 2022-12-15 NOTE — TELEPHONE ENCOUNTER
Antibiotics and Tessalon cough Alvarado called to patient's pharmacy    Patient informed via [address]

## 2022-12-15 NOTE — TELEPHONE ENCOUNTER
Patient is still having cough, congestion and wheezing, ears plugged and feels like she's not getting better.  Tested negative for Covid

## 2022-12-19 RX ORDER — FLUCONAZOLE 150 MG/1
150 TABLET ORAL
Qty: 2 TABLET | Refills: 1 | Status: SHIPPED | OUTPATIENT
Start: 2022-12-19

## 2022-12-19 RX ORDER — FLUCONAZOLE 150 MG/1
150 TABLET ORAL
Qty: 2 TABLET | Refills: 0 | Status: CANCELLED | OUTPATIENT
Start: 2022-12-19 | End: 2022-12-25

## 2022-12-19 NOTE — TELEPHONE ENCOUNTER
From: Nia Dorsey  To: Jasmin Nath  Sent: 12/15/2022 4:25 PM EST  Subject: Antibiotics    Carissa Arias,    I sent over a Z-Ruy and the Tessalon cough pills. Hoping for better!     Take Care & Stay Masoud Mckinley

## 2022-12-29 DIAGNOSIS — I10 ESSENTIAL HYPERTENSION: ICD-10-CM

## 2022-12-29 RX ORDER — HYDROCHLOROTHIAZIDE 12.5 MG/1
TABLET ORAL
Qty: 90 TABLET | Refills: 0 | Status: SHIPPED | OUTPATIENT
Start: 2022-12-29

## 2022-12-29 RX ORDER — PROPYLENE GLYCOL/PEG 400 0.3 %-0.4%
DROPS OPHTHALMIC (EYE)
Qty: 30 ML | Refills: 0 | Status: SHIPPED | OUTPATIENT
Start: 2022-12-29

## 2022-12-30 ENCOUNTER — TELEPHONE (OUTPATIENT)
Dept: FAMILY MEDICINE CLINIC | Age: 52
End: 2022-12-30

## 2022-12-30 NOTE — TELEPHONE ENCOUNTER
Spoke to patient and she'll see about going over to the ER or Urgent Care tomorrow. The office has no openings today for Rekha Frances, patient states she's not available today for an appointment.

## 2022-12-30 NOTE — TELEPHONE ENCOUNTER
Patient called in stating that she is still coughing a lot of phlegm     She is taking cough syrup but it is not helping she has taking the medication you have  prescribed but she cant seem to get rid of her cough and wants to know if you could call her in another abx      Aba Champion P.SRIDHAR. [address], [address] [phone] - F [phone]      Please advise

## 2022-12-30 NOTE — TELEPHONE ENCOUNTER
If she is still sick she needs an in person evaluation from our office or urgent care. Someone needs to listen to her lungs.

## 2023-01-02 DIAGNOSIS — E11.8 TYPE 2 DIABETES MELLITUS WITH COMPLICATION, WITHOUT LONG-TERM CURRENT USE OF INSULIN (HCC): ICD-10-CM

## 2023-01-03 RX ORDER — EMPAGLIFLOZIN 10 MG/1
TABLET, FILM COATED ORAL
Qty: 30 TABLET | Refills: 0 | OUTPATIENT
Start: 2023-01-03

## 2023-01-04 NOTE — TELEPHONE ENCOUNTER
Please verify with patient how she is tolerating the medication.  The plan during her last appointment was to increase her Jardiance to 25 mg in January

## 2023-01-22 DIAGNOSIS — F34.1 DYSTHYMIA: ICD-10-CM

## 2023-01-22 DIAGNOSIS — F43.0 STRESS REACTION: ICD-10-CM

## 2023-01-22 DIAGNOSIS — F41.9 ANXIETY: ICD-10-CM

## 2023-01-23 RX ORDER — VENLAFAXINE HYDROCHLORIDE 150 MG/1
CAPSULE, EXTENDED RELEASE ORAL
Qty: 30 CAPSULE | Refills: 0 | Status: SHIPPED | OUTPATIENT
Start: 2023-01-23

## 2023-01-23 NOTE — TELEPHONE ENCOUNTER
Neva Dhaliwal is calling to request a refill on the following medication(s):    Last Visit Date (If Applicable):  88/0/3754    Next Visit Date:    2/6/2023    Medication Request:  Requested Prescriptions     Pending Prescriptions Disp Refills    venlafaxine (EFFEXOR XR) 150 MG extended release capsule [Pharmacy Med Name: VENLAFAXINE 150MG ER CAPSULES] 30 capsule 0     Sig: TAKE 1 CAPSULE BY MOUTH EVERY MORNING

## 2023-02-06 ENCOUNTER — OFFICE VISIT (OUTPATIENT)
Dept: FAMILY MEDICINE CLINIC | Age: 53
End: 2023-02-06

## 2023-02-06 VITALS
HEIGHT: 65 IN | WEIGHT: 222 LBS | RESPIRATION RATE: 18 BRPM | SYSTOLIC BLOOD PRESSURE: 136 MMHG | TEMPERATURE: 97.6 F | BODY MASS INDEX: 36.99 KG/M2 | DIASTOLIC BLOOD PRESSURE: 82 MMHG | HEART RATE: 74 BPM | OXYGEN SATURATION: 97 %

## 2023-02-06 DIAGNOSIS — F34.1 DYSTHYMIA: ICD-10-CM

## 2023-02-06 DIAGNOSIS — E66.01 CLASS 2 SEVERE OBESITY DUE TO EXCESS CALORIES WITH SERIOUS COMORBIDITY AND BODY MASS INDEX (BMI) OF 38.0 TO 38.9 IN ADULT (HCC): ICD-10-CM

## 2023-02-06 DIAGNOSIS — G89.29 CHRONIC PAIN OF RIGHT KNEE: ICD-10-CM

## 2023-02-06 DIAGNOSIS — M25.561 CHRONIC PAIN OF RIGHT KNEE: ICD-10-CM

## 2023-02-06 DIAGNOSIS — E11.8 TYPE 2 DIABETES MELLITUS WITH COMPLICATION, WITHOUT LONG-TERM CURRENT USE OF INSULIN (HCC): Primary | ICD-10-CM

## 2023-02-06 DIAGNOSIS — F33.0 MILD RECURRENT MAJOR DEPRESSION (HCC): ICD-10-CM

## 2023-02-06 DIAGNOSIS — I10 ESSENTIAL HYPERTENSION: ICD-10-CM

## 2023-02-06 DIAGNOSIS — F41.9 ANXIETY: ICD-10-CM

## 2023-02-06 DIAGNOSIS — Z12.11 SCREENING FOR COLON CANCER: ICD-10-CM

## 2023-02-06 DIAGNOSIS — Z12.31 SCREENING MAMMOGRAM FOR BREAST CANCER: ICD-10-CM

## 2023-02-06 PROBLEM — S02.2XXA CLOSED FRACTURE OF NASAL BONES: Status: RESOLVED | Noted: 2021-10-27 | Resolved: 2023-02-06

## 2023-02-06 PROBLEM — S01.21XA LACERATION OF NOSE: Status: RESOLVED | Noted: 2021-10-27 | Resolved: 2023-02-06

## 2023-02-06 LAB — HBA1C MFR BLD: 8.4 %

## 2023-02-06 RX ORDER — SODIUM OXYBATE 0.5 G/ML
SOLUTION ORAL
COMMUNITY
Start: 2023-01-17 | End: 2023-02-06

## 2023-02-06 RX ORDER — AMMONIUM LACTATE 12 G/100G
LOTION TOPICAL
Qty: 225 G | Refills: 1 | Status: SHIPPED | OUTPATIENT
Start: 2023-02-06

## 2023-02-06 SDOH — ECONOMIC STABILITY: FOOD INSECURITY: WITHIN THE PAST 12 MONTHS, YOU WORRIED THAT YOUR FOOD WOULD RUN OUT BEFORE YOU GOT MONEY TO BUY MORE.: NEVER TRUE

## 2023-02-06 SDOH — ECONOMIC STABILITY: INCOME INSECURITY: HOW HARD IS IT FOR YOU TO PAY FOR THE VERY BASICS LIKE FOOD, HOUSING, MEDICAL CARE, AND HEATING?: NOT HARD AT ALL

## 2023-02-06 SDOH — ECONOMIC STABILITY: FOOD INSECURITY: WITHIN THE PAST 12 MONTHS, THE FOOD YOU BOUGHT JUST DIDN'T LAST AND YOU DIDN'T HAVE MONEY TO GET MORE.: NEVER TRUE

## 2023-02-06 SDOH — ECONOMIC STABILITY: HOUSING INSECURITY
IN THE LAST 12 MONTHS, WAS THERE A TIME WHEN YOU DID NOT HAVE A STEADY PLACE TO SLEEP OR SLEPT IN A SHELTER (INCLUDING NOW)?: NO

## 2023-02-06 ASSESSMENT — PATIENT HEALTH QUESTIONNAIRE - PHQ9
8. MOVING OR SPEAKING SO SLOWLY THAT OTHER PEOPLE COULD HAVE NOTICED. OR THE OPPOSITE, BEING SO FIGETY OR RESTLESS THAT YOU HAVE BEEN MOVING AROUND A LOT MORE THAN USUAL: 0
4. FEELING TIRED OR HAVING LITTLE ENERGY: 3
1. LITTLE INTEREST OR PLEASURE IN DOING THINGS: 2
3. TROUBLE FALLING OR STAYING ASLEEP: 2
SUM OF ALL RESPONSES TO PHQ QUESTIONS 1-9: 13
SUM OF ALL RESPONSES TO PHQ9 QUESTIONS 1 & 2: 4
6. FEELING BAD ABOUT YOURSELF - OR THAT YOU ARE A FAILURE OR HAVE LET YOURSELF OR YOUR FAMILY DOWN: 0
5. POOR APPETITE OR OVEREATING: 3
10. IF YOU CHECKED OFF ANY PROBLEMS, HOW DIFFICULT HAVE THESE PROBLEMS MADE IT FOR YOU TO DO YOUR WORK, TAKE CARE OF THINGS AT HOME, OR GET ALONG WITH OTHER PEOPLE: 0
SUM OF ALL RESPONSES TO PHQ QUESTIONS 1-9: 13
7. TROUBLE CONCENTRATING ON THINGS, SUCH AS READING THE NEWSPAPER OR WATCHING TELEVISION: 1
2. FEELING DOWN, DEPRESSED OR HOPELESS: 2
SUM OF ALL RESPONSES TO PHQ QUESTIONS 1-9: 13
SUM OF ALL RESPONSES TO PHQ QUESTIONS 1-9: 13
9. THOUGHTS THAT YOU WOULD BE BETTER OFF DEAD, OR OF HURTING YOURSELF: 0

## 2023-02-06 ASSESSMENT — ENCOUNTER SYMPTOMS
EYES NEGATIVE: 1
VOMITING: 0
GASTROINTESTINAL NEGATIVE: 1
SHORTNESS OF BREATH: 0
COUGH: 0
DIARRHEA: 0
RESPIRATORY NEGATIVE: 1
BACK PAIN: 1
ABDOMINAL PAIN: 0
NAUSEA: 0
CONSTIPATION: 0

## 2023-02-06 NOTE — PROGRESS NOTES
[facility]  [address]  [place] [address] \[facility]\"" [address]  Dept: [phone]    2/6/2023    CHIEF COMPLAINT    Chief Complaint   Patient presents with    Diabetes    Hypertension       HPI    Ba Tomlin is a 52 y.o. female who presents   Chief Complaint   Patient presents with    Diabetes    Hypertension     Appointment to follow-up on multiple medical conditions. Specialists:     Cody Sher  Pulmonary and Sleep medicine- Seeing Dr. Carmelo Martinez through the [facility]. Pain Management- No longer following with Dr. Hossein Campuzano.     Diabetes- Doyce Wally and Trulicity. Lantus 10 units nightly, but has not been taking it consistently due to only taking it with higher sugars. She d/c the metformin due to her stomach issues. Diarrhea has resolved since d/c it. Checking blood sugar inconsistently. Today's A1C 8.4. Lab Results   Component Value Date    LABA1C 8.4 02/06/2023    LABA1C 7.3 (H) 06/09/2022    LABA1C 8.6 10/08/2021     Lab Results   Component Value Date     06/09/2022      HTN-currently taking amlodipine, hydrochlorothiazide and metoprolol tartrate. Recently stopped hydrochlorothiazide due to concerns for recall. Has not filled BP monitor rx, but also has misplaced script. Narcolepsy-continues being treated with Xyrem. Seeing Dr. Katerina Olea through the [facility]. KAM- has a CPAP with nasal pillows, but she has not worn it in months due to it causing      Asthma-stable on current medications. Somewhat flared with viral upper respiratory.     Anxiety- higher since the lose of her father in August  She is interested in taking Xanax again, but has not discussed  this with sleep specialist.   Plans to call Ezio [facility]annon re-establish     PHQ-9 Total Score: 13 (2/6/2023 10:03 AM)  Thoughts that you would be better off dead, or of hurting yourself in some way: 0 (2/6/2023 10:03 AM)      Right knee pain- has had surgery on it with Dr. Alee Bustamante. She is having since slipping on ice. She is wearing brace most days. Notable swelling and increased pain with removing the brace. She has had to cancel a few appointments with Dr. Eloisa Paulino office, but plans to reschedule. Low back pain- previously treated with Dr. Elaina Mayorga.   She was taking Percocet 7.5 mg daily. She was d/c due to having had a marijuana brownie for her back pain. Vitals:    02/06/23 1001   BP: 136/82   Site: Left Upper Arm   Position: Sitting   Cuff Size: Large Adult   Pulse: 74   Resp: 18   Temp: 97.6 °F (36.4 °C)   TempSrc: Temporal   SpO2: 97%   Weight: 222 lb (100.7 kg)   Height: 5' 5\" (1.651 m)       Wt Readings from Last 3 Encounters:   02/06/23 222 lb (100.7 kg)   06/20/22 215 lb 12.8 oz (97.9 kg)   06/09/22 209 lb (94.8 kg)     BP Readings from Last 3 Encounters:   02/06/23 136/82   06/20/22 (!) 147/91   06/09/22 (!) 159/101       REVIEW OF SYSTEMS    Review of Systems   Constitutional:  Positive for fatigue. Negative for chills and fever. HENT: Negative. Eyes: Negative. Negative for visual disturbance. Respiratory: Negative. Negative for cough and shortness of breath. Cardiovascular: Negative. Negative for chest pain and palpitations. Gastrointestinal: Negative. Negative for abdominal pain, constipation, diarrhea, nausea and vomiting. Genitourinary: Negative. Negative for difficulty urinating. Musculoskeletal:  Positive for arthralgias, back pain and neck pain. Skin: Negative. Negative for rash. Neurological: Negative. Negative for dizziness and headaches. Psychiatric/Behavioral:  Negative for dysphoric mood. The patient is nervous/anxious.       PAST MEDICAL HISTORY    Past Medical History:   Diagnosis Date    Allergic rhinitis     Anxiety     Arthritis     Asthma     Cataplexy and narcolepsy     Chronic back pain     Chronic pain     Depression     Diabetes mellitus (HCC)     Fibromyalgia     Hypertension     Irritable bowel syndrome     Liver enzyme elevation 8/19/2019    Neuropathy 2021    Obesity     Osteoarthritis     Peripheral vascular disease (HCC)     Restless legs syndrome     Sleep apnea        FAMILY HISTORY    Family History   Problem Relation Age of Onset    Heart Disease Mother     Diabetes Mother     Stroke Mother     Arthritis Mother     Asthma Mother     Depression Mother     High Blood Pressure Mother     High Cholesterol Mother     Dementia Father     Alzheimer's Disease Father        SOCIAL HISTORY    Social History     Socioeconomic History    Marital status:      Spouse name: None    Number of children: None    Years of education: None    Highest education level: None   Tobacco Use    Smoking status: Never    Smokeless tobacco: Never   Vaping Use    Vaping Use: Never used   Substance and Sexual Activity    Alcohol use: No     Comment: rare    Drug use: Yes     Types: Marijuana [employer])     Comment: yesterday     Sexual activity: Yes     Partners: Male     Social Determinants of Health     Financial Resource Strain: Low Risk     Difficulty of Paying Living Expenses: Not hard at all   Food Insecurity: No Food Insecurity    Worried About Running Out of Food in the Last Year: Never true    Ran Out of Food in the Last Year: Never true   Transportation Needs: No Transportation Needs    Lack of Transportation (Medical): No    Lack of Transportation (Non-Medical): No   Housing Stability: Unknown    Unstable Housing in the Last Year: No       SURGICAL HISTORY    Past Surgical History:   Procedure Laterality Date    CHOLECYSTECTOMY      FACIAL SURGERY N/A 10/28/2021    SHARP EXCISIONAL DEBRIDEMENT AND COMPLEX CLOSURE OF NASAL LACERATION performed by Jason Owen MD at [address] Right     KNEE ARTHROSCOPY Right 06/20/2022    RIGHT KNEE ARTHROSCOPY PARTIAL MEDIAL MENISCECTOMY     KNEE ARTHROSCOPY Right 6/20/2022    RIGHT KNEE ARTHROSCOPY PARTIAL MEDIAL MENISCECTOMY performed by Tierra Koch MD at [address] N/A 10/28/2021    NASAL CLOSED REDUCTION WITH SPLINTING performed by Max Toussaint MD at [address] [facility]    Current Outpatient Medications   Medication Sig Dispense Refill    insulin glargine (LANTUS SOLOSTAR) 100 UNIT/ML injection pen Inject 15 Units into the skin nightly 5 Adjustable Dose Pre-filled Pen Syringe 5    ammonium lactate (LAC-HYDRIN) 12 % lotion Apply topically as needed.  225 g 1    hydroCHLOROthiazide (HYDRODIURIL) 12.5 MG tablet TAKE 1 TABLET BY MOUTH EVERY DAY 90 tablet 0    SYSTANE ULTRA 0.4-0.3 % ophthalmic solution INSTILL 1 TO 2 DROPS INTO AFFECTED EYE(S) 4 TO 5 TIMES A DAY 30 mL 0    fluconazole (DIFLUCAN) 150 MG tablet Take 1 tablet by mouth every 72 hours as needed (vaginitis) 2 tablet 1    amLODIPine (NORVASC) 10 MG tablet TAKE 1 TABLET BY MOUTH EVERY DAY 90 tablet 0    empagliflozin (JARDIANCE) 25 MG tablet Take 1 tablet by mouth daily 30 tablet 5    dulaglutide (TRULICITY) 1.5 XL/4.3QB SC injection Inject 0.5 mLs into the skin once a week 0.5 mL 0    budesonide-formoterol (SYMBICORT) 160-4.5 MCG/ACT AERO INHALE 2 PUFFS BY MOUTH AND INTO THE LUNGS TWICE DAILY 10.2 g 0    metoprolol tartrate (LOPRESSOR) 50 MG tablet TAKE 1 TABLET BY MOUTH TWICE DAILY 180 tablet 0    fluticasone (FLONASE) 50 MCG/ACT nasal spray INSTILL 1 SPRAY INTO EACH NOSTRIL EVERY DAY 96 g 1    ibuprofen (ADVIL;MOTRIN) 800 MG tablet take 1 tablet by mouth every 8 hours AS NEEDED FOR PAIN with food 90 tablet 1    albuterol sulfate HFA (PROAIR HFA) 108 (90 Base) MCG/ACT inhaler inhale 2 puffs by mouth and INTO THE LUNGS every 6 hours if needed for wheezing 8.5 g 5    mupirocin (BACTROBAN) 2 % ointment APPLY TOPICALLY TO AFFECTED AREAS three times a day 22 g 0    triamcinolone (ARISTOCORT) 0.5 % ointment apply to affected area twice a day 30 g 3    Sodium Oxybate (XYREM PO) Take by mouth      cetirizine (ZYRTEC) 10 MG tablet Take 1 tablet by mouth daily 30 tablet 5    Blood Pressure Monitor MISC 1 Device by Does not apply route daily 1 each 0    Lancets MISC 1 each by Does not apply route 2 times daily 100 each 11    Alcohol Swabs (ALCOHOL PREP) PADS 1 Device by Does not apply route in the morning, at noon, and at bedtime 100 each 11    Insulin Pen Needle 32G X 4 MM MISC 1 each by Does not apply route daily 100 each 5    diclofenac sodium (VOLTAREN) 1 % GEL apply 4 grams topically four times a day 500 g 2    Brimonidine Tartrate 0.025 % SOLN 1 drop in each eye every 8 hours as needed for redness. 7.5 mL 5    rOPINIRole (REQUIP) 1 MG tablet take 1 tablet by mouth 1-3 HOURS BEFORE BEDTIME DAILY      blood glucose test strips (ONE TOUCH ULTRA TEST) strip TEST once daily 100 strip 3    loratadine (CLARITIN) 10 MG tablet Take 1 tablet by mouth daily 30 tablet 3    venlafaxine (EFFEXOR XR) 150 MG extended release capsule TAKE 1 CAPSULE BY MOUTH EVERY MORNING 30 capsule 3    gabapentin (NEURONTIN) 600 MG tablet TAKE 2 TABLETS BY MOUTH TWICE DAILY 120 tablet 0    metFORMIN (GLUCOPHAGE-XR) 500 MG extended release tablet take 2 tablet by mouth twice a day (Patient not taking: Reported on 2/6/2023) 120 tablet 5     No current facility-administered medications for this visit. ALLERGIES    Allergies   Allergen Reactions    Lisinopril Anaphylaxis     Difficulty breathing     Zoloft [Sertraline Hcl] Anaphylaxis     Difficulty breathing     Seasonal     Amoxicillin Rash    Augmentin [Amoxicillin-Pot Clavulanate] Rash    Buspirone Rash    Paxil [Paroxetine Hcl] Other (See Comments)     Weight gain        PHYSICAL EXAM   Physical Exam  Vitals and nursing note reviewed. Constitutional:       General: She is not in acute distress. Appearance: She is well-developed. She is obese. She is not diaphoretic. Interventions: Face mask in place. HENT:      Head: Normocephalic.       Right Ear: Hearing normal.      Left Ear: Hearing normal. Eyes:      General:         Right eye: No discharge. Left eye: No discharge. Pupils: Pupils are equal.   Cardiovascular:      Rate and Rhythm: Normal rate and regular rhythm. Pulses: Normal pulses. Radial pulses are 2+ on the right side and 2+ on the left side. Heart sounds: Normal heart sounds, S1 normal and S2 normal. No murmur heard. Pulmonary:      Effort: Pulmonary effort is normal. No respiratory distress. Breath sounds: Normal breath sounds. No wheezing. Musculoskeletal:      Cervical back: Normal range of motion. Skin:     General: Skin is warm and dry. Neurological:      Mental Status: She is alert and oriented to person, place, and time. Psychiatric:         Behavior: Behavior normal. Behavior is cooperative. ASSESSMENT/PLAN  1. Type 2 diabetes mellitus with complication, without long-term current use of insulin ([facility])  Assessment & Plan:   Uncontrolled, changes made today: Increase Lantus from 10 to 15 units nightly. , medication adherence emphasized and lifestyle modifications recommended  Orders:  -     ammonium lactate (LAC-HYDRIN) 12 % lotion; Apply topically as needed. , Disp-225 g, R-1, Normal  -     POCT glycosylated hemoglobin (Hb A1C)  -     insulin glargine (LANTUS SOLOSTAR) 100 UNIT/ML injection pen; Inject 15 Units into the skin nightly, Disp-5 Adjustable Dose Pre-filled Pen Syringe, R-5Normal  2. Class 2 severe obesity due to excess calories with serious comorbidity and body mass index (BMI) of 38.0 to 38.9 in adult [facility])  Assessment & Plan:   Uncontrolled, medication adherence emphasized and lifestyle modifications recommended  3. Screening for colon cancer  -     Fecal DNA Colorectal cancer screening ([facility])  4. Screening mammogram for breast cancer  -     ASIF DIGITAL SCREEN W OR WO CAD BILATERAL; Future  5. Essential hypertension  6. Anxiety  7. Dysthymia  8. Chronic pain of right knee  9.  Mild recurrent major depression [facility])  Assessment & Plan:   Borderline controlled, continue current medications, lifestyle modifications recommended and Reestablish with [facility] as planned    Increase lantus from 10 to 15 units nightly. Encouaraged to take regualarly. Make appointemnt for DM eye exam.   Continue working on improving diet  Return to Dr. Pete Piña for right knee pain. Hypertension borderline controlled. Continue current medications and home monitoring. Anxiety and depression borderline stable. Call [facility] as planned to reestablish care for counseling     Results for orders placed or performed in visit on 02/06/23   POCT glycosylated hemoglobin (Hb A1C)   Result Value Ref Range    Hemoglobin A1C 8.4 %       Brendan Dias received counseling on the following healthy behaviors: nutrition, exercise, and medication adherence  Reviewed prior labs and health maintenance  Continue current medications, diet and exercise. Discussed use, benefit, and side effects of prescribed medications. Barriers to medication compliance addressed. Patient given educational materials - see patient instructions  Was a self-tracking handout given in paper form or via [employer]? Yes    Requested Prescriptions     Signed Prescriptions Disp Refills    ammonium lactate (LAC-HYDRIN) 12 % lotion 225 g 1     Sig: Apply topically as needed. insulin glargine (LANTUS SOLOSTAR) 100 UNIT/ML injection pen 5 Adjustable Dose Pre-filled Pen Syringe 5     Sig: Inject 15 Units into the skin nightly       All patient questions answered. Patient voiced understanding. Quality Measures    Body mass index is 36.94 kg/m². Elevated. Weight control planned discussed Healthy diet and regular exercise. BP: 136/82 Blood pressure is Normal. Treatment plan consists of No treatment change needed.     Lab Results   Component Value Date    LDLCALC 199 (A) 09/17/2018    LDLCHOLESTEROL 192 (H) 06/09/2022    (goal LDL reduction with dx if diabetes is 50% LDL reduction)      PHQ Scores 2/6/2023 12/8/2022 5/9/2022 2/17/2022 10/8/2021 4/19/2021 8/3/2020   PHQ2 Score 4 0 0 0 0 2 1   PHQ9 Score 13 0 1 0 0 2 1     Interpretation of Total Score Depression Severity: 1-4 = Minimal depression, 5-9 = Mild depression, 10-14 = Moderate depression, 15-19 = Moderately severe depression, 20-27 = Severe depression     Return in about 3 months (around 5/6/2023) for HgbA1C, BP, diabetes.    (Please note that portions of this note were completed with a voice recognition program. Efforts were made to edit the dictations but occasionally words are mis-transcribed.)      Electronically signed by RUBEN Ross CNP on 2/6/23 at 10:28 AM EST

## 2023-02-19 DIAGNOSIS — F43.0 STRESS REACTION: ICD-10-CM

## 2023-02-19 DIAGNOSIS — F41.9 ANXIETY: ICD-10-CM

## 2023-02-19 DIAGNOSIS — F34.1 DYSTHYMIA: ICD-10-CM

## 2023-02-20 RX ORDER — VENLAFAXINE HYDROCHLORIDE 150 MG/1
CAPSULE, EXTENDED RELEASE ORAL
Qty: 30 CAPSULE | Refills: 3 | Status: SHIPPED | OUTPATIENT
Start: 2023-02-20

## 2023-02-25 PROBLEM — S02.2XXD FRACTURE OF NASAL BONES, SUBSEQUENT ENCOUNTER FOR FRACTURE WITH ROUTINE HEALING: Status: RESOLVED | Noted: 2021-10-28 | Resolved: 2023-02-25

## 2023-02-25 RX ORDER — INSULIN GLARGINE 100 [IU]/ML
15 INJECTION, SOLUTION SUBCUTANEOUS NIGHTLY
Qty: 5 ADJUSTABLE DOSE PRE-FILLED PEN SYRINGE | Refills: 5 | Status: SHIPPED | OUTPATIENT
Start: 2023-02-25

## 2023-02-25 NOTE — ASSESSMENT & PLAN NOTE
Uncontrolled, changes made today: Increase Lantus from 10 to 15 units nightly. , medication adherence emphasized and lifestyle modifications recommended

## 2023-02-25 NOTE — ASSESSMENT & PLAN NOTE
Borderline controlled, continue current medications, lifestyle modifications recommended and Reestablish with [facility] as planned

## 2023-02-27 DIAGNOSIS — G89.29 CHRONIC MIDLINE LOW BACK PAIN WITH BILATERAL SCIATICA: ICD-10-CM

## 2023-02-27 DIAGNOSIS — M54.42 CHRONIC MIDLINE LOW BACK PAIN WITH BILATERAL SCIATICA: ICD-10-CM

## 2023-02-27 DIAGNOSIS — M54.41 CHRONIC MIDLINE LOW BACK PAIN WITH BILATERAL SCIATICA: ICD-10-CM

## 2023-02-27 DIAGNOSIS — E11.8 TYPE 2 DIABETES MELLITUS WITH COMPLICATION, WITHOUT LONG-TERM CURRENT USE OF INSULIN (HCC): ICD-10-CM

## 2023-02-27 DIAGNOSIS — M15.9 PRIMARY OSTEOARTHRITIS INVOLVING MULTIPLE JOINTS: ICD-10-CM

## 2023-02-27 RX ORDER — DULAGLUTIDE 1.5 MG/.5ML
INJECTION, SOLUTION SUBCUTANEOUS
Qty: 2 ML | Refills: 2 | Status: SHIPPED | OUTPATIENT
Start: 2023-02-27

## 2023-02-27 RX ORDER — GABAPENTIN 600 MG/1
TABLET ORAL
Qty: 120 TABLET | Refills: 2 | Status: SHIPPED | OUTPATIENT
Start: 2023-02-27 | End: 2023-03-29

## 2023-02-27 RX ORDER — PROPYLENE GLYCOL/PEG 400 0.3 %-0.4%
DROPS OPHTHALMIC (EYE)
Qty: 30 ML | Refills: 2 | Status: SHIPPED | OUTPATIENT
Start: 2023-02-27

## 2023-02-27 RX ORDER — IBUPROFEN 800 MG/1
TABLET ORAL
Qty: 90 TABLET | Refills: 2 | Status: SHIPPED | OUTPATIENT
Start: 2023-02-27

## 2023-02-27 NOTE — TELEPHONE ENCOUNTER
Noemi Prajapati is calling to request a refill on the following medication(s):    Last Visit Date (If Applicable):  2/6/2023    Next Visit Date:    Visit date not found    Medication Request:  Requested Prescriptions     Pending Prescriptions Disp Refills    gabapentin (NEURONTIN) 600 MG tablet [Pharmacy Med Name: GABAPENTIN 600MG TABLETS] 120 tablet 0     Sig: TAKE 2 TABLETS BY MOUTH TWICE DAILY    SYSTANE ULTRA 0.4-0.3 % ophthalmic solution [Pharmacy Med Name: SYSTANE ULTRA SOLUTION] 30 mL 0     Sig: INSTILL 1 TO 2 DROPS IN AFFECTED EYE(S) 4 TO 5 TIMES A DAY    TRULICITY 1.5 MG/0.5ML SC injection [Pharmacy Med Name: TRULICITY 1.5MG/0.5ML SDP 0.5ML] 2 mL 0     Sig: ADMINISTER 1.5 MG UNDER THE SKIN 1 TIME A WEEK    ibuprofen (ADVIL;MOTRIN) 800 MG tablet [Pharmacy Med Name: IBUPROFEN 800MG TABLETS] 90 tablet 1     Sig: TAKE 1 TABLET BY MOUTH EVERY 8 HOURS AS NEEDED FOR PAIN TAKE WITH FOOD

## 2023-02-28 RX ORDER — GABAPENTIN 600 MG/1
TABLET ORAL
Qty: 120 TABLET | Refills: 0 | OUTPATIENT
Start: 2023-02-28 | End: 2023-03-29

## 2023-02-28 NOTE — TELEPHONE ENCOUNTER
Colton Rowe is calling to request a refill on the following medication(s):    Last Visit Date (If Applicable):  Visit date not found    Next Visit Date:    Visit date not found    Medication Request:  Requested Prescriptions     Pending Prescriptions Disp Refills    gabapentin (NEURONTIN) 600 MG tablet [Pharmacy Med Name: GABAPENTIN 600MG TABLETS] 120 tablet 0     Sig: TAKE 2 TABLETS BY MOUTH TWICE DAILY

## 2023-03-04 DIAGNOSIS — I10 ESSENTIAL HYPERTENSION: ICD-10-CM

## 2023-03-06 RX ORDER — METOPROLOL TARTRATE 50 MG/1
TABLET, FILM COATED ORAL
Qty: 180 TABLET | Refills: 0 | Status: SHIPPED | OUTPATIENT
Start: 2023-03-06

## 2023-03-06 RX ORDER — AMLODIPINE BESYLATE 10 MG/1
TABLET ORAL
Qty: 90 TABLET | Refills: 0 | Status: SHIPPED | OUTPATIENT
Start: 2023-03-06

## 2023-03-25 DIAGNOSIS — I10 ESSENTIAL HYPERTENSION: ICD-10-CM

## 2023-03-27 RX ORDER — HYDROCHLOROTHIAZIDE 12.5 MG/1
TABLET ORAL
Qty: 90 TABLET | Refills: 0 | Status: SHIPPED | OUTPATIENT
Start: 2023-03-27

## 2023-03-27 NOTE — TELEPHONE ENCOUNTER
[facility] is calling to request a refill on the following medication(s):    Last Visit Date (If Applicable):  0/5/0320    Next Visit Date:    Visit date not found    Medication Request:  Requested Prescriptions     Pending Prescriptions Disp Refills    hydroCHLOROthiazide (HYDRODIURIL) 12.5 MG tablet [Pharmacy Med Name: HYDROCHLOROTHIAZIDE 12.5MG TABLETS] 90 tablet 0     Sig: TAKE 1 TABLET BY MOUTH EVERY DAY

## 2023-03-31 ENCOUNTER — OFFICE VISIT (OUTPATIENT)
Dept: ORTHOPEDIC SURGERY | Age: 53
End: 2023-03-31

## 2023-03-31 DIAGNOSIS — G89.29 CHRONIC PAIN OF RIGHT KNEE: Primary | ICD-10-CM

## 2023-03-31 DIAGNOSIS — M25.561 CHRONIC PAIN OF RIGHT KNEE: Primary | ICD-10-CM

## 2023-03-31 DIAGNOSIS — I10 ESSENTIAL HYPERTENSION: ICD-10-CM

## 2023-03-31 RX ORDER — BUPIVACAINE HYDROCHLORIDE 5 MG/ML
2 INJECTION, SOLUTION PERINEURAL ONCE
Status: COMPLETED | OUTPATIENT
Start: 2023-03-31 | End: 2023-03-31

## 2023-03-31 RX ORDER — BETAMETHASONE SODIUM PHOSPHATE AND BETAMETHASONE ACETATE 3; 3 MG/ML; MG/ML
12 INJECTION, SUSPENSION INTRA-ARTICULAR; INTRALESIONAL; INTRAMUSCULAR; SOFT TISSUE ONCE
Status: COMPLETED | OUTPATIENT
Start: 2023-03-31 | End: 2023-03-31

## 2023-03-31 RX ORDER — LIDOCAINE HYDROCHLORIDE 10 MG/ML
2 INJECTION, SOLUTION INFILTRATION; PERINEURAL ONCE
Status: COMPLETED | OUTPATIENT
Start: 2023-03-31 | End: 2023-03-31

## 2023-03-31 RX ORDER — HYDROCHLOROTHIAZIDE 12.5 MG/1
TABLET ORAL
Qty: 90 TABLET | Refills: 0 | OUTPATIENT
Start: 2023-03-31

## 2023-03-31 RX ADMIN — BUPIVACAINE HYDROCHLORIDE 10 MG: 5 INJECTION, SOLUTION PERINEURAL at 10:37

## 2023-03-31 RX ADMIN — BETAMETHASONE SODIUM PHOSPHATE AND BETAMETHASONE ACETATE 12 MG: 3; 3 INJECTION, SUSPENSION INTRA-ARTICULAR; INTRALESIONAL; INTRAMUSCULAR; SOFT TISSUE at 10:36

## 2023-03-31 RX ADMIN — LIDOCAINE HYDROCHLORIDE 2 ML: 10 INJECTION, SOLUTION INFILTRATION; PERINEURAL at 10:36

## 2023-03-31 NOTE — PROGRESS NOTES
accurate and complete. Claudine Bill MD. 03/31/23      Please note that this chart was generated using voice recognition Dragon dictation software. Although every effort was made to ensure the accuracy of this automated transcription, some errors in transcription may have occurred.

## 2023-05-13 DIAGNOSIS — E11.8 TYPE 2 DIABETES MELLITUS WITH COMPLICATION, WITHOUT LONG-TERM CURRENT USE OF INSULIN (HCC): ICD-10-CM

## 2023-05-15 RX ORDER — BLOOD SUGAR DIAGNOSTIC
STRIP MISCELLANEOUS
Qty: 100 STRIP | Refills: 11 | Status: SHIPPED | OUTPATIENT
Start: 2023-05-15

## 2023-05-26 DIAGNOSIS — E11.8 TYPE 2 DIABETES MELLITUS WITH COMPLICATION, WITHOUT LONG-TERM CURRENT USE OF INSULIN (HCC): ICD-10-CM

## 2023-05-26 RX ORDER — DULAGLUTIDE 1.5 MG/.5ML
INJECTION, SOLUTION SUBCUTANEOUS
Qty: 2 ML | Refills: 0 | Status: SHIPPED | OUTPATIENT
Start: 2023-05-26

## 2023-06-08 DIAGNOSIS — F41.9 ANXIETY: ICD-10-CM

## 2023-06-08 DIAGNOSIS — F34.1 DYSTHYMIA: ICD-10-CM

## 2023-06-08 DIAGNOSIS — F43.0 STRESS REACTION: ICD-10-CM

## 2023-06-09 RX ORDER — VENLAFAXINE HYDROCHLORIDE 150 MG/1
CAPSULE, EXTENDED RELEASE ORAL
Qty: 30 CAPSULE | Refills: 1 | Status: SHIPPED | OUTPATIENT
Start: 2023-06-09

## 2023-06-28 DIAGNOSIS — E11.8 TYPE 2 DIABETES MELLITUS WITH COMPLICATION, WITHOUT LONG-TERM CURRENT USE OF INSULIN (HCC): ICD-10-CM

## 2023-06-28 RX ORDER — PEN NEEDLE, DIABETIC 32GX 5/32"
NEEDLE, DISPOSABLE MISCELLANEOUS
Qty: 100 EACH | Refills: 5 | Status: SHIPPED | OUTPATIENT
Start: 2023-06-28

## 2023-07-02 DIAGNOSIS — E11.8 TYPE 2 DIABETES MELLITUS WITH COMPLICATION, WITHOUT LONG-TERM CURRENT USE OF INSULIN (HCC): ICD-10-CM

## 2023-07-03 RX ORDER — EMPAGLIFLOZIN 10 MG/1
TABLET, FILM COATED ORAL
Qty: 30 TABLET | Refills: 0 | Status: SHIPPED | OUTPATIENT
Start: 2023-07-03

## 2023-07-03 NOTE — TELEPHONE ENCOUNTER
Reny oRdrigues is calling to request a refill on the following medication(s):    Last Visit Date (If Applicable):  0/4/2969    Next Visit Date:    7/17/2023    Medication Request:  Requested Prescriptions     Pending Prescriptions Disp Refills    JARDIANCE 10 MG tablet [Pharmacy Med Name: JARDIANCE 10MG TABLETS] 30 tablet 0     Sig: TAKE 1 TABLET BY MOUTH DAILY

## 2023-07-07 DIAGNOSIS — M15.9 PRIMARY OSTEOARTHRITIS INVOLVING MULTIPLE JOINTS: ICD-10-CM

## 2023-07-08 RX ORDER — IBUPROFEN 800 MG/1
TABLET ORAL
Qty: 270 TABLET | Refills: 0 | Status: SHIPPED | OUTPATIENT
Start: 2023-07-08

## 2023-07-18 DIAGNOSIS — G89.29 CHRONIC MIDLINE LOW BACK PAIN WITH BILATERAL SCIATICA: ICD-10-CM

## 2023-07-18 DIAGNOSIS — M54.42 CHRONIC MIDLINE LOW BACK PAIN WITH BILATERAL SCIATICA: ICD-10-CM

## 2023-07-18 DIAGNOSIS — M54.41 CHRONIC MIDLINE LOW BACK PAIN WITH BILATERAL SCIATICA: ICD-10-CM

## 2023-07-20 RX ORDER — GABAPENTIN 600 MG/1
TABLET ORAL
Qty: 120 TABLET | Refills: 0 | Status: SHIPPED | OUTPATIENT
Start: 2023-07-20 | End: 2023-08-20

## 2023-07-23 DIAGNOSIS — E11.8 TYPE 2 DIABETES MELLITUS WITH COMPLICATION, WITHOUT LONG-TERM CURRENT USE OF INSULIN (HCC): ICD-10-CM

## 2023-07-23 NOTE — TELEPHONE ENCOUNTER
Lauren Pimentel is calling to request a refill on the following medication(s):    Last Visit Date (If Applicable):  3/8/1031    Next Visit Date:    8/16/2023    Medication Request:  Requested Prescriptions     Pending Prescriptions Disp Refills    TRULICITY 1.5 NW/3.6BD SC injection [Pharmacy Med Name: TRULICITY 0.3EF/5.4WK SDP 0.5ML] 2 mL 0     Sig: ADMINISTER 1.5 MG UNDER THE SKIN 1 TIME A WEEK

## 2023-07-25 RX ORDER — DULAGLUTIDE 1.5 MG/.5ML
INJECTION, SOLUTION SUBCUTANEOUS
Qty: 2 ML | Refills: 0 | Status: SHIPPED | OUTPATIENT
Start: 2023-07-25

## 2023-07-30 DIAGNOSIS — I10 ESSENTIAL HYPERTENSION: ICD-10-CM

## 2023-07-30 NOTE — TELEPHONE ENCOUNTER
Meng Gonzalez is calling to request a refill on the following medication(s):    Last Visit Date (If Applicable):  2/2/2024    Next Visit Date:    8/16/2023    Medication Request:  Requested Prescriptions     Pending Prescriptions Disp Refills    metoprolol tartrate (LOPRESSOR) 50 MG tablet [Pharmacy Med Name: METOPROLOL TARTRATE 50MG TABLETS] 180 tablet 0     Sig: TAKE 1 TABLET BY MOUTH TWICE DAILY

## 2023-07-31 DIAGNOSIS — E11.8 TYPE 2 DIABETES MELLITUS WITH COMPLICATION, WITHOUT LONG-TERM CURRENT USE OF INSULIN (HCC): ICD-10-CM

## 2023-07-31 NOTE — TELEPHONE ENCOUNTER
Ashley Goncalves is calling to request a refill on the following medication(s):    Last Visit Date (If Applicable):  7/4/6640    Next Visit Date:    8/16/2023    Medication Request:  Requested Prescriptions     Pending Prescriptions Disp Refills    JARDIANCE 10 MG tablet [Pharmacy Med Name: JARDIANCE 10MG TABLETS] 30 tablet 0     Sig: TAKE 1 TABLET BY MOUTH DAILY

## 2023-08-01 DIAGNOSIS — F34.1 DYSTHYMIA: ICD-10-CM

## 2023-08-01 DIAGNOSIS — F41.9 ANXIETY: ICD-10-CM

## 2023-08-01 DIAGNOSIS — E11.8 TYPE 2 DIABETES MELLITUS WITH COMPLICATION, WITHOUT LONG-TERM CURRENT USE OF INSULIN (HCC): ICD-10-CM

## 2023-08-01 DIAGNOSIS — F43.0 STRESS REACTION: ICD-10-CM

## 2023-08-01 RX ORDER — METOPROLOL TARTRATE 50 MG/1
TABLET, FILM COATED ORAL
Qty: 60 TABLET | Refills: 0 | Status: SHIPPED | OUTPATIENT
Start: 2023-08-01 | End: 2023-09-01

## 2023-08-01 NOTE — TELEPHONE ENCOUNTER
Aaron Patterson is calling to request a refill on the following medication(s):    Last Visit Date (If Applicable):  6/1/1339    Next Visit Date:    8/16/2023    Medication Request:  Requested Prescriptions     Pending Prescriptions Disp Refills    JARDIANCE 25 MG tablet [Pharmacy Med Name: Truddie Post 25MG TABLETS] 30 tablet 5     Sig: TAKE 1 TABLET BY MOUTH DAILY    venlafaxine (EFFEXOR XR) 150 MG extended release capsule [Pharmacy Med Name: VENLAFAXINE ER 150MG CAPSULES] 30 capsule 1     Sig: TAKE 1 CAPSULE BY MOUTH EVERY MORNING

## 2023-08-02 RX ORDER — EMPAGLIFLOZIN 10 MG/1
TABLET, FILM COATED ORAL
Qty: 30 TABLET | Refills: 0 | OUTPATIENT
Start: 2023-08-02

## 2023-08-02 RX ORDER — EMPAGLIFLOZIN 25 MG/1
TABLET, FILM COATED ORAL
Qty: 30 TABLET | Refills: 5 | OUTPATIENT
Start: 2023-08-02

## 2023-08-02 RX ORDER — VENLAFAXINE HYDROCHLORIDE 150 MG/1
CAPSULE, EXTENDED RELEASE ORAL
Qty: 30 CAPSULE | Refills: 0 | Status: SHIPPED | OUTPATIENT
Start: 2023-08-02 | End: 2023-08-20

## 2023-08-18 DIAGNOSIS — E11.8 TYPE 2 DIABETES MELLITUS WITH COMPLICATION, WITHOUT LONG-TERM CURRENT USE OF INSULIN (HCC): ICD-10-CM

## 2023-08-18 DIAGNOSIS — G89.29 CHRONIC MIDLINE LOW BACK PAIN WITH BILATERAL SCIATICA: ICD-10-CM

## 2023-08-18 DIAGNOSIS — M54.42 CHRONIC MIDLINE LOW BACK PAIN WITH BILATERAL SCIATICA: ICD-10-CM

## 2023-08-18 DIAGNOSIS — M54.41 CHRONIC MIDLINE LOW BACK PAIN WITH BILATERAL SCIATICA: ICD-10-CM

## 2023-08-18 RX ORDER — DULAGLUTIDE 1.5 MG/.5ML
INJECTION, SOLUTION SUBCUTANEOUS
Qty: 2 ML | Refills: 0 | Status: SHIPPED | OUTPATIENT
Start: 2023-08-18

## 2023-08-20 ENCOUNTER — APPOINTMENT (OUTPATIENT)
Dept: GENERAL RADIOLOGY | Age: 53
End: 2023-08-20
Payer: MEDICAID

## 2023-08-20 ENCOUNTER — HOSPITAL ENCOUNTER (EMERGENCY)
Age: 53
Discharge: HOME | End: 2023-08-20
Attending: EMERGENCY MEDICINE
Payer: MEDICAID

## 2023-08-20 VITALS
DIASTOLIC BLOOD PRESSURE: 93 MMHG | HEART RATE: 77 BPM | WEIGHT: 180 LBS | HEIGHT: 65 IN | TEMPERATURE: 97.9 F | SYSTOLIC BLOOD PRESSURE: 178 MMHG | RESPIRATION RATE: 20 BRPM | OXYGEN SATURATION: 95 % | BODY MASS INDEX: 29.99 KG/M2

## 2023-08-20 DIAGNOSIS — R41.0 ACUTE CONFUSION: Primary | ICD-10-CM

## 2023-08-20 LAB
ALBUMIN SERPL-MCNC: 4.7 G/DL (ref 3.5–5.2)
ALBUMIN/GLOB SERPL: 1.7 {RATIO} (ref 1–2.5)
ALP SERPL-CCNC: 85 U/L (ref 35–104)
ALT SERPL-CCNC: 66 U/L (ref 5–33)
AMPHET UR QL SCN: NEGATIVE
ANION GAP SERPL CALCULATED.3IONS-SCNC: 14 MMOL/L (ref 9–17)
APAP SERPL-MCNC: <5 UG/ML (ref 10–30)
AST SERPL-CCNC: 35 U/L
BARBITURATES UR QL SCN: NEGATIVE
BASOPHILS # BLD: 0.1 K/UL (ref 0–0.2)
BASOPHILS NFR BLD: 1 % (ref 0–2)
BENZODIAZ UR QL: NEGATIVE
BILIRUB SERPL-MCNC: 0.3 MG/DL (ref 0.3–1.2)
BUN SERPL-MCNC: 11 MG/DL (ref 6–20)
CALCIUM SERPL-MCNC: 9.3 MG/DL (ref 8.6–10.4)
CANNABINOIDS UR QL SCN: NEGATIVE
CHLORIDE SERPL-SCNC: 99 MMOL/L (ref 98–107)
CO2 SERPL-SCNC: 30 MMOL/L (ref 20–31)
COCAINE UR QL SCN: NEGATIVE
CREAT SERPL-MCNC: 0.7 MG/DL (ref 0.5–0.9)
EOSINOPHIL # BLD: 0.4 K/UL (ref 0–0.4)
EOSINOPHILS RELATIVE PERCENT: 4 % (ref 1–4)
ERYTHROCYTE [DISTWIDTH] IN BLOOD BY AUTOMATED COUNT: 13.3 % (ref 12.5–15.4)
ETHANOL PERCENT: <0.01 %
ETHANOLAMINE SERPL-MCNC: <10 MG/DL
FENTANYL UR QL: NEGATIVE
GFR SERPL CREATININE-BSD FRML MDRD: >60 ML/MIN/1.73M2
GLUCOSE SERPL-MCNC: 198 MG/DL (ref 70–99)
HCT VFR BLD AUTO: 42 % (ref 36–46)
HGB BLD-MCNC: 14.1 G/DL (ref 12–16)
LYMPHOCYTES NFR BLD: 3 K/UL (ref 1–4.8)
LYMPHOCYTES RELATIVE PERCENT: 29 % (ref 24–44)
MAGNESIUM SERPL-MCNC: 2 MG/DL (ref 1.6–2.6)
MCH RBC QN AUTO: 28.5 PG (ref 26–34)
MCHC RBC AUTO-ENTMCNC: 33.5 G/DL (ref 31–37)
MCV RBC AUTO: 85 FL (ref 80–100)
METHADONE UR QL: NEGATIVE
MONOCYTES NFR BLD: 0.7 K/UL (ref 0.1–1.2)
MONOCYTES NFR BLD: 6 % (ref 2–11)
NEUTROPHILS NFR BLD: 60 % (ref 36–66)
NEUTS SEG NFR BLD: 6.2 K/UL (ref 1.8–7.7)
OPIATES UR QL SCN: NEGATIVE
OXYCODONE UR QL SCN: NEGATIVE
PCP UR QL SCN: NEGATIVE
PLATELET # BLD AUTO: 328 K/UL (ref 140–450)
PMV BLD AUTO: 8.3 FL (ref 6–12)
POTASSIUM SERPL-SCNC: 3.4 MMOL/L (ref 3.7–5.3)
PROT SERPL-MCNC: 7.5 G/DL (ref 6.4–8.3)
RBC # BLD AUTO: 4.94 M/UL (ref 4–5.2)
SALICYLATES SERPL-MCNC: <1 MG/DL (ref 3–10)
SODIUM SERPL-SCNC: 143 MMOL/L (ref 135–144)
TEST INFORMATION: NORMAL
WBC OTHER # BLD: 10.4 K/UL (ref 3.5–11)

## 2023-08-20 PROCEDURE — 85025 COMPLETE CBC W/AUTO DIFF WBC: CPT

## 2023-08-20 PROCEDURE — 83735 ASSAY OF MAGNESIUM: CPT

## 2023-08-20 PROCEDURE — 80053 COMPREHEN METABOLIC PANEL: CPT

## 2023-08-20 PROCEDURE — 80307 DRUG TEST PRSMV CHEM ANLYZR: CPT

## 2023-08-20 PROCEDURE — 99285 EMERGENCY DEPT VISIT HI MDM: CPT

## 2023-08-20 PROCEDURE — G0480 DRUG TEST DEF 1-7 CLASSES: HCPCS

## 2023-08-20 PROCEDURE — 80143 DRUG ASSAY ACETAMINOPHEN: CPT

## 2023-08-20 PROCEDURE — 80179 DRUG ASSAY SALICYLATE: CPT

## 2023-08-20 PROCEDURE — 71045 X-RAY EXAM CHEST 1 VIEW: CPT

## 2023-08-20 PROCEDURE — 36415 COLL VENOUS BLD VENIPUNCTURE: CPT

## 2023-08-20 PROCEDURE — 93005 ELECTROCARDIOGRAM TRACING: CPT

## 2023-08-20 RX ORDER — GABAPENTIN 600 MG/1
TABLET ORAL
Qty: 120 TABLET | Refills: 0 | Status: SHIPPED | OUTPATIENT
Start: 2023-08-20 | End: 2023-09-20

## 2023-08-20 ASSESSMENT — ENCOUNTER SYMPTOMS
VOMITING: 0
COUGH: 0
RHINORRHEA: 0
COLOR CHANGE: 0
ABDOMINAL PAIN: 0
SORE THROAT: 0
NAUSEA: 0
DIARRHEA: 0
SHORTNESS OF BREATH: 0

## 2023-08-20 ASSESSMENT — PAIN - FUNCTIONAL ASSESSMENT: PAIN_FUNCTIONAL_ASSESSMENT: 0-10

## 2023-08-20 ASSESSMENT — PAIN SCALES - GENERAL: PAINLEVEL_OUTOF10: 10

## 2023-08-20 NOTE — ED PROVIDER NOTES
[address] [facility]      Pt Name: Gisel Lundberg  MRN: [id number]  [address] [facility] [place] 1970  Date of evaluation: 8/20/2023  Provider: RUBEN Herrera [facility]       Chief Complaint   Patient presents with    Other     Brought by ems with c/o confusion. Pt is A&Ox4 upon arrival. Per ems, pt was pulled over by police for \"eratic driving. \" Pt then had confused speech. Upon arrival, pt reports she has a lot of stressors in her life currently and working through many things. Reports recent breakup with significant other and death of father, mother, and brother. Pt very tearful, but denies SI/HI. Denies any symptoms aside from her chronic fibromyalgia pain. Denies etoh or drug use. HISTORY OF PRESENT ILLNESS   (Location/Symptom, Timing/Onset, Context/Setting, Quality, Duration, Modifying Factors, Severity)  Note limiting factors. Gisel Lundberg is a 52 y.o. female who presents to the emergency department by ems with concern for confusion/altered mental status     HPI  Is a 47-year-old female who presents by EMS after she was found to be driving and switching lanes erratically per police. Police reported to EMS that they felt she was confused and not answering questions appropriately. On initial exam the patient is alert and oriented x3 she is moving extremities without neurological deficits. She states that this is a misunderstanding that she thought the store she was going to was on 1 side of the road so she was switching lanes when she was pulled over she became nervous and was finding it hard to come up with answers to their questions. She states that she does have some psychiatric history and has been under additional stressors in her life. On arrival she does complain of a headache she says she had a headache before getting pulled over.   She also endorses that she takes a strong medication at night for sleep called

## 2023-08-23 DIAGNOSIS — I10 ESSENTIAL HYPERTENSION: ICD-10-CM

## 2023-08-23 LAB
EKG ATRIAL RATE: 74 BPM
EKG P AXIS: 53 DEGREES
EKG P-R INTERVAL: 158 MS
EKG Q-T INTERVAL: 418 MS
EKG QRS DURATION: 78 MS
EKG QTC CALCULATION (BAZETT): 463 MS
EKG R AXIS: 22 DEGREES
EKG T AXIS: 41 DEGREES
EKG VENTRICULAR RATE: 74 BPM

## 2023-08-23 RX ORDER — HYDROCHLOROTHIAZIDE 12.5 MG/1
TABLET ORAL
Qty: 30 TABLET | Refills: 0 | Status: SHIPPED | OUTPATIENT
Start: 2023-08-23 | End: 2023-09-25

## 2023-08-23 RX ORDER — AMLODIPINE BESYLATE 10 MG/1
TABLET ORAL
Qty: 30 TABLET | Refills: 0 | Status: SHIPPED | OUTPATIENT
Start: 2023-08-23 | End: 2023-09-25

## 2023-08-29 DIAGNOSIS — I10 ESSENTIAL HYPERTENSION: ICD-10-CM

## 2023-08-29 DIAGNOSIS — F43.0 STRESS REACTION: ICD-10-CM

## 2023-08-29 DIAGNOSIS — F34.1 DYSTHYMIA: ICD-10-CM

## 2023-08-29 DIAGNOSIS — F41.9 ANXIETY: ICD-10-CM

## 2023-08-30 NOTE — TELEPHONE ENCOUNTER
Julio Cesar Pate is calling to request a refill on the following medication(s):    Last Visit Date (If Applicable):  4/1/7365    Next Visit Date:    9/22/2023    Medication Request:  Requested Prescriptions     Pending Prescriptions Disp Refills    metoprolol tartrate (LOPRESSOR) 50 MG tablet [Pharmacy Med Name: METOPROLOL TARTRATE 50MG TABLETS] 60 tablet 0     Sig: TAKE 1 TABLET BY MOUTH TWICE DAILY    venlafaxine (EFFEXOR XR) 150 MG extended release capsule [Pharmacy Med Name: VENLAFAXINE ER 150MG CAPSULES] 30 capsule 0     Sig: TAKE 1 CAPSULE BY MOUTH EVERY MORNING

## 2023-09-01 RX ORDER — METOPROLOL TARTRATE 50 MG/1
TABLET, FILM COATED ORAL
Qty: 60 TABLET | Refills: 0 | Status: SHIPPED | OUTPATIENT
Start: 2023-09-01

## 2023-09-01 RX ORDER — VENLAFAXINE HYDROCHLORIDE 150 MG/1
CAPSULE, EXTENDED RELEASE ORAL
Qty: 30 CAPSULE | Refills: 0 | Status: SHIPPED | OUTPATIENT
Start: 2023-09-01

## 2023-09-19 DIAGNOSIS — G89.29 CHRONIC MIDLINE LOW BACK PAIN WITH BILATERAL SCIATICA: ICD-10-CM

## 2023-09-19 DIAGNOSIS — M54.42 CHRONIC MIDLINE LOW BACK PAIN WITH BILATERAL SCIATICA: ICD-10-CM

## 2023-09-19 DIAGNOSIS — M54.41 CHRONIC MIDLINE LOW BACK PAIN WITH BILATERAL SCIATICA: ICD-10-CM

## 2023-09-19 DIAGNOSIS — I10 ESSENTIAL HYPERTENSION: ICD-10-CM

## 2023-09-21 DIAGNOSIS — E11.8 TYPE 2 DIABETES MELLITUS WITH COMPLICATION, WITHOUT LONG-TERM CURRENT USE OF INSULIN (HCC): ICD-10-CM

## 2023-09-22 NOTE — TELEPHONE ENCOUNTER
Patient didn't show up for her appointment today    4 missed or cancelled appointments since May 2023

## 2023-09-22 NOTE — TELEPHONE ENCOUNTER
Sherley Hernandez is calling to request a refill on the following medication(s):    Last Visit Date (If Applicable):  2/1/4308    Next Visit Date:    9/22/2023    Medication Request:  Requested Prescriptions     Pending Prescriptions Disp Refills    TRULICITY 1.5 NK/5.1JM SC injection [Pharmacy Med Name: TRULICITY 0.7XD/9.8CX SDP 0.5ML] 2 mL 0     Sig: ADMINISTER 1.5 MG UNDER THE SKIN 1 TIME A WEEK

## 2023-09-23 RX ORDER — DULAGLUTIDE 1.5 MG/.5ML
INJECTION, SOLUTION SUBCUTANEOUS
Qty: 2 ML | Refills: 0 | OUTPATIENT
Start: 2023-09-23

## 2023-09-25 ENCOUNTER — OFFICE VISIT (OUTPATIENT)
Dept: FAMILY MEDICINE CLINIC | Age: 53
End: 2023-09-25

## 2023-09-25 ENCOUNTER — HOSPITAL ENCOUNTER (OUTPATIENT)
Age: 53
Setting detail: SPECIMEN
Discharge: HOME | End: 2023-09-25

## 2023-09-25 VITALS
TEMPERATURE: 98 F | SYSTOLIC BLOOD PRESSURE: 136 MMHG | DIASTOLIC BLOOD PRESSURE: 84 MMHG | HEART RATE: 72 BPM | RESPIRATION RATE: 16 BRPM | BODY MASS INDEX: 34.72 KG/M2 | WEIGHT: 208.4 LBS | OXYGEN SATURATION: 97 % | HEIGHT: 65 IN

## 2023-09-25 DIAGNOSIS — Z12.11 SCREENING FOR COLON CANCER: ICD-10-CM

## 2023-09-25 DIAGNOSIS — M54.41 CHRONIC MIDLINE LOW BACK PAIN WITH BILATERAL SCIATICA: ICD-10-CM

## 2023-09-25 DIAGNOSIS — G89.29 CHRONIC MIDLINE LOW BACK PAIN WITH BILATERAL SCIATICA: ICD-10-CM

## 2023-09-25 DIAGNOSIS — F41.9 ANXIETY: ICD-10-CM

## 2023-09-25 DIAGNOSIS — Z28.21 PNEUMOCOCCAL VACCINATION DECLINED: ICD-10-CM

## 2023-09-25 DIAGNOSIS — M54.42 CHRONIC MIDLINE LOW BACK PAIN WITH BILATERAL SCIATICA: ICD-10-CM

## 2023-09-25 DIAGNOSIS — F34.1 DYSTHYMIA: ICD-10-CM

## 2023-09-25 DIAGNOSIS — E11.8 TYPE 2 DIABETES MELLITUS WITH COMPLICATION, WITHOUT LONG-TERM CURRENT USE OF INSULIN (HCC): ICD-10-CM

## 2023-09-25 DIAGNOSIS — E87.6 HYPOKALEMIA: ICD-10-CM

## 2023-09-25 DIAGNOSIS — E78.2 MIXED HYPERLIPIDEMIA: ICD-10-CM

## 2023-09-25 DIAGNOSIS — E11.8 TYPE 2 DIABETES MELLITUS WITH COMPLICATION, WITHOUT LONG-TERM CURRENT USE OF INSULIN (HCC): Primary | ICD-10-CM

## 2023-09-25 DIAGNOSIS — R74.8 ELEVATED LIVER ENZYMES: ICD-10-CM

## 2023-09-25 DIAGNOSIS — E55.9 VITAMIN D DEFICIENCY: ICD-10-CM

## 2023-09-25 DIAGNOSIS — I10 ESSENTIAL HYPERTENSION: ICD-10-CM

## 2023-09-25 DIAGNOSIS — E66.01 CLASS 2 SEVERE OBESITY DUE TO EXCESS CALORIES WITH SERIOUS COMORBIDITY AND BODY MASS INDEX (BMI) OF 38.0 TO 38.9 IN ADULT (HCC): ICD-10-CM

## 2023-09-25 LAB — HBA1C MFR BLD: 8.6 %

## 2023-09-25 RX ORDER — HYDROCHLOROTHIAZIDE 12.5 MG/1
TABLET ORAL
Qty: 30 TABLET | Refills: 5 | Status: SHIPPED | OUTPATIENT
Start: 2023-09-25

## 2023-09-25 RX ORDER — GABAPENTIN 600 MG/1
TABLET ORAL
Qty: 120 TABLET | Refills: 5 | Status: SHIPPED | OUTPATIENT
Start: 2023-09-25 | End: 2024-02-25

## 2023-09-25 RX ORDER — DULAGLUTIDE 3 MG/.5ML
3 INJECTION, SOLUTION SUBCUTANEOUS WEEKLY
Qty: 2 ML | Refills: 5 | Status: SHIPPED | OUTPATIENT
Start: 2023-10-23

## 2023-09-25 RX ORDER — AMLODIPINE BESYLATE 10 MG/1
TABLET ORAL
Qty: 30 TABLET | Refills: 5 | Status: SHIPPED | OUTPATIENT
Start: 2023-09-25

## 2023-09-25 RX ORDER — ALPRAZOLAM 1 MG/1
1 TABLET ORAL 2 TIMES DAILY PRN
Qty: 30 TABLET | Refills: 0 | Status: SHIPPED | OUTPATIENT
Start: 2023-09-25 | End: 2023-10-25

## 2023-09-25 ASSESSMENT — PATIENT HEALTH QUESTIONNAIRE - PHQ9
3. TROUBLE FALLING OR STAYING ASLEEP: 1
1. LITTLE INTEREST OR PLEASURE IN DOING THINGS: 2
6. FEELING BAD ABOUT YOURSELF - OR THAT YOU ARE A FAILURE OR HAVE LET YOURSELF OR YOUR FAMILY DOWN: 0
8. MOVING OR SPEAKING SO SLOWLY THAT OTHER PEOPLE COULD HAVE NOTICED. OR THE OPPOSITE, BEING SO FIGETY OR RESTLESS THAT YOU HAVE BEEN MOVING AROUND A LOT MORE THAN USUAL: 0
7. TROUBLE CONCENTRATING ON THINGS, SUCH AS READING THE NEWSPAPER OR WATCHING TELEVISION: 0
5. POOR APPETITE OR OVEREATING: 0
2. FEELING DOWN, DEPRESSED OR HOPELESS: 0
9. THOUGHTS THAT YOU WOULD BE BETTER OFF DEAD, OR OF HURTING YOURSELF: 0
SUM OF ALL RESPONSES TO PHQ QUESTIONS 1-9: 3
10. IF YOU CHECKED OFF ANY PROBLEMS, HOW DIFFICULT HAVE THESE PROBLEMS MADE IT FOR YOU TO DO YOUR WORK, TAKE CARE OF THINGS AT HOME, OR GET ALONG WITH OTHER PEOPLE: 0
SUM OF ALL RESPONSES TO PHQ QUESTIONS 1-9: 3
4. FEELING TIRED OR HAVING LITTLE ENERGY: 0
SUM OF ALL RESPONSES TO PHQ9 QUESTIONS 1 & 2: 2

## 2023-09-25 ASSESSMENT — ENCOUNTER SYMPTOMS
COUGH: 0
SHORTNESS OF BREATH: 0
RESPIRATORY NEGATIVE: 1
GASTROINTESTINAL NEGATIVE: 1

## 2023-09-25 NOTE — PATIENT INSTRUCTIONS
New Updates for My [facility]LA    Thank you for choosing [facility] to give you the best care! [facility] ([facility]) is always trying to think of new ways to help their patients. We are asking all patients to try out the new digital registration that is now available through the [employer] [facility] Down load today! . Via the chela you're now able to update your personal and registration information prior to your upcoming appointment. This will save you time once you arrive at the office to check-in, not to mention your information remains safe!! Many other perks come from signing up for an account, such as:  Requesting refills  Scheduling an appointment  Completing an E-Visit  Sending a message to the office/provider  Having access to your medication list  Paying your bill/copay prior to your appointment  Scheduling your yearly mammogram  Review your test results    If you are not familiar the [facility]LA, please ask one of us and we will be happy to answer any questions or help you set-up your account.

## 2023-09-26 LAB
CREAT UR-MCNC: 28.9 MG/DL (ref 28–217)
MICROALBUMIN UR-MCNC: 13 MG/L
MICROALBUMIN/CREAT UR-RTO: 45 MCG/MG CREAT

## 2023-09-26 NOTE — ASSESSMENT & PLAN NOTE
Uncontrolled, changes made today: We will resume Trulicity at 1.5 x 4 weeks and then increase to 3 mg thereafter. Advised patient she should not be taking Lantus without checking her blood glucose regularly. We will continue at 15 units nightly. Declines need for testing supplies. , medication adherence emphasized and lifestyle modifications recommended specifically discussed reduction in carbs and sugars

## 2023-10-02 DIAGNOSIS — F41.9 ANXIETY: ICD-10-CM

## 2023-10-02 DIAGNOSIS — F43.0 STRESS REACTION: ICD-10-CM

## 2023-10-02 DIAGNOSIS — F34.1 DYSTHYMIA: ICD-10-CM

## 2023-10-02 DIAGNOSIS — I10 ESSENTIAL HYPERTENSION: ICD-10-CM

## 2023-10-03 RX ORDER — METOPROLOL TARTRATE 50 MG/1
TABLET, FILM COATED ORAL
Qty: 60 TABLET | Refills: 5 | Status: SHIPPED | OUTPATIENT
Start: 2023-10-03

## 2023-10-03 RX ORDER — VENLAFAXINE HYDROCHLORIDE 150 MG/1
CAPSULE, EXTENDED RELEASE ORAL
Qty: 30 CAPSULE | Refills: 5 | Status: SHIPPED | OUTPATIENT
Start: 2023-10-03

## 2023-10-04 ENCOUNTER — TELEPHONE (OUTPATIENT)
Age: 53
End: 2023-10-04

## 2023-10-04 RX ORDER — POLYETHYLENE GLYCOL 3350, SODIUM CHLORIDE, SODIUM BICARBONATE, POTASSIUM CHLORIDE 420; 11.2; 5.72; 1.48 G/4L; G/4L; G/4L; G/4L
4000 POWDER, FOR SOLUTION ORAL ONCE
Qty: 4000 ML | Refills: 0 | Status: SHIPPED | OUTPATIENT
Start: 2023-10-04 | End: 2023-10-07

## 2023-10-04 NOTE — TELEPHONE ENCOUNTER
Procedure scheduled    Colonoscopy/Aziz  Dx: Screening for colon cancer  Wednesday 11/22/23, 2:00 pm/Arrive 12:30 pm  [facility]/Surgery registration 2nd floor    Golytely split bowel prep mailed to patient. Patient instructed via phone.

## 2023-10-05 RX ORDER — POLYETHYLENE GLYCOL 3350, SODIUM CHLORIDE, SODIUM BICARBONATE, POTASSIUM CHLORIDE 420; 11.2; 5.72; 1.48 G/4L; G/4L; G/4L; G/4L
4000 POWDER, FOR SOLUTION ORAL ONCE
Qty: 4000 ML | Refills: 0 | Status: SHIPPED | OUTPATIENT
Start: 2023-10-05 | End: 2023-10-05

## 2023-10-05 NOTE — TELEPHONE ENCOUNTER
Rx went to wrong pharmacy. Went to [facility]. Patient said she doesn't know where that is, even with address given. She would like it changed to [address] [place] [facility]

## 2023-10-07 RX ORDER — POLYETHYLENE GLYCOL 3350, SODIUM CHLORIDE, SODIUM BICARBONATE, POTASSIUM CHLORIDE 420; 11.2; 5.72; 1.48 G/4L; G/4L; G/4L; G/4L
4000 POWDER, FOR SOLUTION ORAL ONCE
Qty: 4000 ML | Refills: 0 | Status: SHIPPED | OUTPATIENT
Start: 2023-10-07 | End: 2023-10-07

## 2023-10-09 ENCOUNTER — TELEPHONE (OUTPATIENT)
Dept: FAMILY MEDICINE CLINIC | Age: 53
End: 2023-10-09

## 2023-10-09 NOTE — TELEPHONE ENCOUNTER
Patient called in stating you gave her a order to get her colonoscopy done and she mentioned that she picked up the lemon lime to drink the other day and she picked up another bottle today and she is wanting to know if she is supposed to drink both of them       Please advise

## 2023-10-10 NOTE — TELEPHONE ENCOUNTER
She needs to call the gastroenterologist who sent it in. I'm not sure what they sent or what the amount is that she needs to drink to say if she needs to drink both. [facility], [place], operated by [facility] - Krzysztof Lantigua MD   [address]   [facility]   [phone]    This is who she was referred to.

## 2023-10-26 DIAGNOSIS — E11.8 TYPE 2 DIABETES MELLITUS WITH COMPLICATION, WITHOUT LONG-TERM CURRENT USE OF INSULIN (HCC): ICD-10-CM

## 2023-10-26 DIAGNOSIS — M15.9 PRIMARY OSTEOARTHRITIS INVOLVING MULTIPLE JOINTS: ICD-10-CM

## 2023-10-26 RX ORDER — GLUCOSAMINE HCL/CHONDROITIN SU 500-400 MG
CAPSULE ORAL
Qty: 100 EACH | Refills: 5 | Status: SHIPPED | OUTPATIENT
Start: 2023-10-26

## 2023-10-26 RX ORDER — IBUPROFEN 800 MG/1
TABLET ORAL
Qty: 270 TABLET | Refills: 0 | Status: SHIPPED | OUTPATIENT
Start: 2023-10-26

## 2023-11-10 ENCOUNTER — TELEPHONE (OUTPATIENT)
Dept: FAMILY MEDICINE CLINIC | Age: 53
End: 2023-11-10

## 2023-11-25 DIAGNOSIS — J01.90 ACUTE NON-RECURRENT SINUSITIS, UNSPECIFIED LOCATION: ICD-10-CM

## 2023-11-27 RX ORDER — FLUTICASONE PROPIONATE 50 MCG
SPRAY, SUSPENSION (ML) NASAL
Qty: 96 G | Refills: 1 | Status: SHIPPED | OUTPATIENT
Start: 2023-11-27

## 2023-12-01 ENCOUNTER — TELEMEDICINE (OUTPATIENT)
Dept: FAMILY MEDICINE CLINIC | Age: 53
End: 2023-12-01
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
## 2023-12-01 DIAGNOSIS — M15.9 PRIMARY OSTEOARTHRITIS INVOLVING MULTIPLE JOINTS: ICD-10-CM
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
## 2023-12-01 NOTE — PROGRESS NOTES
Surgical History:   Procedure Laterality Date    CHOLECYSTECTOMY      FACIAL SURGERY N/A 10/28/2021    SHARP EXCISIONAL DEBRIDEMENT AND COMPLEX CLOSURE OF NASAL LACERATION performed by TRUNG Sanford MD at [address] [place] [address] Rd Right     KNEE ARTHROSCOPY Right 06/20/2022    RIGHT KNEE ARTHROSCOPY PARTIAL MEDIAL MENISCECTOMY     KNEE ARTHROSCOPY Right 6/20/2022    RIGHT KNEE ARTHROSCOPY PARTIAL MEDIAL MENISCECTOMY performed by Bebo Jeffries MD at [address] [facility] N/A 10/28/2021    NASAL CLOSED REDUCTION WITH SPLINTING [facility] [address] [facility]     Family History   Problem Relation Age of Onset    Heart Disease Mother     Diabetes Mother     Stroke Mother     Arthritis Mother     Asthma Mother     Depression Mother     High Blood Pressure Mother     High Cholesterol Mother     Dementia Father     Alzheimer's Disease Father      Current Outpatient Medications   Medication Sig Dispense Refill    fluticasone (FLONASE) 50 MCG/ACT nasal spray INSTILL 1 SPRAY INTO EACH NOSTRIL EVERY DAY 96 g 1    Alcohol Swabs 70 % PADS USE AS DIRECTED EVERY MORNING,AND DAILY AT NOON AND AT BEDTIME 100 each 5    ibuprofen (ADVIL;MOTRIN) 800 MG tablet TAKE 1 TABLET BY MOUTH EVERY 8 HOURS WITH FOOD AS NEEDED FOR PAIN 270 tablet 0    venlafaxine (EFFEXOR XR) 150 MG extended release capsule TAKE 1 CAPSULE BY MOUTH EVERY MORNING 30 capsule 5    metoprolol tartrate (LOPRESSOR) 50 MG tablet TAKE 1 TABLET BY MOUTH TWICE DAILY 60 tablet 5    hydroCHLOROthiazide (HYDRODIURIL) 12.5 MG tablet TAKE 1 TABLET BY MOUTH EVERY DAY 30 tablet 5    amLODIPine (NORVASC) 10 MG tablet TAKE 1 TABLET BY MOUTH EVERY DAY 30 tablet 5    gabapentin (NEURONTIN) 600 MG tablet TAKE 2 TABLETS BY MOUTH TWICE DAILY 120 tablet 5    Dulaglutide (TRULICITY) 3 GA/6.7JR SOPN Inject 3 mg into the skin once a week 2 mL 5    empagliflozin (JARDIANCE) 25 MG tablet Take 1 tablet by mouth daily 30

## 2024-01-24 DIAGNOSIS — E11.8 TYPE 2 DIABETES MELLITUS WITH COMPLICATION, WITHOUT LONG-TERM CURRENT USE OF INSULIN (HCC): ICD-10-CM

## 2024-01-24 DIAGNOSIS — M15.9 PRIMARY OSTEOARTHRITIS INVOLVING MULTIPLE JOINTS: ICD-10-CM

## 2024-01-24 RX ORDER — EMPAGLIFLOZIN 25 MG/1
25 TABLET, FILM COATED ORAL DAILY
Qty: 30 TABLET | Refills: 5 | Status: SHIPPED | OUTPATIENT
Start: 2024-01-24

## 2024-01-24 RX ORDER — IBUPROFEN 800 MG/1
TABLET ORAL
Qty: 270 TABLET | Refills: 0 | Status: SHIPPED | OUTPATIENT
Start: 2024-01-24

## 2024-01-24 RX ORDER — POLYETHYLENE GLYCOL 400 AND PROPYLENE GLYCOL 4; 3 MG/ML; MG/ML
SOLUTION/ DROPS OPHTHALMIC
Qty: 10 ML | Refills: 1 | Status: SHIPPED | OUTPATIENT
Start: 2024-01-24

## 2024-01-24 NOTE — TELEPHONE ENCOUNTER
Noemi Prajapati is calling to request a refill on the following medication(s):    Last Visit Date (If Applicable):  9/25/2023    Next Visit Date:    Visit date not found    Medication Request:  Requested Prescriptions     Pending Prescriptions Disp Refills    JARDIANCE 25 MG tablet [Pharmacy Med Name: JARDIANCE 25MG TABLETS] 30 tablet 5     Sig: TAKE 1 TABLET BY MOUTH DAILY

## 2024-01-24 NOTE — TELEPHONE ENCOUNTER
Noemi Prajapati is calling to request a refill on the following medication(s):    Last Visit Date (If Applicable):  9/25/2023    Next Visit Date:    Visit date not found    Medication Request:  Requested Prescriptions     Pending Prescriptions Disp Refills    SYSTANE ULTRA 0.4-0.3 % ophthalmic solution [Pharmacy Med Name: SYSTANE ULTRA SOLUTION] 10 mL      Sig: INSTILL 1 TO 2 DROPS IN AFFECTED EYE(S) 4 TO 5 TIMES A DAY    ibuprofen (ADVIL;MOTRIN) 800 MG tablet [Pharmacy Med Name: IBUPROFEN 800MG TABLETS] 270 tablet 0     Sig: TAKE 1 TABLET BY MOUTH EVERY 8 HOURS WITH FOOD AS NEEDED FOR PAIN

## 2024-02-14 DIAGNOSIS — J45.20 MILD INTERMITTENT ASTHMA WITHOUT COMPLICATION: ICD-10-CM

## 2024-02-14 RX ORDER — BUDESONIDE AND FORMOTEROL FUMARATE DIHYDRATE 160; 4.5 UG/1; UG/1
AEROSOL RESPIRATORY (INHALATION)
Qty: 10.2 G | Refills: 0 | Status: SHIPPED | OUTPATIENT
Start: 2024-02-14

## 2024-02-14 NOTE — TELEPHONE ENCOUNTER
Noemi Prajapati is calling to request a refill on the following medication(s):    Last Visit Date (If Applicable):  9/25/2023    Next Visit Date:    Visit date not found    Medication Request:  Requested Prescriptions     Pending Prescriptions Disp Refills    budesonide-formoterol (SYMBICORT) 160-4.5 MCG/ACT AERO [Pharmacy Med Name: BUDESONIDE/FORM 160/4.5MCG(120 INH)] 10.2 g 0     Sig: INHALE 2 PUFFS BY MOUTH TWICE DAILY

## 2024-02-20 ENCOUNTER — TELEPHONE (OUTPATIENT)
Dept: FAMILY MEDICINE CLINIC | Age: 54
End: 2024-02-20

## 2024-02-20 RX ORDER — ATORVASTATIN CALCIUM 40 MG/1
40 TABLET, FILM COATED ORAL DAILY
Qty: 30 TABLET | Refills: 3 | Status: SHIPPED | OUTPATIENT
Start: 2024-02-20

## 2024-02-20 NOTE — TELEPHONE ENCOUNTER
Pharmacy called wanting to know if we can start a statin therapy , she is diabetic and just would be useful to have some cardiovascular protection.

## 2024-02-20 NOTE — TELEPHONE ENCOUNTER
Medication was called in.  Thank you.  Patient also should be on kidney protection.  Has she been taking like lisinopril or ramipril or Benicar?  Thank you.

## 2024-03-20 DIAGNOSIS — J45.20 MILD INTERMITTENT ASTHMA WITHOUT COMPLICATION: ICD-10-CM

## 2024-03-22 DIAGNOSIS — F34.1 DYSTHYMIA: ICD-10-CM

## 2024-03-22 DIAGNOSIS — G89.29 CHRONIC MIDLINE LOW BACK PAIN WITH BILATERAL SCIATICA: ICD-10-CM

## 2024-03-22 DIAGNOSIS — F43.0 STRESS REACTION: ICD-10-CM

## 2024-03-22 DIAGNOSIS — F41.9 ANXIETY: ICD-10-CM

## 2024-03-22 DIAGNOSIS — M54.41 CHRONIC MIDLINE LOW BACK PAIN WITH BILATERAL SCIATICA: ICD-10-CM

## 2024-03-22 DIAGNOSIS — I10 ESSENTIAL HYPERTENSION: ICD-10-CM

## 2024-03-22 DIAGNOSIS — M54.42 CHRONIC MIDLINE LOW BACK PAIN WITH BILATERAL SCIATICA: ICD-10-CM

## 2024-03-22 NOTE — TELEPHONE ENCOUNTER
Lov: 12/01/2023 (Telemedicine)  Nov: Nothing Scheduled    Patient was advised to: Return in about 9 weeks (around 2/2/2024)

## 2024-03-23 DIAGNOSIS — E11.8 TYPE 2 DIABETES MELLITUS WITH COMPLICATION, WITHOUT LONG-TERM CURRENT USE OF INSULIN (HCC): ICD-10-CM

## 2024-03-23 RX ORDER — GABAPENTIN 600 MG/1
TABLET ORAL
Qty: 120 TABLET | Refills: 0 | Status: SHIPPED | OUTPATIENT
Start: 2024-03-23 | End: 2024-04-25

## 2024-03-23 RX ORDER — BUDESONIDE AND FORMOTEROL FUMARATE DIHYDRATE 160; 4.5 UG/1; UG/1
AEROSOL RESPIRATORY (INHALATION)
Qty: 10.2 G | Refills: 0 | Status: SHIPPED | OUTPATIENT
Start: 2024-03-23 | End: 2024-04-25

## 2024-03-23 RX ORDER — HYDROCHLOROTHIAZIDE 12.5 MG/1
TABLET ORAL
Qty: 30 TABLET | Refills: 0 | Status: SHIPPED | OUTPATIENT
Start: 2024-03-23 | End: 2024-04-25

## 2024-03-23 RX ORDER — AMLODIPINE BESYLATE 10 MG/1
TABLET ORAL
Qty: 30 TABLET | Refills: 0 | Status: SHIPPED | OUTPATIENT
Start: 2024-03-23 | End: 2024-04-25

## 2024-03-23 RX ORDER — METOPROLOL TARTRATE 50 MG/1
TABLET, FILM COATED ORAL
Qty: 60 TABLET | Refills: 0 | Status: SHIPPED | OUTPATIENT
Start: 2024-03-23 | End: 2024-04-25

## 2024-03-23 RX ORDER — VENLAFAXINE HYDROCHLORIDE 150 MG/1
CAPSULE, EXTENDED RELEASE ORAL
Qty: 30 CAPSULE | Refills: 0 | Status: SHIPPED | OUTPATIENT
Start: 2024-03-23 | End: 2024-04-25

## 2024-03-25 ENCOUNTER — PATIENT MESSAGE (OUTPATIENT)
Dept: FAMILY MEDICINE CLINIC | Age: 54
End: 2024-03-25

## 2024-03-25 NOTE — TELEPHONE ENCOUNTER
Lov: 12/01/2023 (telemedicine -Dr. Winters)  Nov; Nothing Scheduled     Last seen in office: 09/25/2023    Patient advised to: Return in about 9 weeks (around 2/2/2024)

## 2024-03-25 NOTE — TELEPHONE ENCOUNTER
Unable to leave voice message, sent patient a mychart message and letter for the patient to call the office and schedule an in office appointment

## 2024-03-26 RX ORDER — INSULIN GLARGINE 100 [IU]/ML
INJECTION, SOLUTION SUBCUTANEOUS
Qty: 15 ML | Refills: 0 | OUTPATIENT
Start: 2024-03-26

## 2024-03-27 NOTE — TELEPHONE ENCOUNTER
From: KITA LOGAN  To: Noemi Prajapati  Sent: 3/25/2024 12:51 PM EDT  Subject: Medication/Appointment    Good afternoon Noemi,    The office is trying to contact you in regards to your medication refill. Your mailbox is full.     Please see Nancy's message below:     \"Patient needs apt before refills will be sent. Needs to be in person\"     She was advised to schedule an in office appointment for around 02/02/2024 by Dr. Winters during her virtual visit on 12/01/2023.    Please contact the office to schedule an in office appointment please- per Nancy's request.      ARMEN Grant

## 2024-04-21 DIAGNOSIS — M54.42 CHRONIC MIDLINE LOW BACK PAIN WITH BILATERAL SCIATICA: ICD-10-CM

## 2024-04-21 DIAGNOSIS — F43.0 STRESS REACTION: ICD-10-CM

## 2024-04-21 DIAGNOSIS — G89.29 CHRONIC MIDLINE LOW BACK PAIN WITH BILATERAL SCIATICA: ICD-10-CM

## 2024-04-21 DIAGNOSIS — F34.1 DYSTHYMIA: ICD-10-CM

## 2024-04-21 DIAGNOSIS — M15.9 PRIMARY OSTEOARTHRITIS INVOLVING MULTIPLE JOINTS: ICD-10-CM

## 2024-04-21 DIAGNOSIS — F41.9 ANXIETY: ICD-10-CM

## 2024-04-21 DIAGNOSIS — I10 ESSENTIAL HYPERTENSION: ICD-10-CM

## 2024-04-21 DIAGNOSIS — M54.41 CHRONIC MIDLINE LOW BACK PAIN WITH BILATERAL SCIATICA: ICD-10-CM

## 2024-04-23 RX ORDER — IBUPROFEN 800 MG/1
TABLET ORAL
Qty: 270 TABLET | Refills: 0 | Status: SHIPPED | OUTPATIENT
Start: 2024-04-23

## 2024-04-24 DIAGNOSIS — J45.20 MILD INTERMITTENT ASTHMA WITHOUT COMPLICATION: ICD-10-CM

## 2024-04-24 NOTE — TELEPHONE ENCOUNTER
Noemi Prajapati is calling to request a refill on the following medication(s):    Last Visit Date (If Applicable):  9/25/2023    Next Visit Date:    4/25/2024    Medication Request:  Requested Prescriptions     Pending Prescriptions Disp Refills    budesonide-formoterol (SYMBICORT) 160-4.5 MCG/ACT AERO [Pharmacy Med Name: SYMBICORT 160/4.5MCG (120 ORAL INH)] 10.2 g 0     Sig: INHALE 2 PUFFS BY MOUTH TWICE DAILY

## 2024-04-25 ENCOUNTER — TELEPHONE (OUTPATIENT)
Dept: FAMILY MEDICINE CLINIC | Age: 54
End: 2024-04-25

## 2024-04-25 RX ORDER — VENLAFAXINE HYDROCHLORIDE 150 MG/1
CAPSULE, EXTENDED RELEASE ORAL
Qty: 30 CAPSULE | Refills: 0 | Status: SHIPPED | OUTPATIENT
Start: 2024-04-25

## 2024-04-25 RX ORDER — HYDROCHLOROTHIAZIDE 12.5 MG/1
TABLET ORAL
Qty: 30 TABLET | Refills: 0 | Status: SHIPPED | OUTPATIENT
Start: 2024-04-25

## 2024-04-25 RX ORDER — AMLODIPINE BESYLATE 10 MG/1
TABLET ORAL
Qty: 30 TABLET | Refills: 0 | Status: SHIPPED | OUTPATIENT
Start: 2024-04-25

## 2024-04-25 RX ORDER — GABAPENTIN 600 MG/1
TABLET ORAL
Qty: 120 TABLET | Refills: 0 | Status: SHIPPED | OUTPATIENT
Start: 2024-04-25 | End: 2024-05-21

## 2024-04-25 RX ORDER — BUDESONIDE AND FORMOTEROL FUMARATE DIHYDRATE 160; 4.5 UG/1; UG/1
AEROSOL RESPIRATORY (INHALATION)
Qty: 10.2 G | Refills: 0 | Status: SHIPPED | OUTPATIENT
Start: 2024-04-25

## 2024-04-25 RX ORDER — METOPROLOL TARTRATE 50 MG/1
TABLET, FILM COATED ORAL
Qty: 60 TABLET | Refills: 0 | Status: SHIPPED | OUTPATIENT
Start: 2024-04-25

## 2024-04-25 NOTE — TELEPHONE ENCOUNTER
Pt is asking if she can switch this appointment to vv please call ania calero her appointment is today at 8:40 am

## 2024-04-25 NOTE — TELEPHONE ENCOUNTER
She knows that she needs to be seen in person.  She may not have a virtual.  It needs to be rescheduled for an in person

## 2024-04-26 DIAGNOSIS — M54.41 CHRONIC MIDLINE LOW BACK PAIN WITH BILATERAL SCIATICA: ICD-10-CM

## 2024-04-26 DIAGNOSIS — M54.42 CHRONIC MIDLINE LOW BACK PAIN WITH BILATERAL SCIATICA: ICD-10-CM

## 2024-04-26 DIAGNOSIS — G89.29 CHRONIC MIDLINE LOW BACK PAIN WITH BILATERAL SCIATICA: ICD-10-CM

## 2024-04-26 NOTE — TELEPHONE ENCOUNTER
----- Message from April Nagle sent at 4/25/2024  3:52 PM EDT -----  Subject: Refill Request    QUESTIONS  Name of Medication? gabapentin (NEURONTIN) 600 MG tablet  Patient-reported dosage and instructions? TAKE 2 TABLETS BY MOUTH TWICE   DAILY  How many days do you have left? 0  [id number]  Pharmacy phone number (if available)? [phone]  ---------------------------------------------------------------------------  --------------  CALL BACK INFO  What is the best way for the office to contact you? OK to leave message on   voicemail  Preferred Call Back Phone Number? [phone]  ---------------------------------------------------------------------------  --------------  SCRIPT ANSWERS  Relationship to Patient? Self

## 2024-04-26 NOTE — TELEPHONE ENCOUNTER
----- Message from April Nagle sent at 4/25/2024  3:51 PM EDT -----  Subject: Refill Request    QUESTIONS  Name of Medication? gabapentin (NEURONTIN) 600 MG tablet  Patient-reported dosage and instructions? TAKE 2 TABLETS BY MOUTH TWICE   DAILY  How many days do you have left? 0  [id number]  Pharmacy phone number (if available)? [phone]  ---------------------------------------------------------------------------  --------------  CALL BACK INFO  What is the best way for the office to contact you? OK to leave message on   voicemail  Preferred Call Back Phone Number? [phone]  ---------------------------------------------------------------------------  --------------  SCRIPT ANSWERS  Relationship to Patient? Self

## 2024-04-27 RX ORDER — GABAPENTIN 600 MG/1
1200 TABLET ORAL 2 TIMES DAILY
Qty: 120 TABLET | Refills: 0 | OUTPATIENT
Start: 2024-04-27 | End: 2024-05-23

## 2024-04-30 NOTE — TELEPHONE ENCOUNTER
LOV: 09/25/2023    LVM for Marli to call the office about an in office appointment today at 11:40 am.    Per Nancy she has to be seen in office to continue getting medication refills.

## 2024-05-17 DIAGNOSIS — I10 ESSENTIAL HYPERTENSION: ICD-10-CM

## 2024-05-20 NOTE — TELEPHONE ENCOUNTER
Noemi Prajapati is calling to request a refill on the following medication(s):    Last Visit Date (If Applicable):  9/25/2023    Next Visit Date:    5/30/2024    Medication Request:  Requested Prescriptions     Pending Prescriptions Disp Refills    amLODIPine (NORVASC) 10 MG tablet [Pharmacy Med Name: AMLODIPINE BESYLATE 10MG TABLETS] 30 tablet 0     Sig: TAKE 1 TABLET BY MOUTH EVERY DAY

## 2024-05-21 DIAGNOSIS — M54.42 CHRONIC MIDLINE LOW BACK PAIN WITH BILATERAL SCIATICA: ICD-10-CM

## 2024-05-21 DIAGNOSIS — F34.1 DYSTHYMIA: ICD-10-CM

## 2024-05-21 DIAGNOSIS — M54.41 CHRONIC MIDLINE LOW BACK PAIN WITH BILATERAL SCIATICA: ICD-10-CM

## 2024-05-21 DIAGNOSIS — I10 ESSENTIAL HYPERTENSION: ICD-10-CM

## 2024-05-21 DIAGNOSIS — G89.29 CHRONIC MIDLINE LOW BACK PAIN WITH BILATERAL SCIATICA: ICD-10-CM

## 2024-05-21 DIAGNOSIS — F43.0 STRESS REACTION: ICD-10-CM

## 2024-05-21 DIAGNOSIS — F41.9 ANXIETY: ICD-10-CM

## 2024-05-21 RX ORDER — AMLODIPINE BESYLATE 10 MG/1
TABLET ORAL
Qty: 30 TABLET | Refills: 0 | Status: SHIPPED | OUTPATIENT
Start: 2024-05-21

## 2024-05-22 RX ORDER — METOPROLOL TARTRATE 50 MG/1
TABLET, FILM COATED ORAL
Qty: 60 TABLET | Refills: 0 | Status: SHIPPED | OUTPATIENT
Start: 2024-05-22

## 2024-05-22 RX ORDER — GABAPENTIN 600 MG/1
TABLET ORAL
Qty: 120 TABLET | Refills: 0 | Status: SHIPPED | OUTPATIENT
Start: 2024-05-22 | End: 2024-06-22

## 2024-05-22 RX ORDER — HYDROCHLOROTHIAZIDE 12.5 MG/1
TABLET ORAL
Qty: 30 TABLET | Refills: 0 | Status: SHIPPED | OUTPATIENT
Start: 2024-05-22

## 2024-05-22 RX ORDER — VENLAFAXINE HYDROCHLORIDE 150 MG/1
CAPSULE, EXTENDED RELEASE ORAL
Qty: 30 CAPSULE | Refills: 0 | Status: SHIPPED | OUTPATIENT
Start: 2024-05-22

## 2024-06-20 DIAGNOSIS — E11.8 TYPE 2 DIABETES MELLITUS WITH COMPLICATION, WITHOUT LONG-TERM CURRENT USE OF INSULIN (HCC): ICD-10-CM

## 2024-06-20 DIAGNOSIS — I10 ESSENTIAL HYPERTENSION: ICD-10-CM

## 2024-06-20 DIAGNOSIS — F43.0 STRESS REACTION: ICD-10-CM

## 2024-06-20 DIAGNOSIS — G89.29 CHRONIC MIDLINE LOW BACK PAIN WITH BILATERAL SCIATICA: ICD-10-CM

## 2024-06-20 DIAGNOSIS — M54.42 CHRONIC MIDLINE LOW BACK PAIN WITH BILATERAL SCIATICA: ICD-10-CM

## 2024-06-20 DIAGNOSIS — F41.9 ANXIETY: ICD-10-CM

## 2024-06-20 DIAGNOSIS — F34.1 DYSTHYMIA: ICD-10-CM

## 2024-06-20 DIAGNOSIS — M54.41 CHRONIC MIDLINE LOW BACK PAIN WITH BILATERAL SCIATICA: ICD-10-CM

## 2024-06-20 RX ORDER — AMLODIPINE BESYLATE 10 MG/1
TABLET ORAL
Qty: 30 TABLET | Refills: 0 | Status: SHIPPED | OUTPATIENT
Start: 2024-06-20 | End: 2024-07-24

## 2024-06-20 RX ORDER — HYDROCHLOROTHIAZIDE 12.5 MG/1
TABLET ORAL
Qty: 30 TABLET | Refills: 0 | Status: SHIPPED | OUTPATIENT
Start: 2024-06-20 | End: 2024-07-24

## 2024-06-20 RX ORDER — VENLAFAXINE HYDROCHLORIDE 150 MG/1
CAPSULE, EXTENDED RELEASE ORAL
Qty: 30 CAPSULE | Refills: 0 | Status: SHIPPED | OUTPATIENT
Start: 2024-06-20 | End: 2024-07-24

## 2024-06-20 RX ORDER — METOPROLOL TARTRATE 50 MG/1
TABLET, FILM COATED ORAL
Qty: 60 TABLET | Refills: 0 | Status: SHIPPED | OUTPATIENT
Start: 2024-06-20 | End: 2024-07-24

## 2024-06-20 RX ORDER — ISOPROPYL ALCOHOL 0.75 G/1
SWAB TOPICAL
Qty: 200 EACH | Refills: 0 | Status: SHIPPED | OUTPATIENT
Start: 2024-06-20 | End: 2024-08-19

## 2024-06-20 RX ORDER — GABAPENTIN 600 MG/1
TABLET ORAL
Qty: 120 TABLET | Refills: 0 | Status: SHIPPED | OUTPATIENT
Start: 2024-06-20 | End: 2024-07-24

## 2024-06-26 ENCOUNTER — TELEPHONE (OUTPATIENT)
Dept: FAMILY MEDICINE CLINIC | Age: 54
End: 2024-06-26

## 2024-06-26 NOTE — TELEPHONE ENCOUNTER
Patients bs is 598/548 she is on her way to the ed. She does take her Lantus 15 unitis nightly. Please advise.

## 2024-06-26 NOTE — TELEPHONE ENCOUNTER
Thank you for the update. ED sounds appropriate and we will see her in office in a few short days.

## 2024-06-26 NOTE — TELEPHONE ENCOUNTER
Patient called has been out of the Trulicity for a couple months now and her blood Sugar has been going up. She made an appointment as soon as possible on Monday 07/1. Please advise.

## 2024-06-27 ENCOUNTER — TELEPHONE (OUTPATIENT)
Dept: FAMILY MEDICINE CLINIC | Age: 54
End: 2024-06-27

## 2024-06-27 DIAGNOSIS — E11.8 TYPE 2 DIABETES MELLITUS WITH COMPLICATION, WITHOUT LONG-TERM CURRENT USE OF INSULIN (HCC): ICD-10-CM

## 2024-06-27 NOTE — TELEPHONE ENCOUNTER
Patient has been out of the [facility], her pharmacy is out but [facility] has the 1.2 in stock and is wondering if that can be sent in. Please advise.         [id number] - [address] [phone] - F [phone]

## 2024-07-20 DIAGNOSIS — I10 ESSENTIAL HYPERTENSION: ICD-10-CM

## 2024-07-20 DIAGNOSIS — M54.42 CHRONIC MIDLINE LOW BACK PAIN WITH BILATERAL SCIATICA: ICD-10-CM

## 2024-07-20 DIAGNOSIS — G89.29 CHRONIC MIDLINE LOW BACK PAIN WITH BILATERAL SCIATICA: ICD-10-CM

## 2024-07-20 DIAGNOSIS — M15.9 PRIMARY OSTEOARTHRITIS INVOLVING MULTIPLE JOINTS: ICD-10-CM

## 2024-07-20 DIAGNOSIS — F34.1 DYSTHYMIA: ICD-10-CM

## 2024-07-20 DIAGNOSIS — M54.41 CHRONIC MIDLINE LOW BACK PAIN WITH BILATERAL SCIATICA: ICD-10-CM

## 2024-07-20 DIAGNOSIS — F41.9 ANXIETY: ICD-10-CM

## 2024-07-20 DIAGNOSIS — F43.0 STRESS REACTION: ICD-10-CM

## 2024-07-24 RX ORDER — GABAPENTIN 600 MG/1
TABLET ORAL
Qty: 120 TABLET | Refills: 0 | Status: SHIPPED | OUTPATIENT
Start: 2024-07-24 | End: 2024-09-18 | Stop reason: SDUPTHER

## 2024-07-24 RX ORDER — HYDROCHLOROTHIAZIDE 12.5 MG/1
TABLET ORAL
Qty: 30 TABLET | Refills: 0 | Status: SHIPPED | OUTPATIENT
Start: 2024-07-24 | End: 2024-08-19

## 2024-07-24 RX ORDER — AMLODIPINE BESYLATE 10 MG/1
TABLET ORAL
Qty: 30 TABLET | Refills: 0 | Status: SHIPPED | OUTPATIENT
Start: 2024-07-24 | End: 2024-08-19

## 2024-07-24 RX ORDER — METOPROLOL TARTRATE 50 MG
TABLET ORAL
Qty: 60 TABLET | Refills: 0 | Status: SHIPPED | OUTPATIENT
Start: 2024-07-24 | End: 2024-08-19

## 2024-07-24 RX ORDER — IBUPROFEN 800 MG/1
TABLET, FILM COATED ORAL
Qty: 270 TABLET | Refills: 0 | Status: SHIPPED | OUTPATIENT
Start: 2024-07-24

## 2024-07-24 RX ORDER — VENLAFAXINE HYDROCHLORIDE 150 MG/1
CAPSULE, EXTENDED RELEASE ORAL
Qty: 30 CAPSULE | Refills: 0 | Status: SHIPPED | OUTPATIENT
Start: 2024-07-24 | End: 2024-08-19

## 2024-08-19 DIAGNOSIS — F34.1 DYSTHYMIA: ICD-10-CM

## 2024-08-19 DIAGNOSIS — I10 ESSENTIAL HYPERTENSION: ICD-10-CM

## 2024-08-19 DIAGNOSIS — M54.42 CHRONIC MIDLINE LOW BACK PAIN WITH BILATERAL SCIATICA: ICD-10-CM

## 2024-08-19 DIAGNOSIS — F41.9 ANXIETY: ICD-10-CM

## 2024-08-19 DIAGNOSIS — G89.29 CHRONIC MIDLINE LOW BACK PAIN WITH BILATERAL SCIATICA: ICD-10-CM

## 2024-08-19 DIAGNOSIS — E11.8 TYPE 2 DIABETES MELLITUS WITH COMPLICATION, WITHOUT LONG-TERM CURRENT USE OF INSULIN (HCC): ICD-10-CM

## 2024-08-19 DIAGNOSIS — F43.0 STRESS REACTION: ICD-10-CM

## 2024-08-19 DIAGNOSIS — M54.41 CHRONIC MIDLINE LOW BACK PAIN WITH BILATERAL SCIATICA: ICD-10-CM

## 2024-08-19 RX ORDER — ISOPROPYL ALCOHOL 0.75 G/1
SWAB TOPICAL
Qty: 100 EACH | Refills: 0 | Status: SHIPPED | OUTPATIENT
Start: 2024-08-19

## 2024-08-19 RX ORDER — PEN NEEDLE, DIABETIC 32GX 5/32"
NEEDLE, DISPOSABLE MISCELLANEOUS
Qty: 100 EACH | Refills: 5 | Status: SHIPPED | OUTPATIENT
Start: 2024-08-19

## 2024-08-19 RX ORDER — GABAPENTIN 600 MG/1
TABLET ORAL
Qty: 120 TABLET | Refills: 0 | OUTPATIENT
Start: 2024-08-19

## 2024-08-19 RX ORDER — HYDROCHLOROTHIAZIDE 12.5 MG/1
TABLET ORAL
Qty: 30 TABLET | Refills: 0 | Status: SHIPPED | OUTPATIENT
Start: 2024-08-19

## 2024-08-19 RX ORDER — VENLAFAXINE HYDROCHLORIDE 150 MG/1
CAPSULE, EXTENDED RELEASE ORAL
Qty: 30 CAPSULE | Refills: 0 | Status: SHIPPED | OUTPATIENT
Start: 2024-08-19

## 2024-08-19 RX ORDER — METOPROLOL TARTRATE 50 MG/1
TABLET, FILM COATED ORAL
Qty: 60 TABLET | Refills: 0 | Status: SHIPPED | OUTPATIENT
Start: 2024-08-19

## 2024-08-19 RX ORDER — AMLODIPINE BESYLATE 10 MG/1
TABLET ORAL
Qty: 30 TABLET | Refills: 0 | Status: SHIPPED | OUTPATIENT
Start: 2024-08-19

## 2024-08-19 NOTE — TELEPHONE ENCOUNTER
Noemi Prajapati is calling to request a refill on the following medication(s):    Last Visit Date (If Applicable):  9/25/2023    Next Visit Date:    9/18/2024    Medication Request:  Requested Prescriptions     Pending Prescriptions Disp Refills    BD PEN NEEDLE CODY 2ND GEN 32G X 4 MM MISC [Pharmacy Med Name: B-D CODY 2ND GEN PEN NDL [id number]] 100 each 5     Sig: USE AS DIRECTED EVERY DAY    Alcohol Swabs (B-D SINGLE USE SWABS REGULAR) PADS [Pharmacy Med Name: B-D ALCOHOL SWABS]       Sig: USE AS DIRECTED THREE TIMES DAILY    venlafaxine (EFFEXOR XR) 150 MG extended release capsule [Pharmacy Med Name: VENLAFAXINE ER 150MG CAPSULES] 30 capsule 0     Sig: TAKE 1 CAPSULE BY MOUTH EVERY MORNING    amLODIPine (NORVASC) 10 MG tablet [Pharmacy Med Name: AMLODIPINE BESYLATE 10MG TABLETS] 30 tablet 0     Sig: TAKE 1 TABLET BY MOUTH EVERY DAY    metoprolol tartrate (LOPRESSOR) 50 MG tablet [Pharmacy Med Name: METOPROLOL TARTRATE 50MG TABLETS] 60 tablet 0     Sig: TAKE 1 TABLET BY MOUTH TWICE DAILY    hydroCHLOROthiazide 12.5 MG tablet [Pharmacy Med Name: HYDROCHLOROTHIAZIDE 12.5MG TABLETS] 30 tablet 0     Sig: TAKE 1 TABLET BY MOUTH EVERY DAY    gabapentin (NEURONTIN) 600 MG tablet [Pharmacy Med Name: GABAPENTIN 600MG TABLETS] 120 tablet 0     Sig: TAKE 2 TABLETS BY MOUTH TWICE DAILY

## 2024-08-27 DIAGNOSIS — E11.8 TYPE 2 DIABETES MELLITUS WITH COMPLICATION, WITHOUT LONG-TERM CURRENT USE OF INSULIN (HCC): ICD-10-CM

## 2024-08-28 RX ORDER — DULAGLUTIDE 1.5 MG/.5ML
INJECTION, SOLUTION SUBCUTANEOUS
Qty: 2 ML | Refills: 0 | Status: SHIPPED | OUTPATIENT
Start: 2024-08-28

## 2024-08-28 NOTE — TELEPHONE ENCOUNTER
Noemi Prajapati is calling to request a refill on the following medication(s):    Last Visit Date (If Applicable):  9/25/2023    Next Visit Date:    9/18/2024    Medication Request:  Requested Prescriptions     Pending Prescriptions Disp Refills    TRULICITY 1.5 MG/0.5ML SC injection [Pharmacy Med Name: TRULICITY 1.5 MG/0.5 ML PEN] 2 mL 1     Sig: INJECT 1.5 MG UNDER THE SKIN ONCE WEEKLY

## 2024-09-13 DIAGNOSIS — F41.9 ANXIETY: ICD-10-CM

## 2024-09-13 DIAGNOSIS — I10 ESSENTIAL HYPERTENSION: ICD-10-CM

## 2024-09-13 DIAGNOSIS — F34.1 DYSTHYMIA: ICD-10-CM

## 2024-09-13 DIAGNOSIS — F43.0 STRESS REACTION: ICD-10-CM

## 2024-09-13 RX ORDER — HYDROCHLOROTHIAZIDE 12.5 MG/1
TABLET ORAL
Qty: 30 TABLET | Refills: 0 | OUTPATIENT
Start: 2024-09-13

## 2024-09-13 RX ORDER — AMLODIPINE BESYLATE 10 MG/1
TABLET ORAL
Qty: 30 TABLET | Refills: 0 | OUTPATIENT
Start: 2024-09-13

## 2024-09-13 RX ORDER — METOPROLOL TARTRATE 50 MG
TABLET ORAL
Qty: 60 TABLET | Refills: 0 | OUTPATIENT
Start: 2024-09-13

## 2024-09-13 RX ORDER — VENLAFAXINE HYDROCHLORIDE 150 MG/1
CAPSULE, EXTENDED RELEASE ORAL
Qty: 30 CAPSULE | Refills: 0 | OUTPATIENT
Start: 2024-09-13

## 2024-09-18 ENCOUNTER — OFFICE VISIT (OUTPATIENT)
Dept: FAMILY MEDICINE CLINIC | Age: 54
End: 2024-09-18

## 2024-09-18 VITALS
HEART RATE: 68 BPM | SYSTOLIC BLOOD PRESSURE: 134 MMHG | WEIGHT: 214.8 LBS | RESPIRATION RATE: 16 BRPM | OXYGEN SATURATION: 97 % | DIASTOLIC BLOOD PRESSURE: 84 MMHG | HEIGHT: 65 IN | TEMPERATURE: 97.8 F | BODY MASS INDEX: 35.79 KG/M2

## 2024-09-18 DIAGNOSIS — F43.0 STRESS REACTION: ICD-10-CM

## 2024-09-18 DIAGNOSIS — J01.90 ACUTE NON-RECURRENT SINUSITIS, UNSPECIFIED LOCATION: ICD-10-CM

## 2024-09-18 DIAGNOSIS — G89.29 CHRONIC MIDLINE LOW BACK PAIN WITH BILATERAL SCIATICA: ICD-10-CM

## 2024-09-18 DIAGNOSIS — M54.42 CHRONIC MIDLINE LOW BACK PAIN WITH BILATERAL SCIATICA: ICD-10-CM

## 2024-09-18 DIAGNOSIS — J45.20 MILD INTERMITTENT ASTHMA WITHOUT COMPLICATION: ICD-10-CM

## 2024-09-18 DIAGNOSIS — Z12.11 ENCOUNTER FOR SCREENING COLONOSCOPY: ICD-10-CM

## 2024-09-18 DIAGNOSIS — E11.8 TYPE 2 DIABETES MELLITUS WITH COMPLICATION, WITHOUT LONG-TERM CURRENT USE OF INSULIN (HCC): ICD-10-CM

## 2024-09-18 DIAGNOSIS — F41.9 ANXIETY: ICD-10-CM

## 2024-09-18 DIAGNOSIS — Z00.00 ENCOUNTER FOR WELL ADULT EXAM WITHOUT ABNORMAL FINDINGS: Primary | ICD-10-CM

## 2024-09-18 DIAGNOSIS — M54.41 CHRONIC MIDLINE LOW BACK PAIN WITH BILATERAL SCIATICA: ICD-10-CM

## 2024-09-18 DIAGNOSIS — I10 ESSENTIAL HYPERTENSION: ICD-10-CM

## 2024-09-18 DIAGNOSIS — F34.1 DYSTHYMIA: ICD-10-CM

## 2024-09-18 LAB — HBA1C MFR BLD: 10.9 %

## 2024-09-18 RX ORDER — LORATADINE 10 MG/1
10 TABLET ORAL DAILY
Qty: 90 TABLET | Refills: 0 | Status: SHIPPED | OUTPATIENT
Start: 2024-09-18

## 2024-09-18 RX ORDER — BLOOD SUGAR DIAGNOSTIC
STRIP MISCELLANEOUS
Qty: 300 STRIP | Refills: 0 | Status: SHIPPED | OUTPATIENT
Start: 2024-09-18

## 2024-09-18 RX ORDER — FLUTICASONE PROPIONATE 50 MCG
1 SPRAY, SUSPENSION (ML) NASAL DAILY
Qty: 3 EACH | Refills: 0 | Status: SHIPPED | OUTPATIENT
Start: 2024-09-18

## 2024-09-18 RX ORDER — ROPINIROLE 1 MG/1
1 TABLET, FILM COATED ORAL NIGHTLY
Qty: 90 TABLET | Refills: 0 | Status: SHIPPED | OUTPATIENT
Start: 2024-09-18

## 2024-09-18 RX ORDER — HYDROCHLOROTHIAZIDE 12.5 MG/1
TABLET ORAL
Qty: 30 TABLET | Refills: 0 | Status: SHIPPED | OUTPATIENT
Start: 2024-09-18

## 2024-09-18 RX ORDER — METOPROLOL TARTRATE 50 MG
TABLET ORAL
Qty: 180 TABLET | Refills: 0 | Status: SHIPPED | OUTPATIENT
Start: 2024-09-18

## 2024-09-18 RX ORDER — GABAPENTIN 600 MG/1
TABLET ORAL
Qty: 120 TABLET | Refills: 0 | Status: SHIPPED | OUTPATIENT
Start: 2024-09-18 | End: 2024-10-19

## 2024-09-18 RX ORDER — BUDESONIDE AND FORMOTEROL FUMARATE DIHYDRATE 160; 4.5 UG/1; UG/1
AEROSOL RESPIRATORY (INHALATION)
Qty: 3 EACH | Refills: 0 | Status: SHIPPED | OUTPATIENT
Start: 2024-09-18

## 2024-09-18 RX ORDER — VENLAFAXINE HYDROCHLORIDE 150 MG/1
CAPSULE, EXTENDED RELEASE ORAL
Qty: 90 CAPSULE | Refills: 0 | Status: SHIPPED | OUTPATIENT
Start: 2024-09-18

## 2024-09-18 RX ORDER — DULAGLUTIDE 1.5 MG/.5ML
1.5 INJECTION, SOLUTION SUBCUTANEOUS WEEKLY
Qty: 6 ML | Refills: 0 | Status: CANCELLED | OUTPATIENT
Start: 2024-09-18

## 2024-09-18 RX ORDER — DULAGLUTIDE 3 MG/.5ML
3 INJECTION, SOLUTION SUBCUTANEOUS WEEKLY
Qty: 2 ML | Refills: 3 | Status: SHIPPED | OUTPATIENT
Start: 2024-09-18 | End: 2024-09-18

## 2024-09-18 RX ORDER — AMLODIPINE BESYLATE 10 MG/1
TABLET ORAL
Qty: 90 TABLET | Refills: 0 | Status: SHIPPED | OUTPATIENT
Start: 2024-09-18

## 2024-09-18 RX ORDER — PEN NEEDLE, DIABETIC 32GX 5/32"
1 NEEDLE, DISPOSABLE MISCELLANEOUS DAILY
Qty: 100 EACH | Refills: 0 | Status: SHIPPED | OUTPATIENT
Start: 2024-09-18

## 2024-09-18 RX ORDER — DULAGLUTIDE 3 MG/.5ML
3 INJECTION, SOLUTION SUBCUTANEOUS WEEKLY
Qty: 2 ML | Refills: 3 | Status: SHIPPED | OUTPATIENT
Start: 2024-09-18

## 2024-09-18 RX ORDER — INSULIN GLARGINE 100 [IU]/ML
9 INJECTION, SOLUTION SUBCUTANEOUS 2 TIMES DAILY
Qty: 12 ML | Refills: 2 | Status: SHIPPED | OUTPATIENT
Start: 2024-09-18 | End: 2024-10-18

## 2024-09-18 RX ORDER — ISOPROPYL ALCOHOL 0.75 G/1
SWAB TOPICAL
Qty: 100 EACH | Refills: 1 | Status: SHIPPED | OUTPATIENT
Start: 2024-09-18

## 2024-09-18 ASSESSMENT — PATIENT HEALTH QUESTIONNAIRE - PHQ9
SUM OF ALL RESPONSES TO PHQ QUESTIONS 1-9: 5
SUM OF ALL RESPONSES TO PHQ QUESTIONS 1-9: 5
2. FEELING DOWN, DEPRESSED OR HOPELESS: NOT AT ALL
5. POOR APPETITE OR OVEREATING: NOT AT ALL
4. FEELING TIRED OR HAVING LITTLE ENERGY: MORE THAN HALF THE DAYS
SUM OF ALL RESPONSES TO PHQ QUESTIONS 1-9: 5
8. MOVING OR SPEAKING SO SLOWLY THAT OTHER PEOPLE COULD HAVE NOTICED. OR THE OPPOSITE, BEING SO FIGETY OR RESTLESS THAT YOU HAVE BEEN MOVING AROUND A LOT MORE THAN USUAL: NOT AT ALL
7. TROUBLE CONCENTRATING ON THINGS, SUCH AS READING THE NEWSPAPER OR WATCHING TELEVISION: NOT AT ALL
3. TROUBLE FALLING OR STAYING ASLEEP: NEARLY EVERY DAY
SUM OF ALL RESPONSES TO PHQ QUESTIONS 1-9: 5
9. THOUGHTS THAT YOU WOULD BE BETTER OFF DEAD, OR OF HURTING YOURSELF: NOT AT ALL
10. IF YOU CHECKED OFF ANY PROBLEMS, HOW DIFFICULT HAVE THESE PROBLEMS MADE IT FOR YOU TO DO YOUR WORK, TAKE CARE OF THINGS AT HOME, OR GET ALONG WITH OTHER PEOPLE: SOMEWHAT DIFFICULT
1. LITTLE INTEREST OR PLEASURE IN DOING THINGS: NOT AT ALL
6. FEELING BAD ABOUT YOURSELF - OR THAT YOU ARE A FAILURE OR HAVE LET YOURSELF OR YOUR FAMILY DOWN: NOT AT ALL
SUM OF ALL RESPONSES TO PHQ9 QUESTIONS 1 & 2: 0

## 2024-09-18 ASSESSMENT — ANXIETY QUESTIONNAIRES
4. TROUBLE RELAXING: NOT AT ALL
1. FEELING NERVOUS, ANXIOUS, OR ON EDGE: NOT AT ALL
6. BECOMING EASILY ANNOYED OR IRRITABLE: NOT AT ALL
GAD7 TOTAL SCORE: 0
IF YOU CHECKED OFF ANY PROBLEMS ON THIS QUESTIONNAIRE, HOW DIFFICULT HAVE THESE PROBLEMS MADE IT FOR YOU TO DO YOUR WORK, TAKE CARE OF THINGS AT HOME, OR GET ALONG WITH OTHER PEOPLE: NOT DIFFICULT AT ALL
7. FEELING AFRAID AS IF SOMETHING AWFUL MIGHT HAPPEN: NOT AT ALL
3. WORRYING TOO MUCH ABOUT DIFFERENT THINGS: NOT AT ALL
5. BEING SO RESTLESS THAT IT IS HARD TO SIT STILL: NOT AT ALL
2. NOT BEING ABLE TO STOP OR CONTROL WORRYING: NOT AT ALL

## 2024-10-01 ENCOUNTER — TELEPHONE (OUTPATIENT)
Dept: FAMILY MEDICINE CLINIC | Age: 54
End: 2024-10-01

## 2024-10-01 NOTE — TELEPHONE ENCOUNTER
Patient called in stating she is had to put her 9 year old pit bull down and she is having a very hard time right now and she is wanting to know if you can prescribe her something she has not been sleepy and she stated she is needing a appt but you dont have anything available     Please advise

## 2024-10-01 NOTE — TELEPHONE ENCOUNTER
Please confirm that she is not taking Xyrem. If she is not then I can send a small refill on xanax

## 2024-10-13 DIAGNOSIS — G89.29 CHRONIC MIDLINE LOW BACK PAIN WITH BILATERAL SCIATICA: ICD-10-CM

## 2024-10-13 DIAGNOSIS — M15.0 PRIMARY OSTEOARTHRITIS INVOLVING MULTIPLE JOINTS: ICD-10-CM

## 2024-10-13 DIAGNOSIS — M54.41 CHRONIC MIDLINE LOW BACK PAIN WITH BILATERAL SCIATICA: ICD-10-CM

## 2024-10-13 DIAGNOSIS — M54.42 CHRONIC MIDLINE LOW BACK PAIN WITH BILATERAL SCIATICA: ICD-10-CM

## 2024-10-13 DIAGNOSIS — I10 ESSENTIAL HYPERTENSION: ICD-10-CM

## 2024-10-14 RX ORDER — GABAPENTIN 600 MG/1
TABLET ORAL
Qty: 120 TABLET | Refills: 1 | Status: SHIPPED | OUTPATIENT
Start: 2024-10-14 | End: 2024-11-12

## 2024-10-14 RX ORDER — HYDROCHLOROTHIAZIDE 12.5 MG/1
TABLET ORAL
Qty: 30 TABLET | Refills: 1 | Status: SHIPPED | OUTPATIENT
Start: 2024-10-14

## 2024-10-14 RX ORDER — IBUPROFEN 800 MG/1
TABLET, FILM COATED ORAL
Qty: 270 TABLET | Refills: 1 | Status: SHIPPED | OUTPATIENT
Start: 2024-10-14

## 2024-10-24 ENCOUNTER — ENROLLMENT (OUTPATIENT)
Dept: FAMILY MEDICINE CLINIC | Age: 54
End: 2024-10-24

## 2024-10-24 ENCOUNTER — TELEPHONE (OUTPATIENT)
Dept: FAMILY MEDICINE CLINIC | Age: 54
End: 2024-10-24

## 2024-10-24 NOTE — TELEPHONE ENCOUNTER
Medication Management Service    Date: 10/24/2024  Patient's Name: Noemi Prajapati YOB: 1970            _____________________________________________________________________________________________    Noemi Prajapati is a 54 y.o. female referred to ambulatory pharmacy by Nancy Urbie APRN - CNP for diabetes management. Patient's A1c has been consistently >8%. Patient is currently prescribed Trulicity, Jardiance, Lantus. In addition, patient is current prescribed n/a. Per chart review, patient has previously been on glimepiride, Victoza, metformin prior to current therapy.    Lab Results   Component Value Date    LABA1C 10.9 09/18/2024    LABA1C 8.6 09/25/2023    LABA1C 8.4 02/06/2023       Unable to leave message to discuss referral. Will continue to outreach as appropriate.    Henry Steward, NatashaD, [facility]  Ambulatory Clinical Pharmacist   [facility] Specialty Medication Service  Phone: [phone]  [facility] Medicine  Phone: [phone] option 1    For Pharmacy Admin Tracking Only    Program: Medical Group  CPA in place:  No  Recommendation Provided To: Patient/Caregiver: 1 via Telephone  Intervention Detail: Scheduled Appointment  Intervention Accepted By: Patient/Caregiver: 0  Gap Closed?: No   Time Spent (min): 15

## 2024-11-12 DIAGNOSIS — I10 ESSENTIAL HYPERTENSION: ICD-10-CM

## 2024-11-13 NOTE — TELEPHONE ENCOUNTER
Noemi Prajapati is calling to request a refill on the following medication(s):    Last Visit Date (If Applicable):  9/18/2024    Next Visit Date:    12/18/2024    Medication Request:  Requested Prescriptions     Pending Prescriptions Disp Refills    rOPINIRole (REQUIP) 1 MG tablet [Pharmacy Med Name: ROPINIROLE 1MG TABLETS] 90 tablet 0     Sig: TAKE 1 TABLET BY MOUTH EVERY NIGHT    metoprolol tartrate (LOPRESSOR) 50 MG tablet [Pharmacy Med Name: METOPROLOL TARTRATE 50MG TABLETS] 180 tablet 0     Sig: TAKE 1 TABLET BY MOUTH TWICE DAILY

## 2024-11-15 RX ORDER — ROPINIROLE 1 MG/1
1 TABLET, FILM COATED ORAL NIGHTLY
Qty: 90 TABLET | Refills: 0 | Status: SHIPPED | OUTPATIENT
Start: 2024-11-15

## 2024-11-15 RX ORDER — METOPROLOL TARTRATE 50 MG
TABLET ORAL
Qty: 180 TABLET | Refills: 0 | Status: SHIPPED | OUTPATIENT
Start: 2024-11-15

## 2024-11-21 ENCOUNTER — TELEPHONE (OUTPATIENT)
Dept: FAMILY MEDICINE CLINIC | Age: 54
End: 2024-11-21

## 2024-11-21 NOTE — TELEPHONE ENCOUNTER
Medication Management Service    Date: 11/21/2024  Patient's Name: Noemi Prajapati YOB: 1970            _____________________________________________________________________________________________    Reached patient to schedule appointment for DM management. Patient is wanting to wait to see if her efforts of diet and exercise has been able to control DM. Will follow up after appointment with Nancy Uribe APRN - CNP on 12/18/2024..     Henry Steward, NatashaD, [facility]  Ambulatory Clinical Pharmacist   [facility] Specialty Medication Service  Phone: [phone]  [facility] Medicine  Phone: [phone] option 1    For Pharmacy Admin Tracking Only    Program: Medical Group  CPA in place:  No  Time Spent (min): 20

## 2024-12-11 DIAGNOSIS — M54.42 CHRONIC MIDLINE LOW BACK PAIN WITH BILATERAL SCIATICA: ICD-10-CM

## 2024-12-11 DIAGNOSIS — I10 ESSENTIAL HYPERTENSION: ICD-10-CM

## 2024-12-11 DIAGNOSIS — G89.29 CHRONIC MIDLINE LOW BACK PAIN WITH BILATERAL SCIATICA: ICD-10-CM

## 2024-12-11 DIAGNOSIS — M54.41 CHRONIC MIDLINE LOW BACK PAIN WITH BILATERAL SCIATICA: ICD-10-CM

## 2024-12-11 RX ORDER — GABAPENTIN 600 MG/1
TABLET ORAL
Qty: 120 TABLET | Refills: 1 | Status: SHIPPED | OUTPATIENT
Start: 2024-12-11 | End: 2025-02-11

## 2024-12-11 RX ORDER — HYDROCHLOROTHIAZIDE 12.5 MG/1
TABLET ORAL
Qty: 30 TABLET | Refills: 1 | Status: SHIPPED | OUTPATIENT
Start: 2024-12-11

## 2024-12-12 DIAGNOSIS — F41.9 ANXIETY: ICD-10-CM

## 2024-12-12 DIAGNOSIS — F43.0 STRESS REACTION: ICD-10-CM

## 2024-12-12 DIAGNOSIS — F34.1 DYSTHYMIA: ICD-10-CM

## 2024-12-12 DIAGNOSIS — I10 ESSENTIAL HYPERTENSION: ICD-10-CM

## 2024-12-14 RX ORDER — VENLAFAXINE HYDROCHLORIDE 150 MG/1
CAPSULE, EXTENDED RELEASE ORAL
Qty: 90 CAPSULE | Refills: 0 | Status: SHIPPED | OUTPATIENT
Start: 2024-12-14

## 2024-12-14 RX ORDER — AMLODIPINE BESYLATE 10 MG/1
TABLET ORAL
Qty: 30 TABLET | Refills: 0 | Status: SHIPPED | OUTPATIENT
Start: 2024-12-14 | End: 2025-01-12

## 2024-12-19 ENCOUNTER — TELEPHONE (OUTPATIENT)
Dept: FAMILY MEDICINE CLINIC | Age: 54
End: 2024-12-19

## 2024-12-19 NOTE — TELEPHONE ENCOUNTER
Medication Management Service    Date: 12/19/2024  Patient's Name: Noemi Prajapati YOB: 1970            _____________________________________________________________________________________________    Reached patient to schedule appointment for DM management. Patient scheduled 12/31/2024 .    Henry Stweard PharmD, [facility]  Ambulatory Clinical Pharmacist   [facility] Specialty Medication Service  Phone: [phone]  [facility]  Phone: [phone] option 1    For Pharmacy Admin Tracking Only    Program: Medical Group  CPA in place:  No  Recommendation Provided To: Patient/Caregiver: 1 via Telephone  Intervention Detail: Scheduled Appointment  Intervention Accepted By: Patient/Caregiver: 1  Gap Closed?: No   Time Spent (min): 20

## 2025-01-10 DIAGNOSIS — I10 ESSENTIAL HYPERTENSION: ICD-10-CM

## 2025-01-12 RX ORDER — AMLODIPINE BESYLATE 10 MG/1
TABLET ORAL
Qty: 30 TABLET | Refills: 1 | Status: SHIPPED | OUTPATIENT
Start: 2025-01-12

## 2025-01-23 DIAGNOSIS — E11.8 TYPE 2 DIABETES MELLITUS WITH COMPLICATION, WITHOUT LONG-TERM CURRENT USE OF INSULIN (HCC): ICD-10-CM

## 2025-01-23 DIAGNOSIS — J01.90 ACUTE NON-RECURRENT SINUSITIS, UNSPECIFIED LOCATION: ICD-10-CM

## 2025-01-26 RX ORDER — EMPAGLIFLOZIN 25 MG/1
25 TABLET, FILM COATED ORAL DAILY
Qty: 30 TABLET | Refills: 0 | Status: SHIPPED | OUTPATIENT
Start: 2025-01-26

## 2025-01-26 RX ORDER — LORATADINE 10 MG/1
10 TABLET ORAL DAILY
Qty: 30 TABLET | Refills: 0 | Status: SHIPPED | OUTPATIENT
Start: 2025-01-26

## 2025-02-04 DIAGNOSIS — G89.29 CHRONIC MIDLINE LOW BACK PAIN WITH BILATERAL SCIATICA: ICD-10-CM

## 2025-02-04 DIAGNOSIS — I10 ESSENTIAL HYPERTENSION: ICD-10-CM

## 2025-02-04 DIAGNOSIS — M54.42 CHRONIC MIDLINE LOW BACK PAIN WITH BILATERAL SCIATICA: ICD-10-CM

## 2025-02-04 DIAGNOSIS — M54.41 CHRONIC MIDLINE LOW BACK PAIN WITH BILATERAL SCIATICA: ICD-10-CM

## 2025-02-06 RX ORDER — HYDROCHLOROTHIAZIDE 12.5 MG/1
TABLET ORAL
Qty: 30 TABLET | Refills: 0 | Status: SHIPPED | OUTPATIENT
Start: 2025-02-06

## 2025-02-06 RX ORDER — GABAPENTIN 600 MG/1
TABLET ORAL
Qty: 120 TABLET | Refills: 0 | Status: SHIPPED | OUTPATIENT
Start: 2025-02-06 | End: 2025-03-06

## 2025-02-16 DIAGNOSIS — J01.90 ACUTE NON-RECURRENT SINUSITIS, UNSPECIFIED LOCATION: ICD-10-CM

## 2025-02-17 NOTE — TELEPHONE ENCOUNTER
Noemi Prajapati is calling to request a refill on the following medication(s):    Last Visit Date (If Applicable):  9/18/2024    Next Visit Date:    2/27/2025    Medication Request:  Requested Prescriptions     Pending Prescriptions Disp Refills    loratadine (CLARITIN) 10 MG tablet [Pharmacy Med Name: ALLERGY RELF (LORATADINE) 10MG TABS] 30 tablet 0     Sig: TAKE 1 TABLET BY MOUTH DAILY

## 2025-02-18 RX ORDER — LORATADINE 10 MG/1
10 TABLET ORAL DAILY
Qty: 30 TABLET | Refills: 0 | Status: SHIPPED | OUTPATIENT
Start: 2025-02-18

## 2025-02-23 DIAGNOSIS — E11.8 TYPE 2 DIABETES MELLITUS WITH COMPLICATION, WITHOUT LONG-TERM CURRENT USE OF INSULIN (HCC): ICD-10-CM

## 2025-02-23 RX ORDER — EMPAGLIFLOZIN 25 MG/1
25 TABLET, FILM COATED ORAL DAILY
Qty: 30 TABLET | Refills: 0 | Status: SHIPPED | OUTPATIENT
Start: 2025-02-23

## 2025-02-24 ASSESSMENT — PATIENT HEALTH QUESTIONNAIRE - PHQ9
2. FEELING DOWN, DEPRESSED OR HOPELESS: NOT AT ALL
3. TROUBLE FALLING OR STAYING ASLEEP: NOT AT ALL
6. FEELING BAD ABOUT YOURSELF - OR THAT YOU ARE A FAILURE OR HAVE LET YOURSELF OR YOUR FAMILY DOWN: NOT AT ALL
SUM OF ALL RESPONSES TO PHQ QUESTIONS 1-9: 0
1. LITTLE INTEREST OR PLEASURE IN DOING THINGS: NOT AT ALL
6. FEELING BAD ABOUT YOURSELF - OR THAT YOU ARE A FAILURE OR HAVE LET YOURSELF OR YOUR FAMILY DOWN: NOT AT ALL
10. IF YOU CHECKED OFF ANY PROBLEMS, HOW DIFFICULT HAVE THESE PROBLEMS MADE IT FOR YOU TO DO YOUR WORK, TAKE CARE OF THINGS AT HOME, OR GET ALONG WITH OTHER PEOPLE: NOT DIFFICULT AT ALL
9. THOUGHTS THAT YOU WOULD BE BETTER OFF DEAD, OR OF HURTING YOURSELF: NOT AT ALL
SUM OF ALL RESPONSES TO PHQ9 QUESTIONS 1 & 2: 0
9. THOUGHTS THAT YOU WOULD BE BETTER OFF DEAD, OR OF HURTING YOURSELF: NOT AT ALL
5. POOR APPETITE OR OVEREATING: NOT AT ALL
3. TROUBLE FALLING OR STAYING ASLEEP: NOT AT ALL
8. MOVING OR SPEAKING SO SLOWLY THAT OTHER PEOPLE COULD HAVE NOTICED. OR THE OPPOSITE, BEING SO FIGETY OR RESTLESS THAT YOU HAVE BEEN MOVING AROUND A LOT MORE THAN USUAL: NOT AT ALL
10. IF YOU CHECKED OFF ANY PROBLEMS, HOW DIFFICULT HAVE THESE PROBLEMS MADE IT FOR YOU TO DO YOUR WORK, TAKE CARE OF THINGS AT HOME, OR GET ALONG WITH OTHER PEOPLE: NOT DIFFICULT AT ALL
SUM OF ALL RESPONSES TO PHQ QUESTIONS 1-9: 0
5. POOR APPETITE OR OVEREATING: NOT AT ALL
1. LITTLE INTEREST OR PLEASURE IN DOING THINGS: NOT AT ALL
4. FEELING TIRED OR HAVING LITTLE ENERGY: NOT AT ALL
2. FEELING DOWN, DEPRESSED OR HOPELESS: NOT AT ALL
7. TROUBLE CONCENTRATING ON THINGS, SUCH AS READING THE NEWSPAPER OR WATCHING TELEVISION: NOT AT ALL
7. TROUBLE CONCENTRATING ON THINGS, SUCH AS READING THE NEWSPAPER OR WATCHING TELEVISION: NOT AT ALL
SUM OF ALL RESPONSES TO PHQ QUESTIONS 1-9: 0
SUM OF ALL RESPONSES TO PHQ QUESTIONS 1-9: 0
4. FEELING TIRED OR HAVING LITTLE ENERGY: NOT AT ALL
8. MOVING OR SPEAKING SO SLOWLY THAT OTHER PEOPLE COULD HAVE NOTICED. OR THE OPPOSITE - BEING SO FIDGETY OR RESTLESS THAT YOU HAVE BEEN MOVING AROUND A LOT MORE THAN USUAL: NOT AT ALL
SUM OF ALL RESPONSES TO PHQ QUESTIONS 1-9: 0

## 2025-02-25 DIAGNOSIS — M54.42 CHRONIC MIDLINE LOW BACK PAIN WITH BILATERAL SCIATICA: ICD-10-CM

## 2025-02-25 DIAGNOSIS — I10 ESSENTIAL HYPERTENSION: ICD-10-CM

## 2025-02-25 DIAGNOSIS — M54.41 CHRONIC MIDLINE LOW BACK PAIN WITH BILATERAL SCIATICA: ICD-10-CM

## 2025-02-25 DIAGNOSIS — G89.29 CHRONIC MIDLINE LOW BACK PAIN WITH BILATERAL SCIATICA: ICD-10-CM

## 2025-02-27 ENCOUNTER — OFFICE VISIT (OUTPATIENT)
Dept: FAMILY MEDICINE CLINIC | Age: 55
End: 2025-02-27

## 2025-02-27 ENCOUNTER — HOSPITAL ENCOUNTER (OUTPATIENT)
Age: 55
Setting detail: SPECIMEN
Discharge: HOME | End: 2025-02-27

## 2025-02-27 VITALS
TEMPERATURE: 97.8 F | SYSTOLIC BLOOD PRESSURE: 132 MMHG | OXYGEN SATURATION: 98 % | WEIGHT: 205 LBS | HEIGHT: 65 IN | BODY MASS INDEX: 34.16 KG/M2 | HEART RATE: 70 BPM | DIASTOLIC BLOOD PRESSURE: 80 MMHG | RESPIRATION RATE: 16 BRPM

## 2025-02-27 DIAGNOSIS — M54.42 CHRONIC MIDLINE LOW BACK PAIN WITH BILATERAL SCIATICA: ICD-10-CM

## 2025-02-27 DIAGNOSIS — Z12.31 SCREENING MAMMOGRAM FOR BREAST CANCER: ICD-10-CM

## 2025-02-27 DIAGNOSIS — F34.1 DYSTHYMIA: ICD-10-CM

## 2025-02-27 DIAGNOSIS — M25.512 ACUTE PAIN OF LEFT SHOULDER: ICD-10-CM

## 2025-02-27 DIAGNOSIS — J01.90 ACUTE NON-RECURRENT SINUSITIS, UNSPECIFIED LOCATION: ICD-10-CM

## 2025-02-27 DIAGNOSIS — E11.8 TYPE 2 DIABETES MELLITUS WITH COMPLICATION, WITHOUT LONG-TERM CURRENT USE OF INSULIN (HCC): Primary | ICD-10-CM

## 2025-02-27 DIAGNOSIS — Z12.11 SCREENING FOR COLON CANCER: ICD-10-CM

## 2025-02-27 DIAGNOSIS — I10 ESSENTIAL HYPERTENSION: ICD-10-CM

## 2025-02-27 DIAGNOSIS — J45.20 MILD INTERMITTENT ASTHMA WITHOUT COMPLICATION: ICD-10-CM

## 2025-02-27 DIAGNOSIS — G89.29 CHRONIC MIDLINE LOW BACK PAIN WITH BILATERAL SCIATICA: ICD-10-CM

## 2025-02-27 DIAGNOSIS — M54.41 CHRONIC MIDLINE LOW BACK PAIN WITH BILATERAL SCIATICA: ICD-10-CM

## 2025-02-27 DIAGNOSIS — G47.411 NARCOLEPSY AND CATAPLEXY: ICD-10-CM

## 2025-02-27 DIAGNOSIS — L98.9 CHANGING SKIN LESION: ICD-10-CM

## 2025-02-27 DIAGNOSIS — F43.0 STRESS REACTION: ICD-10-CM

## 2025-02-27 DIAGNOSIS — M15.0 PRIMARY OSTEOARTHRITIS INVOLVING MULTIPLE JOINTS: ICD-10-CM

## 2025-02-27 DIAGNOSIS — E11.8 TYPE 2 DIABETES MELLITUS WITH COMPLICATION, WITHOUT LONG-TERM CURRENT USE OF INSULIN (HCC): ICD-10-CM

## 2025-02-27 DIAGNOSIS — F41.9 ANXIETY: ICD-10-CM

## 2025-02-27 LAB
CREAT UR-MCNC: 33.7 MG/DL (ref 28–217)
HBA1C MFR BLD: 10.9 %
TOTAL PROTEIN, URINE: 6 MG/DL
URINE TOTAL PROTEIN CREATININE RATIO: 0.18 (ref 0–0.2)

## 2025-02-27 RX ORDER — AMLODIPINE BESYLATE 10 MG/1
TABLET ORAL
Qty: 90 TABLET | Refills: 0 | Status: SHIPPED | OUTPATIENT
Start: 2025-02-27

## 2025-02-27 RX ORDER — ALBUTEROL SULFATE 90 UG/1
INHALANT RESPIRATORY (INHALATION)
Qty: 8.5 G | Refills: 1 | Status: SHIPPED | OUTPATIENT
Start: 2025-02-27

## 2025-02-27 RX ORDER — AMLODIPINE BESYLATE 10 MG/1
TABLET ORAL
Qty: 30 TABLET | Refills: 0 | OUTPATIENT
Start: 2025-02-27

## 2025-02-27 RX ORDER — BLOOD SUGAR DIAGNOSTIC
STRIP MISCELLANEOUS
Qty: 300 STRIP | Refills: 0 | Status: SHIPPED | OUTPATIENT
Start: 2025-02-27

## 2025-02-27 RX ORDER — IBUPROFEN 800 MG/1
TABLET, FILM COATED ORAL
Qty: 270 TABLET | Refills: 1 | Status: SHIPPED | OUTPATIENT
Start: 2025-02-27

## 2025-02-27 RX ORDER — METOPROLOL TARTRATE 50 MG
TABLET ORAL
Qty: 180 TABLET | Refills: 0 | Status: SHIPPED | OUTPATIENT
Start: 2025-02-27

## 2025-02-27 RX ORDER — HYDROCHLOROTHIAZIDE 12.5 MG/1
TABLET ORAL
Qty: 30 TABLET | Refills: 0 | OUTPATIENT
Start: 2025-02-27

## 2025-02-27 RX ORDER — POLYETHYLENE GLYCOL 400 AND PROPYLENE GLYCOL 4; 3 MG/ML; MG/ML
SOLUTION/ DROPS OPHTHALMIC
Qty: 10 ML | Refills: 0 | Status: SHIPPED | OUTPATIENT
Start: 2025-02-27

## 2025-02-27 RX ORDER — DULAGLUTIDE 3 MG/.5ML
3 INJECTION, SOLUTION SUBCUTANEOUS WEEKLY
COMMUNITY
Start: 2025-01-17

## 2025-02-27 RX ORDER — PEN NEEDLE, DIABETIC 32GX 5/32"
1 NEEDLE, DISPOSABLE MISCELLANEOUS DAILY
Qty: 100 EACH | Refills: 0 | Status: SHIPPED | OUTPATIENT
Start: 2025-02-27

## 2025-02-27 RX ORDER — ROPINIROLE 1 MG/1
1 TABLET, FILM COATED ORAL NIGHTLY
Qty: 90 TABLET | Refills: 0 | Status: SHIPPED | OUTPATIENT
Start: 2025-02-27

## 2025-02-27 RX ORDER — VENLAFAXINE HYDROCHLORIDE 37.5 MG/1
37.5 CAPSULE, EXTENDED RELEASE ORAL DAILY
Qty: 90 CAPSULE | Refills: 1 | Status: SHIPPED | OUTPATIENT
Start: 2025-02-27

## 2025-02-27 RX ORDER — BUDESONIDE AND FORMOTEROL FUMARATE DIHYDRATE 160; 4.5 UG/1; UG/1
AEROSOL RESPIRATORY (INHALATION)
Qty: 3 EACH | Refills: 0 | Status: SHIPPED | OUTPATIENT
Start: 2025-02-27

## 2025-02-27 RX ORDER — FLUTICASONE PROPIONATE 50 MCG
1 SPRAY, SUSPENSION (ML) NASAL DAILY
Qty: 3 EACH | Refills: 0 | Status: SHIPPED | OUTPATIENT
Start: 2025-02-27

## 2025-02-27 RX ORDER — GABAPENTIN 600 MG/1
TABLET ORAL
Qty: 120 TABLET | Refills: 0 | OUTPATIENT
Start: 2025-02-27

## 2025-02-27 RX ORDER — BLOOD-GLUCOSE METER
1 KIT MISCELLANEOUS DAILY
Qty: 1 KIT | Refills: 0 | Status: SHIPPED | OUTPATIENT
Start: 2025-02-27

## 2025-02-27 RX ORDER — LORATADINE 10 MG/1
10 TABLET ORAL DAILY
Qty: 90 TABLET | Refills: 0 | Status: CANCELLED | OUTPATIENT
Start: 2025-02-27

## 2025-02-27 RX ORDER — INSULIN GLARGINE 100 [IU]/ML
9 INJECTION, SOLUTION SUBCUTANEOUS 2 TIMES DAILY
Qty: 15 ML | Refills: 0 | Status: SHIPPED | OUTPATIENT
Start: 2025-02-27 | End: 2025-05-21

## 2025-02-27 RX ORDER — GABAPENTIN 600 MG/1
TABLET ORAL
Qty: 120 TABLET | Refills: 0 | Status: SHIPPED | OUTPATIENT
Start: 2025-02-27 | End: 2025-03-27

## 2025-02-27 RX ORDER — HYDROCHLOROTHIAZIDE 12.5 MG/1
TABLET ORAL
Qty: 90 TABLET | Refills: 0 | Status: SHIPPED | OUTPATIENT
Start: 2025-02-27

## 2025-02-27 RX ORDER — DULAGLUTIDE 3 MG/.5ML
3 INJECTION, SOLUTION SUBCUTANEOUS WEEKLY
Qty: 0.5 ML | Refills: 2 | Status: CANCELLED | OUTPATIENT
Start: 2025-02-27

## 2025-02-27 RX ORDER — ISOPROPYL ALCOHOL 0.75 G/1
SWAB TOPICAL
Qty: 100 EACH | Refills: 1 | Status: SHIPPED | OUTPATIENT
Start: 2025-02-27

## 2025-02-27 RX ORDER — VENLAFAXINE HYDROCHLORIDE 150 MG/1
CAPSULE, EXTENDED RELEASE ORAL
Qty: 90 CAPSULE | Refills: 0 | Status: SHIPPED | OUTPATIENT
Start: 2025-02-27

## 2025-02-27 SDOH — ECONOMIC STABILITY: FOOD INSECURITY: WITHIN THE PAST 12 MONTHS, THE FOOD YOU BOUGHT JUST DIDN'T LAST AND YOU DIDN'T HAVE MONEY TO GET MORE.: NEVER TRUE

## 2025-02-27 SDOH — ECONOMIC STABILITY: FOOD INSECURITY: WITHIN THE PAST 12 MONTHS, YOU WORRIED THAT YOUR FOOD WOULD RUN OUT BEFORE YOU GOT MONEY TO BUY MORE.: NEVER TRUE

## 2025-02-27 ASSESSMENT — ENCOUNTER SYMPTOMS
COUGH: 0
GASTROINTESTINAL NEGATIVE: 1
SHORTNESS OF BREATH: 0
RESPIRATORY NEGATIVE: 1

## 2025-02-27 ASSESSMENT — ANXIETY QUESTIONNAIRES
5. BEING SO RESTLESS THAT IT IS HARD TO SIT STILL: NOT AT ALL
GAD7 TOTAL SCORE: 2
IF YOU CHECKED OFF ANY PROBLEMS ON THIS QUESTIONNAIRE, HOW DIFFICULT HAVE THESE PROBLEMS MADE IT FOR YOU TO DO YOUR WORK, TAKE CARE OF THINGS AT HOME, OR GET ALONG WITH OTHER PEOPLE: NOT DIFFICULT AT ALL
1. FEELING NERVOUS, ANXIOUS, OR ON EDGE: NOT AT ALL
2. NOT BEING ABLE TO STOP OR CONTROL WORRYING: NOT AT ALL
3. WORRYING TOO MUCH ABOUT DIFFERENT THINGS: SEVERAL DAYS
7. FEELING AFRAID AS IF SOMETHING AWFUL MIGHT HAPPEN: NOT AT ALL
4. TROUBLE RELAXING: NOT AT ALL
6. BECOMING EASILY ANNOYED OR IRRITABLE: SEVERAL DAYS

## 2025-02-27 NOTE — PATIENT INSTRUCTIONS
Thank you for choosing [email or web address].  We know you have options when it comes to your healthcare; we appreciate that you chose us. Our goal is to provide exceptional  service and world class care to every patient.  You will be receiving a survey via email or text message asking for your feedback.  Please take a few minutes to share your thoughts about your recent visit. Your comments helps us understand what we do well and ways we can improve.  Thank you in advance for your valuable feedback.              New Updates for [facility]    Thank you for choosing [facility] to give you the best care! [facility] is always trying to think of new ways to help their patients. We are asking all patients to try out the new digital registration that is now available through the [employer] [facility]. Down load today!. Via the luis carlos you're now able to update your personal and registration information prior to your upcoming appointment. This will save you time once you arrive at the office to check-in, not to mention your information remains safe!! Many other perks come from signing up for an account, such as:  Requesting refills  Scheduling an appointment  Completing an E-Visit  Sending a message to the office/provider  Having access to your medication list  Paying your bill/copay prior to your appointment  Scheduling your yearly mammogram  Review your test results    If you are not familiar the [employer] [facility], please ask one of us and we will be happy to answer any questions or help you set-up your account.

## 2025-02-27 NOTE — PROGRESS NOTES
[facility]  [facility]  [address]  [address]  [facility] [address]  Dept: [phone]    2/27/2025    CHIEF COMPLAINT    Chief Complaint   Patient presents with    Diabetes    Arm Pain     Upper left arm pain, elbow to shoulder       HPI    Noemi Prajapati is a 55 y.o. female who presents   Chief Complaint   Patient presents with    Diabetes    Arm Pain     Upper left arm pain, elbow to shoulder   .  Appointment for diabetes follow-up.    Diabetes- Patient canceled December diabetic follow-up with me and pharmacist follow-up   Taking Trulicity when able. Taking Lantus and Jardiance.  Taking Lantus 9 u twice daily.  15 minutes daily.   She was advised to increase her pop intake as she previously reported drinking 4-5 cans per day.  She continues to not check her BG due to not being able to find her machine.   TODAY's A1C 10.9     Lab Results       Component                Value               Date                       LABA1C                   10.9                09/18/2024                 LABA1C                   8.6                 09/25/2023                 LABA1C                   8.4                 02/06/2023            Lab Results       Component                Value               Date                       EAG                      163                 06/09/2022                       Depression/Anxiety- Taking Venlafaxine 150 mg.  She has previously taken Xanax during the daytime as needed, but has not needed this prescription for quite some time.  She has not been in the office in quite some time.  Has been unable/in eligible for refill as well  No longer taking Xyrem per pulmonary.    Wound- mole on back was treated with skin tag remover by her boyfriend. She notes that it is very itchy and uncomfortable. Wanting it removed today by our office.     Hypertension-continue amlodipine, hydrochlorothiazide and metoprolol.    Cologuard ordered at last

## 2025-03-01 NOTE — ASSESSMENT & PLAN NOTE
Chronic, worsening (exacerbation), changes made today: Increase compliance with Lantus twice daily dosing.  Continuing 9 units twice daily for now, continue Jardiance, switch Trulicity for Mounjaro, stop drinking sweet tea, improve diet and increase activity

## 2025-03-13 ENCOUNTER — TELEPHONE (OUTPATIENT)
Dept: FAMILY MEDICINE CLINIC | Age: 55
End: 2025-03-13

## 2025-03-13 DIAGNOSIS — I10 ESSENTIAL HYPERTENSION: ICD-10-CM

## 2025-03-13 DIAGNOSIS — F34.1 DYSTHYMIA: ICD-10-CM

## 2025-03-13 DIAGNOSIS — F41.9 ANXIETY: ICD-10-CM

## 2025-03-13 DIAGNOSIS — F43.0 STRESS REACTION: ICD-10-CM

## 2025-03-13 NOTE — TELEPHONE ENCOUNTER
Noemi Prajapati is calling to request a refill on the following medication(s):    Last Visit Date (If Applicable):  2/27/2025    Next Visit Date:    5/27/2025    Medication Request:  Requested Prescriptions     Pending Prescriptions Disp Refills    venlafaxine (EFFEXOR XR) 150 MG extended release capsule [Pharmacy Med Name: VENLAFAXINE ER 150MG CAPSULES] 90 capsule 0     Sig: TAKE 1 CAPSULE BY MOUTH EVERY MORNING    metoprolol tartrate (LOPRESSOR) 50 MG tablet [Pharmacy Med Name: METOPROLOL TARTRATE 50MG TABLETS] 180 tablet 0     Sig: TAKE 1 TABLET BY MOUTH TWICE DAILY    rOPINIRole (REQUIP) 1 MG tablet [Pharmacy Med Name: ROPINIROLE 1MG TABLETS] 90 tablet 0     Sig: TAKE 1 TABLET BY MOUTH EVERY NIGHT

## 2025-03-13 NOTE — TELEPHONE ENCOUNTER
Pt stated: medical supply company will call us to approve for c-pap.  Has no name of company only phone number ([phone]) and ([phone]).    Please, approve per pt.

## 2025-03-16 RX ORDER — METOPROLOL TARTRATE 50 MG
50 TABLET ORAL 2 TIMES DAILY
Qty: 180 TABLET | Refills: 0 | Status: SHIPPED | OUTPATIENT
Start: 2025-03-16

## 2025-03-16 RX ORDER — VENLAFAXINE HYDROCHLORIDE 150 MG/1
150 CAPSULE, EXTENDED RELEASE ORAL EVERY MORNING
Qty: 90 CAPSULE | Refills: 0 | Status: SHIPPED | OUTPATIENT
Start: 2025-03-16

## 2025-03-16 RX ORDER — ROPINIROLE 1 MG/1
1 TABLET, FILM COATED ORAL NIGHTLY
Qty: 90 TABLET | Refills: 0 | Status: SHIPPED | OUTPATIENT
Start: 2025-03-16

## 2025-03-24 DIAGNOSIS — J01.90 ACUTE NON-RECURRENT SINUSITIS, UNSPECIFIED LOCATION: ICD-10-CM

## 2025-03-24 NOTE — TELEPHONE ENCOUNTER
Noemi Prajapati is calling to request a refill on the following medication(s):    Last Visit Date (If Applicable):  2/27/2025    Next Visit Date:    5/27/2025    Medication Request:  Requested Prescriptions     Pending Prescriptions Disp Refills    loratadine (CLARITIN) 10 MG tablet [Pharmacy Med Name: ALLERGY RELF (LORATADINE) 10MG TABS] 30 tablet 0     Sig: TAKE 1 TABLET BY MOUTH DAILY

## 2025-03-25 DIAGNOSIS — M15.0 PRIMARY OSTEOARTHRITIS INVOLVING MULTIPLE JOINTS: ICD-10-CM

## 2025-03-26 RX ORDER — LORATADINE 10 MG/1
10 TABLET ORAL DAILY
Qty: 30 TABLET | Refills: 3 | Status: SHIPPED | OUTPATIENT
Start: 2025-03-26

## 2025-03-26 RX ORDER — IBUPROFEN 800 MG/1
TABLET, FILM COATED ORAL
Qty: 270 TABLET | Refills: 1 | Status: SHIPPED | OUTPATIENT
Start: 2025-03-26

## 2025-03-27 ENCOUNTER — TELEPHONE (OUTPATIENT)
Dept: FAMILY MEDICINE CLINIC | Age: 55
End: 2025-03-27

## 2025-03-27 DIAGNOSIS — M54.41 CHRONIC MIDLINE LOW BACK PAIN WITH BILATERAL SCIATICA: ICD-10-CM

## 2025-03-27 DIAGNOSIS — G89.29 CHRONIC MIDLINE LOW BACK PAIN WITH BILATERAL SCIATICA: ICD-10-CM

## 2025-03-27 DIAGNOSIS — M54.42 CHRONIC MIDLINE LOW BACK PAIN WITH BILATERAL SCIATICA: ICD-10-CM

## 2025-03-27 NOTE — TELEPHONE ENCOUNTER
Spoke to Noemi and she does not want to get her supplies for CPAP machine from [facility].    She has not asked for the Dexcom/Freestyle Blake either.    Charisse has told [facility] that the office will not be completing this request -per the patient

## 2025-03-30 RX ORDER — GABAPENTIN 600 MG/1
1200 TABLET ORAL 2 TIMES DAILY
Qty: 120 TABLET | Refills: 0 | Status: SHIPPED | OUTPATIENT
Start: 2025-03-30 | End: 2025-04-29

## 2025-03-31 DIAGNOSIS — E11.8 TYPE 2 DIABETES MELLITUS WITH COMPLICATION, WITHOUT LONG-TERM CURRENT USE OF INSULIN (HCC): Primary | ICD-10-CM

## 2025-03-31 NOTE — TELEPHONE ENCOUNTER
Maico Cruz    Pt is calling due to her Mounjaro 7.5. Need of a PA. Pt states her Pharmacy need a PA for this medication.     Thank You.

## 2025-03-31 NOTE — TELEPHONE ENCOUNTER
They have denied this medication. She hasn't done three approved medications and failed on they.     I will call again tomorrow and try to add additional   information.     She never tried Trulicity 4.5 mg, she didn't take Metformin and Jardiance together for 6 months.

## 2025-04-03 ENCOUNTER — TELEPHONE (OUTPATIENT)
Dept: FAMILY MEDICINE CLINIC | Age: 55
End: 2025-04-03

## 2025-04-03 DIAGNOSIS — E11.8 TYPE 2 DIABETES MELLITUS WITH COMPLICATION, WITHOUT LONG-TERM CURRENT USE OF INSULIN (HCC): ICD-10-CM

## 2025-04-03 NOTE — TELEPHONE ENCOUNTER
Patient called is needing dulaglutide sent to [facility]. Please advise.         [id number] [address] - P [phone] - F [phone]

## 2025-04-24 ENCOUNTER — PATIENT MESSAGE (OUTPATIENT)
Dept: FAMILY MEDICINE CLINIC | Age: 55
End: 2025-04-24

## 2025-05-07 PROBLEM — S22.41XA: Status: ACTIVE | Noted: 2025-05-07

## 2025-05-21 DIAGNOSIS — J45.20 MILD INTERMITTENT ASTHMA WITHOUT COMPLICATION: ICD-10-CM

## 2025-05-21 NOTE — TELEPHONE ENCOUNTER
Noemi Prajapati is calling to request a refill on the following medication(s):    Last Visit Date (If Applicable):  2/27/2025    Next Visit Date:    5/27/2025    Medication Request:  Requested Prescriptions     Pending Prescriptions Disp Refills    albuterol sulfate HFA (PROVENTIL;VENTOLIN;PROAIR) 108 (90 Base) MCG/ACT inhaler [Pharmacy Med Name: ALBUTEROL HFA INH (200 PUFFS) 8.5GM] 8.5 g 1     Sig: INHALE 2 PUFFS BY MOUTH INTO THE LUNGS EVERY 6 HOURS IF NEEDED FOR WHEEZING

## 2025-05-22 RX ORDER — ALBUTEROL SULFATE 90 UG/1
INHALANT RESPIRATORY (INHALATION)
Qty: 8.5 G | Refills: 1 | Status: SHIPPED | OUTPATIENT
Start: 2025-05-22

## 2025-05-23 DIAGNOSIS — M54.42 CHRONIC MIDLINE LOW BACK PAIN WITH BILATERAL SCIATICA: ICD-10-CM

## 2025-05-23 DIAGNOSIS — M54.41 CHRONIC MIDLINE LOW BACK PAIN WITH BILATERAL SCIATICA: ICD-10-CM

## 2025-05-23 DIAGNOSIS — G89.29 CHRONIC MIDLINE LOW BACK PAIN WITH BILATERAL SCIATICA: ICD-10-CM

## 2025-05-23 RX ORDER — GABAPENTIN 600 MG/1
1200 TABLET ORAL 2 TIMES DAILY
Qty: 120 TABLET | Refills: 0 | Status: SHIPPED | OUTPATIENT
Start: 2025-05-23 | End: 2025-06-22

## 2025-05-24 DIAGNOSIS — I10 ESSENTIAL HYPERTENSION: ICD-10-CM

## 2025-05-27 ENCOUNTER — TELEPHONE (OUTPATIENT)
Dept: FAMILY MEDICINE CLINIC | Age: 55
End: 2025-05-27
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
## 2025-05-27 NOTE — TELEPHONE ENCOUNTER
Noemi Prajapati is calling to request a refill on the following medication(s):    Last Visit Date (If Applicable):  2/27/2025    Next Visit Date:    6/23/2025    Medication Request:  Requested Prescriptions     Pending Prescriptions Disp Refills    amLODIPine (NORVASC) 10 MG tablet [Pharmacy Med Name: AMLODIPINE BESYLATE 10MGTABLETS] 90 tablet 0     Sig: TAKE 1 TABLET BY MOUTH EVERY DAY

## 2025-05-29 RX ORDER — AMLODIPINE BESYLATE 10 MG/1
10 TABLET ORAL DAILY
Qty: 90 TABLET | Refills: 0 | Status: SHIPPED | OUTPATIENT
Start: 2025-05-29

## 2025-06-07 DIAGNOSIS — E11.8 TYPE 2 DIABETES MELLITUS WITH COMPLICATION, WITHOUT LONG-TERM CURRENT USE OF INSULIN (HCC): ICD-10-CM

## 2025-06-09 NOTE — TELEPHONE ENCOUNTER
Noemi Prajapati is calling to request a refill on the following medication(s):    Last Visit Date (If Applicable):  2/27/2025    Next Visit Date:    6/23/2025    Medication Request:  Requested Prescriptions     Pending Prescriptions Disp Refills    blood glucose test strips (ONETOUCH ULTRA) strip [Pharmacy Med Name: ONE TOUCH ULTRA BLUE TESTST(NEW)100] 300 strip 0     Sig: USE TO TEST BLOOD SUGARS THREE TIMES DAILY

## 2025-06-11 DIAGNOSIS — E11.8 TYPE 2 DIABETES MELLITUS WITH COMPLICATION, WITHOUT LONG-TERM CURRENT USE OF INSULIN (HCC): ICD-10-CM

## 2025-06-11 RX ORDER — BLOOD SUGAR DIAGNOSTIC
STRIP MISCELLANEOUS
Qty: 300 STRIP | Refills: 0 | Status: SHIPPED | OUTPATIENT
Start: 2025-06-11

## 2025-06-12 RX ORDER — EMPAGLIFLOZIN 25 MG/1
25 TABLET, FILM COATED ORAL DAILY
Qty: 90 TABLET | Refills: 0 | Status: SHIPPED | OUTPATIENT
Start: 2025-06-12

## 2025-06-21 DIAGNOSIS — M54.42 CHRONIC MIDLINE LOW BACK PAIN WITH BILATERAL SCIATICA: ICD-10-CM

## 2025-06-21 DIAGNOSIS — M54.41 CHRONIC MIDLINE LOW BACK PAIN WITH BILATERAL SCIATICA: ICD-10-CM

## 2025-06-21 DIAGNOSIS — G89.29 CHRONIC MIDLINE LOW BACK PAIN WITH BILATERAL SCIATICA: ICD-10-CM

## 2025-06-23 ENCOUNTER — HOSPITAL ENCOUNTER (OUTPATIENT)
Age: 55
Setting detail: SPECIMEN
Discharge: HOME | End: 2025-06-23

## 2025-06-23 ENCOUNTER — OFFICE VISIT (OUTPATIENT)
Dept: FAMILY MEDICINE CLINIC | Age: 55
End: 2025-06-23
Payer: COMMERCIAL

## 2025-06-23 VITALS
HEART RATE: 90 BPM | OXYGEN SATURATION: 99 % | HEIGHT: 65 IN | SYSTOLIC BLOOD PRESSURE: 118 MMHG | DIASTOLIC BLOOD PRESSURE: 80 MMHG | BODY MASS INDEX: 32.86 KG/M2 | WEIGHT: 197.2 LBS

## 2025-06-23 DIAGNOSIS — I10 ESSENTIAL HYPERTENSION: ICD-10-CM

## 2025-06-23 DIAGNOSIS — F34.1 DYSTHYMIA: ICD-10-CM

## 2025-06-23 DIAGNOSIS — M15.0 PRIMARY OSTEOARTHRITIS INVOLVING MULTIPLE JOINTS: ICD-10-CM

## 2025-06-23 DIAGNOSIS — E66.09 CLASS 1 OBESITY DUE TO EXCESS CALORIES WITH SERIOUS COMORBIDITY AND BODY MASS INDEX (BMI) OF 32.0 TO 32.9 IN ADULT: ICD-10-CM

## 2025-06-23 DIAGNOSIS — E11.8 TYPE 2 DIABETES MELLITUS WITH COMPLICATION, WITHOUT LONG-TERM CURRENT USE OF INSULIN (HCC): Primary | ICD-10-CM

## 2025-06-23 DIAGNOSIS — F43.0 STRESS REACTION: ICD-10-CM

## 2025-06-23 DIAGNOSIS — E66.811 CLASS 1 OBESITY DUE TO EXCESS CALORIES WITH SERIOUS COMORBIDITY AND BODY MASS INDEX (BMI) OF 32.0 TO 32.9 IN ADULT: ICD-10-CM

## 2025-06-23 DIAGNOSIS — E11.8 TYPE 2 DIABETES MELLITUS WITH COMPLICATION, WITHOUT LONG-TERM CURRENT USE OF INSULIN (HCC): ICD-10-CM

## 2025-06-23 DIAGNOSIS — Z12.11 SCREENING FOR COLON CANCER: ICD-10-CM

## 2025-06-23 DIAGNOSIS — F41.9 ANXIETY: ICD-10-CM

## 2025-06-23 LAB
CREAT UR-MCNC: 38.2 MG/DL (ref 28–217)
HBA1C MFR BLD: 9.1 %
MICROALBUMIN UR-MCNC: 13 MG/L (ref 0–20)
MICROALBUMIN/CREAT UR-RTO: 34 MCG/MG CREAT (ref 0–25)

## 2025-06-23 PROCEDURE — 3074F SYST BP LT 130 MM HG: CPT | Performed by: NURSE PRACTITIONER

## 2025-06-23 PROCEDURE — G8427 DOCREV CUR MEDS BY ELIG CLIN: HCPCS | Performed by: NURSE PRACTITIONER

## 2025-06-23 PROCEDURE — 1036F TOBACCO NON-USER: CPT | Performed by: NURSE PRACTITIONER

## 2025-06-23 PROCEDURE — 3079F DIAST BP 80-89 MM HG: CPT | Performed by: NURSE PRACTITIONER

## 2025-06-23 PROCEDURE — G8417 CALC BMI ABV UP PARAM F/U: HCPCS | Performed by: NURSE PRACTITIONER

## 2025-06-23 PROCEDURE — 2022F DILAT RTA XM EVC RTNOPTHY: CPT | Performed by: NURSE PRACTITIONER

## 2025-06-23 PROCEDURE — 83036 HEMOGLOBIN GLYCOSYLATED A1C: CPT | Performed by: NURSE PRACTITIONER

## 2025-06-23 PROCEDURE — 99214 OFFICE O/P EST MOD 30 MIN: CPT | Performed by: NURSE PRACTITIONER

## 2025-06-23 PROCEDURE — 3017F COLORECTAL CA SCREEN DOC REV: CPT | Performed by: NURSE PRACTITIONER

## 2025-06-23 PROCEDURE — 3046F HEMOGLOBIN A1C LEVEL >9.0%: CPT | Performed by: NURSE PRACTITIONER

## 2025-06-23 RX ORDER — VENLAFAXINE HYDROCHLORIDE 150 MG/1
150 CAPSULE, EXTENDED RELEASE ORAL EVERY MORNING
Qty: 90 CAPSULE | Refills: 0 | Status: SHIPPED | OUTPATIENT
Start: 2025-06-23

## 2025-06-23 RX ORDER — VENLAFAXINE HYDROCHLORIDE 37.5 MG/1
37.5 CAPSULE, EXTENDED RELEASE ORAL DAILY
Qty: 90 CAPSULE | Refills: 0 | Status: SHIPPED | OUTPATIENT
Start: 2025-06-23
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
## 2025-06-23 NOTE — TELEPHONE ENCOUNTER
Noemi Prajapati is calling to request a refill on the following medication(s):    Last Visit Date (If Applicable):  2/27/2025    Next Visit Date:    6/23/2025    Medication Request:  Requested Prescriptions     Pending Prescriptions Disp Refills    gabapentin (NEURONTIN) 600 MG tablet [Pharmacy Med Name: GABAPENTIN 600MG TABLETS] 120 tablet 0     Sig: Take 2 tablets by mouth 2 times daily for 30 days.

## 2025-06-25 RX ORDER — GABAPENTIN 600 MG/1
1200 TABLET ORAL 2 TIMES DAILY
Qty: 120 TABLET | Refills: 3 | Status: SHIPPED | OUTPATIENT
Start: 2025-06-25 | End: 2025-10-23

## 2025-07-01 ENCOUNTER — RESULTS FOLLOW-UP (OUTPATIENT)
Dept: FAMILY MEDICINE CLINIC | Age: 55
End: 2025-07-01

## 2025-07-01 ASSESSMENT — ENCOUNTER SYMPTOMS
RESPIRATORY NEGATIVE: 1
COUGH: 0
GASTROINTESTINAL NEGATIVE: 1
SHORTNESS OF BREATH: 0

## 2025-07-20 DIAGNOSIS — J01.90 ACUTE NON-RECURRENT SINUSITIS, UNSPECIFIED LOCATION: ICD-10-CM

## 2025-07-20 DIAGNOSIS — J45.20 MILD INTERMITTENT ASTHMA WITHOUT COMPLICATION: ICD-10-CM

## 2025-07-20 RX ORDER — ALBUTEROL SULFATE 90 UG/1
INHALANT RESPIRATORY (INHALATION)
Qty: 8.5 G | Refills: 5 | Status: SHIPPED | OUTPATIENT
Start: 2025-07-20

## 2025-07-20 RX ORDER — LORATADINE 10 MG/1
10 TABLET ORAL DAILY
Qty: 30 TABLET | Refills: 5 | Status: SHIPPED | OUTPATIENT
Start: 2025-07-20

## 2025-08-17 DIAGNOSIS — I10 ESSENTIAL HYPERTENSION: ICD-10-CM

## 2025-08-20 RX ORDER — HYDROCHLOROTHIAZIDE 12.5 MG/1
12.5 TABLET ORAL DAILY
Qty: 90 TABLET | Refills: 0 | Status: SHIPPED | OUTPATIENT
Start: 2025-08-20

## 2025-08-23 DIAGNOSIS — I10 ESSENTIAL HYPERTENSION: ICD-10-CM

## 2025-08-24 RX ORDER — AMLODIPINE BESYLATE 10 MG/1
10 TABLET ORAL DAILY
Qty: 90 TABLET | Refills: 0 | Status: SHIPPED | OUTPATIENT
Start: 2025-08-24

## (undated) DEVICE — GAUZE,SPONGE,4"X4",16PLY,STRL,LF,10/TRAY: Brand: MEDLINE

## (undated) DEVICE — GAUZE,PACKING STRIP,IODOFORM,1/2"X5YD,ST: Brand: CURAD

## (undated) DEVICE — ELECTRODE PT RET AD L9FT HI MOIST COND ADH HYDRGEL CORDED

## (undated) DEVICE — BANDAGE COMPR M W6INXL10YD WHT BGE VELC E MTRX HK AND LOOP

## (undated) DEVICE — GLOVE SURG SZ 85 L12IN FNGR THK13MIL BRN LTX SYN POLYMER W

## (undated) DEVICE — MHPB ARTHROSCOPY PACK: Brand: MEDLINE INDUSTRIES, INC.

## (undated) DEVICE — TUBING PMP L16FT MAIN DISP FOR AR-6400 AR-6475

## (undated) DEVICE — SOLUTION IRRIG 3000ML LAC RINGERS ARTHROMTC PLAS CONT

## (undated) DEVICE — SOLUTION SCRB 4OZ 10% POVIDONE IOD ANTIMIC BTL

## (undated) DEVICE — MHPB HEAD AND NECK  PACK: Brand: MEDLINE INDUSTRIES, INC.

## (undated) DEVICE — THERMOMETER W/ TEE ADAPTER

## (undated) DEVICE — DRESSING,GAUZE,XEROFORM,CURAD,1"X8",ST: Brand: CURAD

## (undated) DEVICE — PADDING UNDERCAST W6INXL4YD WYTEX 6 PER BG

## (undated) DEVICE — PENCIL ES L3M BTTN SWCH HOLSTER W/ BLDE ELECTRD EDGE

## (undated) DEVICE — 3M™ STERI-STRIP™ BLEND TONE SKIN CLOSURES, B1551, TAN, 1/4 IN X 3 IN (6 MM X 75 MM), 3 STRIPS/ENVELOPE: Brand: 3M™ STERI-STRIP™

## (undated) DEVICE — BLADE SHV L13CM DIA4MM EXCALIBUR AGG COOLCUT

## (undated) DEVICE — CHLORAPREP 26ML ORANGE

## (undated) DEVICE — SUTURE PLN GUT SZ 5-0 L18IN ABSRB YELLOWISH TAN L13MM PC-1 1915G

## (undated) DEVICE — GOWN,AURORA,NONREINFORCED,LARGE: Brand: MEDLINE

## (undated) DEVICE — STOCKINETTE,IMPERVIOUS,12X48,STERILE: Brand: MEDLINE

## (undated) DEVICE — SUTURE COAT VCRL SZ 4-0 L18IN ABSRB UD L19MM PS-2 1/2 CIR J496G

## (undated) DEVICE — BANDAGE,ELASTIC,ESMARK,STERILE,6"X9',LF: Brand: MEDLINE

## (undated) DEVICE — STRAP,POSITIONING,KNEE/BODY,FOAM,4X60": Brand: MEDLINE

## (undated) DEVICE — DRAPE,ARTHRO,W/POUCH,STERILE: Brand: MEDLINE

## (undated) DEVICE — PACK ARTHRO W PCH

## (undated) DEVICE — MEDI-VAC YANKAUER SUCTION HANDLE W/BULBOUS TIP: Brand: CARDINAL HEALTH

## (undated) DEVICE — SOLUTION IV IRRIG 500ML 0.9% SODIUM CHL 2F7123

## (undated) DEVICE — STANDARD HYPODERMIC NEEDLE,POLYPROPYLENE HUB: Brand: MONOJECT

## (undated) DEVICE — GLOVE SURG SZ 8 L12IN FNGR THK13MIL BRN LTX SYN POLYMER W

## (undated) DEVICE — SYRINGE MED 10ML LUERLOCK TIP W/O SFTY DISP

## (undated) DEVICE — ADHESIVE SKIN CLSR 0.7ML TOP DERMBND ADV

## (undated) DEVICE — INTENDED FOR TISSUE SEPARATION, AND OTHER PROCEDURES THAT REQUIRE A SHARP SURGICAL BLADE TO PUNCTURE OR CUT.: Brand: BARD-PARKER ® CARBON RIB-BACK BLADES

## (undated) DEVICE — ARROWHEAD LOCAL PACK: Brand: MEDLINE INDUSTRIES, INC.

## (undated) DEVICE — STERILE POLYISOPRENE POWDER-FREE SURGICAL GLOVES WITH EMOLLIENT COATING: Brand: PROTEXIS

## (undated) DEVICE — STERILE POLYISOPRENE POWDER-FREE SURGICAL GLOVES: Brand: PROTEXIS

## (undated) DEVICE — SUTURE ETHLN SZ 3-0 L18IN NONABSORBABLE BLK FS-1 L24MM 3/8 663H

## (undated) DEVICE — DRAPE,U/ SHT,SPLIT,PLAS,STERIL: Brand: MEDLINE

## (undated) DEVICE — SINGLE PORT MANIFOLD: Brand: NEPTUNE 2

## (undated) DEVICE — SPLINT 1529010 10PK THERMASPLINT MEDIUM

## (undated) DEVICE — ZIMMER® STERILE DISPOSABLE TOURNIQUET CUFF WITH PLC, DUAL PORT, SINGLE BLADDER, 30 IN. (76 CM)